# Patient Record
Sex: FEMALE | Race: WHITE | NOT HISPANIC OR LATINO | Employment: UNEMPLOYED | URBAN - METROPOLITAN AREA
[De-identification: names, ages, dates, MRNs, and addresses within clinical notes are randomized per-mention and may not be internally consistent; named-entity substitution may affect disease eponyms.]

---

## 2017-11-15 ENCOUNTER — TRANSCRIBE ORDERS (OUTPATIENT)
Dept: ADMINISTRATIVE | Facility: HOSPITAL | Age: 52
End: 2017-11-15

## 2017-11-15 ENCOUNTER — APPOINTMENT (OUTPATIENT)
Dept: LAB | Facility: HOSPITAL | Age: 52
End: 2017-11-15
Payer: MEDICARE

## 2017-11-15 DIAGNOSIS — N39.0 URINARY TRACT INFECTION WITHOUT HEMATURIA, SITE UNSPECIFIED: Primary | ICD-10-CM

## 2017-11-15 LAB
BACTERIA UR QL AUTO: ABNORMAL /HPF
BILIRUB UR QL STRIP: NEGATIVE
CLARITY UR: ABNORMAL
COLOR UR: YELLOW
GLUCOSE UR STRIP-MCNC: NEGATIVE MG/DL
HGB UR QL STRIP.AUTO: NEGATIVE
KETONES UR STRIP-MCNC: NEGATIVE MG/DL
LEUKOCYTE ESTERASE UR QL STRIP: ABNORMAL
NITRITE UR QL STRIP: NEGATIVE
NON-SQ EPI CELLS URNS QL MICRO: ABNORMAL /HPF
PH UR STRIP.AUTO: 5.5 [PH] (ref 4.5–8)
PROT UR STRIP-MCNC: NEGATIVE MG/DL
RBC #/AREA URNS AUTO: ABNORMAL /HPF
SP GR UR STRIP.AUTO: >=1.03 (ref 1–1.03)
UROBILINOGEN UR QL STRIP.AUTO: 0.2 E.U./DL
WBC #/AREA URNS AUTO: ABNORMAL /HPF

## 2017-11-15 PROCEDURE — 81001 URINALYSIS AUTO W/SCOPE: CPT | Performed by: FAMILY MEDICINE

## 2017-12-14 ENCOUNTER — OFFICE VISIT (OUTPATIENT)
Dept: URGENT CARE | Facility: CLINIC | Age: 52
End: 2017-12-14
Payer: MEDICARE

## 2017-12-14 PROCEDURE — G0463 HOSPITAL OUTPT CLINIC VISIT: HCPCS

## 2017-12-14 PROCEDURE — 99213 OFFICE O/P EST LOW 20 MIN: CPT

## 2017-12-16 NOTE — PROGRESS NOTES
Assessment  1  Dysuria (788 1) (R30 0)    Discussion/Summary  Discussion Summary:   Patient was encouraged to go the ER and refused patient ambulated out of the office by her daughter at time of discharge  The she did multiple attempts to obtain a urine here in the office and was unsuccessful  Patient was getting frustrated and wanted to be straight cathed however procedure is not possible at this setting  Patient states she will do when she gets home cuff was given to her but must obtain order from PCP  I once again encouraged patient to go to ER due to her symptoms multiple complaints and recent falls and she says she will follow up with her family doctor and left with her daughter hunched over in using cane  Medication Side Effects Reviewed: Possible side effects of new medications were reviewed with the patient/guardian today  Understands and agrees with treatment plan: The treatment plan was reviewed with the patient/guardian  The patient/guardian understands and agrees with the treatment plan   Counseling Documentation With Imm: The patient was counseled regarding instructions for management,-- patient and family education,-- importance of compliance with treatment  Follow Up Instructions: Follow Up with your Primary Care Provider in 1-2 days  If your symptoms worsen, go to the nearest [place] [address] Emergency Department  Chief Complaint  1  Urinary Frequency  Chief Complaint Free Text Note Form: Pt reports 3 weeks of urinary frequency  She saw her PCP 3 weeks ago for her urinary frequency  She takes nitrofuratoin BID for prevention of kidney infections per the patient  On Monday she developed dysuria, b/l flank pain and had 1 episode of vomiting yesterday  She is also c/o urinary incontinence  She would also like to be evaluated for a fall today after tripping  She has right knee and right ankle pain  She reports hitting the right side of her head on a plastic tote  Denies LOC        History of Present Illness  HPI: 48 yo female who is here today for possible UTI symptoms and unable to urinate at this time  She has had these symptoms for the past 1 5 weeks and has been doing OTC cranberry pills and juice to help and not changing  She has burning with urination 3-4 days ago, fever two days ago, blood noted in urine per pt at home and dark kerry in color per pt, but unable to urinate at this time  right knee pain, right ankle and foot pain  She has had the symptoms for the past few months with intermittent pain to the areas  Patient is here with her daughter and patient is hunched to the right  Per patient and family member this is her normal posture and her normal behavior  She arrives slightly disheveled sunglasses on nightgown on in Winter boots to the knees  Daughter at bedside well put together  Patient is jumping from 1 complaint to another and then goes on to say she has multiple falls in the past few days and has a long history of falling  Denies hitting her head but has a known aneurysm present denies headache  She has a long history of migraines but that has not bothered her  She has not urinated for last few hours to a goal cup of water has been here and still unable to urinate  She straight caths herself at home  Encouraged patient to go to ER due to the multiple complaints on ability to urinate and patient refused patient states her PCP usually gives her a sterile container and is able to bring her urine to lab once it is collected at home  Continue was given to her today with the understanding that patient must call her family doctor to obtain an order and follow up with them as well  Making to patient's right knee right ankle right foot in due to multiple injuries patient refused for me to evaluate and suggest forget I mention it because I do not Want to go over to the Er   I explained the importance of knowing all details to injuries and the seriousness and sehs topped talking about her falls ans headed to the restroom to try and obtain a urine sample  [facility] Based Practices Required Assessment:  Pain Assessment  the patient states they have pain  The pain is located in the right knee, right ankle  The patient describes the pain as aching  (on a scale of 0 to 10, the patient rates the pain at 7 )  Abuse And Domestic Violence Screen   Domestic violence screen not done today  Reason DV Screen not done: family present   Depression And Suicide Screen  Suicide screen not done today  -- Reason suicide screen not done: family present  Prefered Language is  [place]  Primary Language is  English  Urinary Frequency: Dung Joy presents with complaints of urinary frequency  Associated symptoms include urinary urgency  Review of Systems  Focused-Female:  Constitutional: as noted in HPI  Genitourinary: as noted in HPI  ROS Reviewed:   ROS reviewed  Active Problems  1  Acquired Deformity - Wrist Drop (736 05)   2  Closed Fracture Of The Proximal Phalanx Of The Right Fourth Toe (826 0)   3  Closed Fracture Of The Proximal Phalanx Of The Right Third Toe (826 0)   4  Pain in joint of right wrist (719 43) (M25 531)   5  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)   6  Ulnar Nerve Palsy Of The Right Arm (354 2)    Past Medical History  1  History of Arthritis (V13 4)   2  History of Asthma (493 90) (J45 909)   3  History of Depression (311) (F32 9)   4  History of Disorder of function of stomach (536 9) (K31 9)   5  History of Epilepsy (345 90)   6  History of thyroid disease (V12 29) (Z86 39)  Active Problems And Past Medical History Reviewed: The active problems and past medical history were reviewed and updated today  Family History  Family History    1  Family history of Acute Myocardial Infarction (V17 3)   2  Family history of Arthritis (V17 7)   3  Family history of Breast Cancer (V16 3)   4  Family history of Diabetes Mellitus (V18 0)   5   Family history of Essential Hypertension 6  Family history of Ischemic Stroke (V17 1)  Family History Reviewed: The family history was reviewed and updated today  Social History   · Current Every Day Smoker (305 1)   · Daily Coffee Consumption (4  Cups/Day)   · Daily Tea Consumption (2  Cups/Day)   · Marital History -  (V61 03)   · Physical Disability Affecting Ability To Work  Social History Reviewed: The social history was reviewed and updated today  The social history was reviewed and is unchanged  Surgical History  1  History of  Section   2  History of Gastric Surgery For Morbid Obesity Gastric Stapling   3  History of Intracranial Aneurysm Repair   4  History of Laminectomy Lumbar   5  History of Wrist Surgery  Surgical History Reviewed: The surgical history was reviewed and updated today  Current Meds   1  Amitiza CAPS; Therapy: (Marjorie Iniguez) to Recorded   2  Baclofen 10 MG Oral Tablet; TAKE 1 TABLET 3 TIMES DAILY; Therapy: (Marjorie Iniguez) to Recorded   3  BuPROPion HCl ER (XL) 300 MG Oral Tablet Extended Release 24 Hour; TAKE 1 TABLET DAILY; Therapy: (Marjorie Iniguez) to Recorded   4  Citalopram Hydrobromide 40 MG Oral Tablet; TAKE 1 TABLET DAILY; Therapy: (Marjorie Iniguez) to Recorded   5  Cyclobenzaprine HCl - 5 MG Oral Tablet; Therapy: (Recorded:[id number]) to Recorded   6  Dilaudid-5 1 MG/ML LIQD; Therapy: [id number] to Recorded   7  Gabapentin 600 MG Oral Tablet; TAKE 1 TABLET 4 TIMES DAILY; Therapy: (Marjorie Iniguez) to Recorded   8  Ipratropium-Albuterol 0 5-2 5 (3) MG/3ML Inhalation Solution; Therapy: (Marjorie Iniguez) to Recorded   9  LevETIRAcetam TABS; Therapy: (Marjorie Iniguez) to Recorded   10  Levothyroxine Sodium 125 MCG Oral Tablet; TAKE 1 TABLET DAILY; Therapy: (Marjorie Iniguez) to Recorded   11  LORazepam 1 MG Oral Tablet; TAKE 1 TABLET DAILY AS DIRECTED; Therapy: (Marjorie Iniguez) to Recorded   12  MetOLazone 5 MG Oral Tablet; TAKE 1 TABLET DAILY;   Therapy: (Shasta Lozoya) to Recorded   13  Multivital-M TABS; Therapy: (Shasta Lozoya) to Recorded   14  Nitrofurantoin Macrocrystal 100 MG Oral Capsule; Therapy: (Recorded:[id number]) to Recorded   15  Oxybutynin Chloride 5 MG Oral Tablet; TAKE 1 TABLET 3 TIMES DAILY; Therapy: (Shasta Lozoya) to Recorded   16  Potassium Chloride Rebeca ER 20 MEQ Oral Tablet Extended Release; TAKE 2 TABLETS  TWICE DAILY; Therapy: (Recorded:[id number]) to Recorded   17  SUMAtriptan Succinate 50 MG Oral Tablet; TAKE AS DIRECTED; Therapy: (Shasta Lozoya) to Recorded   18  Temazepam 30 MG Oral Capsule; TAKE 1 CAPSULE AT BEDTIME; Therapy: (Shasta Lozoya) to Recorded   19  TiZANidine HCl - 4 MG Oral Capsule; Therapy: (Shasta Lozoya) to Recorded   20  TraMADol HCl - 50 MG Oral Tablet; TAKE 1 TABLET EVERY 6 HOURS AS NEEDED; Therapy: (Recorded:[id number]) to Recorded    Allergies  1  Iodinated Contrast Media    Vitals  Signs   Recorded: [id number] 06:16PM   Temperature: 98 9 F  Heart Rate: 100  Respiration: 16  Systolic: 237  Diastolic: 54  O2 Saturation: 98    Physical Exam   Constitutional  General appearance: Abnormal  -- Unkept and disheveled, wearing sunglasses due to photosensitivity  Cardiovascular  Auscultation of heart: Normal rate and rhythm, normal S1 and S2, without murmurs  Musculoskeletal  Gait and station: Abnormal  -- patient has slow gait hunched over posture and uses cane to ambulate  Digits and nails: Abnormal  -- right foot and ankle were not fully examine due to patient not wanting me to continue assessment of lower extremity  Inspection/palpation of joints, bones, and muscles: Abnormal    Psychiatric  Orientation to person, place, and time: Normal    Mood and affect: Normal    Additional Exam:  patient was unable to give a urine sample today encouraged with the ER patient refused patient will follow up her PCP  Vital signs stable left with her daughter        Signatures   Electronically signed by : AFSHIN Lance; Dec 14 2017  8:46PM EST                       (Author)    Electronically signed by : Agustin Loomis DO; Dec 15 2017  2:37PM EST                       (Co-author)

## 2017-12-24 ENCOUNTER — APPOINTMENT (EMERGENCY)
Dept: CT IMAGING | Facility: HOSPITAL | Age: 52
End: 2017-12-24
Payer: MEDICARE

## 2017-12-24 ENCOUNTER — APPOINTMENT (EMERGENCY)
Dept: RADIOLOGY | Facility: HOSPITAL | Age: 52
End: 2017-12-24
Payer: MEDICARE

## 2017-12-24 ENCOUNTER — HOSPITAL ENCOUNTER (EMERGENCY)
Facility: HOSPITAL | Age: 52
Discharge: LEFT BEFORE DISCHARGE | End: 2017-12-24
Attending: EMERGENCY MEDICINE
Payer: MEDICARE

## 2017-12-24 VITALS
HEART RATE: 92 BPM | SYSTOLIC BLOOD PRESSURE: 124 MMHG | WEIGHT: 150 LBS | OXYGEN SATURATION: 95 % | BODY MASS INDEX: 24.11 KG/M2 | RESPIRATION RATE: 18 BRPM | TEMPERATURE: 96.9 F | HEIGHT: 66 IN | DIASTOLIC BLOOD PRESSURE: 72 MMHG

## 2017-12-24 DIAGNOSIS — N39.0 UTI (URINARY TRACT INFECTION): Primary | ICD-10-CM

## 2017-12-24 DIAGNOSIS — E83.42 HYPOMAGNESEMIA: ICD-10-CM

## 2017-12-24 LAB
ALBUMIN SERPL BCP-MCNC: 2.4 G/DL (ref 3.5–5)
ALP SERPL-CCNC: 133 U/L (ref 46–116)
ALT SERPL W P-5'-P-CCNC: 36 U/L (ref 12–78)
ANION GAP SERPL CALCULATED.3IONS-SCNC: 9 MMOL/L (ref 4–13)
APTT PPP: 33 SECONDS (ref 23–35)
AST SERPL W P-5'-P-CCNC: 29 U/L (ref 5–45)
BACTERIA UR QL AUTO: ABNORMAL /HPF
BASOPHILS # BLD AUTO: 0.03 THOUSANDS/ΜL (ref 0–0.1)
BASOPHILS NFR BLD AUTO: 0 % (ref 0–1)
BILIRUB SERPL-MCNC: 0.2 MG/DL (ref 0.2–1)
BILIRUB UR QL STRIP: NEGATIVE
BUN SERPL-MCNC: 32 MG/DL (ref 5–25)
CALCIUM SERPL-MCNC: 7.6 MG/DL (ref 8.3–10.1)
CHLORIDE SERPL-SCNC: 108 MMOL/L (ref 100–108)
CLARITY UR: ABNORMAL
CO2 SERPL-SCNC: 27 MMOL/L (ref 21–32)
COLOR UR: YELLOW
CREAT SERPL-MCNC: 0.86 MG/DL (ref 0.6–1.3)
EOSINOPHIL # BLD AUTO: 0.13 THOUSAND/ΜL (ref 0–0.61)
EOSINOPHIL NFR BLD AUTO: 1 % (ref 0–6)
ERYTHROCYTE [DISTWIDTH] IN BLOOD BY AUTOMATED COUNT: 21 % (ref 11.6–15.1)
GFR SERPL CREATININE-BSD FRML MDRD: 77 ML/MIN/1.73SQ M
GLUCOSE SERPL-MCNC: 146 MG/DL (ref 65–140)
GLUCOSE UR STRIP-MCNC: NEGATIVE MG/DL
HCT VFR BLD AUTO: 26.6 % (ref 34.8–46.1)
HGB BLD-MCNC: 8 G/DL (ref 11.5–15.4)
HGB UR QL STRIP.AUTO: NEGATIVE
INR PPP: 1.12 (ref 0.86–1.16)
KETONES UR STRIP-MCNC: NEGATIVE MG/DL
LACTATE SERPL-SCNC: 1 MMOL/L (ref 0.5–2)
LEUKOCYTE ESTERASE UR QL STRIP: ABNORMAL
LYMPHOCYTES # BLD AUTO: 1.38 THOUSANDS/ΜL (ref 0.6–4.47)
LYMPHOCYTES NFR BLD AUTO: 15 % (ref 14–44)
MAGNESIUM SERPL-MCNC: 1.4 MG/DL (ref 1.6–2.6)
MCH RBC QN AUTO: 26 PG (ref 26.8–34.3)
MCHC RBC AUTO-ENTMCNC: 30.1 G/DL (ref 31.4–37.4)
MCV RBC AUTO: 86 FL (ref 82–98)
MONOCYTES # BLD AUTO: 0.73 THOUSAND/ΜL (ref 0.17–1.22)
MONOCYTES NFR BLD AUTO: 8 % (ref 4–12)
NEUTROPHILS # BLD AUTO: 7.25 THOUSANDS/ΜL (ref 1.85–7.62)
NEUTS SEG NFR BLD AUTO: 76 % (ref 43–75)
NITRITE UR QL STRIP: POSITIVE
NON-SQ EPI CELLS URNS QL MICRO: ABNORMAL /HPF
PH UR STRIP.AUTO: 5.5 [PH] (ref 4.5–8)
PLATELET # BLD AUTO: 358 THOUSANDS/UL (ref 149–390)
PMV BLD AUTO: 10 FL (ref 8.9–12.7)
POTASSIUM SERPL-SCNC: 3.4 MMOL/L (ref 3.5–5.3)
PROT SERPL-MCNC: 5.5 G/DL (ref 6.4–8.2)
PROT UR STRIP-MCNC: NEGATIVE MG/DL
PROTHROMBIN TIME: 14.2 SECONDS (ref 12.1–14.4)
RBC # BLD AUTO: 3.08 MILLION/UL (ref 3.81–5.12)
RBC #/AREA URNS AUTO: ABNORMAL /HPF
SODIUM SERPL-SCNC: 144 MMOL/L (ref 136–145)
SP GR UR STRIP.AUTO: >=1.03 (ref 1–1.03)
UROBILINOGEN UR QL STRIP.AUTO: 0.2 E.U./DL
WBC # BLD AUTO: 9.52 THOUSAND/UL (ref 4.31–10.16)
WBC #/AREA URNS AUTO: ABNORMAL /HPF
WBC CLUMPS # UR AUTO: PRESENT /UL

## 2017-12-24 PROCEDURE — 83735 ASSAY OF MAGNESIUM: CPT | Performed by: EMERGENCY MEDICINE

## 2017-12-24 PROCEDURE — 87077 CULTURE AEROBIC IDENTIFY: CPT | Performed by: EMERGENCY MEDICINE

## 2017-12-24 PROCEDURE — 99284 EMERGENCY DEPT VISIT MOD MDM: CPT

## 2017-12-24 PROCEDURE — 81001 URINALYSIS AUTO W/SCOPE: CPT | Performed by: EMERGENCY MEDICINE

## 2017-12-24 PROCEDURE — 36415 COLL VENOUS BLD VENIPUNCTURE: CPT | Performed by: EMERGENCY MEDICINE

## 2017-12-24 PROCEDURE — 87086 URINE CULTURE/COLONY COUNT: CPT | Performed by: EMERGENCY MEDICINE

## 2017-12-24 PROCEDURE — 83605 ASSAY OF LACTIC ACID: CPT | Performed by: EMERGENCY MEDICINE

## 2017-12-24 PROCEDURE — 96365 THER/PROPH/DIAG IV INF INIT: CPT

## 2017-12-24 PROCEDURE — 87186 SC STD MICRODIL/AGAR DIL: CPT | Performed by: EMERGENCY MEDICINE

## 2017-12-24 PROCEDURE — 93005 ELECTROCARDIOGRAM TRACING: CPT

## 2017-12-24 PROCEDURE — 87040 BLOOD CULTURE FOR BACTERIA: CPT | Performed by: EMERGENCY MEDICINE

## 2017-12-24 PROCEDURE — 85730 THROMBOPLASTIN TIME PARTIAL: CPT | Performed by: EMERGENCY MEDICINE

## 2017-12-24 PROCEDURE — 70450 CT HEAD/BRAIN W/O DYE: CPT

## 2017-12-24 PROCEDURE — 80053 COMPREHEN METABOLIC PANEL: CPT | Performed by: EMERGENCY MEDICINE

## 2017-12-24 PROCEDURE — 96361 HYDRATE IV INFUSION ADD-ON: CPT

## 2017-12-24 PROCEDURE — 85025 COMPLETE CBC W/AUTO DIFF WBC: CPT | Performed by: EMERGENCY MEDICINE

## 2017-12-24 PROCEDURE — 71010 HB CHEST X-RAY 1 VIEW FRONTAL (PORTABLE): CPT

## 2017-12-24 PROCEDURE — 85610 PROTHROMBIN TIME: CPT | Performed by: EMERGENCY MEDICINE

## 2017-12-24 RX ORDER — CEPHALEXIN 250 MG/1
500 CAPSULE ORAL ONCE
Status: COMPLETED | OUTPATIENT
Start: 2017-12-24 | End: 2017-12-24

## 2017-12-24 RX ORDER — CEPHALEXIN 500 MG/1
500 CAPSULE ORAL EVERY 12 HOURS SCHEDULED
Qty: 20 CAPSULE | Refills: 0 | Status: SHIPPED | OUTPATIENT
Start: 2017-12-25 | End: 2018-10-07

## 2017-12-24 RX ORDER — 0.9 % SODIUM CHLORIDE 0.9 %
3 VIAL (ML) INJECTION AS NEEDED
Status: DISCONTINUED | OUTPATIENT
Start: 2017-12-24 | End: 2017-12-24 | Stop reason: HOSPADM

## 2017-12-24 RX ORDER — MAGNESIUM SULFATE HEPTAHYDRATE 40 MG/ML
2 INJECTION, SOLUTION INTRAVENOUS ONCE
Status: COMPLETED | OUTPATIENT
Start: 2017-12-24 | End: 2017-12-24

## 2017-12-24 RX ADMIN — MAGNESIUM SULFATE HEPTAHYDRATE 2 G: 40 INJECTION, SOLUTION INTRAVENOUS at 17:01

## 2017-12-24 RX ADMIN — CEPHALEXIN 500 MG: 250 CAPSULE ORAL at 17:40

## 2017-12-24 RX ADMIN — SODIUM CHLORIDE 1000 ML: 0.9 INJECTION, SOLUTION INTRAVENOUS at 15:13

## 2017-12-24 NOTE — DISCHARGE INSTRUCTIONS
Hypomagnesemia   AMBULATORY CARE:   Hypomagnesemia  is a condition that develops when the amount of magnesium in your body is too low  Magnesium is a mineral that helps your heart, muscles, and nerves work normally  It also helps strengthen your bones  Signs and symptoms: You may have no signs or symptoms when your levels are only slightly below normal  As your blood levels continue to decrease, you may develop any of the following:  · Muscle weakness     · Muscle tightness, tremors, or twitches     · Irritability or insomnia     · Numbness and tingling    · Severe drowsiness, fatigue, and confusion    · Fast or irregular heart rate    · Seizures  Seek care immediately if:   · You have numbness and tingling in your arms or legs  · You have painful muscle spasms and tremors in your arms or legs  · You are not able to move your muscles, and you have trouble thinking clearly  · Your heartbeat is faster than usual, or is irregular  · You have a seizure  Contact your healthcare provider if:   · You have fatigue and muscle tremors or twitching  · You become irritable and have trouble sleeping  · You have questions or concerns about your condition or care  Treatment:  Magnesium is given to you in the form of a pill if you have no symptoms and tests show you have mild hypomagnesemia  Magnesium will be given to you through an IV if you have moderate to life-threatening hypomagnesemia  Prevent hypomagnesemia:   · Manage health conditions  by following your treatment plan  Health conditions such as congestive heart failure, diabetes, and chronic diarrhea can put you at risk for hypomagnesemia  · Eat foods that contain magnesium every day  Ask your dietitian or healthcare provider how much magnesium you need each day  · Limit or do not drink alcohol  Alcohol can prevent your body from absorbing magnesium   Alcohol also makes your body release large amounts of magnesium through your urine  · You may need to take a magnesium supplement  Ask your healthcare provider which supplement to take and how often to take it  Foods that contain magnesium:   · Almonds, cashews, peanuts, and peanut butter    · Dark green leafy vegetables such as spinach    · Raisins, bananas, apples, broccoli, and carrots    · Soy milk and soy beans    · Black beans and kidney beans    · Whole-wheat bread and brown rice    · Shredded-wheat cereal, oatmeal, and other breakfast cereals fortified with magnesium    · Plain low-fat yogurt and milk    · Cooked halibut  Follow up with your healthcare provider as directed: You may need more tests to monitor your condition  Write down your questions so you remember to ask them during your visits  © 2017 [employer] Information is for End User's use only and may not be sold, redistributed or otherwise used for commercial purposes  All illustrations and images included in CareNotes® are the copyrighted property of A D A M , Inc  or Zach Lewis  The above information is an  only  It is not intended as medical advice for individual conditions or treatments  Talk to your doctor, nurse or pharmacist before following any medical regimen to see if it is safe and effective for you  Urinary Tract Infection in Women   AMBULATORY CARE:   A urinary tract infection (UTI)  is caused by bacteria that get inside your urinary tract  Most bacteria that enter your urinary tract come out when you urinate  If the bacteria stay in your urinary tract, you may get an infection  Your urinary tract includes your kidneys, ureters, bladder, and urethra  Urine is made in your kidneys, and it flows from the ureters to the bladder  Urine leaves the bladder through the urethra  A UTI is more common in your lower urinary tract, which includes your bladder and urethra          Common symptoms include the following:   · Urinating more often or waking from sleep to urinate    · Pain or burning when you urinate    · Pain or pressure in your lower abdomen     · Urine that smells bad    · Blood in your urine    · Leaking urine  Seek care immediately if:   · You are urinating very little or not at all  · You have a high fever with shaking chills  · You have side or back pain that gets worse  Contact your healthcare provider if:   · You have a fever  · You do not feel better after 2 days of taking antibiotics  · You are vomiting  · You have questions or concerns about your condition or care  Treatment for a UTI  may include medicines to treat a bacterial infection  You may also need medicines to decrease pain and burning, or decrease the urge to urinate often  Prevent a UTI:   · Empty your bladder often  Urinate and empty your bladder as soon as you feel the need  Do not hold your urine for long periods of time  · Wipe from front to back after you urinate or have a bowel movement  This will help prevent germs from getting into your urinary tract through your urethra  · Drink liquids as directed  Ask how much liquid to drink each day and which liquids are best for you  You may need to drink more liquids than usual to help flush out the bacteria  Do not drink alcohol, caffeine, or citrus juices  These can irritate your bladder and increase your symptoms  Your healthcare provider may recommend cranberry juice to help prevent a UTI  · Urinate after you have sex  This can help flush out bacteria passed during sex  · Do not douche or use feminine deodorants  These can change the chemical balance in your vagina  · Change sanitary pads or tampons often  This will help prevent germs from getting into your urinary tract  · Do pelvic muscle exercises often  Pelvic muscle exercises may help you start and stop urinating  Strong pelvic muscles may help you empty your bladder easier   Squeeze these muscles tightly for 5 seconds like you are trying to hold back urine  Then relax for 5 seconds  Gradually work up to squeezing for 10 seconds  Do 3 sets of 15 repetitions a day, or as directed  Follow up with your healthcare provider as directed:  Write down your questions so you remember to ask them during your visits  © 2017 [employer] Information is for End User's use only and may not be sold, redistributed or otherwise used for commercial purposes  All illustrations and images included in CareNotes® are the copyrighted property of A D A M , Inc  or [employer]  The above information is an  only  It is not intended as medical advice for individual conditions or treatments  Talk to your doctor, nurse or pharmacist before following any medical regimen to see if it is safe and effective for you  Against Medical Advice   WHAT YOU NEED TO KNOW:   Discharge against medical advice means that you choose to leave the hospital before your healthcare provider recommends that you do  Your healthcare provider may still need to diagnose or treat your condition or your treatment may not be complete  DISCHARGE INSTRUCTIONS:   Risks:  Risks of leaving the hospital before your healthcare providers recommend include the following:  · Your condition may cause other health problems if not treated properly  · You may need to be admitted to the hospital again for the same condition  · Your condition could become life-threatening  Follow up with your healthcare provider as directed:  Write down your questions so you remember to ask them during your visits  © 2017 [address] [employer] Information is for End User's use only and may not be sold, redistributed or otherwise used for commercial purposes  All illustrations and images included in CareNotes® are the copyrighted property of A D A M , Inc  or [employer] Debbie  The above information is an  only   It is not intended as medical advice for individual conditions or treatments  Talk to your doctor, nurse or pharmacist before following any medical regimen to see if it is safe and effective for you

## 2017-12-24 NOTE — ED NOTES
Patient has rambling conversation    Patient is able to answer questions     Galen Sahu, RN  12/24/17 2083

## 2017-12-24 NOTE — ED NOTES
Patient will ambulate to the bathroom but stated she was unable to void    Pt cathed for approx 400cc of dark urine     Tiffany Azar RN  12/24/17 4436

## 2017-12-24 NOTE — ED NOTES
Patient states she is not usually incontinent of urine but was today,     Fiorella Irby RN  12/24/17 7751

## 2017-12-24 NOTE — ED NOTES
Patient straight cathed for a urine specimen    Patient tolerated procedure well Pt states she thinks she may have a yeast infection but no redness or discharge noted     Landy Avitia RN  12/24/17 4396

## 2017-12-26 LAB
ATRIAL RATE: 93 BPM
BACTERIA UR CULT: ABNORMAL
P AXIS: 54 DEGREES
PR INTERVAL: 144 MS
QRS AXIS: 67 DEGREES
QRSD INTERVAL: 90 MS
QT INTERVAL: 418 MS
QTC INTERVAL: 519 MS
T WAVE AXIS: 55 DEGREES
VENTRICULAR RATE: 93 BPM

## 2017-12-27 NOTE — PROGRESS NOTES
Patient returned call  I advised her to stop taking keflex since she isn't feeling much better and I called in a script of Bactrim DS I po BID x 7 days to [facility] in [facility]  I advised patient to f/u with PCP for recheck in 2-3 days

## 2017-12-29 LAB
BACTERIA BLD CULT: NORMAL
BACTERIA BLD CULT: NORMAL

## 2018-01-23 VITALS
HEART RATE: 100 BPM | SYSTOLIC BLOOD PRESSURE: 112 MMHG | RESPIRATION RATE: 16 BRPM | DIASTOLIC BLOOD PRESSURE: 54 MMHG | OXYGEN SATURATION: 98 % | TEMPERATURE: 98.9 F

## 2018-04-03 LAB
ABSOL LYMPHOCYTES (HISTORICAL): 1.6 K/UL (ref 0.5–4)
ALBUMIN SERPL BCP-MCNC: 3.1 G/DL (ref 3–5.2)
ALP SERPL-CCNC: 125 U/L (ref 43–122)
ALT SERPL W P-5'-P-CCNC: 26 U/L (ref 9–52)
ANION GAP SERPL CALCULATED.3IONS-SCNC: 9 MMOL/L (ref 5–14)
AST SERPL W P-5'-P-CCNC: 18 U/L (ref 14–36)
BASOPHILS # BLD AUTO: 0.1 K/UL (ref 0–0.1)
BASOPHILS # BLD AUTO: 1 % (ref 0–1)
BILIRUB SERPL-MCNC: <0.1 MG/DL
BUN SERPL-MCNC: 16 MG/DL (ref 5–25)
CALCIUM SERPL-MCNC: 8.6 MG/DL (ref 8.4–10.2)
CHLORIDE SERPL-SCNC: 102 MEQ/L (ref 97–108)
CO2 SERPL-SCNC: 28 MMOL/L (ref 22–30)
COMMENT (HISTORICAL): ABNORMAL
CREATINE, SERUM (HISTORICAL): 0.55 MG/DL (ref 0.6–1.2)
DEPRECATED RDW RBC AUTO: 19.5 %
EGFR (HISTORICAL): >60 ML/MIN/1.73 M2
EOSINOPHIL # BLD AUTO: 0.1 K/UL (ref 0–0.4)
EOSINOPHIL NFR BLD AUTO: 1 % (ref 0–6)
ERYTHROCYTE SEDIMENTATION RATE (HISTORICAL): 78 MM (ref 1–20)
GLUCOSE SERPL-MCNC: 194 MG/DL (ref 70–99)
GLUCOSE SERPL-MCNC: 52 MG/DL (ref 70–99)
HCT VFR BLD AUTO: 25 % (ref 36–46)
HGB BLD-MCNC: 7.7 G/DL (ref 12–16)
LYMPHOCYTES NFR BLD AUTO: 23 % (ref 25–45)
MCH RBC QN AUTO: 23.8 PG (ref 26–34)
MCHC RBC AUTO-ENTMCNC: 30.6 % (ref 31–36)
MCV RBC AUTO: 78 FL (ref 80–100)
MONOCYTES # BLD AUTO: 0.6 K/UL (ref 0.2–0.9)
MONOCYTES NFR BLD AUTO: 8 % (ref 1–10)
NEUTROPHILS ABS COUNT (HISTORICAL): 4.5 K/UL (ref 1.8–7.8)
NEUTS SEG NFR BLD AUTO: 67 % (ref 45–65)
PLATELET # BLD AUTO: 395 K/MCL (ref 150–450)
POTASSIUM SERPL-SCNC: 4.6 MEQ/L (ref 3.6–5)
PT - I.N. RATIO (HISTORICAL): 1 RATIO(INR)
PT, PATIENT (HISTORICAL): 9.7 SEC (ref 9.2–11.1)
RBC # BLD AUTO: 3.21 M/MCL (ref 4–5.2)
RBC MORPHOLOGY (HISTORICAL): ABNORMAL
SODIUM SERPL-SCNC: 138 MEQ/L (ref 137–147)
TOTAL PROTEIN (HISTORICAL): 5.6 G/DL (ref 5.9–8.4)
WBC # BLD AUTO: 6.9 K/MCL (ref 4.5–11)

## 2018-04-04 LAB
ABSOL LYMPHOCYTES (HISTORICAL): 1.8 K/UL (ref 0.5–4)
ALBUMIN SERPL BCP-MCNC: 2.9 G/DL (ref 3–5.2)
ALP SERPL-CCNC: 202 U/L (ref 43–122)
ALT SERPL W P-5'-P-CCNC: 74 U/L (ref 9–52)
ANION GAP SERPL CALCULATED.3IONS-SCNC: 7 MMOL/L (ref 5–14)
AST SERPL W P-5'-P-CCNC: 92 U/L (ref 14–36)
BASOPHILS # BLD AUTO: 0 % (ref 0–1)
BASOPHILS # BLD AUTO: 0 K/UL (ref 0–0.1)
BILIRUB SERPL-MCNC: 0.1 MG/DL
BUN SERPL-MCNC: 12 MG/DL (ref 5–25)
CALCIUM SERPL-MCNC: 8.7 MG/DL (ref 8.4–10.2)
CHLORIDE SERPL-SCNC: 101 MEQ/L (ref 97–108)
CO2 SERPL-SCNC: 29 MMOL/L (ref 22–30)
COMMENT (HISTORICAL): ABNORMAL
CREATINE, SERUM (HISTORICAL): 0.53 MG/DL (ref 0.6–1.2)
DEPRECATED RDW RBC AUTO: 19.7 %
EGFR (HISTORICAL): >60 ML/MIN/1.73 M2
EOSINOPHIL # BLD AUTO: 0.1 K/UL (ref 0–0.4)
EOSINOPHIL NFR BLD AUTO: 2 % (ref 0–6)
FERRITIN SERPL-MCNC: 8 NG/ML (ref 11–264)
FOLATE SERPL-MCNC: 15.4 NG/ML
GLUCOSE SERPL-MCNC: 70 MG/DL (ref 70–99)
HCT VFR BLD AUTO: 25 % (ref 36–46)
HGB BLD-MCNC: 7.5 G/DL (ref 12–16)
IRON SERPL-MCNC: 26 UG/DL (ref 37–170)
LYMPHOCYTES NFR BLD AUTO: 26 % (ref 25–45)
MCH RBC QN AUTO: 23.4 PG (ref 26–34)
MCHC RBC AUTO-ENTMCNC: 29.9 % (ref 31–36)
MCV RBC AUTO: 78 FL (ref 80–100)
MONOCYTES # BLD AUTO: 0.7 K/UL (ref 0.2–0.9)
MONOCYTES NFR BLD AUTO: 10 % (ref 1–10)
NEUTROPHILS ABS COUNT (HISTORICAL): 4.3 K/UL (ref 1.8–7.8)
NEUTS SEG NFR BLD AUTO: 62 % (ref 45–65)
PERCENT SATURATION (HISTORICAL): 7 % (ref 11–46)
PHOSPHATE SERPL-MCNC: 3.8 MG/DL (ref 2.5–4.8)
PLATELET # BLD AUTO: 394 K/MCL (ref 150–450)
POTASSIUM SERPL-SCNC: 4 MEQ/L (ref 3.6–5)
RBC # BLD AUTO: 3.19 M/MCL (ref 4–5.2)
RBC MORPHOLOGY (HISTORICAL): ABNORMAL
SODIUM SERPL-SCNC: 136 MEQ/L (ref 137–147)
TIBC SERPL-MCNC: 365 UG/DL (ref 265–497)
TOTAL PROTEIN (HISTORICAL): 5.3 G/DL (ref 5.9–8.4)
TSH SERPL DL<=0.05 MIU/L-ACNC: 4.52 UIU/ML (ref 0.47–4.68)
VIT B12 SERPL-MCNC: 776 PG/ML (ref 239–931)
VITAMIN D25-HYDROXY (HISTORICAL): 24.1 NG/ML (ref 30–100)
WBC # BLD AUTO: 6.9 K/MCL (ref 4.5–11)

## 2018-04-05 LAB
ABSOL LYMPHOCYTES (HISTORICAL): 2 K/UL (ref 0.5–4)
ALBUMIN SERPL BCP-MCNC: 3 G/DL (ref 3–5.2)
ALP SERPL-CCNC: 172 U/L (ref 43–122)
ALT SERPL W P-5'-P-CCNC: 56 U/L (ref 9–52)
ANION GAP SERPL CALCULATED.3IONS-SCNC: 7 MMOL/L (ref 5–14)
AST SERPL W P-5'-P-CCNC: 32 U/L (ref 14–36)
BASOPHILS # BLD AUTO: 0.1 K/UL (ref 0–0.1)
BASOPHILS # BLD AUTO: 1 % (ref 0–1)
BILIRUB SERPL-MCNC: <0.1 MG/DL
BUN SERPL-MCNC: 12 MG/DL (ref 5–25)
CALCIUM SERPL-MCNC: 8.7 MG/DL (ref 8.4–10.2)
CHLORIDE SERPL-SCNC: 100 MEQ/L (ref 97–108)
CO2 SERPL-SCNC: 31 MMOL/L (ref 22–30)
COMMENT (HISTORICAL): ABNORMAL
CREATINE, SERUM (HISTORICAL): 0.51 MG/DL (ref 0.6–1.2)
DEPRECATED RDW RBC AUTO: 19.4 %
EGFR (HISTORICAL): >60 ML/MIN/1.73 M2
EOSINOPHIL # BLD AUTO: 0.1 K/UL (ref 0–0.4)
EOSINOPHIL NFR BLD AUTO: 2 % (ref 0–6)
GLUCOSE SERPL-MCNC: 79 MG/DL (ref 70–99)
HCT VFR BLD AUTO: 25.3 % (ref 36–46)
HGB BLD-MCNC: 7.6 G/DL (ref 12–16)
LEVETIRACETA (KEPPRA) (HISTORICAL): 10
LYMPHOCYTES NFR BLD AUTO: 31 % (ref 25–45)
MCH RBC QN AUTO: 23.6 PG (ref 26–34)
MCHC RBC AUTO-ENTMCNC: 30.2 % (ref 31–36)
MCV RBC AUTO: 78 FL (ref 80–100)
MONOCYTES # BLD AUTO: 0.7 K/UL (ref 0.2–0.9)
MONOCYTES NFR BLD AUTO: 11 % (ref 1–10)
NEUTROPHILS ABS COUNT (HISTORICAL): 3.6 K/UL (ref 1.8–7.8)
NEUTS SEG NFR BLD AUTO: 55 % (ref 45–65)
PLATELET # BLD AUTO: 350 K/MCL (ref 150–450)
POTASSIUM SERPL-SCNC: 4 MEQ/L (ref 3.6–5)
RBC # BLD AUTO: 3.23 M/MCL (ref 4–5.2)
RBC MORPHOLOGY (HISTORICAL): ABNORMAL
SODIUM SERPL-SCNC: 138 MEQ/L (ref 137–147)
TOTAL PROTEIN (HISTORICAL): 5.3 G/DL (ref 5.9–8.4)
VANCOMYCIN TROUGH (HISTORICAL): 9.31 UG/ML (ref 10–20)
WBC # BLD AUTO: 6.5 K/MCL (ref 4.5–11)

## 2018-04-06 LAB
ALBUMIN SERPL BCP-MCNC: 3 G/DL (ref 3–5.2)
ALP SERPL-CCNC: 142 U/L (ref 43–122)
ALT SERPL W P-5'-P-CCNC: 51 U/L (ref 9–52)
ANION GAP SERPL CALCULATED.3IONS-SCNC: 11 MMOL/L (ref 5–14)
AST SERPL W P-5'-P-CCNC: 32 U/L (ref 14–36)
BILIRUB SERPL-MCNC: <0.1 MG/DL
BUN SERPL-MCNC: 13 MG/DL (ref 5–25)
CALCIUM SERPL-MCNC: 8.7 MG/DL (ref 8.4–10.2)
CHLORIDE SERPL-SCNC: 99 MEQ/L (ref 97–108)
CO2 SERPL-SCNC: 27 MMOL/L (ref 22–30)
CREATINE, SERUM (HISTORICAL): 0.69 MG/DL (ref 0.6–1.2)
DEPRECATED RDW RBC AUTO: 19.7 %
EGFR (HISTORICAL): >60 ML/MIN/1.73 M2
GLUCOSE SERPL-MCNC: 120 MG/DL (ref 70–99)
HCT VFR BLD AUTO: 24.9 % (ref 36–46)
HGB BLD-MCNC: 7.5 G/DL (ref 12–16)
MAGNESIUM SERPL-MCNC: 1.4 MG/DL (ref 1.6–2.3)
MCH RBC QN AUTO: 23.7 PG (ref 26–34)
MCHC RBC AUTO-ENTMCNC: 30.3 % (ref 31–36)
MCV RBC AUTO: 78 FL (ref 80–100)
PLATELET # BLD AUTO: 353 K/MCL (ref 150–450)
POTASSIUM SERPL-SCNC: 4 MEQ/L (ref 3.6–5)
RBC # BLD AUTO: 3.17 M/MCL (ref 4–5.2)
SODIUM SERPL-SCNC: 137 MEQ/L (ref 137–147)
TOTAL PROTEIN (HISTORICAL): 5.4 G/DL (ref 5.9–8.4)
WBC # BLD AUTO: 6.9 K/MCL (ref 4.5–11)

## 2018-04-07 LAB
ANION GAP SERPL CALCULATED.3IONS-SCNC: 9 MMOL/L (ref 5–14)
BUN SERPL-MCNC: 19 MG/DL (ref 5–25)
CALCIUM SERPL-MCNC: 8.3 MG/DL (ref 8.4–10.2)
CHLORIDE SERPL-SCNC: 97 MEQ/L (ref 97–108)
CO2 SERPL-SCNC: 26 MMOL/L (ref 22–30)
CREATINE, SERUM (HISTORICAL): 0.57 MG/DL (ref 0.6–1.2)
EGFR (HISTORICAL): >60 ML/MIN/1.73 M2
GLUCOSE SERPL-MCNC: 140 MG/DL (ref 70–99)
MAGNESIUM SERPL-MCNC: 1.8 MG/DL (ref 1.6–2.3)
POTASSIUM SERPL-SCNC: 4.2 MEQ/L (ref 3.6–5)
SODIUM SERPL-SCNC: 133 MEQ/L (ref 137–147)
VANCOMYCIN TROUGH (HISTORICAL): 12.37 UG/ML (ref 10–20)

## 2018-04-10 LAB
ABSOL LYMPHOCYTES (HISTORICAL): 2.8 K/UL (ref 0.5–4)
BASOPHILS # BLD AUTO: 0.1 K/UL (ref 0–0.1)
BASOPHILS # BLD AUTO: 1 % (ref 0–1)
DEPRECATED RDW RBC AUTO: 20.4 %
EOSINOPHIL # BLD AUTO: 0.3 K/UL (ref 0–0.4)
EOSINOPHIL NFR BLD AUTO: 4 % (ref 0–6)
HCT VFR BLD AUTO: 26.2 % (ref 36–46)
HGB BLD-MCNC: 7.9 G/DL (ref 12–16)
LYMPHOCYTES NFR BLD AUTO: 34 % (ref 25–45)
MCH RBC QN AUTO: 24.5 PG (ref 26–34)
MCHC RBC AUTO-ENTMCNC: 30.2 % (ref 31–36)
MCV RBC AUTO: 81 FL (ref 80–100)
MONOCYTES # BLD AUTO: 0.7 K/UL (ref 0.2–0.9)
MONOCYTES NFR BLD AUTO: 8 % (ref 1–10)
NEUTROPHILS ABS COUNT (HISTORICAL): 4.4 K/UL (ref 1.8–7.8)
NEUTS SEG NFR BLD AUTO: 53 % (ref 45–65)
PLATELET # BLD AUTO: 348 K/MCL (ref 150–450)
RBC # BLD AUTO: 3.23 M/MCL (ref 4–5.2)
WBC # BLD AUTO: 8.4 K/MCL (ref 4.5–11)

## 2018-07-02 ENCOUNTER — APPOINTMENT (OUTPATIENT)
Dept: WOUND CARE | Facility: CLINIC | Age: 53
End: 2018-07-02

## 2018-07-31 ENCOUNTER — APPOINTMENT (OUTPATIENT)
Dept: WOUND CARE | Facility: HOSPITAL | Age: 53
End: 2018-07-31
Attending: PREVENTIVE MEDICINE
Payer: MEDICARE

## 2018-07-31 PROCEDURE — 99212 OFFICE O/P EST SF 10 MIN: CPT | Performed by: PREVENTIVE MEDICINE

## 2018-10-07 ENCOUNTER — HOSPITAL ENCOUNTER (EMERGENCY)
Facility: HOSPITAL | Age: 53
Discharge: HOME | End: 2018-10-07
Attending: EMERGENCY MEDICINE | Admitting: EMERGENCY MEDICINE
Payer: MEDICARE

## 2018-10-07 ENCOUNTER — APPOINTMENT (EMERGENCY)
Dept: CT IMAGING | Facility: HOSPITAL | Age: 53
End: 2018-10-07
Payer: MEDICARE

## 2018-10-07 ENCOUNTER — OFFICE VISIT (OUTPATIENT)
Dept: URGENT CARE | Facility: CLINIC | Age: 53
End: 2018-10-07
Payer: MEDICARE

## 2018-10-07 VITALS
TEMPERATURE: 97.6 F | HEART RATE: 68 BPM | HEIGHT: 66 IN | BODY MASS INDEX: 28.12 KG/M2 | WEIGHT: 175 LBS | RESPIRATION RATE: 18 BRPM | OXYGEN SATURATION: 97 % | DIASTOLIC BLOOD PRESSURE: 76 MMHG | SYSTOLIC BLOOD PRESSURE: 132 MMHG

## 2018-10-07 DIAGNOSIS — S01.112A LEFT EYELID LACERATION, INITIAL ENCOUNTER: Primary | ICD-10-CM

## 2018-10-07 DIAGNOSIS — S01.119A EYELID LACERATION: ICD-10-CM

## 2018-10-07 DIAGNOSIS — S09.93XA FACIAL INJURY, INITIAL ENCOUNTER: Primary | ICD-10-CM

## 2018-10-07 PROCEDURE — 90715 TDAP VACCINE 7 YRS/> IM: CPT | Performed by: EMERGENCY MEDICINE

## 2018-10-07 PROCEDURE — 70450 CT HEAD/BRAIN W/O DYE: CPT

## 2018-10-07 PROCEDURE — 70486 CT MAXILLOFACIAL W/O DYE: CPT

## 2018-10-07 PROCEDURE — 99213 OFFICE O/P EST LOW 20 MIN: CPT | Performed by: PHYSICIAN ASSISTANT

## 2018-10-07 PROCEDURE — 99285 EMERGENCY DEPT VISIT HI MDM: CPT

## 2018-10-07 PROCEDURE — 90471 IMMUNIZATION ADMIN: CPT

## 2018-10-07 PROCEDURE — G0463 HOSPITAL OUTPT CLINIC VISIT: HCPCS | Performed by: PHYSICIAN ASSISTANT

## 2018-10-07 RX ORDER — CARISOPRODOL 350 MG/1
0.5 TABLET ORAL 3 TIMES DAILY
COMMUNITY
End: 2020-10-02 | Stop reason: HOSPADM

## 2018-10-07 RX ORDER — GABAPENTIN 300 MG/1
300 CAPSULE ORAL 4 TIMES DAILY
COMMUNITY
Start: 2016-02-11

## 2018-10-07 RX ORDER — HYDROMORPHONE HYDROCHLORIDE 1 MG/ML
2.7 SOLUTION ORAL DAILY
COMMUNITY
Start: 2013-10-17 | End: 2020-10-02 | Stop reason: HOSPADM

## 2018-10-07 RX ORDER — GINSENG 100 MG
1 CAPSULE ORAL ONCE
Status: COMPLETED | OUTPATIENT
Start: 2018-10-07 | End: 2018-10-07

## 2018-10-07 RX ADMIN — BACITRACIN ZINC 1 SMALL APPLICATION: 500 OINTMENT TOPICAL at 17:51

## 2018-10-07 RX ADMIN — TETANUS TOXOID, REDUCED DIPHTHERIA TOXOID AND ACELLULAR PERTUSSIS VACCINE, ADSORBED 0.5 ML: 5; 2.5; 8; 8; 2.5 SUSPENSION INTRAMUSCULAR at 16:45

## 2018-10-07 NOTE — PATIENT INSTRUCTIONS
Patient referred to [address] [place] [address] ER for treatment  Travel by private vehicle with daughter

## 2018-10-07 NOTE — PROGRESS NOTES
NAME: Wilian Magallanes is a 47 y o  female  : 1965    MRN: [id number]      Assessment and Plan   Left eyelid laceration, initial encounter [S42 313I]  1  Left eyelid laceration, initial encounter  Transfer to other facility       Due to location of laceration will be referring patient to the ER for suturing     Patient Instructions   Patient Instructions   Patient referred to [facility] ER for treatment  Travel by private vehicle with daughter    Proceed to ER if symptoms worsen  History of Present Illness     Patient presents complaining of a laceration to her left eyelid which she sustained a little bit ago after falling and hitting her face on the railing  She reports that she caught a board that was sticking up which caused her to trip and fall  Patient denies loss of consciousness but reports feeling dizzy and seeing stars afterwards  Review of Systems   Review of Systems   Skin: Positive for wound  Current Medications       Current Outpatient Prescriptions:     baclofen 10 mg tablet, Take 10 mg by mouth 3 (three) times a day , Disp: , Rfl:     bisacodyl (DULCOLAX) 5 mg EC tablet, Take 5 mg by mouth 2 (two) times a day  2 in the AM; 2 in the PM, Disp: , Rfl:     buPROPion (WELLBUTRIN SR) 150 mg 12 hr tablet, Take 150 mg by mouth 2 (two) times a day  2 tabs in AM; one tab in afternoon, Disp: , Rfl:     cephalexin (KEFLEX) 500 mg capsule, Take 1 capsule by mouth every 12 (twelve) hours, Disp: 20 capsule, Rfl: 0    citalopram (CeleXA) 40 mg tablet, Take 40 mg by mouth daily  , Disp: , Rfl:     CRANBERRY PO, Take 4,200 mg by mouth daily  , Disp: , Rfl:     DiphenhydrAMINE HCl (ALLERGY MED PO), Take 25 mg by mouth as needed  [facility], Disp: , Rfl:     fluconazole (DIFLUCAN) 100 mg tablet, Take 100 mg by mouth 2 (two) times a day , Disp: , Rfl:     levETIRAcetam (KEPPRA) 500 mg tablet, Take 500 mg by mouth 2 (two) times a day   3 tabs two times a day, Disp: , Rfl:    levothyroxine 100 mcg tablet, Take 100 mcg by mouth daily  , Disp: , Rfl:     LORazepam (ATIVAN) 1 mg tablet, Take 1 mg by mouth 4 (four) times a day , Disp: , Rfl:     lubiprostone (AMITIZA) 24 mcg capsule, Take 24 mcg by mouth 2 (two) times a day with meals  , Disp: , Rfl:     Magnesium Cl-Calcium Carbonate (SLOW-MAG PO), Take 64 mg by mouth 3 (three) times a day , Disp: , Rfl:     metolazone (ZAROXOLYN) 2 5 mg tablet, Take 2 5 mg by mouth daily  , Disp: , Rfl:     nitrofurantoin (MACROBID) 100 mg capsule, Take 100 mg by mouth 2 (two) times a day , Disp: , Rfl:     omeprazole (PriLOSEC) 40 MG capsule, Take 40 mg by mouth daily  , Disp: , Rfl:     oxybutynin (DITROPAN) 5 mg tablet, Take 5 mg by mouth 3 (three) times a day , Disp: , Rfl:     potassium chloride (K-DUR,KLOR-CON) 20 mEq tablet, Take 40 mEq by mouth 2 (two) times a day , Disp: , Rfl:     SUMAtriptan (IMITREX) 100 mg tablet, Take 100 mg by mouth once as needed for migraine  , Disp: , Rfl:     temazepam (RESTORIL) 22 5 MG capsule, Take 22 5 mg by mouth daily at bedtime  , Disp: , Rfl:     TiZANidine (ZANAFLEX) 4 MG capsule, Take 4 mg by mouth 3 (three) times a day , Disp: , Rfl:     traMADol (ULTRAM) 50 mg tablet, Take 50 mg by mouth 4 (four) times a day , Disp: , Rfl:     Current Allergies     Allergies as of 10/07/2018 - Reviewed 10/07/2018   Allergen Reaction Noted    Bee venom  03/22/2016    Iodinated diagnostic agents  03/22/2016              Past Medical History:   Diagnosis Date    Abnormal gait     Abscess of abdominal cavity (HCC)     Aneurysm ([address] )     Brain with clips x 3 per pt    Arm injury     Right arm- pt present with short FA/wrist  immoblizer present on ED arrival    Cataract     Cellulitis     B/L LE    Chronic back pain     Chronic pain     Disease of thyroid gland     hypothyroid    Edema     B/L LE    Falls frequently     last fall 1 hour ago per pt    Foot drop, left foot     Fracture     Facial bones per pt- left orbit/cheek? pt unsure    Fracture closed, nasal bone     Gastric bypass status for obesity     Hypoglycemia     Memory loss, short term     Migraine     MRSA (methicillin resistant Staphylococcus aureus)     Stomach ulcer        Past Surgical History:   Procedure Laterality Date    BACK SURGERY      lumbar/sacral fusion per pt    CEREBRAL ANEURYSM REPAIR      with clips x3 per pt     SECTION      x 4 per pt    GASTRIC BYPASS      MRSA CULTURE (HISTORICAL)      stomach abscess per pt       No family history on file  Medications have been verified  The following portions of the patient's history were reviewed and updated as appropriate: allergies, current medications, past family history, past medical history, past social history, past surgical history and problem list     Objective   LMP  (LMP Unknown)      Physical Exam     Physical Exam   Constitutional: She appears well-developed and well-nourished  No distress  Skin:   1/2 centimeter transverse laceration extending into the adipose tissue of the left eyelid at the crease  Actively oozing blood  Surrounding area up through eyebrow and then to frontal aspect of forehead is ecchymotic and edematous

## 2018-10-07 NOTE — ED PROVIDER NOTES
H&P Exam - Trauma   Rosanna Almonte 47 y o  female MRN: [id number]  Unit/Bed#: ED 05/ED 05 Encounter: [id number]    Assessment/Plan   Trauma Alert: Trauma Acuity: Trauma Evaluation  Model of Arrival: Trauma Mode of Arrival: Other (Comment) via    Trauma Team: Current Providers  Attending Provider: Toribio Sands MD  Registered Nurse: Yousuf Rudd RN  Consultants:     Trauma Active Problems:  Head trauma, facial contusion, laceration to left eyelid upper    Trauma Plan: CT head, CT face, discussed with plastics for laceration repair    Chief Complaint:   Chief Complaint   Patient presents with    Fall - Major     Patient states she tripped and fell into the raling  Patient has an ecchymotic area over L eye and L cheek  Patient denies any LOC  Patient playing on phone and ddaughter answering all the questions       History of Present Illness   HPI:  Rosanna Almonte is a 47 y o  female who presents with left upper eyelid laceration  Patient had mechanical fall, tripped  Hit railing  No loss of consciousness  Not on blood thinners  Complaining of pain around the eye  Eye itself is not painful  Eyes not red  Was seen in urgent care refer to ER  Minimal headache  No trouble swallowing  No trismus  No neck or back pain  No chest pain  No shortness of breath  No nausea vomiting  Tetanus up-to-date  Mechanism:Details of Incident:  (Patient states she fell into the railing at home, patient denies any LOC   ) Injury Date: 10/07/18 Injury Time: 1535        History provided by:  Patient   used: No      Review of Systems   Constitutional: Negative for chills, diaphoresis and fever  HENT: Negative for congestion and sore throat  Eyes: Negative for photophobia, redness and visual disturbance  Respiratory: Negative for cough, shortness of breath, wheezing and stridor  Cardiovascular: Negative for chest pain, palpitations and leg swelling     Gastrointestinal: Negative for abdominal pain, blood in stool, diarrhea, nausea and vomiting  Genitourinary: Negative for dysuria, frequency and urgency  Musculoskeletal: Negative for neck pain and neck stiffness  Skin: Negative for pallor and rash  Neurological: Positive for headaches  Negative for dizziness, syncope, weakness and light-headedness  All other systems reviewed and are negative  Historical Information     Immunizations:   Immunization History   Administered Date(s) Administered    Tdap 10/07/2018       Past Medical History:   Diagnosis Date    Abnormal gait     Abscess of abdominal cavity (HCC)     Aneurysm ([address] )     Brain with clips x 3 per pt    Arm injury     Right arm- pt present with short FA/wrist  immoblizer present on ED arrival    Cataract     Cellulitis     B/L LE    Chronic back pain     Chronic pain     Disease of thyroid gland     hypothyroid    Edema     B/L LE    Falls frequently     last fall 1 hour ago per pt    Foot drop, left foot     Fracture     Facial bones per pt- left orbit/cheek? pt unsure    Fracture closed, nasal bone     Gastric bypass status for obesity     Hypoglycemia     Memory loss, short term     Migraine     MRSA (methicillin resistant Staphylococcus aureus)     Stomach ulcer      History reviewed  No pertinent family history    Past Surgical History:   Procedure Laterality Date    BACK SURGERY      lumbar/sacral fusion per pt    CEREBRAL ANEURYSM REPAIR      with clips x3 per pt     SECTION      x 4 per pt    GASTRIC BYPASS      MRSA CULTURE (HISTORICAL)      stomach abscess per pt       Social History     Social History    Marital status:      Spouse name: N/A    Number of children: N/A    Years of education: N/A     Social History Main Topics    Smoking status: Former Smoker    Smokeless tobacco: Never Used    Alcohol use 1 2 oz/week     2 Glasses of wine per week    Drug use: No    Sexual activity: Not Asked     Other Topics Concern    None     Social History Narrative    None       Family History: non-contributory    Meds/Allergies   Prior to Admission Medications   Prescriptions Last Dose Informant Patient Reported? Taking? CRANBERRY PO   Yes No   Sig: Take 4,200 mg by mouth daily  DiphenhydrAMINE HCl (ALLERGY MED PO)   Yes No   Sig: Take 25 mg by mouth as needed  [place] [facility]   Hydromorphone HCl (DILAUDID) 1 MG/ML LIQD   Yes Yes   Sig: Take 2 7 mg by mouth daily   LORazepam (ATIVAN) 1 mg tablet   Yes No   Sig: Take 1 mg by mouth 4 (four) times a day  SUMAtriptan (IMITREX) 100 mg tablet   Yes No   Sig: Take 100 mg by mouth once as needed for migraine  Tapentadol HCl (NUCYNTA PO)   Yes Yes   Sig: Take by mouth   TiZANidine (ZANAFLEX) 4 MG capsule   Yes No   Sig: Take 4 mg by mouth 3 (three) times a day  bisacodyl (DULCOLAX) 5 mg EC tablet   Yes No   Sig: Take 5 mg by mouth 2 (two) times a day  2 in the AM; 2 in the PM   buPROPion [facility]) 150 mg 12 hr tablet   Yes No   Sig: Take 150 mg by mouth 2 (two) times a day  2 tabs in AM; one tab in afternoon   carisoprodol (SOMA) 350 mg tablet   Yes No   Sig: Take 0 5 tablets by mouth 3 (three) times a day   citalopram (CeleXA) 40 mg tablet   Yes No   Sig: Take 40 mg by mouth daily  fluconazole (DIFLUCAN) 100 mg tablet   Yes No   Sig: Take 100 mg by mouth as needed     gabapentin (NEURONTIN) 300 mg capsule   Yes Yes   Sig: Take 300 mg by mouth 4 (four) times a day   levETIRAcetam (KEPPRA) 500 mg tablet   Yes No   Sig: Take 500 mg by mouth 2 (two) times a day  3 tabs two times a day   levothyroxine 100 mcg tablet   Yes No   Sig: Take 100 mcg by mouth daily  lubiprostone (AMITIZA) 24 mcg capsule   Yes No   Sig: Take 24 mcg by mouth 2 (two) times a day with meals  metolazone (ZAROXOLYN) 2 5 mg tablet   Yes No   Sig: Take 2 5 mg by mouth     nitrofurantoin (MACROBID) 100 mg capsule   Yes No   Sig: Take 100 mg by mouth 2 (two) times a day     omeprazole (PriLOSEC) 40 MG capsule   Yes No Sig: Take 40 mg by mouth daily  oxybutynin (DITROPAN) 5 mg tablet   Yes No   Sig: Take 5 mg by mouth 3 (three) times a day     potassium chloride (K-DUR,KLOR-CON) 20 mEq tablet   Yes No   Sig: Take 40 mEq by mouth 2 (two) times a day     temazepam (RESTORIL) 22 5 MG capsule   Yes No   Sig: Take 22 5 mg by mouth daily at bedtime  Facility-Administered Medications: None       Allergies   Allergen Reactions    Bee Venom     Iodinated Diagnostic Agents        PHYSICAL EXAM        Objective   Vitals:   First set: Temperature: (!) 96 1 °F (35 6 °C) (10/07/18 1631)  Pulse: 72 (10/07/18 1630)  Respirations: 20 (10/07/18 1631)  Blood Pressure: 134/75 (10/07/18 1631)  SpO2: 96 % (10/07/18 1630)    Primary Survey:   (A) Airway:  Intact  (B) Breathing:  Equal bilaterally  (C) Circulation: Pulses:   normal  (D) Disabliity:  GCS Total:  15  (E) Expose:  Completed    Secondary Survey: (Click on Physical Exam tab above)  Physical Exam   Constitutional: She is oriented to person, place, and time  She appears well-developed and well-nourished  No distress  HENT:   Head: Normocephalic  Abrasion over the left cheekbone, left side of forehead  Laceration to upper eyelid on the left, does not go through all the way  Eyes: Pupils are equal, round, and reactive to light  Conjunctivae and EOM are normal    Neck: Neck supple  No C-spine tenderness  Pulmonary/Chest: Effort normal  No respiratory distress  Abdominal: Soft  Musculoskeletal: Normal range of motion  She exhibits no edema, tenderness or deformity  Neurological: She is alert and oriented to person, place, and time  No cranial nerve deficit  She exhibits normal muscle tone  Coordination normal    Skin: Skin is warm  She is not diaphoretic         Invasive Devices     Peripheral Intravenous Line            Peripheral IV 12/24/17 Right Forearm 289 days                Lab Results:   Results Reviewed     None                 Imaging Studies:   CT facial bones wo contrast   Final Result by Rowdy Gage MD (10/07 1716)   1  Negative for facial bone fracture  2   Soft tissue swelling and hematoma as above  Workstation performed: [id number]         CT head without contrast   Final Result by Rowdy Gage MD (10/07 1707)      No acute intracranial abnormality  Workstation performed: [id number]                 Code Status: No Order  Advance Directive and Living Will:      Power of :    POLST:      Procedures  Procedures       Phone Contacts  ED Phone Contact     ED Course       Discussed case with Angie Royal from plastics  Explained that I do not feel comfortable repairing this in the ER  Dr Angie Royal recommends for patient to follow up in the morning in the office  States that laceration can wait until tomorrow to be fixed  Patient and daughter agreeable with plan  MDM  CritCare Time        Disposition  Final diagnoses:   Facial injury, initial encounter   Eyelid laceration     Time reflects when diagnosis was documented in both MDM as applicable and the Disposition within this note     Time User Action Codes Description Comment    10/7/2018  5:46 PM [address] [facility], [place] Add [[id number]] Facial injury, initial encounter     10/7/2018  5:46 PM [address] [facility], [place] [[id number]] Eyelid laceration       ED Disposition     ED Disposition Condition Comment    Discharge  [place] Shane discharge to home/self care      Condition at discharge: Good        Follow-up Information     Follow up With Specialties Details Why Contact Info Additional Information    [address] [facility] Emergency Medicine  As needed, If symptoms worsen [facility]  [address] [place] [address] [address] [place] [address] [facility], [facility], [address] [place], [place], 2 Maggy Quinn MD Plastic Surgery, Hand Surgery Call please call in morning and get see, let them know ER doctor spoke with Dr Angie Royal and told to be seen same day [facility] [address]           Discharge Medication List as of 10/7/2018  5:47 PM      CONTINUE these medications which have NOT CHANGED    Details   bisacodyl (DULCOLAX) 5 mg EC tablet Take 5 mg by mouth 2 (two) times a day  2 in the AM; 2 in the PM, Until Discontinued, Historical Med      buPROPion (WELLBUTRIN SR) 150 mg 12 hr tablet Take 150 mg by mouth 2 (two) times a day  2 tabs in AM; one tab in afternoon, Until Discontinued, Historical Med      carisoprodol (SOMA) 350 mg tablet Take 0 5 tablets by mouth 3 (three) times a day, Historical Med      citalopram (CeleXA) 40 mg tablet Take 40 mg by mouth daily  , Until Discontinued, Historical Med      CRANBERRY PO Take 4,200 mg by mouth daily  , Until Discontinued, Historical Med      DiphenhydrAMINE HCl (ALLERGY MED PO) Take 25 mg by mouth as needed  [place] [facility], Until Discontinued, Historical Med      fluconazole (DIFLUCAN) 100 mg tablet Take 100 mg by mouth as needed  , Historical Med      gabapentin (NEURONTIN) 300 mg capsule Take 300 mg by mouth 4 (four) times a day, Starting Thu 2/11/2016, Historical Med      Hydromorphone HCl (DILAUDID) 1 MG/ML LIQD Take 2 7 mg by mouth daily, Starting Thu 10/17/2013, Historical Med      levETIRAcetam (KEPPRA) 500 mg tablet Take 500 mg by mouth 2 (two) times a day  3 tabs two times a day, Until Discontinued, Historical Med      levothyroxine 100 mcg tablet Take 100 mcg by mouth daily  , Until Discontinued, Historical Med      LORazepam (ATIVAN) 1 mg tablet Take 1 mg by mouth 4 (four) times a day , Until Discontinued, Historical Med      lubiprostone (AMITIZA) 24 mcg capsule Take 24 mcg by mouth 2 (two) times a day with meals  , Until Discontinued, Historical Med      metolazone (ZAROXOLYN) 2 5 mg tablet Take 2 5 mg by mouth  , Historical Med      nitrofurantoin (MACROBID) 100 mg capsule Take 100 mg by mouth 2 (two) times a day , Until Discontinued, Historical Med      omeprazole (PriLOSEC) 40 MG capsule Take 40 mg by mouth daily  , Until Discontinued, Historical Med      oxybutynin (DITROPAN) 5 mg tablet Take 5 mg by mouth 3 (three) times a day  , Historical Med      potassium chloride (K-DUR,KLOR-CON) 20 mEq tablet Take 40 mEq by mouth 2 (two) times a day  , Historical Med      SUMAtriptan (IMITREX) 100 mg tablet Take 100 mg by mouth once as needed for migraine , Until Discontinued, Historical Med      Tapentadol HCl (NUCYNTA PO) Take by mouth, Historical Med      temazepam (RESTORIL) 22 5 MG capsule Take 22 5 mg by mouth daily at bedtime  , Until Discontinued, Historical Med      TiZANidine (ZANAFLEX) 4 MG capsule Take 4 mg by mouth 3 (three) times a day , Until Discontinued, Historical Med           No discharge procedures on file        ED Provider  Electronically Signed by         Jake Gomez MD  10/10/18 [id number]

## 2018-10-07 NOTE — DISCHARGE INSTRUCTIONS
Facial Laceration   WHAT YOU NEED TO KNOW:   A facial laceration is a tear or cut in the skin caused by blunt or shearing forces, or sharp objects  Facial lacerations may be closed within 24 hours of injury  DISCHARGE INSTRUCTIONS:   Return to the emergency department if:   · You have a fever and the wound is painful, warm, or swollen  The wound area may be red, or fluid may come out of it  · You have heavy bleeding or bleeding that does not stop after 10 minutes of holding firm, direct pressure over the wound  Contact your healthcare provider if:   · Your wound reopens or your tape comes off  · Your wound is very painful  · Your wound is not healing, or you think there is an object in the wound  · The skin around your wound stays numb  · You have questions or concerns about your condition or care  Medicines:   · Antibiotics  may be given to prevent an infection if your wound was deep and had to be cleaned out  · Take your medicine as directed  Contact your healthcare provider if you think your medicine is not helping or if you have side effects  Tell him of her if you are allergic to any medicine  Keep a list of the medicines, vitamins, and herbs you take  Include the amounts, and when and why you take them  Bring the list or the pill bottles to follow-up visits  Carry your medicine list with you in case of an emergency  Care for your wound:  Care for your wound as directed to prevent infection and help it heal  Wash your hands with soap and warm water before and after you care for your wound  You may need to keep the wound dry for the first 24 to 48 hours  When your healthcare provider says it is okay, wash around your wound with soap and water, or as directed  Gently pat the area dry  Do not use alcohol or hydrogen peroxide to clean your wound unless you are directed to  · Do not take aspirin or NSAIDs for 24 hours after being injured  Aspirin and NSAIDs can increase blood flow   Your laceration may continue to bleed  · Do not take hot showers, eat or drink hot foods and liquids for 48 hours after being injured  Also, do not use a heating pad near your laceration  The heat can cause swelling in and around your laceration  · If your wound was covered with a bandage,  leave your bandage on as long as directed  Bandages keep your wound clean and protected  They can also prevent swelling  Ask when and how to change your bandage  Be careful not to apply the bandage or tape too tightly  This could cut off blood flow and cause more injury  · If your wound was closed with stitches,  keep your wound clean  Your healthcare provider may recommend that you apply antibiotic ointment after you clean your wound  · If your wound was closed with wound tape or medical strips,  keep the area clean and dry  The strips will usually fall off on their own after several days  · If your wound was closed with tissue glue,  do not use any ointments or lotions on the area  You may shower, but do not swim or soak in a bathtub  Gently pat the area dry after you take a shower  Do not pick at or scrub the glue area  Decrease scarring: The skin in the area of your wound may turn a different color if it is exposed to direct sunlight  After your wound is healed, use sunscreen over the area when you are out in the sun  You should do this for at least 6 months to 1 year after your injury  Some wounds scar less if they are covered while they heal   Follow up with your healthcare provider as directed: You may need to follow up with your healthcare provider in 24 to 48 hours to have your wound checked for infection  You may need to return in 3 to 5 days if you have stitches that need to be removed  Write down your questions so you remember to ask them during your visits    © 2017 [employer] Information is for End User's use only and may not be sold, redistributed or otherwise used for commercial purposes  All illustrations and images included in CareNotes® are the copyrighted property of A [employer] M [employer]  or Zach Lewis  The above information is an  only  It is not intended as medical advice for individual conditions or treatments  Talk to your doctor, nurse or pharmacist before following any medical regimen to see if it is safe and effective for you

## 2018-10-08 NOTE — TRAUMA DOCUMENTATION
Patient has ecchymosis and swelling of left orbital region  Patient able to open eye but states it is very painful

## 2019-04-01 ENCOUNTER — APPOINTMENT (OUTPATIENT)
Dept: RADIOLOGY | Facility: CLINIC | Age: 54
End: 2019-04-01
Payer: MEDICARE

## 2019-04-01 ENCOUNTER — OFFICE VISIT (OUTPATIENT)
Dept: URGENT CARE | Facility: CLINIC | Age: 54
End: 2019-04-01
Payer: MEDICARE

## 2019-04-01 VITALS
TEMPERATURE: 99 F | OXYGEN SATURATION: 98 % | HEART RATE: 90 BPM | WEIGHT: 178 LBS | BODY MASS INDEX: 28.61 KG/M2 | SYSTOLIC BLOOD PRESSURE: 167 MMHG | HEIGHT: 66 IN | RESPIRATION RATE: 16 BRPM | DIASTOLIC BLOOD PRESSURE: 95 MMHG

## 2019-04-01 DIAGNOSIS — M25.551 RIGHT HIP PAIN: Primary | ICD-10-CM

## 2019-04-01 DIAGNOSIS — M25.551 RIGHT HIP PAIN: ICD-10-CM

## 2019-04-01 DIAGNOSIS — S70.01XA CONTUSION OF RIGHT HIP, INITIAL ENCOUNTER: ICD-10-CM

## 2019-04-01 PROCEDURE — 73502 X-RAY EXAM HIP UNI 2-3 VIEWS: CPT

## 2019-04-01 PROCEDURE — 99213 OFFICE O/P EST LOW 20 MIN: CPT | Performed by: NURSE PRACTITIONER

## 2019-04-01 PROCEDURE — G0463 HOSPITAL OUTPT CLINIC VISIT: HCPCS | Performed by: NURSE PRACTITIONER

## 2019-04-01 RX ORDER — KETOROLAC TROMETHAMINE 30 MG/ML
30 INJECTION, SOLUTION INTRAMUSCULAR; INTRAVENOUS ONCE
Status: COMPLETED | OUTPATIENT
Start: 2019-04-01 | End: 2019-04-01

## 2019-04-01 RX ADMIN — KETOROLAC TROMETHAMINE 30 MG: 30 INJECTION, SOLUTION INTRAMUSCULAR; INTRAVENOUS at 16:02

## 2019-04-19 ENCOUNTER — HOSPITAL ENCOUNTER (EMERGENCY)
Facility: HOSPITAL | Age: 54
Discharge: HOME | End: 2019-04-19
Admitting: EMERGENCY MEDICINE
Payer: MEDICARE

## 2019-04-19 ENCOUNTER — APPOINTMENT (EMERGENCY)
Dept: RADIOLOGY | Facility: HOSPITAL | Age: 54
End: 2019-04-19
Payer: MEDICARE

## 2019-04-19 VITALS
HEIGHT: 66 IN | RESPIRATION RATE: 20 BRPM | TEMPERATURE: 98.2 F | BODY MASS INDEX: 28.59 KG/M2 | WEIGHT: 177.91 LBS | SYSTOLIC BLOOD PRESSURE: 140 MMHG | OXYGEN SATURATION: 94 % | HEART RATE: 86 BPM | DIASTOLIC BLOOD PRESSURE: 63 MMHG
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
## 2019-04-19 DIAGNOSIS — S76.012A HIP STRAIN, LEFT, INITIAL ENCOUNTER: ICD-10-CM

## 2019-04-19 PROCEDURE — 99283 EMERGENCY DEPT VISIT LOW MDM: CPT | Performed by: PHYSICIAN ASSISTANT

## 2019-04-19 PROCEDURE — 73502 X-RAY EXAM HIP UNI 2-3 VIEWS: CPT

## 2019-04-19 PROCEDURE — 99283 EMERGENCY DEPT VISIT LOW MDM: CPT

## 2019-04-19 PROCEDURE — 96372 THER/PROPH/DIAG INJ SC/IM: CPT

## 2019-04-19 RX ORDER — KETOROLAC TROMETHAMINE 30 MG/ML
30 INJECTION, SOLUTION INTRAMUSCULAR; INTRAVENOUS ONCE
Status: COMPLETED | OUTPATIENT
Start: 2019-04-19 | End: 2019-04-19

## 2019-04-19 RX ADMIN — KETOROLAC TROMETHAMINE 30 MG: 30 INJECTION, SOLUTION INTRAMUSCULAR; INTRAVENOUS at 21:17

## 2019-05-07 ENCOUNTER — OFFICE VISIT (OUTPATIENT)
Dept: OBGYN CLINIC | Facility: CLINIC | Age: 54
End: 2019-05-07
Payer: MEDICARE

## 2019-05-07 ENCOUNTER — TELEPHONE (OUTPATIENT)
Dept: OBGYN CLINIC | Facility: HOSPITAL | Age: 54
End: 2019-05-07

## 2019-05-07 VITALS
WEIGHT: 177 LBS | HEART RATE: 80 BPM | DIASTOLIC BLOOD PRESSURE: 70 MMHG | SYSTOLIC BLOOD PRESSURE: 122 MMHG | BODY MASS INDEX: 28.45 KG/M2 | HEIGHT: 66 IN

## 2019-05-07 DIAGNOSIS — M25.552 PAIN IN LEFT HIP: Primary | ICD-10-CM

## 2019-05-07 PROCEDURE — 99203 OFFICE O/P NEW LOW 30 MIN: CPT | Performed by: ORTHOPAEDIC SURGERY

## 2019-08-14 ENCOUNTER — APPOINTMENT (OUTPATIENT)
Dept: RADIOLOGY | Facility: CLINIC | Age: 54
End: 2019-08-14
Payer: MEDICARE

## 2019-08-14 ENCOUNTER — OFFICE VISIT (OUTPATIENT)
Dept: URGENT CARE | Facility: CLINIC | Age: 54
End: 2019-08-14
Payer: MEDICARE

## 2019-08-14 VITALS
SYSTOLIC BLOOD PRESSURE: 122 MMHG | HEART RATE: 92 BPM | RESPIRATION RATE: 18 BRPM | BODY MASS INDEX: 32.65 KG/M2 | WEIGHT: 196 LBS | HEIGHT: 65 IN | OXYGEN SATURATION: 95 % | DIASTOLIC BLOOD PRESSURE: 76 MMHG | TEMPERATURE: 98.4 F

## 2019-08-14 DIAGNOSIS — M25.561 ACUTE PAIN OF RIGHT KNEE: ICD-10-CM

## 2019-08-14 DIAGNOSIS — M54.50 LUMBAR PAIN: ICD-10-CM

## 2019-08-14 DIAGNOSIS — M79.671 RIGHT FOOT PAIN: ICD-10-CM

## 2019-08-14 DIAGNOSIS — M79.671 RIGHT FOOT PAIN: Primary | ICD-10-CM

## 2019-08-14 PROCEDURE — G0463 HOSPITAL OUTPT CLINIC VISIT: HCPCS

## 2019-08-14 PROCEDURE — 99213 OFFICE O/P EST LOW 20 MIN: CPT

## 2019-08-14 PROCEDURE — 73564 X-RAY EXAM KNEE 4 OR MORE: CPT

## 2019-08-14 PROCEDURE — 72100 X-RAY EXAM L-S SPINE 2/3 VWS: CPT

## 2019-08-14 PROCEDURE — 73630 X-RAY EXAM OF FOOT: CPT

## 2019-08-14 RX ORDER — IPRATROPIUM/ALBUTEROL SULFATE 20-100 MCG
2 MIST INHALER (GRAM) INHALATION EVERY 4 HOURS PRN
Refills: 5 | COMMUNITY
Start: 2019-08-10

## 2019-08-14 RX ORDER — LEVETIRACETAM 500 MG/1
1500 TABLET ORAL 2 TIMES DAILY
Refills: 5 | COMMUNITY
Start: 2019-07-29

## 2019-08-14 RX ORDER — CYANOCOBALAMIN 1000 UG/ML
INJECTION INTRAMUSCULAR; SUBCUTANEOUS
COMMUNITY

## 2019-08-14 NOTE — PROGRESS NOTES
NAME: Rayshawn Chatterjee is a 46 y o  female  : 1965    MRN: [id number]      Assessment and Plan   Right foot pain [M79 318]  1  Right foot pain  XR foot 3+ vw right    CANCELED: XR foot 2 vw right   2  Acute pain of right knee  XR knee 4+ vw right injury   3  Lumbar pain  XR spine lumbar 2 or 3 views injury       Patient has hx of chronic pain  She was able to ambulate into clinic with assistance of cane  She has no neuro complaints and no blood thinners  Will x-ray lumbar, knee and foot as these are the areas where patient had point bony tenderness and had the most complaints  Discussed with patient and daughter how x-rays were difficult to read due to all the degenerative changes  Call tomorrow for final read  Take pain medication as prescribed  Patient is asking for a toradol injection- told patient I will not be giving her one as it has high risk of interaction with the citalopram- daughter agrees  Discussed if she is in this much pain she should really be going to the ER  She is declining  X-ray lumbar spine: no acute fracture noted- severe degenerative changes visualized  Right knee x-ray: no acute fracture noted- degenerative changes  Right foot x-ray: no acute fracture noted - degenerative changes  Patient Instructions   Patient Instructions   Continue pain medication as directed  Ice and heat to the areas  F/u with PCP in 1-2 days   If anything changes or worsens before then, f/u sooner or go to the ER    Proceed to ER if symptoms worsen      Chief Complaint     Chief Complaint   Patient presents with    Fall     2 nights ago; pt fell backwards injurying her R hip, R shoulder, R foot, R knee, and L face         History of Present Illness   Patient with extensive past medical history including seizure disorder, behavioral health history chronic back pain secondary to osteomyelitis in her spine and brain aneurysms which have been surgically removed presents complaining of a fall 2 days ago   Reports she slipped on water and started to fall forward  She states she caught herself on the table but then fell backwards  She reports she thinks she hit her head but did not have any loss of consciousness  She is here with her daughter who did not witness the fall but states she has been complaining of right-sided body pain since  Patient walks with a cane on the right side chronically  States that her right shoulder, right elbow, right hip, right knee and right foot hurt  She denies any headache, change in vision, dizziness, lightheadedness, nausea, vomiting or seizures  She states she has chronic pain but this is worse  Reports several injuries to her foot in the past   Has not taken anything for the pain  Review of Systems   Review of Systems   Musculoskeletal: Positive for arthralgias and back pain  Negative for neck pain and neck stiffness  Neurological: Negative for dizziness, syncope, speech difficulty, light-headedness and headaches  Current Medications       Current Outpatient Medications:     bisacodyl (DULCOLAX) 5 mg EC tablet, Take 5 mg by mouth 2 (two) times a day  2 in the AM; 2 in the PM, Disp: , Rfl:     buPROPion (WELLBUTRIN SR) 150 mg 12 hr tablet, Take 150 mg by mouth 2 (two) times a day  2 tabs in AM; one tab in afternoon, Disp: , Rfl:     carisoprodol (SOMA) 350 mg tablet, Take 0 5 tablets by mouth 3 (three) times a day, Disp: , Rfl:     citalopram (CeleXA) 40 mg tablet, Take 40 mg by mouth daily  , Disp: , Rfl:     CRANBERRY PO, Take 4,200 mg by mouth daily  , Disp: , Rfl:     DiphenhydrAMINE HCl (ALLERGY MED PO), Take 25 mg by mouth as needed  [facility], Disp: , Rfl:     gabapentin (NEURONTIN) 300 mg capsule, Take 300 mg by mouth 4 (four) times a day, Disp: , Rfl:     Hydromorphone HCl (DILAUDID) 1 MG/ML LIQD, Take 2 7 mg by mouth daily, Disp: , Rfl:     levothyroxine 100 mcg tablet, Take 100 mcg by mouth daily  , Disp: , Rfl:     LORazepam (ATIVAN) 1 mg tablet, Take 1 mg by mouth 4 (four) times a day , Disp: , Rfl:     lubiprostone (AMITIZA) 24 mcg capsule, Take 24 mcg by mouth 2 (two) times a day with meals  , Disp: , Rfl:     metolazone (ZAROXOLYN) 2 5 mg tablet, Take 2 5 mg by mouth  , Disp: , Rfl:     nitrofurantoin (MACROBID) 100 mg capsule, Take 100 mg by mouth 2 (two) times a day , Disp: , Rfl:     omeprazole (PriLOSEC) 40 MG capsule, Take 40 mg by mouth daily  , Disp: , Rfl:     oxybutynin (DITROPAN) 5 mg tablet, Take 5 mg by mouth 3 (three) times a day  , Disp: , Rfl:     potassium chloride (K-DUR,KLOR-CON) 20 mEq tablet, Take 40 mEq by mouth 2 (two) times a day  , Disp: , Rfl:     SUMAtriptan (IMITREX) 100 mg tablet, Take 100 mg by mouth once as needed for migraine  , Disp: , Rfl:     Tapentadol HCl (NUCYNTA PO), Take by mouth , Disp: , Rfl:     temazepam (RESTORIL) 22 5 MG capsule, Take 22 5 mg by mouth daily at bedtime  , Disp: , Rfl:     COMBIVENT RESPIMAT inhaler, Inhale 2 puffs every 4 (four) hours as needed, Disp: , Rfl: 5    cyanocobalamin 1,000 mcg/mL, 1 INJECTION MONTHLY, Disp: , Rfl:     levETIRAcetam (KEPPRA) 500 mg tablet, Take 1,500 mg by mouth 2 (two) times a day, Disp: , Rfl: 5    Current Allergies     Allergies as of 08/14/2019 - Reviewed 08/14/2019   Allergen Reaction Noted    Iodinated diagnostic agents Swelling 03/22/2016    Bee venom  03/22/2016    Iodine  08/14/2019              Past Medical History:   Diagnosis Date    Abnormal gait     Abscess of abdominal cavity (HCC)     Aneurysm ([address] )     Brain with clips x 3 per pt    Arm injury     Right arm- pt present with short FA/wrist  immoblizer present on ED arrival    Cataract     Cellulitis     B/L LE    Chronic back pain     Chronic pain     Disease of thyroid gland     hypothyroid    Edema     B/L LE    Falls frequently     last fall 1 hour ago per pt    Foot drop, left foot     Fracture     Facial bones per pt- left orbit/cheek? pt unsure    Fracture closed, nasal bone     Gastric bypass status for obesity     Hypoglycemia     Memory loss, short term     Migraine     MRSA (methicillin resistant Staphylococcus aureus)     Stomach ulcer        Past Surgical History:   Procedure Laterality Date    BACK SURGERY      lumbar/sacral fusion per pt    CEREBRAL ANEURYSM REPAIR      with clips x3 per pt     SECTION      x 4 per pt    GASTRIC BYPASS      MRSA CULTURE (HISTORICAL)      stomach abscess per pt       Family History   Problem Relation Age of Onset    Diabetes Mother     Heart failure Mother     Breast cancer Mother     Heart disease Father          Medications have been verified  The following portions of the patient's history were reviewed and updated as appropriate: allergies, current medications, past family history, past medical history, past social history, past surgical history and problem list     Objective   /76   Pulse 92   Temp 98 4 °F (36 9 °C) (Tympanic)   Resp 18   Ht 5' 5" (1 651 m)   Wt 88 9 kg (196 lb)   LMP  (LMP Unknown)   SpO2 95%   BMI 32 62 kg/m²      Physical Exam     Physical Exam   Constitutional: She is oriented to person, place, and time  She appears well-developed and well-nourished  No distress  HENT:   Head: Normocephalic and atraumatic  Musculoskeletal:   Right shoulder:  Not tender to palpation anywhere in the joint, clavicle or upper arm  Limited range of motion (chronically)  No erythema, edema, ecchymosis or abrasion  Right elbow:  Mild generalized tenderness  No point tenderness  Full flexion and extension with discomfort      Right hip:  Not tender to palpation over the ASIS or pelvis  Tender to palpation over the lumbar spine midline  Tender to palpation over the right PVMs  Patient unable to perform range of motion    Right knee: Without erythema, ecchymosis or abrasion  Chronic edema  Tender to palpation over the patella and lateral aspect    Full flexion and extension  Right ankle and foot:  No ecchymosis or abrasion  Chronic edema and slight erythema per patient and daughter  Reports tenderness over the great toe  Full flexion of great toe  Chronic decreased range of motion of toes and ankle  No tenderness anywhere else in the foot or ankle reported  Pedal pulse 2+  Sensation intact  Capillary refill < 2 seconds  Neurological: She is alert and oriented to person, place, and time  No cranial nerve deficit  Skin: She is not diaphoretic  Vitals reviewed

## 2019-08-15 NOTE — PATIENT INSTRUCTIONS
Continue pain medication as directed  Ice and heat to the areas  F/u with PCP in 1-2 days   If anything changes or worsens before then, f/u sooner or go to the ER

## 2019-12-18 ENCOUNTER — APPOINTMENT (EMERGENCY)
Dept: CT IMAGING | Facility: HOSPITAL | Age: 54
End: 2019-12-18
Payer: MEDICARE

## 2019-12-18 ENCOUNTER — HOSPITAL ENCOUNTER (EMERGENCY)
Facility: HOSPITAL | Age: 54
Discharge: HOME | End: 2019-12-19
Attending: EMERGENCY MEDICINE | Admitting: EMERGENCY MEDICINE
Payer: MEDICARE

## 2019-12-18 VITALS
WEIGHT: 195.99 LBS | RESPIRATION RATE: 16 BRPM | TEMPERATURE: 98.4 F | SYSTOLIC BLOOD PRESSURE: 122 MMHG | OXYGEN SATURATION: 99 % | BODY MASS INDEX: 32.61 KG/M2 | DIASTOLIC BLOOD PRESSURE: 69 MMHG | HEART RATE: 80 BPM

## 2019-12-18 DIAGNOSIS — S20.211A RIB CONTUSION, RIGHT, INITIAL ENCOUNTER: Primary | ICD-10-CM

## 2019-12-18 DIAGNOSIS — N63.41 SUBAREOLAR LUMP OF RIGHT BREAST: ICD-10-CM

## 2019-12-18 DIAGNOSIS — J84.9 LUNG DISEASE, INTERSTITIAL (HCC): ICD-10-CM

## 2019-12-18 PROCEDURE — 96375 TX/PRO/DX INJ NEW DRUG ADDON: CPT

## 2019-12-18 PROCEDURE — 99285 EMERGENCY DEPT VISIT HI MDM: CPT | Performed by: EMERGENCY MEDICINE

## 2019-12-18 PROCEDURE — 99284 EMERGENCY DEPT VISIT MOD MDM: CPT

## 2019-12-18 PROCEDURE — 96374 THER/PROPH/DIAG INJ IV PUSH: CPT

## 2019-12-18 PROCEDURE — 71250 CT THORAX DX C-: CPT

## 2019-12-18 RX ORDER — METOCLOPRAMIDE HYDROCHLORIDE 5 MG/ML
10 INJECTION INTRAMUSCULAR; INTRAVENOUS ONCE
Status: COMPLETED | OUTPATIENT
Start: 2019-12-18 | End: 2019-12-18

## 2019-12-18 RX ORDER — LIDOCAINE 50 MG/G
1 PATCH TOPICAL ONCE
Status: DISCONTINUED | OUTPATIENT
Start: 2019-12-18 | End: 2019-12-19 | Stop reason: HOSPADM

## 2019-12-18 RX ADMIN — METOCLOPRAMIDE 10 MG: 5 INJECTION, SOLUTION INTRAMUSCULAR; INTRAVENOUS at 22:08

## 2019-12-18 RX ADMIN — MORPHINE SULFATE 2 MG: 2 INJECTION, SOLUTION INTRAMUSCULAR; INTRAVENOUS at 22:08

## 2019-12-18 RX ADMIN — LIDOCAINE 1 PATCH: 50 PATCH TOPICAL at 22:07

## 2019-12-19 NOTE — ED PROVIDER NOTES
History  Chief Complaint   Patient presents with    Fall     pt fell and hit right rib on coffee table  Pt states she hit table on side of ribs     19-year-old female presents for evaluation of right lateral chest wall pain beginning after striking the right side of her chest on a coffee table at home around 4:30 p m  Today  Patient reports severe pain in the right side chest which worsens with deep inspiration and with movement  She denies any shortness of breath  No recent cough or congestion  Patient states that her feet were wrapped around a blanket with stricture causing her to fall  She denies loss of consciousness or head strike  Patient history of chronic migraines and states that she has had a migraine that is typical of her usual headaches throughout the day today  Migraine is associated with photophobia  Patient took Tylenol for pain prior to arrival without any significant improvement  History provided by:  Patient  Injury   Location:  Right chest wall  Quality:  Sharp  Severity:  Moderate  Onset quality:  Sudden  Duration:  4 hours  Timing:  Constant  Progression:  Worsening  Chronicity:  New  Context:  Fall, stuck on coffee table  Relieved by:  Nothing  Worsened by: Movement, deep inspiration  Ineffective treatments:  Tylenol  Associated symptoms: chest pain (right chest wall) and headaches    Associated symptoms: no abdominal pain, no congestion, no cough, no diarrhea, no fever, no myalgias, no nausea, no rhinorrhea, no shortness of breath, no sore throat and no vomiting    Headaches:     Severity:  Severe    Onset quality:  Gradual    Duration:  1 day    Timing:  Constant    Progression:  Unchanged    Chronicity:  New      Prior to Admission Medications   Prescriptions Last Dose Informant Patient Reported? Taking? COMBIVENT RESPIMAT inhaler   Yes No   Sig: Inhale 2 puffs every 4 (four) hours as needed   CRANBERRY PO  Child Yes No   Sig: Take 4,200 mg by mouth daily     DiphenhydrAMINE HCl (ALLERGY MED PO)  Child Yes No   Sig: Take 25 mg by mouth as needed  [place] [facility]   Hydromorphone HCl (DILAUDID) 1 MG/ML LIQD  Child Yes No   Sig: Take 2 7 mg by mouth daily   LORazepam (ATIVAN) 1 mg tablet  Child Yes No   Sig: Take 1 mg by mouth 4 (four) times a day  SUMAtriptan (IMITREX) 100 mg tablet  Child Yes No   Sig: Take 100 mg by mouth once as needed for migraine  Tapentadol HCl (NUCYNTA PO)  Child Yes No   Sig: Take by mouth    bisacodyl (DULCOLAX) 5 mg EC tablet  Child Yes No   Sig: Take 5 mg by mouth 2 (two) times a day  2 in the AM; 2 in the PM   buPROPion [facility]) 150 mg 12 hr tablet  Child Yes No   Sig: Take 150 mg by mouth 2 (two) times a day  2 tabs in AM; one tab in afternoon   carisoprodol (SOMA) 350 mg tablet  Child Yes No   Sig: Take 0 5 tablets by mouth 3 (three) times a day   citalopram (CeleXA) 40 mg tablet  Child Yes No   Sig: Take 40 mg by mouth daily  cyanocobalamin 1,000 mcg/mL   Yes No   Si INJECTION MONTHLY   gabapentin (NEURONTIN) 300 mg capsule  Child Yes No   Sig: Take 300 mg by mouth 4 (four) times a day   levETIRAcetam (KEPPRA) 500 mg tablet   Yes No   Sig: Take 1,500 mg by mouth 2 (two) times a day   levothyroxine 100 mcg tablet  Child Yes No   Sig: Take 100 mcg by mouth daily  lubiprostone (AMITIZA) 24 mcg capsule  Child Yes No   Sig: Take 24 mcg by mouth 2 (two) times a day with meals  metolazone (ZAROXOLYN) 2 5 mg tablet  Child Yes No   Sig: Take 2 5 mg by mouth     nitrofurantoin (MACROBID) 100 mg capsule  Child Yes No   Sig: Take 100 mg by mouth 2 (two) times a day  omeprazole (PriLOSEC) 40 MG capsule  Child Yes No   Sig: Take 40 mg by mouth daily     oxybutynin (DITROPAN) 5 mg tablet  Child Yes No   Sig: Take 5 mg by mouth 3 (three) times a day     potassium chloride (K-DUR,KLOR-CON) 20 mEq tablet  Child Yes No   Sig: Take 40 mEq by mouth 2 (two) times a day     temazepam (RESTORIL) 22 5 MG capsule  Child Yes No   Sig: Take 22 5 mg by mouth daily at bedtime  Facility-Administered Medications: None       Past Medical History:   Diagnosis Date    Abnormal gait     Abscess of abdominal cavity (HCC)     Aneurysm (HCC)     Brain with clips x 3 per pt    Arm injury     Right arm- pt present with short FA/wrist  immoblizer present on ED arrival    Cataract     Cellulitis     B/L LE    Chronic back pain     Chronic pain     Disease of thyroid gland     hypothyroid    Edema     B/L LE    Falls frequently     last fall 1 hour ago per pt    Foot drop, left foot     Fracture     Facial bones per pt- left orbit/cheek? pt unsure    Fracture closed, nasal bone     Gastric bypass status for obesity     Hypoglycemia     Memory loss, short term     Migraine     MRSA (methicillin resistant Staphylococcus aureus)     Stomach ulcer        Past Surgical History:   Procedure Laterality Date    BACK SURGERY      lumbar/sacral fusion per pt    CEREBRAL ANEURYSM REPAIR      with clips x3 per pt     SECTION      x 4 per pt    GASTRIC BYPASS      MRSA CULTURE (HISTORICAL)      stomach abscess per pt       Family History   Problem Relation Age of Onset    Diabetes Mother     Heart failure Mother     Breast cancer Mother     Heart disease Father      I have reviewed and agree with the history as documented  Social History     Tobacco Use    Smoking status: Current Every Day Smoker     Packs/day: 0 50     Types: Cigarettes, Cigars    Smokeless tobacco: Never Used   Substance Use Topics    Alcohol use: Not Currently     Comment: social    Drug use: No        Review of Systems   Constitutional: Negative for appetite change, chills and fever  HENT: Negative for congestion, rhinorrhea and sore throat  Respiratory: Negative for cough, chest tightness and shortness of breath  Cardiovascular: Positive for chest pain (right chest wall)  Negative for palpitations and leg swelling     Gastrointestinal: Negative for abdominal pain, constipation, diarrhea, nausea and vomiting  Genitourinary: Negative for dysuria, frequency and hematuria  Musculoskeletal: Negative for myalgias, neck pain and neck stiffness  Skin: Negative for pallor and wound  Neurological: Positive for headaches  Negative for syncope and weakness  All other systems reviewed and are negative  Physical Exam  Physical Exam   Constitutional: She is oriented to person, place, and time  She appears well-developed and well-nourished  Non-toxic appearance  No distress  HENT:   Head: Normocephalic and atraumatic  Eyes: Pupils are equal, round, and reactive to light  Conjunctivae and EOM are normal    Neck: Normal range of motion  Neck supple  No tracheal deviation present  No thyromegaly present  Cardiovascular: Normal rate, regular rhythm, normal heart sounds and intact distal pulses  Pulmonary/Chest: Effort normal and breath sounds normal  She exhibits tenderness  She exhibits no crepitus  Abdominal: Soft  Bowel sounds are normal  She exhibits no distension  There is no tenderness  Musculoskeletal: She exhibits no edema  Lymphadenopathy:     She has no cervical adenopathy  Neurological: She is alert and oriented to person, place, and time  Skin: Skin is warm and dry  She is not diaphoretic  Nursing note and vitals reviewed        Vital Signs  ED Triage Vitals   Temperature Pulse Respirations Blood Pressure SpO2   12/18/19 2116 12/18/19 2113 12/18/19 2113 12/18/19 2113 12/18/19 2113   98 4 °F (36 9 °C) 79 18 (!) 181/85 98 %      Temp Source Heart Rate Source Patient Position - Orthostatic VS BP Location FiO2 (%)   12/18/19 2113 12/18/19 2113 12/18/19 2113 12/18/19 2113 --   Tympanic Monitor Lying Right arm       Pain Score       12/18/19 2113       6           Vitals:    12/18/19 2130 12/18/19 2200 12/18/19 2300 12/18/19 2330   BP: (!) 134/101 122/78 119/67 122/69   Pulse: 72 72 77 80   Patient Position - Orthostatic VS: Lying Lying Lying Lying Visual Acuity      ED Medications  Medications   lidocaine (LIDODERM) 5 % patch 1 patch (1 patch Topical Medication Applied 12/18/19 2207)   metoclopramide (REGLAN) injection 10 mg (10 mg Intravenous Given 12/18/19 2208)   morphine injection 2 mg (2 mg Intravenous Given 12/18/19 2208)       Diagnostic Studies  Results Reviewed     Procedure Component Value Units Date/Time    POCT pregnancy, urine [[id number]]     Lab Status:  No result                  CT chest without contrast   Final Result by Wild Harmon MD (12/18 2327)      Ill-defined scarring and interstitial irregularity noted in the right lobe; this is visible on the prior chest x-ray from 5/2/2018; this may represent recurrent atypical pneumonia or chronic interstitial changes  Subtle centrilobular groundglass nodules are seen in the right lower lobe suggesting an infectious or inflammatory process  Recommend clinical correlation  Subareolar changes are noted in the right breast; recommend mammography if this has not been done recently  Workstation performed: [id number]                    Procedures  Procedures         ED Course                               MDM  Number of Diagnoses or Management Options  Lung disease, interstitial ([facility] [address] ): new and requires workup  Rib contusion, right, initial encounter: new and requires workup  Subareolar lump of right breast: new and requires workup  Diagnosis management comments: 53-year-old female presents for evaluation of right lateral chest wall pain after falling and striking the right side of her chest on a coffee table have 4:30 p m  Matt Mouna Chest wall tenderness on exam   No crepitus  Patient also complaining of migraine  Typical of her usual headaches  No neuro deficits  Reglan given for migraine  Morphine for chest wall pain as well as Lidoderm patch  No fracture seen on CT scan  Likely rib contusion  Incentive spirometry    Patient noticed to have interstitial changes of the right lung infectious versus inflammatory  Patient denies any cough or congestion  She denies chest pain prior to the fall  Consult dental right breast changes also noticed on CT scan  Patient informed of incidental finding  PCP follow-up  Discussed strict return precautions with patient and her daughter who is her caregiver  Amount and/or Complexity of Data Reviewed  Tests in the radiology section of CPT®: ordered and reviewed    Patient Progress  Patient progress: stable        Disposition  Final diagnoses:   Lung disease, interstitial ([address] ) - right lung   Subareolar lump of right breast   Rib contusion, right, initial encounter     Time reflects when diagnosis was documented in both MDM as applicable and the Disposition within this note     Time User Action Codes Description Comment    12/19/2019 12:04 AM Tesha POLLOCK Add [J84 9] Lung disease, interstitial ([address] )     12/19/2019 12:04 AM Kendra Amador Modify [J84 9] Lung disease, interstitial ([address] ) right lung    12/19/2019 12:05 AM Shasta Lozano Add [N63 41] Subareolar lump of right breast     12/19/2019 12:05 AM Kendra Amaodr Add [S20 211A] Rib contusion, right, initial encounter     12/19/2019 12:06 AM Kendra Amador Modify [J84 9] Lung disease, interstitial ([address] ) right lung    12/19/2019 12:06 AM Kendra Amador Modify [S20 211A] Rib contusion, right, initial encounter       ED Disposition     ED Disposition Condition Date/Time Comment    Discharge Stable u Dec 19, 2019 12:06 AM Kenneth Acuna discharge to home/self care              Follow-up Information     Follow up With Specialties Details Why Contact Info Additional Information    [facility] Schedule an appointment as soon as possible for a visit in 2 days for re-evaluation and to schedule mammogram [address] [facility] [address]  [place] [address] Emergency Department Emergency Medicine Go to If symptoms worsen, shortness of breath, severe cough, fever greater than [address]  [address] [facility] [address] [phone]  ED, [address] [place] [address], [facility]and[facility] [address]          Patient's Medications   Discharge Prescriptions    No medications on file     No discharge procedures on file      ED Provider  Electronically Signed by           Jessica Roberson MD  12/19/19 1655

## 2019-12-19 NOTE — ED NOTES
Pt refusing to give urine at this time, states "I have my tubes tied and I haven't had a period for ten years"     Wong Lovett RN  12/18/19 9784

## 2019-12-19 NOTE — DISCHARGE INSTRUCTIONS
Rib Contusion   WHAT YOU NEED TO KNOW:   A rib contusion is a bruise on one or more of your ribs  DISCHARGE INSTRUCTIONS:   Return to the emergency department if:   · You have increased chest pain  · You have shortness of breath  · You start to cough up blood  · Your pain does not improve with pain medicine  Contact your healthcare provider if:   · You have a cough  · You have a fever  · You have questions or concerns about your condition or care  Medicines: You may need any of the following:  · NSAIDs , such as ibuprofen, help decrease swelling, pain, and fever  This medicine is available with or without a doctor's order  NSAIDs can cause stomach bleeding or kidney problems in certain people  If you take blood thinner medicine, always ask if NSAIDs are safe for you  Always read the medicine label and follow directions  Do not give these medicines to children under 10months of age without direction from your child's healthcare provider  · Prescription pain medicine  may be given  Ask how to take this medicine safely  · Take your medicine as directed  Contact your healthcare provider if you think your medicine is not helping or if you have side effects  Tell him of her if you are allergic to any medicine  Keep a list of the medicines, vitamins, and herbs you take  Include the amounts, and when and why you take them  Bring the list or the pill bottles to follow-up visits  Carry your medicine list with you in case of an emergency  Deep breathing:   · To help prevent pneumonia, take 10 deep breaths every hour, even when you wake up during the night  Brace your ribs with your hands or a pillow while you take deep breaths or cough  This will help decrease your pain  · You may need to use an incentive spirometer to help you take deeper breaths  Put the plastic piece into your mouth and take a very deep breath  Hold your breath as long as you can  Then let out your breath   Do this 10 times in a row every hour while you are awake  Rest:  Rest your ribs to decrease swelling and allow the injury to heal faster  Avoid activities that may cause more pain or damage to your ribs  As your pain decreases, begin movements slowly  Ice:  Ice helps decrease swelling and pain  Ice may also help prevent tissue damage  Use an ice pack or put crushed ice in a plastic bag  Cover it with a towel and place it on your bruised area for 15 to 20 minutes every hour as directed  Follow up with your healthcare provider as directed:  Write down your questions so you remember to ask them during your visits  © 2017 [facility] Information is for End User's use only and may not be sold, redistributed or otherwise used for commercial purposes  All illustrations and images included in CareNotes® are the copyrighted property of [employer]  or Zach Lewis  The above information is an  only  It is not intended as medical advice for individual conditions or treatments  Talk to your doctor, nurse or pharmacist before following any medical regimen to see if it is safe and effective for you

## 2020-08-24 ENCOUNTER — APPOINTMENT (EMERGENCY)
Dept: RADIOLOGY | Facility: HOSPITAL | Age: 55
End: 2020-08-24
Payer: MEDICARE

## 2020-08-24 ENCOUNTER — HOSPITAL ENCOUNTER (EMERGENCY)
Facility: HOSPITAL | Age: 55
Discharge: HOME | End: 2020-08-24
Attending: EMERGENCY MEDICINE | Admitting: EMERGENCY MEDICINE
Payer: MEDICARE

## 2020-08-24 VITALS
DIASTOLIC BLOOD PRESSURE: 70 MMHG | BODY MASS INDEX: 33.28 KG/M2 | RESPIRATION RATE: 18 BRPM | HEART RATE: 73 BPM | SYSTOLIC BLOOD PRESSURE: 159 MMHG | WEIGHT: 200 LBS | TEMPERATURE: 98 F | OXYGEN SATURATION: 95 %

## 2020-08-24 DIAGNOSIS — S93.601A RIGHT FOOT SPRAIN: Primary | ICD-10-CM

## 2020-08-24 PROCEDURE — 73630 X-RAY EXAM OF FOOT: CPT

## 2020-08-24 PROCEDURE — 96372 THER/PROPH/DIAG INJ SC/IM: CPT

## 2020-08-24 PROCEDURE — 73610 X-RAY EXAM OF ANKLE: CPT

## 2020-08-24 PROCEDURE — 99283 EMERGENCY DEPT VISIT LOW MDM: CPT

## 2020-08-24 PROCEDURE — 99284 EMERGENCY DEPT VISIT MOD MDM: CPT | Performed by: EMERGENCY MEDICINE

## 2020-08-24 RX ORDER — KETOROLAC TROMETHAMINE 30 MG/ML
30 INJECTION, SOLUTION INTRAMUSCULAR; INTRAVENOUS ONCE
Status: COMPLETED | OUTPATIENT
Start: 2020-08-24 | End: 2020-08-24

## 2020-08-24 RX ORDER — OLANZAPINE 10 MG/1
5 INJECTION, POWDER, LYOPHILIZED, FOR SOLUTION INTRAMUSCULAR ONCE
Status: DISCONTINUED | OUTPATIENT
Start: 2020-08-24 | End: 2020-08-24

## 2020-08-24 RX ADMIN — KETOROLAC TROMETHAMINE 30 MG: 30 INJECTION, SOLUTION INTRAMUSCULAR at 21:07

## 2020-08-24 NOTE — ED PROVIDER NOTES
History  Chief Complaint   Patient presents with    Fall     fall from bed 2 days ago, denies headstrike, denies LOC   c/o right foot pain "I broke all the bones in my right foot"     This is a 79-year-old female who states that she was getting out of bed 2 days ago stumbled and hyper flexed her right foot she heard several snaps now has pain across the dorsal aspect of the right foot denies any head injury no loss of consciousness  She is ambulatory with crutches secondary to chronic back pain      History provided by:  Patient  Medical Problem   Location:  Right foot  Quality:  Sharp pain  Severity:  Severe  Onset quality:  Sudden  Duration:  2 days  Timing:  Constant  Progression:  Unchanged  Chronicity:  New  Context:  Hyperflexion injury to the right foot  Relieved by:  Nothing  Worsened by:  Ambulation      Prior to Admission Medications   Prescriptions Last Dose Informant Patient Reported? Taking? COMBIVENT RESPIMAT inhaler   Yes No   Sig: Inhale 2 puffs every 4 (four) hours as needed   CRANBERRY PO  Child Yes No   Sig: Take 4,200 mg by mouth daily  DiphenhydrAMINE HCl (ALLERGY MED PO)  Child Yes No   Sig: Take 25 mg by mouth as needed  [place] [facility]   Hydromorphone HCl (DILAUDID) 1 MG/ML LIQD  Child Yes No   Sig: Take 2 7 mg by mouth daily   LORazepam (ATIVAN) 1 mg tablet  Child Yes No   Sig: Take 1 mg by mouth 4 (four) times a day  SUMAtriptan (IMITREX) 100 mg tablet  Child Yes No   Sig: Take 100 mg by mouth once as needed for migraine  Tapentadol HCl (NUCYNTA PO)  Child Yes No   Sig: Take by mouth    bisacodyl (DULCOLAX) 5 mg EC tablet  Child Yes No   Sig: Take 5 mg by mouth 2 (two) times a day  2 in the AM; 2 in the PM   buPROPion [facility]) 150 mg 12 hr tablet  Child Yes No   Sig: Take 150 mg by mouth 2 (two) times a day   2 tabs in AM; one tab in afternoon   carisoprodol (SOMA) 350 mg tablet  Child Yes No   Sig: Take 0 5 tablets by mouth 3 (three) times a day   citalopram (CeleXA) 40 mg tablet  Child Yes No   Sig: Take 40 mg by mouth daily  cyanocobalamin 1,000 mcg/mL   Yes No   Si INJECTION MONTHLY   gabapentin (NEURONTIN) 300 mg capsule  Child Yes No   Sig: Take 300 mg by mouth 4 (four) times a day   levETIRAcetam (KEPPRA) 500 mg tablet   Yes No   Sig: Take 1,500 mg by mouth 2 (two) times a day   levothyroxine 100 mcg tablet  Child Yes No   Sig: Take 100 mcg by mouth daily  lubiprostone (AMITIZA) 24 mcg capsule  Child Yes No   Sig: Take 24 mcg by mouth 2 (two) times a day with meals  metolazone (ZAROXOLYN) 2 5 mg tablet  Child Yes No   Sig: Take 2 5 mg by mouth     nitrofurantoin (MACROBID) 100 mg capsule  Child Yes No   Sig: Take 100 mg by mouth 2 (two) times a day  omeprazole (PriLOSEC) 40 MG capsule  Child Yes No   Sig: Take 40 mg by mouth daily  oxybutynin (DITROPAN) 5 mg tablet  Child Yes No   Sig: Take 5 mg by mouth 3 (three) times a day     potassium chloride (K-DUR,KLOR-CON) 20 mEq tablet  Child Yes No   Sig: Take 40 mEq by mouth 2 (two) times a day     temazepam (RESTORIL) 22 5 MG capsule  Child Yes No   Sig: Take 22 5 mg by mouth daily at bedtime        Facility-Administered Medications: None       Past Medical History:   Diagnosis Date    Abnormal gait     Abscess of abdominal cavity (HCC)     Aneurysm (HCC)     Brain with clips x 3 per pt    Arm injury     Right arm- pt present with short FA/wrist  immoblizer present on ED arrival    Cataract     Cellulitis     B/L LE    Chronic back pain     Chronic pain     Disease of thyroid gland     hypothyroid    Edema     B/L LE    Falls frequently     last fall 1 hour ago per pt    Foot drop, left foot     Fracture     Facial bones per pt- left orbit/cheek? pt unsure    Fracture closed, nasal bone     Gastric bypass status for obesity     Hypoglycemia     Memory loss, short term     Migraine     MRSA (methicillin resistant Staphylococcus aureus)     Stomach ulcer        Past Surgical History:   Procedure Laterality Date    BACK SURGERY      lumbar/sacral fusion per pt    CEREBRAL ANEURYSM REPAIR      with clips x3 per pt     SECTION      x 4 per pt    GASTRIC BYPASS      MRSA CULTURE (HISTORICAL)      stomach abscess per pt       Family History   Problem Relation Age of Onset    Diabetes Mother     Heart failure Mother     Breast cancer Mother     Heart disease Father      I have reviewed and agree with the history as documented  E-Cigarette/Vaping    E-Cigarette Use Never User      E-Cigarette/Vaping Substances    Nicotine No     THC No     CBD No     Flavoring No     Other No     Unknown No      Social History     Tobacco Use    Smoking status: Current Every Day Smoker     Packs/day: 0 50     Types: Cigarettes, Cigars    Smokeless tobacco: Never Used   Substance Use Topics    Alcohol use: Not Currently     Comment: social    Drug use: No       Review of Systems   Musculoskeletal: Positive for back pain (Chronic)  Right foot pain   All other systems reviewed and are negative  Physical Exam  Physical Exam  Vitals signs and nursing note reviewed  Constitutional:       General: She is not in acute distress  Appearance: She is not ill-appearing, toxic-appearing or diaphoretic  HENT:      Head: Normocephalic and atraumatic  Right Ear: Tympanic membrane normal       Left Ear: Tympanic membrane normal    Eyes:      General: No scleral icterus  Right eye: No discharge  Left eye: No discharge  Extraocular Movements: Extraocular movements intact  Pupils: Pupils are equal, round, and reactive to light  Neck:      Musculoskeletal: Neck supple  No neck rigidity  Cardiovascular:      Rate and Rhythm: Normal rate and regular rhythm  Pulses: Normal pulses  Heart sounds: No murmur  No friction rub  No gallop  Pulmonary:      Effort: Pulmonary effort is normal  No respiratory distress  Breath sounds: No stridor   No wheezing, rhonchi or rales  Abdominal:      General: There is no distension  Tenderness: There is no abdominal tenderness  There is no guarding  Musculoskeletal:         General: Swelling, tenderness and signs of injury present  Comments: Moderate swelling and tenderness across the dorsal aspect of the right foot pulses in gross sensation intact decreased range of motion secondary to pain   Skin:     General: Skin is warm and dry  Neurological:      Mental Status: She is alert and oriented to person, place, and time  Gait: Gait abnormal    Psychiatric:         Mood and Affect: Mood normal          Behavior: Behavior normal          Vital Signs  ED Triage Vitals [08/24/20 1959]   Temperature Pulse Respirations Blood Pressure SpO2   98 °F (36 7 °C) 84 18 (!) 192/84 96 %      Temp Source Heart Rate Source Patient Position - Orthostatic VS BP Location FiO2 (%)   Temporal Monitor Lying Right arm --      Pain Score       7           Vitals:    08/24/20 1959 08/24/20 2045   BP: (!) 192/84 159/70   Pulse: 84 73   Patient Position - Orthostatic VS: Lying Sitting         Visual Acuity      ED Medications  Medications   ketorolac (TORADOL) injection 30 mg (has no administration in time range)       Diagnostic Studies  Results Reviewed     None                 XR ankle 3+ views RIGHT   Final Result by Haily Vergara MD (08/24 2028)      No acute osseous abnormality  Workstation performed: [id number]         XR foot 3+ views RIGHT   Final Result by Haily Vergara MD (08/24 2028)      No acute osseous abnormality  Workstation performed: [id number]                    Procedures  Procedures         ED Course  ED Course as of Aug 24 2104   Mon Aug 24, 2020   2104 Patient was instructed to call her pain management physician tomorrow for further pain control medication          US AUDIT      Most Recent Value   Initial Alcohol Screen: US AUDIT-C    1   How often do you have a drink containing alcohol?  0 Filed at: 08/24/2020 1958   2  How many drinks containing alcohol do you have on a typical day you are drinking? 0 Filed at: 08/24/2020 1958   3a  Male UNDER 65: How often do you have five or more drinks on one occasion? 0 Filed at: 08/24/2020 1958   3b  FEMALE Any Age, or MALE 65+: How often do you have 4 or more drinks on one occassion? 0 Filed at: 08/24/2020 1958   Audit-C Score  0 Filed at: 08/24/2020 1958                  KRISTEL/DAST-10      Most Recent Value   How many times in the past year have you    Used an illegal drug or used a prescription medication for non-medical reasons? Never Filed at: 08/24/2020 1958                                MDM      Disposition  Final diagnoses:   Right foot sprain     Time reflects when diagnosis was documented in both MDM as applicable and the Disposition within this note     Time User Action Codes Description Comment    8/24/2020  9:03 PM Kaveh Beasley [[id number]] Right foot sprain       ED Disposition     ED Disposition Condition Date/Time Comment    Discharge Stable Mon Aug 24, 2020  9:02 PM Haseeb Sandra discharge to home/self care  Follow-up Information     Follow up With Specialties Details Why Contact Info Additional [address] In 1 week As needed follow-up if pain continues [address] [facility] [id number]  [address] [facility] [address] [facility] [address] [id number] [address], [address] [place] [address], [phone]          Patient's Medications   Discharge Prescriptions    No medications on file     No discharge procedures on file      PDMP Review     None          ED Provider  Electronically Signed by           Megan Castaneda DO  08/24/20 7609

## 2020-08-25 NOTE — ED NOTES
Called [facility] to attempt and get transport for patient  Was told there was no transportation available until the morning  Patient states she will call daughter and attempt to set up ride with her        [facility]  08/24/20 2118

## 2020-09-12 ENCOUNTER — HOSPITAL ENCOUNTER (EMERGENCY)
Facility: HOSPITAL | Age: 55
Discharge: HOME | End: 2020-09-12
Attending: EMERGENCY MEDICINE
Payer: MEDICARE

## 2020-09-12 VITALS
RESPIRATION RATE: 14 BRPM | BODY MASS INDEX: 35.45 KG/M2 | HEART RATE: 79 BPM | TEMPERATURE: 96.7 F | OXYGEN SATURATION: 99 % | DIASTOLIC BLOOD PRESSURE: 123 MMHG | WEIGHT: 213 LBS | SYSTOLIC BLOOD PRESSURE: 215 MMHG

## 2020-09-12 DIAGNOSIS — I10 HYPERTENSION: ICD-10-CM

## 2020-09-12 DIAGNOSIS — G89.29 CHRONIC BACK PAIN: ICD-10-CM

## 2020-09-12 DIAGNOSIS — M54.9 CHRONIC BACK PAIN: ICD-10-CM

## 2020-09-12 DIAGNOSIS — Z76.0 ENCOUNTER FOR MEDICATION REFILL: Primary | ICD-10-CM

## 2020-09-12 PROCEDURE — 96372 THER/PROPH/DIAG INJ SC/IM: CPT

## 2020-09-12 PROCEDURE — 99284 EMERGENCY DEPT VISIT MOD MDM: CPT | Performed by: PHYSICIAN ASSISTANT

## 2020-09-12 PROCEDURE — 99283 EMERGENCY DEPT VISIT LOW MDM: CPT

## 2020-09-12 RX ORDER — ACETAMINOPHEN 500 MG
1000 TABLET ORAL EVERY 6 HOURS PRN
Qty: 30 TABLET | Refills: 0 | Status: SHIPPED | OUTPATIENT
Start: 2020-09-12

## 2020-09-12 RX ORDER — KETOROLAC TROMETHAMINE 30 MG/ML
15 INJECTION, SOLUTION INTRAMUSCULAR; INTRAVENOUS ONCE
Status: COMPLETED | OUTPATIENT
Start: 2020-09-12 | End: 2020-09-12

## 2020-09-12 RX ADMIN — KETOROLAC TROMETHAMINE 15 MG: 30 INJECTION, SOLUTION INTRAMUSCULAR; INTRAVENOUS at 18:28

## 2020-09-12 NOTE — ED PROVIDER NOTES
History  Chief Complaint   Patient presents with    Back Pain     chronic  pt states she has been out of pain meds x2 weeks and hasnt been able to get in contact with  for refill  pt states she has a dilaudid infusion pump in spine     54 yo female presents to the ED for request of pain medication for chronic back pain  Pt reports that she last saw her pain management provider over one month ago and is currently on Nucenta, but has ran out three weeks ago and has been unsuccessful in contacting them to refill her prescription  She has a scheduled appt with pain management in two weeks  She also states that she has a dilaudid pump that was placed years ago and had lasted received refill for the pump over 5 months ago  Pt reports that her pain today is consistent in nature to her pain in the past  She reports chronic bowel irregularity, but denies any acute changes  Denies any fever, chills, diaphoresis, chest pain, and dyspnea  History provided by:  Patient   used: No    Back Pain   Associated symptoms: no abdominal pain, no chest pain, no dysuria, no fever, no headaches, no numbness and no weakness        Prior to Admission Medications   Prescriptions Last Dose Informant Patient Reported? Taking? COMBIVENT RESPIMAT inhaler   Yes No   Sig: Inhale 2 puffs every 4 (four) hours as needed   CRANBERRY PO  Child Yes No   Sig: Take 4,200 mg by mouth daily  DiphenhydrAMINE HCl (ALLERGY MED PO)  Child Yes No   Sig: Take 25 mg by mouth as needed  [place] [facility]   Hydromorphone HCl (DILAUDID) 1 MG/ML LIQD  Child Yes No   Sig: Take 2 7 mg by mouth daily   LORazepam (ATIVAN) 1 mg tablet  Child Yes No   Sig: Take 1 mg by mouth 4 (four) times a day  SUMAtriptan (IMITREX) 100 mg tablet  Child Yes No   Sig: Take 100 mg by mouth once as needed for migraine     Tapentadol HCl (NUCYNTA PO)  Child Yes No   Sig: Take by mouth    bisacodyl (DULCOLAX) 5 mg EC tablet  Child Yes No   Sig: Take 5 mg by mouth 2 (two) times a day  2 in the AM; 2 in the PM   buPROPion [facility] SR) 150 mg 12 hr tablet  Child Yes No   Sig: Take 150 mg by mouth 2 (two) times a day  2 tabs in AM; one tab in afternoon   carisoprodol (SOMA) 350 mg tablet  Child Yes No   Sig: Take 0 5 tablets by mouth 3 (three) times a day   citalopram (CeleXA) 40 mg tablet  Child Yes No   Sig: Take 40 mg by mouth daily  cyanocobalamin 1,000 mcg/mL   Yes No   Si INJECTION MONTHLY   gabapentin (NEURONTIN) 300 mg capsule  Child Yes No   Sig: Take 300 mg by mouth 4 (four) times a day   levETIRAcetam (KEPPRA) 500 mg tablet   Yes No   Sig: Take 1,500 mg by mouth 2 (two) times a day   levothyroxine 100 mcg tablet  Child Yes No   Sig: Take 100 mcg by mouth daily  lubiprostone (AMITIZA) 24 mcg capsule  Child Yes No   Sig: Take 24 mcg by mouth 2 (two) times a day with meals  metolazone (ZAROXOLYN) 2 5 mg tablet  Child Yes No   Sig: Take 2 5 mg by mouth     nitrofurantoin (MACROBID) 100 mg capsule  Child Yes No   Sig: Take 100 mg by mouth 2 (two) times a day  omeprazole (PriLOSEC) 40 MG capsule  Child Yes No   Sig: Take 40 mg by mouth daily  oxybutynin (DITROPAN) 5 mg tablet  Child Yes No   Sig: Take 5 mg by mouth 3 (three) times a day     potassium chloride (K-DUR,KLOR-CON) 20 mEq tablet  Child Yes No   Sig: Take 40 mEq by mouth 2 (two) times a day     temazepam (RESTORIL) 22 5 MG capsule  Child Yes No   Sig: Take 22 5 mg by mouth daily at bedtime        Facility-Administered Medications: None       Past Medical History:   Diagnosis Date    Abnormal gait     Abscess of abdominal cavity (HCC)     Aneurysm (HCC)     Brain with clips x 3 per pt    Arm injury     Right arm- pt present with short FA/wrist  immoblizer present on ED arrival    Cataract     Cellulitis     B/L LE    Chronic back pain     Chronic pain     Disease of thyroid gland     hypothyroid    Edema     B/L LE    Falls frequently     last fall 1 hour ago per pt    Foot drop, left foot  Fracture     Facial bones per pt- left orbit/cheek? pt unsure    Fracture closed, nasal bone     Gastric bypass status for obesity     Hypoglycemia     Memory loss, short term     Migraine     MRSA (methicillin resistant Staphylococcus aureus)     Stomach ulcer        Past Surgical History:   Procedure Laterality Date    BACK SURGERY      lumbar/sacral fusion per pt    CEREBRAL ANEURYSM REPAIR      with clips x3 per pt     SECTION      x 4 per pt    GASTRIC BYPASS      MRSA CULTURE (HISTORICAL)      stomach abscess per pt       Family History   Problem Relation Age of Onset    Diabetes Mother     Heart failure Mother     Breast cancer Mother     Heart disease Father      I have reviewed and agree with the history as documented  E-Cigarette/Vaping    E-Cigarette Use Never User      E-Cigarette/Vaping Substances    Nicotine No     THC No     CBD No     Flavoring No     Other No     Unknown No      Social History     Tobacco Use    Smoking status: Current Every Day Smoker     Packs/day: 0 50     Types: Cigarettes, Cigars    Smokeless tobacco: Never Used   Substance Use Topics    Alcohol use: Not Currently     Comment: social    Drug use: No       Review of Systems   Constitutional: Negative for chills, diaphoresis, fatigue and fever  HENT: Negative for congestion, rhinorrhea and sore throat  Respiratory: Negative for cough and shortness of breath  Cardiovascular: Negative for chest pain and palpitations  Gastrointestinal: Positive for constipation (chronic) and diarrhea (chronic )  Negative for abdominal pain, nausea and vomiting  Genitourinary: Negative for difficulty urinating, dysuria and hematuria  Musculoskeletal: Positive for back pain (chronic) and gait problem (chronic due to back pain)  Negative for joint swelling, myalgias and neck pain  Skin: Negative for color change, rash and wound     Neurological: Negative for dizziness, weakness, light-headedness, numbness and headaches  Hematological: Negative for adenopathy  Does not bruise/bleed easily  Physical Exam  Physical Exam  Vitals signs and nursing note reviewed  Constitutional:       General: She is not in acute distress  Appearance: She is well-developed  She is obese  She is not ill-appearing, toxic-appearing or diaphoretic  HENT:      Head: Normocephalic and atraumatic  Right Ear: External ear normal       Left Ear: External ear normal       Nose: Nose normal  No congestion or rhinorrhea  Mouth/Throat:      Mouth: Mucous membranes are moist       Pharynx: No oropharyngeal exudate  Eyes:      General: No scleral icterus  Right eye: No discharge  Left eye: No discharge  Extraocular Movements: Extraocular movements intact  Conjunctiva/sclera: Conjunctivae normal       Pupils: Pupils are equal, round, and reactive to light  Neck:      Musculoskeletal: No neck rigidity or muscular tenderness  Cardiovascular:      Rate and Rhythm: Normal rate and regular rhythm  Pulses: Normal pulses  Heart sounds: Normal heart sounds  No murmur  Pulmonary:      Effort: Pulmonary effort is normal  No respiratory distress  Breath sounds: Normal breath sounds  No wheezing  Abdominal:      Palpations: Abdomen is soft  Tenderness: There is no abdominal tenderness  Musculoskeletal:         General: No swelling or deformity  Right shoulder: She exhibits decreased range of motion, tenderness and bony tenderness  She exhibits no swelling, no crepitus, no deformity and no laceration  Right lower leg: No edema  Left lower leg: No edema  Comments: Poor mobility and range of motion at back  Pt accompanied by daughter who states that this is "normal" for pt since her injury over ten years ago  Tenderness diffusely throughout bony aspect of thoracic and lumbar spine  Distal sensation to light touch intact and equal b/l  Skin:     General: Skin is warm and dry  Capillary Refill: Capillary refill takes less than 2 seconds  Coloration: Skin is not jaundiced  Findings: No bruising, erythema or rash  Neurological:      Mental Status: She is alert and oriented to person, place, and time  Sensory: No sensory deficit  Coordination: Coordination normal       Gait: Gait abnormal (chronic due to back pain)  Psychiatric:         Mood and Affect: Mood normal          Behavior: Behavior normal          Vital Signs  ED Triage Vitals   Temperature Pulse Respirations Blood Pressure SpO2   09/12/20 1748 09/12/20 1748 09/12/20 1748 09/12/20 1748 09/12/20 1748   (!) 96 7 °F (35 9 °C) 79 14 (!) 215/123 99 %      Temp Source Heart Rate Source Patient Position - Orthostatic VS BP Location FiO2 (%)   09/12/20 1748 09/12/20 1748 09/12/20 1748 09/12/20 1748 --   Tympanic Monitor Lying Left arm       Pain Score       09/12/20 1823       9           Vitals:    09/12/20 1748   BP: (!) 215/123   Pulse: 79   Patient Position - Orthostatic VS: Lying         Visual Acuity      ED Medications  Medications   ketorolac (TORADOL) injection 15 mg (15 mg Intramuscular Given 9/12/20 1828)       Diagnostic Studies  Results Reviewed     None                 No orders to display              Procedures  Procedures         ED Course                           SBIRT 22yo+      Most Recent Value   SBIRT (25 yo +)   In order to provide better care to our patients, we are screening all of our patients for alcohol and drug use  Would it be okay to ask you these screening questions? Yes Filed at: 09/12/2020 1824   Initial Alcohol Screen: US AUDIT-C    1  How often do you have a drink containing alcohol?  0 Filed at: 09/12/2020 1824   2  How many drinks containing alcohol do you have on a typical day you are drinking? 1 Filed at: 09/12/2020 1824   3b  FEMALE Any Age, or MALE 65+: How often do you have 4 or more drinks on one occassion?   0 Filed at: 09/12/2020 1824   Audit-C Score  1 Filed at: 09/12/2020 1824   KRISTEL: How many times in the past year have you    Used an illegal drug or used a prescription medication for non-medical reasons? Never Filed at: 09/12/2020 1824                  MDM  Number of Diagnoses or Management Options  Chronic back pain: new and requires workup  Encounter for medication refill: new and requires workup  Hypertension: new and requires workup  Diagnosis management comments: 54 yo female presents with request to for "pain medicine" until she can see her pain management provider in two weeks  Explained to patient the network policy that ED providers are restricted from refilling chronic pain medicine in the ED  History and physical exam revealed no acute changes in patients condition  Provided pt with toradol and tylenol  Advised to continue follow up with PM as scheduled  F/u with PCP for HTN  Provided patient with strict return precautions  Patient stable at discharge  Vital signs stable at discharge  Discussed in detail any red flag signs and symptoms that would require immediate follow-up care in the emergency room  Instructed to follow-up with primary care provider for reevaluation  Discussed all prescribed medications to include doses, use, and route  Patient was satisfied with discharge plan and was provided the opportunity to inquire about any questions or concerns regarding ED visit          Amount and/or Complexity of Data Reviewed  Review and summarize past medical records: yes  Discuss the patient with other providers: yes  Independent visualization of images, tracings, or specimens: yes    Risk of Complications, Morbidity, and/or Mortality  Presenting problems: moderate  Diagnostic procedures: moderate  Management options: moderate        Disposition  Final diagnoses:   Encounter for medication refill   Chronic back pain   Hypertension     Time reflects when diagnosis was documented in both MDM as applicable and the Disposition within this note     Time User Action Codes Description Comment    9/12/2020  6:29 PM Tramaine Miguelito Beasley [Z76 [id number]] Encounter for medication refill     9/12/2020  6:31 PM Tramaine Beasley [M54 [id number],  G89 29] Chronic back pain     9/17/2020  8:44 PM Kavitha Guzman [facility] Hypertension       ED Disposition     ED Disposition Condition Date/Time Comment    Discharge Stable Sat Sep 12, 2020  6:28 PM Francisco Cordero discharge to home/self care  Follow-up Information     Follow up With Specialties Details Why Contact Info Additional Information    [facility] Physical Therapy Call   [phone]          Discharge Medication List as of 9/12/2020  6:33 PM      START taking these medications    Details   acetaminophen (TYLENOL) 500 mg tablet Take 2 tablets (1,000 mg total) by mouth every 6 (six) hours as needed for mild pain or moderate pain, Starting Sat 9/12/2020, Normal         CONTINUE these medications which have NOT CHANGED    Details   bisacodyl (DULCOLAX) 5 mg EC tablet Take 5 mg by mouth 2 (two) times a day  2 in the AM; 2 in the PM, Historical Med      buPROPion (WELLBUTRIN SR) 150 mg 12 hr tablet Take 150 mg by mouth 2 (two) times a day  2 tabs in AM; one tab in afternoon, Historical Med      carisoprodol (SOMA) 350 mg tablet Take 0 5 tablets by mouth 3 (three) times a day, Historical Med      citalopram (CeleXA) 40 mg tablet Take 40 mg by mouth daily  , Historical Med      COMBIVENT RESPIMAT inhaler Inhale 2 puffs every 4 (four) hours as needed, Starting Sat 8/10/2019, Historical Med      CRANBERRY PO Take 4,200 mg by mouth daily  , Historical Med      cyanocobalamin 1,000 mcg/mL 1 INJECTION MONTHLY, Historical Med      DiphenhydrAMINE HCl (ALLERGY MED PO) Take 25 mg by mouth as needed   [place] [facility], Historical Med      gabapentin (NEURONTIN) 300 mg capsule Take 300 mg by mouth 4 (four) times a day, Starting Thu 2/11/2016, Historical Med Hydromorphone HCl (DILAUDID) 1 MG/ML LIQD Take 2 7 mg by mouth daily, Starting Thu 10/17/2013, Historical Med      levETIRAcetam (KEPPRA) 500 mg tablet Take 1,500 mg by mouth 2 (two) times a day, Starting Mon 7/29/2019, Historical Med      levothyroxine 100 mcg tablet Take 100 mcg by mouth daily  , Historical Med      LORazepam (ATIVAN) 1 mg tablet Take 1 mg by mouth 4 (four) times a day , Historical Med      lubiprostone (AMITIZA) 24 mcg capsule Take 24 mcg by mouth 2 (two) times a day with meals  , Historical Med      metolazone (ZAROXOLYN) 2 5 mg tablet Take 2 5 mg by mouth  , Historical Med      nitrofurantoin (MACROBID) 100 mg capsule Take 100 mg by mouth 2 (two) times a day , Historical Med      omeprazole (PriLOSEC) 40 MG capsule Take 40 mg by mouth daily  , Historical Med      oxybutynin (DITROPAN) 5 mg tablet Take 5 mg by mouth 3 (three) times a day  , Historical Med      potassium chloride (K-DUR,KLOR-CON) 20 mEq tablet Take 40 mEq by mouth 2 (two) times a day  , Historical Med      SUMAtriptan (IMITREX) 100 mg tablet Take 100 mg by mouth once as needed for migraine , Historical Med      Tapentadol HCl (NUCYNTA PO) Take by mouth , Historical Med      temazepam (RESTORIL) 22 5 MG capsule Take 22 5 mg by mouth daily at bedtime  , Historical Med           No discharge procedures on file      PDMP Review       Value Time User    PDMP Reviewed  Yes 9/17/2020 12:42 PM Omi Bean MD          ED Provider  Electronically Signed by           Margaux Anne PA-C  09/17/20 2045

## 2020-09-14 ENCOUNTER — HOSPITAL ENCOUNTER (EMERGENCY)
Facility: HOSPITAL | Age: 55
Discharge: HOME | End: 2020-09-14
Attending: EMERGENCY MEDICINE | Admitting: EMERGENCY MEDICINE
Payer: MEDICARE

## 2020-09-14 ENCOUNTER — APPOINTMENT (EMERGENCY)
Dept: CT IMAGING | Facility: HOSPITAL | Age: 55
End: 2020-09-14
Payer: MEDICARE

## 2020-09-14 VITALS
DIASTOLIC BLOOD PRESSURE: 111 MMHG | SYSTOLIC BLOOD PRESSURE: 233 MMHG | RESPIRATION RATE: 21 BRPM | TEMPERATURE: 98.2 F | HEART RATE: 75 BPM | OXYGEN SATURATION: 99 % | WEIGHT: 209.44 LBS | BODY MASS INDEX: 34.85 KG/M2

## 2020-09-14 DIAGNOSIS — R19.7 DIARRHEA: Primary | ICD-10-CM

## 2020-09-14 LAB
ABO GROUP BLD: NORMAL
ALBUMIN SERPL BCP-MCNC: 4.2 G/DL (ref 3.5–5)
ALP SERPL-CCNC: 121 U/L (ref 46–116)
ALT SERPL W P-5'-P-CCNC: 34 U/L (ref 12–78)
ANION GAP SERPL CALCULATED.3IONS-SCNC: 8 MMOL/L (ref 4–13)
AST SERPL W P-5'-P-CCNC: 22 U/L (ref 5–45)
BASOPHILS # BLD AUTO: 0.05 THOUSANDS/ΜL (ref 0–0.1)
BASOPHILS NFR BLD AUTO: 1 % (ref 0–1)
BILIRUB SERPL-MCNC: 0.2 MG/DL (ref 0.2–1)
BLD GP AB SCN SERPL QL: NEGATIVE
BUN SERPL-MCNC: 9 MG/DL (ref 5–25)
CALCIUM SERPL-MCNC: 8.4 MG/DL (ref 8.3–10.1)
CHLORIDE SERPL-SCNC: 96 MMOL/L (ref 100–108)
CO2 SERPL-SCNC: 26 MMOL/L (ref 21–32)
CREAT SERPL-MCNC: 0.91 MG/DL (ref 0.6–1.3)
EOSINOPHIL # BLD AUTO: 0.15 THOUSAND/ΜL (ref 0–0.61)
EOSINOPHIL NFR BLD AUTO: 2 % (ref 0–6)
ERYTHROCYTE [DISTWIDTH] IN BLOOD BY AUTOMATED COUNT: 17.1 % (ref 11.6–15.1)
GFR SERPL CREATININE-BSD FRML MDRD: 71 ML/MIN/1.73SQ M
GLUCOSE SERPL-MCNC: 121 MG/DL (ref 65–140)
HCT VFR BLD AUTO: 38.3 % (ref 34.8–46.1)
HGB BLD-MCNC: 11.9 G/DL (ref 11.5–15.4)
IMM GRANULOCYTES # BLD AUTO: 0.07 THOUSAND/UL (ref 0–0.2)
IMM GRANULOCYTES NFR BLD AUTO: 1 % (ref 0–2)
LIPASE SERPL-CCNC: 83 U/L (ref 73–393)
LYMPHOCYTES # BLD AUTO: 1.89 THOUSANDS/ΜL (ref 0.6–4.47)
LYMPHOCYTES NFR BLD AUTO: 26 % (ref 14–44)
MCH RBC QN AUTO: 24.6 PG (ref 26.8–34.3)
MCHC RBC AUTO-ENTMCNC: 31.1 G/DL (ref 31.4–37.4)
MCV RBC AUTO: 79 FL (ref 82–98)
MONOCYTES # BLD AUTO: 0.53 THOUSAND/ΜL (ref 0.17–1.22)
MONOCYTES NFR BLD AUTO: 7 % (ref 4–12)
NEUTROPHILS # BLD AUTO: 4.55 THOUSANDS/ΜL (ref 1.85–7.62)
NEUTS SEG NFR BLD AUTO: 63 % (ref 43–75)
NRBC BLD AUTO-RTO: 0 /100 WBCS
PLATELET # BLD AUTO: 347 THOUSANDS/UL (ref 149–390)
PMV BLD AUTO: 10.1 FL (ref 8.9–12.7)
POTASSIUM SERPL-SCNC: 3.7 MMOL/L (ref 3.5–5.3)
PROT SERPL-MCNC: 7.3 G/DL (ref 6.4–8.2)
RBC # BLD AUTO: 4.84 MILLION/UL (ref 3.81–5.12)
RH BLD: POSITIVE
SODIUM SERPL-SCNC: 130 MMOL/L (ref 136–145)
SPECIMEN EXPIRATION DATE: NORMAL
TROPONIN I SERPL-MCNC: <0.02 NG/ML
WBC # BLD AUTO: 7.24 THOUSAND/UL (ref 4.31–10.16)

## 2020-09-14 PROCEDURE — 86901 BLOOD TYPING SEROLOGIC RH(D): CPT | Performed by: EMERGENCY MEDICINE

## 2020-09-14 PROCEDURE — 99284 EMERGENCY DEPT VISIT MOD MDM: CPT | Performed by: EMERGENCY MEDICINE

## 2020-09-14 PROCEDURE — 96374 THER/PROPH/DIAG INJ IV PUSH: CPT

## 2020-09-14 PROCEDURE — G1004 CDSM NDSC: HCPCS

## 2020-09-14 PROCEDURE — 96375 TX/PRO/DX INJ NEW DRUG ADDON: CPT

## 2020-09-14 PROCEDURE — 74176 CT ABD & PELVIS W/O CONTRAST: CPT

## 2020-09-14 PROCEDURE — 86900 BLOOD TYPING SEROLOGIC ABO: CPT | Performed by: EMERGENCY MEDICINE

## 2020-09-14 PROCEDURE — 86850 RBC ANTIBODY SCREEN: CPT | Performed by: EMERGENCY MEDICINE

## 2020-09-14 PROCEDURE — 83690 ASSAY OF LIPASE: CPT | Performed by: EMERGENCY MEDICINE

## 2020-09-14 PROCEDURE — 99285 EMERGENCY DEPT VISIT HI MDM: CPT

## 2020-09-14 PROCEDURE — 80053 COMPREHEN METABOLIC PANEL: CPT | Performed by: EMERGENCY MEDICINE

## 2020-09-14 PROCEDURE — 84484 ASSAY OF TROPONIN QUANT: CPT | Performed by: EMERGENCY MEDICINE

## 2020-09-14 PROCEDURE — 93005 ELECTROCARDIOGRAM TRACING: CPT

## 2020-09-14 PROCEDURE — 36415 COLL VENOUS BLD VENIPUNCTURE: CPT | Performed by: EMERGENCY MEDICINE

## 2020-09-14 PROCEDURE — 85025 COMPLETE CBC W/AUTO DIFF WBC: CPT | Performed by: EMERGENCY MEDICINE

## 2020-09-14 RX ORDER — ONDANSETRON 2 MG/ML
4 INJECTION INTRAMUSCULAR; INTRAVENOUS ONCE
Status: COMPLETED | OUTPATIENT
Start: 2020-09-14 | End: 2020-09-14

## 2020-09-14 RX ORDER — ONDANSETRON 2 MG/ML
1 INJECTION INTRAMUSCULAR; INTRAVENOUS ONCE
Status: COMPLETED | OUTPATIENT
Start: 2020-09-14 | End: 2020-09-14

## 2020-09-14 RX ORDER — KETOROLAC TROMETHAMINE 30 MG/ML
15 INJECTION, SOLUTION INTRAMUSCULAR; INTRAVENOUS ONCE
Status: COMPLETED | OUTPATIENT
Start: 2020-09-14 | End: 2020-09-14

## 2020-09-14 RX ADMIN — KETOROLAC TROMETHAMINE 15 MG: 30 INJECTION, SOLUTION INTRAMUSCULAR at 23:08

## 2020-09-14 RX ADMIN — ONDANSETRON 4 MG: 2 INJECTION INTRAMUSCULAR; INTRAVENOUS at 21:29

## 2020-09-15 ENCOUNTER — HOSPITAL ENCOUNTER (INPATIENT)
Facility: HOSPITAL | Age: 55
LOS: 1 days | DRG: 305 | End: 2020-09-17
Attending: EMERGENCY MEDICINE | Admitting: INTERNAL MEDICINE
Payer: MEDICARE

## 2020-09-15 DIAGNOSIS — I16.0 HYPERTENSIVE URGENCY: ICD-10-CM

## 2020-09-15 DIAGNOSIS — R19.7 NAUSEA VOMITING AND DIARRHEA: ICD-10-CM

## 2020-09-15 DIAGNOSIS — E87.1 HYPONATREMIA: ICD-10-CM

## 2020-09-15 DIAGNOSIS — R11.2 NAUSEA VOMITING AND DIARRHEA: ICD-10-CM

## 2020-09-15 DIAGNOSIS — E87.6 HYPOKALEMIA: Primary | ICD-10-CM

## 2020-09-15 PROBLEM — R26.2 AMBULATORY DYSFUNCTION: Status: ACTIVE | Noted: 2020-09-15

## 2020-09-15 PROBLEM — G89.29 CHRONIC MIDLINE LOW BACK PAIN WITH SCIATICA: Status: ACTIVE | Noted: 2020-09-15

## 2020-09-15 PROBLEM — M54.59 INTRACTABLE LOW BACK PAIN: Status: ACTIVE | Noted: 2020-09-15

## 2020-09-15 PROBLEM — M54.40 CHRONIC MIDLINE LOW BACK PAIN WITH SCIATICA: Status: ACTIVE | Noted: 2020-09-15

## 2020-09-15 PROBLEM — D72.829 LEUKOCYTOSIS: Status: ACTIVE | Noted: 2020-09-15

## 2020-09-15 LAB
ALBUMIN SERPL BCP-MCNC: 3 G/DL (ref 3–5.2)
ALP SERPL-CCNC: 74 U/L (ref 43–122)
ALT SERPL W P-5'-P-CCNC: 30 U/L (ref 9–52)
ANION GAP SERPL CALCULATED.3IONS-SCNC: 10 MMOL/L (ref 5–14)
AST SERPL W P-5'-P-CCNC: 22 U/L (ref 14–36)
ATRIAL RATE: 84 BPM
BILIRUB SERPL-MCNC: 0.2 MG/DL
BUN SERPL-MCNC: 8 MG/DL (ref 5–25)
CALCIUM SERPL-MCNC: 6.4 MG/DL (ref 8.4–10.2)
CHLORIDE SERPL-SCNC: 103 MMOL/L (ref 97–108)
CO2 SERPL-SCNC: 15 MMOL/L (ref 22–30)
CREAT SERPL-MCNC: 0.33 MG/DL (ref 0.6–1.2)
ERYTHROCYTE [DISTWIDTH] IN BLOOD BY AUTOMATED COUNT: 18.6 %
GFR SERPL CREATININE-BSD FRML MDRD: 125 ML/MIN/1.73SQ M
GLUCOSE SERPL-MCNC: 111 MG/DL (ref 70–99)
HCT VFR BLD AUTO: 43.8 % (ref 36–46)
HGB BLD-MCNC: 14.2 G/DL (ref 12–16)
LIPASE SERPL-CCNC: 27 U/L (ref 23–300)
LYMPHOCYTES # BLD AUTO: 24 % (ref 25–45)
LYMPHOCYTES # BLD AUTO: 5.06 THOUSAND/UL (ref 0.5–4)
MCH RBC QN AUTO: 24.8 PG (ref 26–34)
MCHC RBC AUTO-ENTMCNC: 32.3 G/DL (ref 31–36)
MCV RBC AUTO: 77 FL (ref 80–100)
MICROCYTES BLD QL AUTO: PRESENT
MONOCYTES # BLD AUTO: 1.9 THOUSAND/UL (ref 0.2–0.9)
MONOCYTES NFR BLD AUTO: 9 % (ref 1–10)
NEUTS BAND NFR BLD MANUAL: 3 % (ref 0–8)
NEUTS SEG # BLD: 13.72 THOUSAND/UL (ref 1.8–7.8)
NEUTS SEG NFR BLD AUTO: 62 %
P AXIS: 60 DEGREES
PLATELET # BLD AUTO: 444 THOUSANDS/UL (ref 150–450)
PLATELET BLD QL SMEAR: ABNORMAL
PMV BLD AUTO: 8.1 FL (ref 8.9–12.7)
POTASSIUM SERPL-SCNC: 2.1 MMOL/L (ref 3.6–5)
PR INTERVAL: 168 MS
PROT SERPL-MCNC: 5.2 G/DL (ref 5.9–8.4)
QRS AXIS: 69 DEGREES
QRSD INTERVAL: 94 MS
QT INTERVAL: 390 MS
QTC INTERVAL: 460 MS
RBC # BLD AUTO: 5.72 MILLION/UL (ref 4–5.2)
RBC MORPH BLD: ABNORMAL
SODIUM SERPL-SCNC: 128 MMOL/L (ref 137–147)
T WAVE AXIS: 48 DEGREES
TOTAL CELLS COUNTED SPEC: 100
TROPONIN I SERPL-MCNC: 0.03 NG/ML (ref 0–0.03)
VARIANT LYMPHS # BLD AUTO: 2 % (ref 0–0)
VENTRICULAR RATE: 84 BPM
WBC # BLD AUTO: 21.1 THOUSAND/UL (ref 4.5–11)

## 2020-09-15 PROCEDURE — 93010 ELECTROCARDIOGRAM REPORT: CPT | Performed by: INTERNAL MEDICINE

## 2020-09-15 PROCEDURE — 99285 EMERGENCY DEPT VISIT HI MDM: CPT | Performed by: EMERGENCY MEDICINE

## 2020-09-15 PROCEDURE — 84484 ASSAY OF TROPONIN QUANT: CPT | Performed by: EMERGENCY MEDICINE

## 2020-09-15 PROCEDURE — 83690 ASSAY OF LIPASE: CPT | Performed by: EMERGENCY MEDICINE

## 2020-09-15 PROCEDURE — 99285 EMERGENCY DEPT VISIT HI MDM: CPT

## 2020-09-15 PROCEDURE — 36415 COLL VENOUS BLD VENIPUNCTURE: CPT | Performed by: EMERGENCY MEDICINE

## 2020-09-15 PROCEDURE — 93005 ELECTROCARDIOGRAM TRACING: CPT

## 2020-09-15 PROCEDURE — 80053 COMPREHEN METABOLIC PANEL: CPT | Performed by: EMERGENCY MEDICINE

## 2020-09-15 PROCEDURE — 96375 TX/PRO/DX INJ NEW DRUG ADDON: CPT

## 2020-09-15 PROCEDURE — 96374 THER/PROPH/DIAG INJ IV PUSH: CPT

## 2020-09-15 PROCEDURE — 85027 COMPLETE CBC AUTOMATED: CPT | Performed by: EMERGENCY MEDICINE

## 2020-09-15 PROCEDURE — 85007 BL SMEAR W/DIFF WBC COUNT: CPT | Performed by: EMERGENCY MEDICINE

## 2020-09-15 PROCEDURE — 99220 PR INITIAL OBSERVATION CARE/DAY 70 MINUTES: CPT | Performed by: INTERNAL MEDICINE

## 2020-09-15 RX ORDER — LIDOCAINE 50 MG/G
1 PATCH TOPICAL DAILY
Status: COMPLETED | OUTPATIENT
Start: 2020-09-16 | End: 2020-09-16

## 2020-09-15 RX ORDER — LEVOTHYROXINE SODIUM 0.1 MG/1
100 TABLET ORAL
Status: DISCONTINUED | OUTPATIENT
Start: 2020-09-16 | End: 2020-09-17 | Stop reason: HOSPADM

## 2020-09-15 RX ORDER — IPRATROPIUM BROMIDE AND ALBUTEROL SULFATE 2.5; .5 MG/3ML; MG/3ML
3 SOLUTION RESPIRATORY (INHALATION) EVERY 6 HOURS PRN
Status: DISCONTINUED | OUTPATIENT
Start: 2020-09-15 | End: 2020-09-17 | Stop reason: HOSPADM

## 2020-09-15 RX ORDER — POTASSIUM CHLORIDE AND SODIUM CHLORIDE 900; 300 MG/100ML; MG/100ML
125 INJECTION, SOLUTION INTRAVENOUS CONTINUOUS
Status: DISPENSED | OUTPATIENT
Start: 2020-09-15 | End: 2020-09-17

## 2020-09-15 RX ORDER — GABAPENTIN 300 MG/1
300 CAPSULE ORAL 4 TIMES DAILY
Status: DISCONTINUED | OUTPATIENT
Start: 2020-09-15 | End: 2020-09-17 | Stop reason: HOSPADM

## 2020-09-15 RX ORDER — HYDROMORPHONE HCL/PF 1 MG/ML
0.5 SYRINGE (ML) INJECTION
Status: DISCONTINUED | OUTPATIENT
Start: 2020-09-15 | End: 2020-09-16

## 2020-09-15 RX ORDER — DIPHENHYDRAMINE HYDROCHLORIDE 50 MG/ML
25 INJECTION INTRAMUSCULAR; INTRAVENOUS EVERY 6 HOURS
Status: DISPENSED | OUTPATIENT
Start: 2020-09-15 | End: 2020-09-16

## 2020-09-15 RX ORDER — ONDANSETRON 2 MG/ML
4 INJECTION INTRAMUSCULAR; INTRAVENOUS ONCE
Status: COMPLETED | OUTPATIENT
Start: 2020-09-15 | End: 2020-09-15

## 2020-09-15 RX ORDER — OXYBUTYNIN CHLORIDE 5 MG/1
5 TABLET ORAL 3 TIMES DAILY
Status: DISCONTINUED | OUTPATIENT
Start: 2020-09-15 | End: 2020-09-17 | Stop reason: HOSPADM

## 2020-09-15 RX ORDER — CITALOPRAM 20 MG/1
40 TABLET ORAL DAILY
Status: DISCONTINUED | OUTPATIENT
Start: 2020-09-16 | End: 2020-09-17 | Stop reason: HOSPADM

## 2020-09-15 RX ORDER — HYDROMORPHONE HCL/PF 1 MG/ML
0.2 SYRINGE (ML) INJECTION EVERY 6 HOURS PRN
Status: DISCONTINUED | OUTPATIENT
Start: 2020-09-15 | End: 2020-09-17 | Stop reason: HOSPADM

## 2020-09-15 RX ORDER — HYDROMORPHONE HCL/PF 1 MG/ML
1 SYRINGE (ML) INJECTION ONCE
Status: COMPLETED | OUTPATIENT
Start: 2020-09-15 | End: 2020-09-15

## 2020-09-15 RX ORDER — ONDANSETRON 2 MG/ML
4 INJECTION INTRAMUSCULAR; INTRAVENOUS EVERY 4 HOURS PRN
Status: DISCONTINUED | OUTPATIENT
Start: 2020-09-15 | End: 2020-09-17 | Stop reason: HOSPADM

## 2020-09-15 RX ORDER — SUMATRIPTAN 6 MG/.5ML
6 INJECTION, SOLUTION SUBCUTANEOUS ONCE
Status: COMPLETED | OUTPATIENT
Start: 2020-09-15 | End: 2020-09-15

## 2020-09-15 RX ORDER — ACETAMINOPHEN 325 MG/1
975 TABLET ORAL EVERY 8 HOURS PRN
Status: DISCONTINUED | OUTPATIENT
Start: 2020-09-15 | End: 2020-09-17 | Stop reason: HOSPADM

## 2020-09-15 RX ORDER — IPRATROPIUM BROMIDE AND ALBUTEROL SULFATE 2.5; .5 MG/3ML; MG/3ML
3 SOLUTION RESPIRATORY (INHALATION)
Status: DISCONTINUED | OUTPATIENT
Start: 2020-09-16 | End: 2020-09-15

## 2020-09-15 RX ORDER — HYDROMORPHONE HCL/PF 1 MG/ML
1 SYRINGE (ML) INJECTION EVERY 4 HOURS PRN
Status: DISCONTINUED | OUTPATIENT
Start: 2020-09-15 | End: 2020-09-17 | Stop reason: HOSPADM

## 2020-09-15 RX ORDER — POTASSIUM CHLORIDE 14.9 MG/ML
20 INJECTION INTRAVENOUS
Status: COMPLETED | OUTPATIENT
Start: 2020-09-15 | End: 2020-09-16

## 2020-09-15 RX ORDER — BUPROPION HYDROCHLORIDE 150 MG/1
150 TABLET ORAL 2 TIMES DAILY
Status: DISCONTINUED | OUTPATIENT
Start: 2020-09-15 | End: 2020-09-17 | Stop reason: HOSPADM

## 2020-09-15 RX ORDER — LEVETIRACETAM 500 MG/1
1500 TABLET ORAL 2 TIMES DAILY
Status: DISCONTINUED | OUTPATIENT
Start: 2020-09-16 | End: 2020-09-16

## 2020-09-15 RX ORDER — METOCLOPRAMIDE HYDROCHLORIDE 5 MG/ML
10 INJECTION INTRAMUSCULAR; INTRAVENOUS EVERY 6 HOURS PRN
Status: DISCONTINUED | OUTPATIENT
Start: 2020-09-15 | End: 2020-09-16

## 2020-09-15 RX ORDER — PANTOPRAZOLE SODIUM 40 MG/1
40 INJECTION, POWDER, FOR SOLUTION INTRAVENOUS
Status: DISCONTINUED | OUTPATIENT
Start: 2020-09-16 | End: 2020-09-17 | Stop reason: HOSPADM

## 2020-09-15 RX ORDER — POTASSIUM CHLORIDE 14.9 MG/ML
20 INJECTION INTRAVENOUS 2 TIMES DAILY
Status: DISPENSED | OUTPATIENT
Start: 2020-09-15 | End: 2020-09-16

## 2020-09-15 RX ORDER — TIZANIDINE 4 MG/1
2 TABLET ORAL 3 TIMES DAILY
Status: DISCONTINUED | OUTPATIENT
Start: 2020-09-15 | End: 2020-09-17 | Stop reason: HOSPADM

## 2020-09-15 RX ORDER — LORAZEPAM 2 MG/ML
0.5 INJECTION INTRAMUSCULAR 4 TIMES DAILY
Status: DISCONTINUED | OUTPATIENT
Start: 2020-09-15 | End: 2020-09-17 | Stop reason: HOSPADM

## 2020-09-15 RX ADMIN — DIPHENHYDRAMINE HYDROCHLORIDE 25 MG: 50 INJECTION INTRAMUSCULAR; INTRAVENOUS at 23:16

## 2020-09-15 RX ADMIN — HYDROMORPHONE HYDROCHLORIDE 1 MG: 1 INJECTION, SOLUTION INTRAMUSCULAR; INTRAVENOUS; SUBCUTANEOUS at 20:46

## 2020-09-15 RX ADMIN — LORAZEPAM 0.5 MG: 2 INJECTION INTRAMUSCULAR; INTRAVENOUS at 23:07

## 2020-09-15 RX ADMIN — ONDANSETRON 4 MG: 2 INJECTION INTRAMUSCULAR; INTRAVENOUS at 20:46

## 2020-09-15 RX ADMIN — POTASSIUM CHLORIDE AND SODIUM CHLORIDE 125 ML/HR: 900; 300 INJECTION, SOLUTION INTRAVENOUS at 23:52

## 2020-09-15 RX ADMIN — ONDANSETRON 4 MG: 2 INJECTION INTRAMUSCULAR; INTRAVENOUS at 23:16

## 2020-09-15 RX ADMIN — POTASSIUM CHLORIDE 20 MEQ: 14.9 INJECTION, SOLUTION INTRAVENOUS at 21:51

## 2020-09-15 RX ADMIN — SUMATRIPTAN SUCCINATE 6 MG: 6 INJECTION SUBCUTANEOUS at 22:08

## 2020-09-16 ENCOUNTER — APPOINTMENT (OUTPATIENT)
Dept: CT IMAGING | Facility: HOSPITAL | Age: 55
DRG: 305 | End: 2020-09-16
Payer: MEDICARE

## 2020-09-16 ENCOUNTER — APPOINTMENT (OUTPATIENT)
Dept: NON INVASIVE DIAGNOSTICS | Facility: HOSPITAL | Age: 55
DRG: 305 | End: 2020-09-16
Payer: MEDICARE

## 2020-09-16 LAB
ALBUMIN SERPL BCP-MCNC: 4 G/DL (ref 3–5.2)
ALP SERPL-CCNC: 100 U/L (ref 43–122)
ALT SERPL W P-5'-P-CCNC: 25 U/L (ref 9–52)
AMPHETAMINES SERPL QL SCN: NEGATIVE
ANION GAP SERPL CALCULATED.3IONS-SCNC: 11 MMOL/L (ref 5–14)
ANION GAP SERPL CALCULATED.3IONS-SCNC: 14 MMOL/L (ref 5–14)
AST SERPL W P-5'-P-CCNC: 34 U/L (ref 14–36)
ATRIAL RATE: 0 BPM
ATRIAL RATE: 100 BPM
BACTERIA UR QL AUTO: ABNORMAL /HPF
BARBITURATES UR QL: NEGATIVE
BENZODIAZ UR QL: POSITIVE
BILIRUB SERPL-MCNC: 0.4 MG/DL
BILIRUB UR QL STRIP: NEGATIVE
BUN SERPL-MCNC: 12 MG/DL (ref 5–25)
BUN SERPL-MCNC: 13 MG/DL (ref 5–25)
CALCIUM SERPL-MCNC: 9.1 MG/DL (ref 8.4–10.2)
CALCIUM SERPL-MCNC: 9.4 MG/DL (ref 8.4–10.2)
CHLORIDE SERPL-SCNC: 94 MMOL/L (ref 97–108)
CHLORIDE SERPL-SCNC: 95 MMOL/L (ref 97–108)
CLARITY UR: CLEAR
CO2 SERPL-SCNC: 15 MMOL/L (ref 22–30)
CO2 SERPL-SCNC: 20 MMOL/L (ref 22–30)
COCAINE UR QL: NEGATIVE
COLOR UR: YELLOW
CREAT SERPL-MCNC: 0.48 MG/DL (ref 0.6–1.2)
CREAT SERPL-MCNC: 0.54 MG/DL (ref 0.6–1.2)
ERYTHROCYTE [DISTWIDTH] IN BLOOD BY AUTOMATED COUNT: 18.6 %
GFR SERPL CREATININE-BSD FRML MDRD: 107 ML/MIN/1.73SQ M
GFR SERPL CREATININE-BSD FRML MDRD: 111 ML/MIN/1.73SQ M
GLUCOSE SERPL-MCNC: 122 MG/DL (ref 70–99)
GLUCOSE SERPL-MCNC: 144 MG/DL (ref 70–99)
GLUCOSE UR STRIP-MCNC: NEGATIVE MG/DL
HCT VFR BLD AUTO: 40.8 % (ref 36–46)
HGB BLD-MCNC: 13.1 G/DL (ref 12–16)
HGB UR QL STRIP.AUTO: 25
HYALINE CASTS #/AREA URNS LPF: ABNORMAL /LPF
KETONES UR STRIP-MCNC: ABNORMAL MG/DL
LACTATE SERPL-SCNC: 1 MMOL/L (ref 0.7–2)
LEUKOCYTE ESTERASE UR QL STRIP: NEGATIVE
MAGNESIUM SERPL-MCNC: 1.2 MG/DL (ref 1.6–2.3)
MAGNESIUM SERPL-MCNC: 2.2 MG/DL (ref 1.6–2.3)
MCH RBC QN AUTO: 25 PG (ref 26–34)
MCHC RBC AUTO-ENTMCNC: 32.1 G/DL (ref 31–36)
MCV RBC AUTO: 78 FL (ref 80–100)
METHADONE UR QL: NEGATIVE
NITRITE UR QL STRIP: NEGATIVE
NON-SQ EPI CELLS URNS QL MICRO: ABNORMAL /HPF
OPIATES UR QL SCN: POSITIVE
OSMOLALITY UR/SERPL-RTO: 272 MMOL/KG (ref 282–298)
OSMOLALITY UR: 610 MMOL/KG
OXYCODONE+OXYMORPHONE UR QL SCN: NEGATIVE
P AXIS: 51 DEGREES
PCP UR QL: NEGATIVE
PH UR STRIP.AUTO: 6 [PH]
PLATELET # BLD AUTO: 405 THOUSANDS/UL (ref 150–450)
PLATELET # BLD AUTO: 417 THOUSANDS/UL (ref 150–450)
PMV BLD AUTO: 8.3 FL (ref 8.9–12.7)
POTASSIUM SERPL-SCNC: 4.1 MMOL/L (ref 3.6–5)
POTASSIUM SERPL-SCNC: 4.2 MMOL/L (ref 3.6–5)
PR INTERVAL: 148 MS
PROT SERPL-MCNC: 7.2 G/DL (ref 5.9–8.4)
PROT UR STRIP-MCNC: >=500 MG/DL
QRS AXIS: 0 DEGREES
QRS AXIS: 53 DEGREES
QRSD INTERVAL: 0 MS
QRSD INTERVAL: 92 MS
QT INTERVAL: 0 MS
QT INTERVAL: 360 MS
QTC INTERVAL: 0 MS
QTC INTERVAL: 464 MS
RBC # BLD AUTO: 5.24 MILLION/UL (ref 4–5.2)
RBC #/AREA URNS AUTO: ABNORMAL /HPF
SODIUM SERPL-SCNC: 123 MMOL/L (ref 137–147)
SODIUM SERPL-SCNC: 126 MMOL/L (ref 137–147)
SP GR UR STRIP.AUTO: 1.02 (ref 1–1.04)
T WAVE AXIS: 0 DEGREES
T WAVE AXIS: 43 DEGREES
THC UR QL: NEGATIVE
TSH SERPL DL<=0.05 MIU/L-ACNC: 1.12 UIU/ML (ref 0.47–4.68)
UROBILINOGEN UA: NEGATIVE MG/DL
VENTRICULAR RATE: 0 BPM
VENTRICULAR RATE: 100 BPM
WBC # BLD AUTO: 19.3 THOUSAND/UL (ref 4.5–11)
WBC #/AREA URNS AUTO: ABNORMAL /HPF

## 2020-09-16 PROCEDURE — 92610 EVALUATE SWALLOWING FUNCTION: CPT

## 2020-09-16 PROCEDURE — 84443 ASSAY THYROID STIM HORMONE: CPT | Performed by: INTERNAL MEDICINE

## 2020-09-16 PROCEDURE — 83605 ASSAY OF LACTIC ACID: CPT | Performed by: INTERNAL MEDICINE

## 2020-09-16 PROCEDURE — 93010 ELECTROCARDIOGRAM REPORT: CPT | Performed by: INTERNAL MEDICINE

## 2020-09-16 PROCEDURE — 97167 OT EVAL HIGH COMPLEX 60 MIN: CPT

## 2020-09-16 PROCEDURE — 80053 COMPREHEN METABOLIC PANEL: CPT | Performed by: INTERNAL MEDICINE

## 2020-09-16 PROCEDURE — G1004 CDSM NDSC: HCPCS

## 2020-09-16 PROCEDURE — C9113 INJ PANTOPRAZOLE SODIUM, VIA: HCPCS | Performed by: INTERNAL MEDICINE

## 2020-09-16 PROCEDURE — 81003 URINALYSIS AUTO W/O SCOPE: CPT | Performed by: INTERNAL MEDICINE

## 2020-09-16 PROCEDURE — 99223 1ST HOSP IP/OBS HIGH 75: CPT | Performed by: INTERNAL MEDICINE

## 2020-09-16 PROCEDURE — 97163 PT EVAL HIGH COMPLEX 45 MIN: CPT

## 2020-09-16 PROCEDURE — 93975 VASCULAR STUDY: CPT | Performed by: SURGERY

## 2020-09-16 PROCEDURE — 97530 THERAPEUTIC ACTIVITIES: CPT

## 2020-09-16 PROCEDURE — 82088 ASSAY OF ALDOSTERONE: CPT | Performed by: INTERNAL MEDICINE

## 2020-09-16 PROCEDURE — 93975 VASCULAR STUDY: CPT

## 2020-09-16 PROCEDURE — 80048 BASIC METABOLIC PNL TOTAL CA: CPT | Performed by: INTERNAL MEDICINE

## 2020-09-16 PROCEDURE — 99232 SBSQ HOSP IP/OBS MODERATE 35: CPT | Performed by: INTERNAL MEDICINE

## 2020-09-16 PROCEDURE — 83735 ASSAY OF MAGNESIUM: CPT | Performed by: INTERNAL MEDICINE

## 2020-09-16 PROCEDURE — 97535 SELF CARE MNGMENT TRAINING: CPT

## 2020-09-16 PROCEDURE — 80307 DRUG TEST PRSMV CHEM ANLYZR: CPT | Performed by: INTERNAL MEDICINE

## 2020-09-16 PROCEDURE — 83930 ASSAY OF BLOOD OSMOLALITY: CPT | Performed by: INTERNAL MEDICINE

## 2020-09-16 PROCEDURE — 70450 CT HEAD/BRAIN W/O DYE: CPT

## 2020-09-16 PROCEDURE — 83935 ASSAY OF URINE OSMOLALITY: CPT | Performed by: INTERNAL MEDICINE

## 2020-09-16 PROCEDURE — 85049 AUTOMATED PLATELET COUNT: CPT | Performed by: INTERNAL MEDICINE

## 2020-09-16 PROCEDURE — 84244 ASSAY OF RENIN: CPT | Performed by: INTERNAL MEDICINE

## 2020-09-16 PROCEDURE — 83835 ASSAY OF METANEPHRINES: CPT | Performed by: INTERNAL MEDICINE

## 2020-09-16 PROCEDURE — 81001 URINALYSIS AUTO W/SCOPE: CPT | Performed by: INTERNAL MEDICINE

## 2020-09-16 RX ORDER — AMLODIPINE BESYLATE 10 MG/1
10 TABLET ORAL DAILY
Status: DISCONTINUED | OUTPATIENT
Start: 2020-09-16 | End: 2020-09-17 | Stop reason: HOSPADM

## 2020-09-16 RX ORDER — CLONIDINE HYDROCHLORIDE 0.1 MG/1
0.1 TABLET ORAL EVERY 12 HOURS SCHEDULED
Status: DISCONTINUED | OUTPATIENT
Start: 2020-09-16 | End: 2020-09-16

## 2020-09-16 RX ORDER — HYDRALAZINE HYDROCHLORIDE 20 MG/ML
10 INJECTION INTRAMUSCULAR; INTRAVENOUS EVERY 6 HOURS PRN
Status: DISCONTINUED | OUTPATIENT
Start: 2020-09-16 | End: 2020-09-17 | Stop reason: HOSPADM

## 2020-09-16 RX ORDER — HYDROMORPHONE HCL/PF 1 MG/ML
0.5 SYRINGE (ML) INJECTION
Status: DISCONTINUED | OUTPATIENT
Start: 2020-09-16 | End: 2020-09-17 | Stop reason: HOSPADM

## 2020-09-16 RX ORDER — LABETALOL 20 MG/4 ML (5 MG/ML) INTRAVENOUS SYRINGE
10 EVERY 4 HOURS PRN
Status: DISCONTINUED | OUTPATIENT
Start: 2020-09-16 | End: 2020-09-17 | Stop reason: HOSPADM

## 2020-09-16 RX ORDER — HYDRALAZINE HYDROCHLORIDE 20 MG/ML
5 INJECTION INTRAMUSCULAR; INTRAVENOUS EVERY 6 HOURS PRN
Status: DISCONTINUED | OUTPATIENT
Start: 2020-09-16 | End: 2020-09-16

## 2020-09-16 RX ORDER — LABETALOL 20 MG/4 ML (5 MG/ML) INTRAVENOUS SYRINGE
10 EVERY 4 HOURS PRN
Status: DISCONTINUED | OUTPATIENT
Start: 2020-09-16 | End: 2020-09-16

## 2020-09-16 RX ORDER — METOCLOPRAMIDE HYDROCHLORIDE 5 MG/ML
10 INJECTION INTRAMUSCULAR; INTRAVENOUS EVERY 6 HOURS PRN
Status: DISCONTINUED | OUTPATIENT
Start: 2020-09-16 | End: 2020-09-17 | Stop reason: HOSPADM

## 2020-09-16 RX ORDER — MAGNESIUM SULFATE HEPTAHYDRATE 40 MG/ML
2 INJECTION, SOLUTION INTRAVENOUS ONCE
Status: COMPLETED | OUTPATIENT
Start: 2020-09-16 | End: 2020-09-16

## 2020-09-16 RX ADMIN — OXYBUTYNIN CHLORIDE 5 MG: 5 TABLET ORAL at 15:05

## 2020-09-16 RX ADMIN — GABAPENTIN 300 MG: 300 CAPSULE ORAL at 11:35

## 2020-09-16 RX ADMIN — BUPROPION HYDROCHLORIDE 150 MG: 150 TABLET, FILM COATED, EXTENDED RELEASE ORAL at 21:10

## 2020-09-16 RX ADMIN — BUPROPION HYDROCHLORIDE 150 MG: 150 TABLET, FILM COATED, EXTENDED RELEASE ORAL at 08:39

## 2020-09-16 RX ADMIN — HYDRALAZINE HYDROCHLORIDE 5 MG: 20 INJECTION INTRAMUSCULAR; INTRAVENOUS at 01:15

## 2020-09-16 RX ADMIN — PANTOPRAZOLE SODIUM 40 MG: 40 INJECTION, POWDER, LYOPHILIZED, FOR SOLUTION INTRAVENOUS at 08:39

## 2020-09-16 RX ADMIN — HYDROMORPHONE HYDROCHLORIDE 1 MG: 1 INJECTION, SOLUTION INTRAMUSCULAR; INTRAVENOUS; SUBCUTANEOUS at 08:40

## 2020-09-16 RX ADMIN — NICOTINE 1 PATCH: 7 PATCH, EXTENDED RELEASE TRANSDERMAL at 08:41

## 2020-09-16 RX ADMIN — LABETALOL 20 MG/4 ML (5 MG/ML) INTRAVENOUS SYRINGE 10 MG: at 14:04

## 2020-09-16 RX ADMIN — POTASSIUM CHLORIDE 20 MEQ: 14.9 INJECTION, SOLUTION INTRAVENOUS at 04:06

## 2020-09-16 RX ADMIN — LORAZEPAM 0.5 MG: 2 INJECTION INTRAMUSCULAR; INTRAVENOUS at 08:40

## 2020-09-16 RX ADMIN — TIZANIDINE 2 MG: 4 TABLET ORAL at 17:58

## 2020-09-16 RX ADMIN — HYDROMORPHONE HYDROCHLORIDE 1 MG: 1 INJECTION, SOLUTION INTRAMUSCULAR; INTRAVENOUS; SUBCUTANEOUS at 21:44

## 2020-09-16 RX ADMIN — POTASSIUM CHLORIDE AND SODIUM CHLORIDE 125 ML/HR: 900; 300 INJECTION, SOLUTION INTRAVENOUS at 19:04

## 2020-09-16 RX ADMIN — GABAPENTIN 300 MG: 300 CAPSULE ORAL at 08:39

## 2020-09-16 RX ADMIN — HYDROMORPHONE HYDROCHLORIDE 0.5 MG: 1 INJECTION, SOLUTION INTRAMUSCULAR; INTRAVENOUS; SUBCUTANEOUS at 02:56

## 2020-09-16 RX ADMIN — HYDROMORPHONE HYDROCHLORIDE 0.5 MG: 1 INJECTION, SOLUTION INTRAMUSCULAR; INTRAVENOUS; SUBCUTANEOUS at 01:56

## 2020-09-16 RX ADMIN — DIPHENHYDRAMINE HYDROCHLORIDE 25 MG: 50 INJECTION INTRAMUSCULAR; INTRAVENOUS at 11:35

## 2020-09-16 RX ADMIN — HYDROMORPHONE HYDROCHLORIDE 0.5 MG: 1 INJECTION, SOLUTION INTRAMUSCULAR; INTRAVENOUS; SUBCUTANEOUS at 05:53

## 2020-09-16 RX ADMIN — ENOXAPARIN SODIUM 40 MG: 100 INJECTION SUBCUTANEOUS at 08:40

## 2020-09-16 RX ADMIN — TIZANIDINE 2 MG: 4 TABLET ORAL at 21:10

## 2020-09-16 RX ADMIN — ONDANSETRON 4 MG: 2 INJECTION INTRAMUSCULAR; INTRAVENOUS at 09:41

## 2020-09-16 RX ADMIN — GABAPENTIN 300 MG: 300 CAPSULE ORAL at 21:10

## 2020-09-16 RX ADMIN — CITALOPRAM HYDROBROMIDE 40 MG: 20 TABLET ORAL at 08:39

## 2020-09-16 RX ADMIN — LORAZEPAM 0.5 MG: 2 INJECTION INTRAMUSCULAR; INTRAVENOUS at 11:35

## 2020-09-16 RX ADMIN — MAGNESIUM SULFATE IN WATER 2 G: 40 INJECTION, SOLUTION INTRAVENOUS at 02:00

## 2020-09-16 RX ADMIN — LEVETIRACETAM 1500 MG: 100 INJECTION, SOLUTION INTRAVENOUS at 21:08

## 2020-09-16 RX ADMIN — POTASSIUM CHLORIDE AND SODIUM CHLORIDE 125 ML/HR: 900; 300 INJECTION, SOLUTION INTRAVENOUS at 08:38

## 2020-09-16 RX ADMIN — POTASSIUM CHLORIDE 20 MEQ: 14.9 INJECTION, SOLUTION INTRAVENOUS at 00:18

## 2020-09-16 RX ADMIN — HYDRALAZINE HYDROCHLORIDE 10 MG: 20 INJECTION INTRAMUSCULAR; INTRAVENOUS at 08:02

## 2020-09-16 RX ADMIN — LABETALOL 20 MG/4 ML (5 MG/ML) INTRAVENOUS SYRINGE 10 MG: at 04:41

## 2020-09-16 RX ADMIN — HYDROMORPHONE HYDROCHLORIDE 1 MG: 1 INJECTION, SOLUTION INTRAMUSCULAR; INTRAVENOUS; SUBCUTANEOUS at 12:28

## 2020-09-16 RX ADMIN — HYDROMORPHONE HYDROCHLORIDE 1 MG: 1 INJECTION, SOLUTION INTRAMUSCULAR; INTRAVENOUS; SUBCUTANEOUS at 17:59

## 2020-09-16 RX ADMIN — LABETALOL 20 MG/4 ML (5 MG/ML) INTRAVENOUS SYRINGE 10 MG: at 08:03

## 2020-09-16 RX ADMIN — LORAZEPAM 0.5 MG: 2 INJECTION INTRAMUSCULAR; INTRAVENOUS at 21:11

## 2020-09-16 RX ADMIN — TEMAZEPAM 22.5 MG: 15 CAPSULE ORAL at 21:20

## 2020-09-16 RX ADMIN — OXYBUTYNIN CHLORIDE 5 MG: 5 TABLET ORAL at 21:10

## 2020-09-16 RX ADMIN — OXYBUTYNIN CHLORIDE 5 MG: 5 TABLET ORAL at 08:39

## 2020-09-16 RX ADMIN — LORAZEPAM 0.5 MG: 2 INJECTION INTRAMUSCULAR; INTRAVENOUS at 17:59

## 2020-09-16 RX ADMIN — LIDOCAINE 1 PATCH: 50 PATCH CUTANEOUS at 08:39

## 2020-09-16 RX ADMIN — HYDRALAZINE HYDROCHLORIDE 10 MG: 20 INJECTION INTRAMUSCULAR; INTRAVENOUS at 15:05

## 2020-09-16 RX ADMIN — TIZANIDINE 2 MG: 4 TABLET ORAL at 09:41

## 2020-09-16 RX ADMIN — GABAPENTIN 300 MG: 300 CAPSULE ORAL at 17:58

## 2020-09-16 RX ADMIN — DIPHENHYDRAMINE HYDROCHLORIDE 25 MG: 50 INJECTION INTRAMUSCULAR; INTRAVENOUS at 17:59

## 2020-09-16 RX ADMIN — AMLODIPINE BESYLATE 10 MG: 10 TABLET ORAL at 08:38

## 2020-09-16 RX ADMIN — HYDROMORPHONE HYDROCHLORIDE 0.5 MG: 1 INJECTION, SOLUTION INTRAMUSCULAR; INTRAVENOUS; SUBCUTANEOUS at 04:02

## 2020-09-16 RX ADMIN — LEVETIRACETAM 1500 MG: 100 INJECTION, SOLUTION INTRAVENOUS at 09:41

## 2020-09-16 NOTE — ASSESSMENT & PLAN NOTE
Secondary to intractable nausea vomiting and poor oral intake  Continue with IV replacement  Follow-up on magnesium  Follow-up BMP in a m     Maintain on telemetry monitor

## 2020-09-16 NOTE — NURSING NOTE
Pt  Still maintains high BP, labetalol given, only HR decreased to 84  Pain remains 10/10  Bladders scanned and showed 490 ml,  Will give 30 min to void, if not Straith cath per protocol, Dr Cici Dyson aware  Will continue to monitor

## 2020-09-16 NOTE — SPEECH THERAPY NOTE
Speech Language/Pathology    Speech-Language Pathology Bedside Swallow Evaluation      Patient Name: Tramaine Reynolds    IZFSB'Q Date: 2020     Problem List  Principal Problem:    Intractable nausea and vomiting  Active Problems:    Chronic midline low back pain with sciatica    Intractable low back pain    Diarrhea with dehydration    Hypokalemia    Hyponatremia    Ambulatory dysfunction    Leukocytosis    Hypertensive urgency         Past Medical History  Past Medical History:   Diagnosis Date    Abnormal gait     Abscess of abdominal cavity (HCC)     Aneurysm ([address] )     Brain with clips x 3 per pt    Arm injury     Right arm- pt present with short FA/wrist  immoblizer present on ED arrival    Cataract     Cellulitis     B/L LE    Chronic back pain     Chronic pain     Disease of thyroid gland     hypothyroid    Edema     B/L LE    Falls frequently     last fall 1 hour ago per pt    Foot drop, left foot     Fracture     Facial bones per pt- left orbit/cheek? pt unsure    Fracture closed, nasal bone     Gastric bypass status for obesity     Hypoglycemia     Memory loss, short term     Migraine     MRSA (methicillin resistant Staphylococcus aureus)     Stomach ulcer        Past Surgical History  Past Surgical History:   Procedure Laterality Date    BACK SURGERY      lumbar/sacral fusion per pt    CEREBRAL ANEURYSM REPAIR      with clips x3 per pt     SECTION      x 4 per pt    GASTRIC BYPASS      MRSA CULTURE (HISTORICAL)      stomach abscess per pt       Summary   Highly limited assessment secondary to pt c/o nausea w/ po intake  Pt tolerated few tsps puree (w/ and w/out pills) and few sips of thin liquid w/o overt s/s aspiration        Recommendations:   Diet: per primary team - consider puree/level 1 diet as tolerated, thin liquids   Meds: whole with puree   Frequent Oral care: 4x/day  Aspiration precautions and compensatory swallowing strategies: upright posture, only feed when fully alert, slow rate of feeding and small bites/sips  Other Recommendations/ considerations:   -will cont to follow to assess tolerance of diet and potential to advance given limited assessment today   -consider dietitian referral, agree w/ GI consult       Current Medical Status  Pt is a 53 y o  female who presented to [place] [id number]  with intractable nausea and vomiting  For the last 2 days she started having multiple episodes of nausea vomiting followed by diarrhea  She describes the vomitus as bilious but noticed some specks of blood  Describes the diarrhea as watery with dark colored stools walking and has had multiple falls  she denied any hematemesis, coffee-ground emesis or tammi bleeding per rectum  She denies any abdominal discomfort, fever or chills  Daughter at bedside who was the primary historian reports that patient also has been at times confused and has been having difficulty  with ambulation and multiple falls recently  No vomiting since last night he will clear liquid the morning she tolerated some slow rate she started vomiting will consult GI and bariatric surgeon  ST consulted for swallow evaluation and pt reportedly had trouble swallowing PO meds and failed RN dysphagia screen (RN reports pt coughed w/ thin liquids and noted very dry oral mucosa at time of screen)       Past medical history:   Please see H&P for details    Special Studies:  CT head: No acute intracranial abnormality    Social/Education/Vocational Hx:  Pt lives home      Swallow Information   Current Risks for Dysphagia & Aspiration: change in mental status/confusion, generalized weakness/debilitation  Current Symptoms/Concerns: failed RN dysphagia screen, poor po intake, trouble swallowing pills   Current Diet: clear liquids   Baseline Diet: pt reports limited po intake at home -initially only reported eating ice pops and tea however when probed to provide more information, pt reported eating soft boiled eggs, egg salad, and the lasagna which caused her to have vomiting   Takes pills-whole w/ water at home     Baseline Assessment   Behavior/Cognition: alert, confused, slow to respond at times   Speech/Language Status: able to participate in basic conversation and able to follow commands  Patient Positioning: upright in bed     Swallow Mechanism Exam   Facial: symmetrical  Labial: WFL  Lingual: WFL  Velum: unable to visualize  Mandible: adequate ROM  Dentition: edentulous  Vocal quality:clear/adequate   Respiratory: RA     Consistencies Assessed and Performance   Consistencies Administered: limited assessment due to pt c/o nausea - pt agreeable to few tsps of puree and pills whole w/ puree w/ RN; few sips of thin liquid via straw     Oral Stage: pt able to self-feed  Adequate retrieval  Slow manipulation and transfer w/ puree  No gross oral residue noted  No overt s/s reduced oral control  Pharyngeal Stage: swallow initiation appeared timely w/ laryngeal rise present to palpation  No overt s/s aspiration w/ minimal amounts of po intake (no cough, throat clear, change in vocal quality)         Esophageal Concerns: reported acid reflux symptoms w/ tomato sauce       Results Reviewed with: patient and RN   Dysphagia Goals: pt will tolerate puree with thin liquids without s/s of aspiration [id number]

## 2020-09-16 NOTE — PLAN OF CARE
Problem: Potential for Falls  Goal: Patient will remain free of falls  Description: INTERVENTIONS:  - Assess patient frequently for physical needs  -  Identify cognitive and physical deficits and behaviors that affect risk of falls    -  [place] fall precautions as indicated by assessment   - Educate patient/family on patient safety including physical limitations  - Instruct patient to call for assistance with activity based on assessment  - Modify environment to reduce risk of injury  - Consider OT/PT consult to assist with strengthening/mobility  Outcome: Progressing     Problem: Prexisting or High Potential for Compromised Skin Integrity  Goal: Skin integrity is maintained or improved  Description: INTERVENTIONS:  - Identify patients at risk for skin breakdown  - Assess and monitor skin integrity  - Assess and monitor nutrition and hydration status  - Monitor labs   - Assess for incontinence   - Turn and reposition patient  - Assist with mobility/ambulation  - Relieve pressure over bony prominences  - Avoid friction and shearing  - Provide appropriate hygiene as needed including keeping skin clean and dry  - Evaluate need for skin moisturizer/barrier cream  - Collaborate with interdisciplinary team   - Patient/family teaching  - Consider wound care consult   Outcome: Progressing     Problem: PAIN - ADULT  Goal: Verbalizes/displays adequate comfort level or baseline comfort level  Description: Interventions:  - Encourage patient to monitor pain and request assistance  - Assess pain using appropriate pain scale  - Administer analgesics based on type and severity of pain and evaluate response  - Implement non-pharmacological measures as appropriate and evaluate response  - Consider cultural and social influences on pain and pain management  - Notify physician/advanced practitioner if interventions unsuccessful or patient reports new pain  Outcome: Progressing     Problem: INFECTION - ADULT  Goal: Absence or prevention of progression during hospitalization  Description: INTERVENTIONS:  - Assess and monitor for signs and symptoms of infection  - Monitor lab/diagnostic results  - Monitor all insertion sites, i e  indwelling lines, tubes, and drains  - Monitor endotracheal if appropriate and nasal secretions for changes in amount and color  - [place] appropriate cooling/warming therapies per order  - Administer medications as ordered  - Instruct and encourage patient and family to use good hand hygiene technique  - Identify and instruct in appropriate isolation precautions for identified infection/condition  Outcome: Progressing  Goal: Absence of fever/infection during neutropenic period  Description: INTERVENTIONS:  - Monitor WBC    Outcome: Progressing     Problem: SAFETY ADULT  Goal: Patient will remain free of falls  Description: INTERVENTIONS:  - Assess patient frequently for physical needs  -  Identify cognitive and physical deficits and behaviors that affect risk of falls    -  [place] fall precautions as indicated by assessment   - Educate patient/family on patient safety including physical limitations  - Instruct patient to call for assistance with activity based on assessment  - Modify environment to reduce risk of injury  - Consider OT/PT consult to assist with strengthening/mobility  Outcome: Progressing  Goal: Maintain or return to baseline ADL function  Description: INTERVENTIONS:  -  Assess patient's ability to carry out ADLs; assess patient's baseline for ADL function and identify physical deficits which impact ability to perform ADLs (bathing, care of mouth/teeth, toileting, grooming, dressing, etc )  - Assess/evaluate cause of self-care deficits   - Assess range of motion  - Assess patient's mobility; develop plan if impaired  - Assess patient's need for assistive devices and provide as appropriate  - Encourage maximum independence but intervene and supervise when necessary  - Involve family in performance of ADLs  - Assess for home care needs following discharge   - Consider OT consult to assist with ADL evaluation and planning for discharge  - Provide patient education as appropriate  Outcome: Progressing  Goal: Maintain or return mobility status to optimal level  Description: INTERVENTIONS:  - Assess patient's baseline mobility status (ambulation, transfers, stairs, etc )    - Identify cognitive and physical deficits and behaviors that affect mobility  - Identify mobility aids required to assist with transfers and/or ambulation (gait belt, sit-to-stand, lift, walker, cane, etc )  - [place] fall precautions as indicated by assessment  - Record patient progress and toleration of activity level on Mobility SBAR; progress patient to next Phase/Stage  - Instruct patient to call for assistance with activity based on assessment  - Consider rehabilitation consult to assist with strengthening/weightbearing, etc   Outcome: Progressing     Problem: DISCHARGE PLANNING  Goal: Discharge to home or other facility with appropriate resources  Description: INTERVENTIONS:  - Identify barriers to discharge w/patient and caregiver  - Arrange for needed discharge resources and transportation as appropriate  - Identify discharge learning needs (meds, wound care, etc )  - Arrange for interpretive services to assist at discharge as needed  - Refer to Case Management Department for coordinating discharge planning if the patient needs post-hospital services based on physician/advanced practitioner order or complex needs related to functional status, cognitive ability, or social support system  Outcome: Progressing     Problem: Knowledge Deficit  Goal: Patient/family/caregiver demonstrates understanding of disease process, treatment plan, medications, and discharge instructions  Description: Complete learning assessment and assess knowledge base    Interventions:  - Provide teaching at level of understanding  - Provide teaching via preferred learning methods  Outcome: Progressing

## 2020-09-16 NOTE — SOCIAL WORK
LOS: 0 GMLOS: none    SW ,met w/ pt to present w/ and explain SEGOVIA  Pt had no questions, no concerns, gave verbal understanding and signed  SW provided pt w/ copy and placed original in scan bin to be scanned into EHR

## 2020-09-16 NOTE — PROGRESS NOTES
Progress Note - Kayley Jones 1965, 53 y o  female MRN: [id number]    Unit/Bed#: 7T [facility] 704-02 Encounter: [id number]    Primary Care Provider: Pete Rutledge   Date and time admitted to hospital: 9/15/2020  8:07 PM        Hypertensive urgency  Assessment & Plan  Blood pressure elevated on admission at 200/110 likely secondary to pain  Patient denies any prior history of hypertension  Will continue with p r n  Hydralazine  And pain control  Patient multiple pain medication has been complaining of pain around 7  Increase hydralazine to 10 patient tolerated p o  Okay and not right extremity mg p o  For medication just given the above target blood pressure is 16o sys  There not want to drop too fast  Discussed with Nephrology  Some blood work rule out secondary hypertension patient denied prior history of hypertension  Renal artery ultrasound to rule out renal artery stenosis    Leukocytosis  Assessment & Plan  Likely secondary to volume contraction and dehydration  Meet sirs criteria because of tachycardia leukocytosis  Follow-up on lactic acid and procalcitonin  Will continue to monitor off antibiotics  Follow-up on CBC post hydration  Repeat CBC in the morning if afebrile and does not look toxic and not look infectious process causing right flank elevated BP secondary to nausea vomiting diarrhea stress-induced and pain problem    Ambulatory dysfunction  Assessment & Plan  Patient has history of chronic low back pain and usually ambulates with assistance  Currently has worsening back pain secondary to inability to take chronic pain medications  Daughter reports frequent falls and gait instability  Maintain fall precautions  Physical therapy evaluation will be obtained  Consult PT and OT    Hyponatremia  Assessment & Plan  Sodium 128 on admission  Likely secondary to intractable nausea vomiting and diarrhea and dehydration resulting from that  Fortunately patient is hypertensive    Continue with IV hydration  Follow-up BMP in a m     Most probably secondary to diarrhea and vomiting the patient is running fluid normal saline  Repeat Chem 7 and 12 noon if continued drop most probably secondary to combination inappropriate ADH and nausea vomiting  p o  Intake in order serum and urine osmolarity and spot urinary sodium  Appreciate Nephrology consult           Hypokalemia  Assessment & Plan  Secondary to intractable nausea vomiting and poor oral intake  Continue with IV replacement  Follow-up on magnesium  Follow-up BMP in a m     Maintain on telemetry monitor  Potassium corrected a repeat potassium 4  2  Repeat Chem 7 at 12 noon    Diarrhea with dehydration  Assessment & Plan  Patient presents with multiple episodes of loose watery stools  Complains of generalized weakness  Associated with nausea vomiting but denies any abdominal discomfort fever or chills  Symptoms started after patient consumed homemade lasagna  Also admits to having antibiotic use in the last 3 months  No recent hospitalization  Rule out infectious etiology  Follow-up on stool enteric pathogen panel and C diff toxin assay  Maintain contact and hand hygiene precautions in the interim  Continue with IV hydration  Patient no  Vomiting since last night this morning tolerated p o  Small amount of fluid no problem swallowing or choking    Intractable low back pain  Assessment & Plan  Patient has acute worsening of her chronic back pain likely secondary to withdrawal from her chronic pain medications  She has not been able to follow-up with her pain management team at [place] [id number]  Multiple attempts by family to contact her physician were unsuccessful  She has not had any refill of her intrathecal Dilaudid pump and Nucynta since August   PDMP verified    Plan:  Maintain fall precautions  Physical therapy evaluation will be obtained    Start patient on intravenous Dilaudid for moderate, severe and breakthrough pain  Will attempt to get in touch with patient's outpatient pain management team in a m  Angelita Jade Continue with as needed muscle relaxant, gabapentin, Lidoderm patch, aqua  K-pad for pain control    Chronic midline low back pain with sciatica  Assessment & Plan  Patient has history of chronic lumbar pain secondary to lumbar disc disease and has had multiple surgeries in the past   She normally ambulates with cane or crutches  Has had multiple falls and has chronically been maintained on intrathecal Dilaudid pump, Nucynta and Ativan  She follows up with pain management at [facility]  Patient has not had refills of her intrathecal Dilaudid pump since August   She also ran out of her Nucynta on August 20th  She states that she has worsening back pain  States that pain radiates to the left leg and has had difficulty ambulating and frequent falls  Consult pain management      * Intractable nausea and vomiting  Assessment & Plan  Presents with 2-3 days history of multiple episodes of nausea and vomiting  Patient also reports dry heaving  Has not been able to tolerate her medications or food for over 2 days  Denies any abdominal discomfort but it has been associated with multiple episodes of diarrhea  She does report some specks of blood in her vomitus  Denies any hematemesis or coffee-ground emesis  Hemoconcentrated with a hemoglobin of 14 on admission    Her symptoms could be related to opioid Withdrawal as well  Abdominal exam is clinically benign  Continue with scheduled antiemetics and IV hydration  Continue with IV ppi    Rule out anastomotic ulcer( prior history of gastric bypass surgery), peptic ulcer disease, GI bleed  No vomiting since last night he will clear liquid the morning she tolerated some slow rate she started vomiting will consult GI and bariatric surgeon                        VTE Pharmacologic Prophylaxis:   Pharmacologic: Enoxaparin (Lovenox)  Mechanical VTE Prophylaxis in Place: Yes    Patient Centered Rounds: I have performed bedside rounds with nursing staff today  Discussions with Specialists or Other Care Team Provider:  Nephrology    Education and Discussions with Family / Patient:  Discussed with patient about the medical team for    Time Spent for Care: 30 minutes  More than 50% of total time spent on counseling and coordination of care as described above  Current Length of Stay: 0 day(s)    Current Patient Status: Observation   Certification Statement: The patient will continue to require additional inpatient hospital stay due to Hypertension and hyponatremia    Discharge Plan: home  Code Status: Level 1 - Full Code      Subjective:   Pain is still 7 not have a problem with the urinary retention this morning unable to walk straight cathed 900 cc patient from August not get her from failed with pain medication to get that Dr  Her pain management doctor bill came back to contact him again no vomiting since last night or diarrhea elevated this morning clear liquid    Objective:     Vitals:   Temp (24hrs), Av 8 °F (36 6 °C), Min:97 4 °F (36 3 °C), Max:98 2 °F (36 8 °C)    Temp:  [97 4 °F (36 3 °C)-98 2 °F (36 8 °C)] 97 4 °F (36 3 °C)  HR:  [] 88  Resp:  [18-24] 22  BP: (191-220)/() 203/108  SpO2:  [97 %-100 %] 100 %  Body mass index is 35 07 kg/m²  Input and Output Summary (last 24 hours): Intake/Output Summary (Last 24 hours) at 2020 0907  Last data filed at 2020 0615  Gross per 24 hour   Intake 0 ml   Output 900 ml   Net -900 ml       Physical Exam:     Physical Exam  Vitals signs and nursing note reviewed  Constitutional:       Appearance: Normal appearance  HENT:      Head: Normocephalic and atraumatic  Right Ear: External ear normal       Left Ear: External ear normal       Nose: Nose normal       Mouth/Throat:      Mouth: Mucous membranes are moist       Pharynx: Oropharynx is clear        Comments: Gag reflex intact  Eyes: Comments:     Dilated   Neck:      Musculoskeletal: Normal range of motion and neck supple  Cardiovascular:      Rate and Rhythm: Normal rate and regular rhythm  Pulses: Normal pulses  Pulmonary:      Effort: Pulmonary effort is normal       Breath sounds: Normal breath sounds  Abdominal:      General: Abdomen is flat  Bowel sounds are normal    Musculoskeletal: Normal range of motion  Comments: Severe back   Skin:     General: Skin is warm and dry  Neurological:      General: No focal deficit present  Mental Status: She is alert and oriented to person, place, and time  Psychiatric:         Mood and Affect: Mood normal          Behavior: Behavior normal         your exam)    Additional Data:     Labs:    Results from last 7 days   Lab Units 09/16/20  0610  09/15/20  2028 09/14/20  2025   WBC Thousand/uL 19 30*  --  21 10* 7 24   HEMOGLOBIN g/dL 13 1  --  14 2 11 9   HEMATOCRIT % 40 8  --  43 8 38 3   PLATELETS Thousands/uL 417   < > 444 347   BANDS PCT %  --   --  3  --    NEUTROS PCT %  --   --   --  63   LYMPHS PCT %  --   --   --  26   LYMPHO PCT %  --   --  24*  --    MONOS PCT %  --   --   --  7   MONO PCT %  --   --  9  --    EOS PCT %  --   --   --  2    < > = values in this interval not displayed  Results from last 7 days   Lab Units 09/16/20  0610   SODIUM mmol/L 123*   POTASSIUM mmol/L 4 2   CHLORIDE mmol/L 94*   CO2 mmol/L 15*   BUN mg/dL 12   CREATININE mg/dL 0 48*   ANION GAP mmol/L 14   CALCIUM mg/dL 9 1   ALBUMIN g/dL 4 0   TOTAL BILIRUBIN mg/dL 0 40   ALK PHOS U/L 100   ALT U/L 25   AST U/L 34   GLUCOSE RANDOM mg/dL 144*                 Results from last 7 days   Lab Units 09/16/20  0040   LACTIC ACID mmol/L 1 0           * I Have Reviewed All Lab Data Listed Above  * Additional Pertinent Lab Tests Reviewed:  Juan 65 Admission Reviewed    Imaging:    Imaging Reports Reviewed Today Include:  CT of head revealed CT of the head reviewed  Imaging Personally Reviewed by Myself Includes:  Review    Recent Cultures (last 7 days):           Last 24 Hours Medication List:   Current Facility-Administered Medications   Medication Dose Route Frequency Provider Last Rate    acetaminophen  975 mg Oral Q8H PRN Bess Tello MD      amLODIPine  10 mg Oral Daily Janet Burks MD      buPROPion  150 mg Oral BID Bess Tello MD      citalopram  40 mg Oral Daily Bess Tello MD      diphenhydrAMINE  25 mg Intravenous Angie Rawls MD      enoxaparin  40 mg Subcutaneous Daily Bess Tello MD      gabapentin  300 mg Oral 4x Daily Bess Tello MD      hydrALAZINE  10 mg Intravenous Q6H PRN Janet Burks MD      HYDROmorphone  0 2 mg Intravenous Q6H PRN Bess Tello MD      HYDROmorphone  0 5 mg Intravenous Q1H PRN Bess Tello MD      HYDROmorphone  1 mg Intravenous Q4H PRN Bess Tello MD      ipratropium-albuterol  3 mL Nebulization Q6H PRN Bess Tello MD      Labetalol HCl  10 mg Intravenous Q4H PRN Bess Tello MD      levETIRAcetam  1,500 mg Intravenous Q12H Sunny Farias MD      levothyroxine  100 mcg Oral Early Morning Bess Tello MD      lidocaine  1 patch Topical Daily Bess Tello MD      LORazepam  0 5 mg Intravenous 4x Daily Bess Tello MD      metoclopramide  10 mg Intravenous Q6H PRN Bess Tello MD      naloxone  0 04 mg Intravenous Q1MIN PRN Bess Tello MD      nicotine  1 patch Transdermal Daily Bess Tello MD      ondansetron  4 mg Intravenous Q4H PRN Bess Tello MD      oxybutynin  5 mg Oral TID Bess Tello MD      pantoprazole  40 mg Intravenous Q24H Sunny Farias MD      potassium chloride  20 mEq Intravenous BID Bess Tello MD 20 mEq (09/16/20 0406)    sodium chloride 0 9 % with KCl 40 mEq/L  125 mL/hr Intravenous Continuous Bess Tello  mL/hr (09/16/20 9892)    temazepam  22 5 mg Oral HS Bess Tello MD      tiZANidine  2 mg Oral TID Calos Miller MD          Today, Patient Was Seen By: Prince Elena MD    ** Please Note: Dictation voice to text software may have been used in the creation of this document   **

## 2020-09-16 NOTE — ASSESSMENT & PLAN NOTE
Sodium 128 on admission  Likely secondary to intractable nausea vomiting and diarrhea and dehydration resulting from that  Fortunately patient is hypertensive  Continue with IV hydration  Follow-up BMP in a m Karrie Nissen

## 2020-09-16 NOTE — ASSESSMENT & PLAN NOTE
Patient has acute worsening of her chronic back pain likely secondary to withdrawal from her chronic pain medications  She has not been able to follow-up with her pain management team at [place] [id number]  Multiple attempts by family to contact her physician were unsuccessful  She has not had any refill of her intrathecal Dilaudid pump and Nucynta since August   PDM verified    Plan:  Maintain fall precautions  Physical therapy evaluation will be obtained  Start patient on intravenous Dilaudid for moderate, severe and breakthrough pain  Will attempt to get in touch with patient's outpatient pain management team in a   [place] Organ   Continue with as needed muscle relaxant, gabapentin, Lidoderm patch, aqua  K-pad for pain control

## 2020-09-16 NOTE — ASSESSMENT & PLAN NOTE
Presents with 2-3 days history of multiple episodes of nausea and vomiting  Patient also reports dry heaving  Has not been able to tolerate her medications or food for over 2 days  Denies any abdominal discomfort but it has been associated with multiple episodes of diarrhea  She does report some specks of blood in her vomitus  Denies any hematemesis or coffee-ground emesis  Hemoconcentrated with a hemoglobin of 14 on admission    Her symptoms could be related to opioid Withdrawal as well  Abdominal exam is clinically benign  Continue with scheduled antiemetics and IV hydration  Continue with IV ppi    Rule out anastomotic ulcer( prior history of gastric bypass surgery), peptic ulcer disease, GI bleed  No vomiting since last night he will clear liquid the morning she tolerated some slow rate she started vomiting will consult GI and bariatric surgeon

## 2020-09-16 NOTE — ASSESSMENT & PLAN NOTE
Sodium 128 on admission  Likely secondary to intractable nausea vomiting and diarrhea and dehydration resulting from that  Fortunately patient is hypertensive  Continue with IV hydration  Follow-up BMP in a m     Most probably secondary to diarrhea and vomiting the patient is running fluid normal saline  Repeat Chem 7 and 12 noon if continued drop most probably secondary to combination inappropriate ADH and nausea vomiting  p o   Intake in order serum and urine osmolarity and spot urinary sodium  Appreciate Nephrology consult

## 2020-09-16 NOTE — ASSESSMENT & PLAN NOTE
Patient presents with multiple episodes of loose watery stools  Complains of generalized weakness  Associated with nausea vomiting but denies any abdominal discomfort fever or chills  Symptoms started after patient consumed homemade lasagna  Also admits to having antibiotic use in the last 3 months  No recent hospitalization  Rule out infectious etiology  Follow-up on stool enteric pathogen panel and C diff toxin assay  Maintain contact and hand hygiene precautions in the interim  Continue with IV hydration  Patient no  Vomiting since last night this morning tolerated p o   Small amount of fluid no problem swallowing or choking

## 2020-09-16 NOTE — ASSESSMENT & PLAN NOTE
Blood pressure elevated on admission at 200/110 likely secondary to pain  Patient denies any prior history of hypertension  Will continue with p r n  Hydralazine    And pain control

## 2020-09-16 NOTE — NURSING NOTE
Pt  Arrived via stretcher, unable to stand, A/O,disoriented w/ time  complaining of pain 10/10 at lower back and abd  Decreased visual field, noncreative pupils, measured 4 cm  Blurred vision per pt  Light sensitive, motor weakness noted in all 4 extremities  Decreased sensation BL LE  Abd soft, lower R/L Q tenderness noted  Pt stated last BM 9/14  Pt stated she fell today , denies head injury  No edema noted, 2/1 pulses  Pt  Was unable to swallow PO med  Failed dysphasia assesment, Dr Daniel Petersen notified, NPO ordered, speech consulted  Potasium hanged, continues fluid in place  Tele in place, pure wick placed  Will continue to monitor

## 2020-09-16 NOTE — CONSULTS
Consultation - Nephrology   Jena Xavier 53 y o  female MRN: [id number]  Unit/Bed#: 7T [facility] 704-02 Encounter: [id number]    ASSESSMENT and PLAN:  1  Hypertensive urgency,  Patient denies any prior history of HTN  Blood pressure on admission 200/110, suspected secondary to pain, nausea, vomiting  Patient received 10 mg of Norvasc p o  before my evaluation  Previously received hydralazine 10 mg intravenously as well as labetalol 10 mg intravenously  Monitor blood pressure after blood pressure medication  If nausea and vomiting continues and if blood pressure remains elevated, consider starting Cardene drip with a goal systolic blood pressure around 160  Check B[place], renin, metanephrine, TSH  Check renal Doppler  Urinalysis shows 0-1 rbc's, 0-1 wbc's, more than 500 protein  Check urine microalbumin to creatinine ratio for quantification  Follow low-salt diet    2  Hyponatremia, acute on chronic  As per previous labs noted patient had previous episode of hyponatremia with serum sodium in the 130-133 in 2014 and 2018  Suspected a component of true volume depletion given nausea and vomiting for 3 weeks as well as reported diarrhea  Noted serum sodium on admission was 130 and decreased to 123 today  Possible superimposed ADH related component secondary to pain, nausea and vomiting  Continue with intravenously fluids  Check TSH as above  I have ordered a repeat BMP, serum osmolality, urine osmolality and urine sodium at noon  Based on results will decide about further testing  Noted also patient is on Celexa that can cause SIADH    3  Hypokalemia hypomagnesemia on admission, improved after replacement, follow serial labs    4  Intractable nausea vomiting, per patient for the last 3 weeks, further management per primary team   Reported abdominal pain and diarrhea  5  History of intractable lower back pain, history of intrathecal Dilaudid pump  6  History of cerebral aneurysm status post clipping    7  Reported urinary retention with previous straight catheterization over 900, follow bladder scan Q shift as per  Urinary retention protocol, urinary retention persist consider Farias catheter placement        SUMMARY OF RECOMMENDATIONS:  Patient received labetalol and hydralazine intravenously  Patient received 10 mg of Norvasc before my evaluation  Follow vitals  If blood pressure does not improve and patient continues with nausea vomiting consider start Cardene drip, goal to have systolic blood pressure around 160 for now  Follow low-salt diet  Check metanephrines, renin, Beka, TSH  Check renal Doppler  Repeat BMP with urine osmolality, serum osmolality, urine sodium at noon  Continue with intravenously fluid for now  Follow urinary retention protocol, low threshold for Farias catheter placement urinary retention persist   My plan and recommendation with discussed with Dr Oziel Tafoya from Internal Medicine team        HISTORY OF PRESENT ILLNESS:  Requesting Physician: Dimitry Long MD  Reason for Consult:  Uncontrolled hypertension, hyponatremia, nausea vomiting    Suellen Andrew is a 53 y o  female who was admitted to [facility] after presenting with several weeks of nausea vomiting, reported abdominal pain, diarrhea  A renal consultation is requested today for assistance in the management of uncontrolled hypertension, hyponatremia  Patient with history of chronic back pain, status post intrathecal Dilaudid pump, history of cerebral aneurysm  Status post clipping was admitted to the hospital for nausea, vomiting, abdominal pain, found to have uncontrolled hypertension with systolic blood pressure in the 200/100, patient was started on intravenously fluids, also found to have mild hyponatremia with hypokalemia hypomagnesemia,  Labs this morning show worsening hyponatremia for the reason Nephrology was consulted    During my evaluation patient states she has not vomiting since yesterday night   Currently denies any chest pain, no shortness of breath  Complaining of left lower quadrant abdominal pain, previous diarrhea but no since admission  Denies any urinary problems    Reported episode urinary retention early today requiring straight catheterization    PAST MEDICAL HISTORY:  Past Medical History:   Diagnosis Date    Abnormal gait     Abscess of abdominal cavity (HCC)     Aneurysm ([address] )     Brain with clips x 3 per pt    Arm injury     Right arm- pt present with short FA/wrist  immoblizer present on ED arrival    Cataract     Cellulitis     B/L LE    Chronic back pain     Chronic pain     Disease of thyroid gland     hypothyroid    Edema     B/L LE    Falls frequently     last fall 1 hour ago per pt    Foot drop, left foot     Fracture     Facial bones per pt- left orbit/cheek? pt unsure    Fracture closed, nasal bone     Gastric bypass status for obesity     Hypoglycemia     Memory loss, short term     Migraine     MRSA (methicillin resistant Staphylococcus aureus)     Stomach ulcer        PAST SURGICAL HISTORY:  Past Surgical History:   Procedure Laterality Date    BACK SURGERY      lumbar/sacral fusion per pt    CEREBRAL ANEURYSM REPAIR      with clips x3 per pt     SECTION      x 4 per pt    GASTRIC BYPASS      MRSA CULTURE (HISTORICAL)      stomach abscess per pt       SOCIAL HISTORY:  Social History     Substance and Sexual Activity   Alcohol Use Not Currently    Comment: social     Social History     Substance and Sexual Activity   Drug Use No     Social History     Tobacco Use   Smoking Status Current Every Day Smoker    Packs/day: 0 50    Types: Cigarettes, Cigars   Smokeless Tobacco Never Used       FAMILY HISTORY:  Family History   Problem Relation Age of Onset    Diabetes Mother     Heart failure Mother     Breast cancer Mother     Heart disease Father        ALLERGIES:  Allergies   Allergen Reactions    Iodinated Diagnostic Agents Swelling    Bee Venom     Iodine        MEDICATIONS:    Current Facility-Administered Medications:     acetaminophen (TYLENOL) tablet 975 mg, 975 mg, Oral, Q8H PRN, Nayan Rose MD    amLODIPine (NORVASC) tablet 10 mg, 10 mg, Oral, Daily, Kevin Adames MD, 10 mg at 09/16/20 0838    buPROPion (WELLBUTRIN XL) 24 hr tablet 150 mg, 150 mg, Oral, BID, Nayan Rose MD, 150 mg at 09/16/20 0839    citalopram (CeleXA) tablet 40 mg, 40 mg, Oral, Daily, Nayan Rose MD, 40 mg at 09/16/20 0839    diphenhydrAMINE (BENADRYL) injection 25 mg, 25 mg, Intravenous, Q6H, Nayan Rose MD, 25 mg at 09/15/20 2316    enoxaparin (LOVENOX) subcutaneous injection 40 mg, 40 mg, Subcutaneous, Daily, Nayan Rose MD, 40 mg at 09/16/20 0840    gabapentin (NEURONTIN) capsule 300 mg, 300 mg, Oral, 4x Daily, Nayan Rose MD, 300 mg at 09/16/20 0839    hydrALAZINE (APRESOLINE) injection 10 mg, 10 mg, Intravenous, Q6H PRN, Kevin Adames MD, 10 mg at 09/16/20 0802    HYDROmorphone (DILAUDID) injection 0 2 mg, 0 2 mg, Intravenous, Q6H PRN, Nayan Rose MD    HYDROmorphone (DILAUDID) injection 0 5 mg, 0 5 mg, Intravenous, Q1H PRN, Nayan Rose MD    HYDROmorphone (DILAUDID) injection 1 mg, 1 mg, Intravenous, Q4H PRN, Nayan Rose MD, 1 mg at 09/16/20 0840    ipratropium-albuterol (DUO-NEB) 0 5-2 5 mg/3 mL inhalation solution 3 mL, 3 mL, Nebulization, Q6H PRN, Nayan Rose MD    Labetalol HCl (NORMODYNE) injection 10 mg, 10 mg, Intravenous, Q4H PRN, Nayan Rose MD, 10 mg at 09/16/20 0803    levETIRAcetam (KEPPRA) 1,500 mg in sodium chloride 0 9 % 100 mL IVPB, 1,500 mg, Intravenous, Q12H [address], Nayan Rose MD    levothyroxine tablet 100 mcg, 100 mcg, Oral, Early Morning, Nayan Rose MD    lidocaine (LIDODERM) 5 % patch 1 patch, 1 patch, Topical, Daily, Nayan Rose MD, 1 patch at 09/16/20 0839    LORazepam (ATIVAN) injection 0 5 mg, 0 5 mg, Intravenous, 4x Daily, Nayan Rose MD, 0 5 mg at 09/16/20 0840    metoclopramide (REGLAN) injection 10 mg, 10 mg, Intravenous, Q6H PRN, Valente Lorenzo MD    naloxone (NARCAN) 0 04 mg/mL syringe 0 04 mg, 0 04 mg, Intravenous, Q1MIN PRN, Valente Lorenzo MD    nicotine (NICODERM CQ) 7 mg/24hr TD 24 hr patch 1 patch, 1 patch, Transdermal, Daily, Valente Lorenzo MD, 1 patch at 09/16/20 0841    ondansetron (ZOFRAN) injection 4 mg, 4 mg, Intravenous, Q4H PRN, Valente Lorenzo MD, 4 mg at 09/15/20 2316    oxybutynin (DITROPAN) tablet 5 mg, 5 mg, Oral, TID, Valente Lorenzo MD, 5 mg at 09/16/20 0839    pantoprazole (PROTONIX) injection 40 mg, 40 mg, Intravenous, Q24H [facility], Valente Lorenzo MD, 40 mg at 09/16/20 0839    potassium chloride 20 mEq IVPB (premix), 20 mEq, Intravenous, BID, Valente Lorenzo MD, Last Rate: 50 mL/hr at 09/16/20 0406, 20 mEq at 09/16/20 0406    sodium chloride 0 9 % with KCl 40 mEq/L infusion (premix), 125 mL/hr, Intravenous, Continuous, Valente Lorenzo MD, Last Rate: 125 mL/hr at 09/16/20 0838, 125 mL/hr at 09/16/20 0838    temazepam (RESTORIL) capsule 22 5 mg, 22 5 mg, Oral, HS, Valente Lorenzo MD    tiZANidine (ZANAFLEX) tablet 2 mg, 2 mg, Oral, TID, Valente Lorenzo MD    REVIEW OF SYSTEMS:  All the systems were reviewed and were negative except as documented on the HPI      PHYSICAL EXAM:  Current Weight: Weight - Scale: 95 6 kg (210 lb 12 2 oz)  First Weight: Weight - Scale: 95 5 kg (210 lb 8 6 oz)  Vitals:    09/16/20 0430 09/16/20 0510 09/16/20 0645 09/16/20 0748   BP: (!) 202/102 (!) 210/130 (!) 205/102 (!) 203/108   BP Location: Left arm Left arm Left arm Left arm   Pulse: (!) 120 84 89 88   Resp: 20 22 22 22   Temp: 97 8 °F (36 6 °C) 97 7 °F (36 5 °C) 97 8 °F (36 6 °C) (!) 97 4 °F (36 3 °C)   TempSrc: Temporal Temporal Temporal Temporal   SpO2: 98% 98% 99% 100%   Weight:       Height:           Intake/Output Summary (Last 24 hours) at 9/16/2020 0847  Last data filed at 9/16/2020 0615  Gross per 24 hour   Intake 0 ml   Output 900 ml   Net -900 ml     General: conscious, cooperative, in not acute distress  Eyes: conjunctivae pink, anicteric sclerae  ENT: lips and mucous membranes moist  Neck: supple, no JVD  Chest: clear breath sounds bilateral, no crackles, ronchus or wheezings  CVS: distinct S1 & S2, normal rate, regular rhythm  Abdomen: soft, mildly-tender, non-distended, normoactive bowel sounds  Extremities:  Minimal edema of both legs  Skin: no rash  Neuro: awake, alert, oriented        Invasive Devices:        Lab Results:   Results from last 7 days   Lab Units 09/16/20  0610 09/16/20  0040 09/15/20  2059 09/15/20  2028 09/14/20  2025   WBC Thousand/uL 19 30*  --   --  21 10* 7 24   HEMOGLOBIN g/dL 13 1  --   --  14 2 11 9   HEMATOCRIT % 40 8  --   --  43 8 38 3   PLATELETS Thousands/uL 417 405  --  444 347   SODIUM mmol/L 123*  --  128*  --  130*   POTASSIUM mmol/L 4 2  --  2 1*  --  3 7   CHLORIDE mmol/L 94*  --  103  --  96*   CO2 mmol/L 15*  --  15*  --  26   BUN mg/dL 12  --  8  --  9   CREATININE mg/dL 0 48*  --  0 33*  --  0 91   CALCIUM mg/dL 9 1  --  6 4*  --  8 4   MAGNESIUM mg/dL 2 2 1 2*  --   --   --    ALK PHOS U/L 100  --  74  --  121*   ALT U/L 25  --  30  --  34   AST U/L 34  --  22  --  22       Other Studies:   CT scan of the head without contrast reported as no acute intracranial abnormalities      Portions of the record may have been created with voice recognition software  Occasional wrong word or "sound a like" substitutions may have occurred due to the inherent limitations of voice recognition software  Read the chart carefully and recognize, using context, where substitutions have occurred  If you have any questions, please contact the dictating provider

## 2020-09-16 NOTE — PLAN OF CARE
Problem: OCCUPATIONAL THERAPY ADULT  Goal: Performs self-care activities at highest level of function for planned discharge setting  See evaluation for individualized goals  Description: Treatment Interventions: ADL retraining, Functional transfer training, UE strengthening/ROM, Endurance training, Cognitive reorientation, Continued evaluation, Activityengagement, Energy conservation          See flowsheet documentation for full assessment, interventions and recommendations  Note: Limitation: Decreased ADL status, Decreased UE strength, Decreased Safe judgement during ADL, Decreased endurance, Decreased cognition, Decreased high-level ADLs  Prognosis: Good  Assessment: Pt is a 53 y o  female seen for OT evaluation s/p admit to [facility] on 9/15/2020 w/ Intractable nausea and vomiting  See above for extensive list of comorbidities affecting Pt's functional performance at time of assessment  Personal factors affecting Pt at time of IE include:steps to enter environment, behavioral pattern, difficulty performing ADLS, difficulty performing IADLS , limited insight into deficits and health management   Prior to admission, Pt reports being independent with ADLs, however at times needing assist for mobility, notes crawling up the stairs  Upon evaluation: Pt requires Katrina for sit-->stand transfers  Pt reports increased abd pain with standing, noted forward flexed posture and initial toe lift from floor  Pt requires maxA for brief management and donning socks  The following deficits impact occupational performance: weakness, decreased strength, decreased balance, decreased tolerance, impaired sequencing, impaired problem solving and decreased safety awareness  Pt to benefit from continued skilled OT services while in the hospital to address deficits as defined above and maximize level of functional independence w ADL's and functional mobility   Occupational performance areas to address include: grooming, bathing/shower, toilet hygiene, health maintenance, functional mobility and clothing management  From OT standpoint, recommendation at time of d/c would be TBD pending further mobility assessment and pain management         OT Discharge Recommendation: Other (Comment)(pending further assessment )

## 2020-09-16 NOTE — ASSESSMENT & PLAN NOTE
Likely secondary to volume contraction and dehydration  Meet sirs criteria because of tachycardia leukocytosis  Follow-up on lactic acid and procalcitonin  Will continue to monitor off antibiotics  Follow-up on CBC post hydration

## 2020-09-16 NOTE — NURSING NOTE
Pt  Maintains BP in 220/120, Dr Jasper Gamble is notified, Hydralazine ordered, Magnesium came 1 2 , IV mag ordered  Pt is tachycardic, maintaining 120 of HR

## 2020-09-16 NOTE — ASSESSMENT & PLAN NOTE
Patient has history of chronic lumbar pain secondary to lumbar disc disease and has had multiple surgeries in the past   She normally ambulates with cane or crutches  Has had multiple falls and has chronically been maintained on intrathecal Dilaudid pump, Nucynta and Ativan  She follows up with pain management at [facility]  Patient has not had refills of her intrathecal Dilaudid pump since August   She also ran out of her Nucynta on August 20th  She states that she has worsening back pain  States that pain radiates to the left leg and has had difficulty ambulating and frequent falls

## 2020-09-16 NOTE — PLAN OF CARE
Problem: PHYSICAL THERAPY ADULT  Goal: Performs mobility at highest level of function for planned discharge setting  See evaluation for individualized goals  Description: Treatment/Interventions: Functional transfer training, LE strengthening/ROM, Therapeutic exercise, Endurance training, Cognitive reorientation, Patient/family training, Equipment eval/education, Bed mobility, Gait training, Spoke to nursing, Spoke to MD, Spoke to case management, OT  Equipment Recommended: (TBD - forearm crutches/RW)       See flowsheet documentation for full assessment, interventions and recommendations  Outcome: Progressing  Note: Prognosis: Fair  Problem List: Decreased strength, Decreased range of motion, Decreased endurance, Impaired balance, Decreased mobility, Decreased coordination, Decreased cognition, Decreased safety awareness, Obesity, Pain, Impaired judgement  Assessment: /Pt is 53 y o  female seen for PT evaluation s/p admit to [facility] on 9/15/2020 w/ Intractable nausea and vomiting  Patient has history of chronic low back pain and follows up with pain management at [facility]  PT consulted to assess Pt's functional mobility and d/c needs  Please see above for past medical history and comorbidities  On evaluation patient presents in bed  Vitals supine resting /104, HR 93 bpm, Seated at EOB /98, HR 94 bpm  Pt reports that she has an intrathecal pump than ran out and has been trying to get seen by [facility] for 3 weeks  Pt c/o severe low back pain and left lower quadrant pain in abdomen  Pt required extensive assist with bed mobility due to impair thought process, IV line into right hand region with attention to not disrupt site, bed type(on specialized mattress with firm border impacting ability to attain sitting without assistance), recent receiving of pain meds, and pain  Ambulation held due to medical working to lower BP and nephrology addressing kidneys/low sodium level   Pt currently functioning below baseline function  Pt reports that she walked with loftstrand crutches although reports her one crutch is broken  She reports having a stair glide but stated that was broke as well  She stated that she crawled up the steps to shower and had been doing that for some time  She expressed having to provide care for her significant other but has been unable to do that  She reported past history of depression, became tearful on one occasion  May benefit from psychiatry evaluation  Complete social history difficult to obtain due to patient some confusion at times and difficulty maintaining focus during conversation of intake information  Clarification from family on prior level may be beneficial  Pt will benefit from skilled PT to increase functional mobility to allow for safe discharge  Barriers to Discharge: Inaccessible home environment, Decreased caregiver support     PT Discharge Recommendation: (Rehab vs  Home PT pending progress)     PT - OK to Discharge: No    See flowsheet documentation for full assessment

## 2020-09-16 NOTE — PLAN OF CARE
Problem: Potential for Falls  Goal: Patient will remain free of falls  Description: INTERVENTIONS:  - Assess patient frequently for physical needs  -  Identify cognitive and physical deficits and behaviors that affect risk of falls    -  [place] fall precautions as indicated by assessment   - Educate patient/family on patient safety including physical limitations  - Instruct patient to call for assistance with activity based on assessment  - Modify environment to reduce risk of injury  - Consider OT/PT consult to assist with strengthening/mobility  Outcome: Progressing     Problem: Prexisting or High Potential for Compromised Skin Integrity  Goal: Skin integrity is maintained or improved  Description: INTERVENTIONS:  - Identify patients at risk for skin breakdown  - Assess and monitor skin integrity  - Assess and monitor nutrition and hydration status  - Monitor labs   - Assess for incontinence   - Turn and reposition patient  - Assist with mobility/ambulation  - Relieve pressure over bony prominences  - Avoid friction and shearing  - Provide appropriate hygiene as needed including keeping skin clean and dry  - Evaluate need for skin moisturizer/barrier cream  - Collaborate with interdisciplinary team   - Patient/family teaching  - Consider wound care consult   Outcome: Progressing     Problem: PAIN - ADULT  Goal: Verbalizes/displays adequate comfort level or baseline comfort level  Description: Interventions:  - Encourage patient to monitor pain and request assistance  - Assess pain using appropriate pain scale  - Administer analgesics based on type and severity of pain and evaluate response  - Implement non-pharmacological measures as appropriate and evaluate response  - Consider cultural and social influences on pain and pain management  - Notify physician/advanced practitioner if interventions unsuccessful or patient reports new pain  Outcome: Progressing     Problem: INFECTION - ADULT  Goal: Absence or prevention of progression during hospitalization  Description: INTERVENTIONS:  - Assess and monitor for signs and symptoms of infection  - Monitor lab/diagnostic results  - Monitor all insertion sites, i e  indwelling lines, tubes, and drains  - Monitor endotracheal if appropriate and nasal secretions for changes in amount and color  - [place] appropriate cooling/warming therapies per order  - Administer medications as ordered  - Instruct and encourage patient and family to use good hand hygiene technique  - Identify and instruct in appropriate isolation precautions for identified infection/condition  Outcome: Progressing  Goal: Absence of fever/infection during neutropenic period  Description: INTERVENTIONS:  - Monitor WBC    Outcome: Progressing     Problem: SAFETY ADULT  Goal: Patient will remain free of falls  Description: INTERVENTIONS:  - Assess patient frequently for physical needs  -  Identify cognitive and physical deficits and behaviors that affect risk of falls    -  [place] fall precautions as indicated by assessment   - Educate patient/family on patient safety including physical limitations  - Instruct patient to call for assistance with activity based on assessment  - Modify environment to reduce risk of injury  - Consider OT/PT consult to assist with strengthening/mobility  Outcome: Progressing  Goal: Maintain or return to baseline ADL function  Description: INTERVENTIONS:  -  Assess patient's ability to carry out ADLs; assess patient's baseline for ADL function and identify physical deficits which impact ability to perform ADLs (bathing, care of mouth/teeth, toileting, grooming, dressing, etc )  - Assess/evaluate cause of self-care deficits   - Assess range of motion  - Assess patient's mobility; develop plan if impaired  - Assess patient's need for assistive devices and provide as appropriate  - Encourage maximum independence but intervene and supervise when necessary  - Involve family in performance of ADLs  - Assess for home care needs following discharge   - Consider OT consult to assist with ADL evaluation and planning for discharge  - Provide patient education as appropriate  Outcome: Progressing  Goal: Maintain or return mobility status to optimal level  Description: INTERVENTIONS:  - Assess patient's baseline mobility status (ambulation, transfers, stairs, etc )    - Identify cognitive and physical deficits and behaviors that affect mobility  - Identify mobility aids required to assist with transfers and/or ambulation (gait belt, sit-to-stand, lift, walker, cane, etc )  - [place] fall precautions as indicated by assessment  - Record patient progress and toleration of activity level on Mobility SBAR; progress patient to next Phase/Stage  - Instruct patient to call for assistance with activity based on assessment  - Consider rehabilitation consult to assist with strengthening/weightbearing, etc   Outcome: Progressing     Problem: DISCHARGE PLANNING  Goal: Discharge to home or other facility with appropriate resources  Description: INTERVENTIONS:  - Identify barriers to discharge w/patient and caregiver  - Arrange for needed discharge resources and transportation as appropriate  - Identify discharge learning needs (meds, wound care, etc )  - Arrange for interpretive services to assist at discharge as needed  - Refer to Case Management Department for coordinating discharge planning if the patient needs post-hospital services based on physician/advanced practitioner order or complex needs related to functional status, cognitive ability, or social support system  Outcome: Progressing     Problem: Knowledge Deficit  Goal: Patient/family/caregiver demonstrates understanding of disease process, treatment plan, medications, and discharge instructions  Description: Complete learning assessment and assess knowledge base    Interventions:  - Provide teaching at level of understanding  - Provide teaching via preferred learning methods  Outcome: Progressing

## 2020-09-16 NOTE — ASSESSMENT & PLAN NOTE
Patient has history of chronic low back pain and usually ambulates with assistance  Currently has worsening back pain secondary to inability to take chronic pain medications  Daughter reports frequent falls and gait instability  Maintain fall precautions  Physical therapy evaluation will be obtained    Consult PT and OT

## 2020-09-16 NOTE — ED PROCEDURE NOTE
PROCEDURE  ECG 12 Lead Documentation Only    Date/Time: 9/15/2020 8:28 PM  Performed by: Hamida Barrera MD  Authorized by: Hamida Barrera MD     Indications / Diagnosis:  Generalized weakness  ECG reviewed by me, the ED Provider: yes    Patient location:  ED  Interpretation:     Interpretation: normal    Rate:     ECG rate:  100    ECG rate assessment: normal    Rhythm:     Rhythm: sinus rhythm    Ectopy:     Ectopy: none    QRS:     QRS axis:  Normal    QRS intervals:  Normal  Conduction:     Conduction: normal    ST segments:     ST segments:  Normal  T waves:     T waves: normal           Hamida Barrera MD  09/15/20 2028

## 2020-09-16 NOTE — SOCIAL WORK
LOS: 0 GMLOS: none    Pt is not a 30 day readmission and is not a bundle pt  Pt presented at ED from home w/ chief complaint of diarrhea  Pt admitted and being treated for intractable nausea and vomiting  PT/OT speech consulted  SW met w/ pt to complete assessment  Pt was alert and able to answer assessment questions  ELIANE confirmed pt's phone number and confirmed that her daughter Guy Tejada ([phone] and [phone]) and pt's SO Naresh Mayo ([phone])  Pt's daughter Dev is POA for healthcare  Pt is disabled and receives SSD  Pt lives w/ her SO Eva Mcgee and two daughters Dev and Mark Lees who are 34 and 29  Dev and sriram are not working at this time and are both home 24/7  Pt lives in a 2 SH (+basement) w/ a 1st floor set up and a bathroom on the second floor  Prior to presentation, pt utilized 2 crutches to ambulate and her daughter assisted w/ meal prep  Pt does not drive, her daughter Dev transports as needed  Pt is not open w/ any in home resources  Pt's SO was receiving meals on wheels through the waiver program  ELIANE confirmed that pt's PCP is Edmund Cash  Pt smokes about a 1/2 pack of cigarettes a day  Pt is not a , does not have any legal issues and no SNF hx  Pt stated that she has depression but was never psychiatrically hospitalized  ELIANE confirmed w/ pt that she has medicare A and B w/ a secondary of [place] [facility]  Pt prefers to utilize [employer]  Pt cares for her SO Eva Mcgee, who pt states is about 22years older then her  Pt stated that she helps manage pt's diabetic medications as well as assist w/ some bathing  At this time pt stated that her daughter Dev and Mark Lees are caring for pt's SO Eva Mcgee  However, pt stated that she does not think her daughters feel super comfortable w/ assisting w/ bathing pt  Pt asking if ELIANE can provide [phone] phone # to see if they would be able to assist w/ bringing someone in to help   SW provided pt w/ phone number  Sw called pt's daughter Sarah Teran and confirmed that she was caring fro Jorgito Hammond and able to  Sarah Teran confirmed that she was and did not voice any concerns about being able to medically and physically care for Jorgito Hammond at this time  Sarah Teran is able to care for [facility]  SW discussed w/ pt PT and OT recommendation of rehab vs home pending progress  SW to f/u w/ pt once definite recommendation has been made  Pt had no other questions or concerns  SW will f/u w/ pt for plan of care

## 2020-09-16 NOTE — ASSESSMENT & PLAN NOTE
Secondary to intractable nausea vomiting and poor oral intake  Continue with IV replacement  Follow-up on magnesium  Follow-up BMP in a m     Maintain on telemetry monitor    Potassium corrected a repeat potassium 4  2  Repeat Chem 7 at 12 noon

## 2020-09-16 NOTE — NURSING NOTE
Pt  Still maintains high BP, hydralazine given, morphine q 1hr, , pain remains 10/10  Tachy on tele, Dr Rosa Burger aware  Will continue to monitor  full upper teeth  infusion port   WATCHMAN left atrial appendage closure device

## 2020-09-16 NOTE — ASSESSMENT & PLAN NOTE
Patient has history of chronic low back pain and usually ambulates with assistance  Currently has worsening back pain secondary to inability to take chronic pain medications  Daughter reports frequent falls and gait instability  Maintain fall precautions  Physical therapy evaluation will be obtained

## 2020-09-16 NOTE — ED PROVIDER NOTES
History  Chief Complaint   Patient presents with    Diarrhea     pt has been without pain meds for back injury since Aug 20th, started vomiting/diarreah/confusion yesterday  Patient is a 58-year-old female here with withdrawal symptoms  Patient states has a internal Dilaudid pump due to chronic back pain but had a mix up with her appointment in the office on August 20th  Has been out of her medicine since  States has her next follow-up appointment at the 28th of this month per daughter  PCP will not fill any pain meds for her due to her contract  Has had 2 weeks of symptoms nausea vomiting and diarrhea  Symptoms have become worse over last few days  Patient taking Imodium without relief  Patient was seen at an outside hospital yesterday where she did have a CT scan of the abdomen pelvis that was negative  Patient still complaining generalized weakness nausea  Per daughter patient only able to drink a small sip of broth before arriving  Prior to Admission Medications   Prescriptions Last Dose Informant Patient Reported? Taking? COMBIVENT RESPIMAT inhaler   Yes No   Sig: Inhale 2 puffs every 4 (four) hours as needed   CRANBERRY PO  Child Yes No   Sig: Take 4,200 mg by mouth daily  DiphenhydrAMINE HCl (ALLERGY MED PO)  Child Yes No   Sig: Take 25 mg by mouth as needed  [place] [facility]   Hydromorphone HCl (DILAUDID) 1 MG/ML LIQD  Child Yes No   Sig: Take 2 7 mg by mouth daily   LORazepam (ATIVAN) 1 mg tablet  Child Yes No   Sig: Take 1 mg by mouth 4 (four) times a day  SUMAtriptan (IMITREX) 100 mg tablet  Child Yes No   Sig: Take 100 mg by mouth once as needed for migraine  Tapentadol HCl (NUCYNTA PO)  Child Yes No   Sig: Take by mouth    acetaminophen (TYLENOL) 500 mg tablet   No No   Sig: Take 2 tablets (1,000 mg total) by mouth every 6 (six) hours as needed for mild pain or moderate pain   bisacodyl (DULCOLAX) 5 mg EC tablet  Child Yes No   Sig: Take 5 mg by mouth 2 (two) times a day   2 in the AM; 2 in the PM   buPROPion [facility] SR) 150 mg 12 hr tablet  Child Yes No   Sig: Take 150 mg by mouth 2 (two) times a day  2 tabs in AM; one tab in afternoon   carisoprodol (SOMA) 350 mg tablet  Child Yes No   Sig: Take 0 5 tablets by mouth 3 (three) times a day   citalopram (CeleXA) 40 mg tablet  Child Yes No   Sig: Take 40 mg by mouth daily  cyanocobalamin 1,000 mcg/mL   Yes No   Si INJECTION MONTHLY   gabapentin (NEURONTIN) 300 mg capsule  Child Yes No   Sig: Take 300 mg by mouth 4 (four) times a day   levETIRAcetam (KEPPRA) 500 mg tablet   Yes No   Sig: Take 1,500 mg by mouth 2 (two) times a day   levothyroxine 100 mcg tablet  Child Yes No   Sig: Take 100 mcg by mouth daily  lubiprostone (AMITIZA) 24 mcg capsule  Child Yes No   Sig: Take 24 mcg by mouth 2 (two) times a day with meals  metolazone (ZAROXOLYN) 2 5 mg tablet  Child Yes No   Sig: Take 2 5 mg by mouth     nitrofurantoin (MACROBID) 100 mg capsule  Child Yes No   Sig: Take 100 mg by mouth 2 (two) times a day  omeprazole (PriLOSEC) 40 MG capsule  Child Yes No   Sig: Take 40 mg by mouth daily  oxybutynin (DITROPAN) 5 mg tablet  Child Yes No   Sig: Take 5 mg by mouth 3 (three) times a day     potassium chloride (K-DUR,KLOR-CON) 20 mEq tablet  Child Yes No   Sig: Take 40 mEq by mouth 2 (two) times a day     temazepam (RESTORIL) 22 5 MG capsule  Child Yes No   Sig: Take 22 5 mg by mouth daily at bedtime        Facility-Administered Medications: None       Past Medical History:   Diagnosis Date    Abnormal gait     Abscess of abdominal cavity (HCC)     Aneurysm (HCC)     Brain with clips x 3 per pt    Arm injury     Right arm- pt present with short FA/wrist  immoblizer present on ED arrival    Cataract     Cellulitis     B/L LE    Chronic back pain     Chronic pain     Disease of thyroid gland     hypothyroid    Edema     B/L LE    Falls frequently     last fall 1 hour ago per pt    Foot drop, left foot     Fracture Facial bones per pt- left orbit/cheek? pt unsure    Fracture closed, nasal bone     Gastric bypass status for obesity     Hypoglycemia     Memory loss, short term     Migraine     MRSA (methicillin resistant Staphylococcus aureus)     Stomach ulcer        Past Surgical History:   Procedure Laterality Date    BACK SURGERY      lumbar/sacral fusion per pt    CEREBRAL ANEURYSM REPAIR      with clips x3 per pt     SECTION      x 4 per pt    GASTRIC BYPASS      MRSA CULTURE (HISTORICAL)      stomach abscess per pt       Family History   Problem Relation Age of Onset    Diabetes Mother     Heart failure Mother     Breast cancer Mother     Heart disease Father      I have reviewed and agree with the history as documented  E-Cigarette/Vaping    E-Cigarette Use Never User      E-Cigarette/Vaping Substances    Nicotine No     THC No     CBD No     Flavoring No     Other No     Unknown No      Social History     Tobacco Use    Smoking status: Current Every Day Smoker     Packs/day: 0 50     Types: Cigarettes, Cigars    Smokeless tobacco: Never Used   Substance Use Topics    Alcohol use: Not Currently     Comment: social    Drug use: No       Review of Systems   Constitutional: Positive for activity change, appetite change and fatigue  Negative for fever  HENT: Negative  Eyes: Negative  Respiratory: Negative  Cardiovascular: Negative  Gastrointestinal: Positive for abdominal pain, diarrhea and vomiting  Endocrine: Negative  Genitourinary: Negative  Musculoskeletal: Negative  Skin: Negative  Allergic/Immunologic: Negative  Neurological: Positive for weakness  Hematological: Negative  Psychiatric/Behavioral: Negative  All other systems reviewed and are negative  Physical Exam  Physical Exam  Vitals signs and nursing note reviewed  Constitutional:       Appearance: Normal appearance  She is obese  HENT:      Head: Normocephalic and atraumatic  Right Ear: Tympanic membrane, ear canal and external ear normal       Left Ear: Tympanic membrane, ear canal and external ear normal       Nose: Nose normal       Mouth/Throat:      Mouth: Mucous membranes are moist       Pharynx: Oropharynx is clear  Eyes:      Conjunctiva/sclera: Conjunctivae normal       Pupils: Pupils are equal, round, and reactive to light  Neck:      Musculoskeletal: Normal range of motion  Cardiovascular:      Rate and Rhythm: Normal rate and regular rhythm  Pulses: Normal pulses  Heart sounds: Normal heart sounds  Pulmonary:      Effort: Pulmonary effort is normal       Breath sounds: Normal breath sounds  Abdominal:      General: Bowel sounds are normal       Palpations: Abdomen is soft  There is no mass  Tenderness: There is abdominal tenderness  Comments: Generalized tenderness no rebound no guarding  No hernia  Musculoskeletal: Normal range of motion  Skin:     General: Skin is warm and dry  Capillary Refill: Capillary refill takes less than 2 seconds  Neurological:      General: No focal deficit present  Mental Status: She is alert and oriented to person, place, and time  Cranial Nerves: No cranial nerve deficit  Sensory: No sensory deficit  Motor: Weakness present        Gait: Gait abnormal       Deep Tendon Reflexes: Reflexes normal    Psychiatric:         Mood and Affect: Mood normal          Behavior: Behavior normal          Vital Signs  ED Triage Vitals   Temperature Pulse Respirations Blood Pressure SpO2   09/15/20 2013 09/15/20 2013 09/15/20 2013 09/15/20 2013 09/15/20 2013   98 °F (36 7 °C) 98 18 (!) 191/101 100 %      Temp Source Heart Rate Source Patient Position - Orthostatic VS BP Location FiO2 (%)   09/15/20 2013 09/15/20 2013 09/15/20 2013 09/15/20 2013 --   Tympanic Monitor Lying Left arm       Pain Score       09/15/20 2046       Worst Possible Pain           Vitals:    09/15/20 2013 09/15/20 2127   BP: (!) 191/101 (!) 200/110   Pulse: 98 95   Patient Position - Orthostatic VS: Lying Lying         Visual Acuity      ED Medications  Medications   potassium chloride 20 mEq IVPB (premix) (20 mEq Intravenous New Bag 9/15/20 2151)   SUMAtriptan (IMITREX) subcutaneous injection 6 mg (has no administration in time range)   ondansetron (ZOFRAN) injection 4 mg (4 mg Intravenous Given 9/15/20 2046)   HYDROmorphone (DILAUDID) injection 1 mg (1 mg Intravenous Given 9/15/20 2046)       Diagnostic Studies  Results Reviewed     Procedure Component Value Units Date/Time    Comprehensive metabolic panel [[id number]]  (Abnormal) Collected:  09/15/20 2059    Lab Status:  Final result Specimen:  Blood from Arm, Left Updated:  09/15/20 2141     Sodium 128 mmol/L      Potassium 2 1 mmol/L      Chloride 103 mmol/L      CO2 15 mmol/L      ANION GAP 10 mmol/L      BUN 8 mg/dL      Creatinine 0 33 mg/dL      Glucose 111 mg/dL      Calcium 6 4 mg/dL      AST 22 U/L      ALT 30 U/L      Alkaline Phosphatase 74 U/L      Total Protein 5 2 g/dL      Albumin 3 0 g/dL      Total Bilirubin 0 20 mg/dL      eGFR 125 ml/min/1 73sq m     Narrative:       [place] guidelines for Chronic Kidney Disease (CKD):     Stage 1 with normal or high GFR (GFR > 90 mL/min/1 73 square meters)    Stage 2 Mild CKD (GFR = 60-89 mL/min/1 73 square meters)    Stage 3A Moderate CKD (GFR = 45-59 mL/min/1 73 square meters)    Stage 3B Moderate CKD (GFR = 30-44 mL/min/1 73 square meters)    Stage 4 Severe CKD (GFR = 15-29 mL/min/1 73 square meters)    Stage 5 End Stage CKD (GFR <15 mL/min/1 73 square meters)  Note: GFR calculation is accurate only with a steady state creatinine    Lipase [[id number]]  (Normal) Collected:  09/15/20 2059    Lab Status:  Final result Specimen:  Blood from Arm, Left Updated:  09/15/20 2139     Lipase 27 u/L     Troponin I [[id number]]  (Normal) Collected:  09/15/20 2059    Lab Status:  Final result Specimen:  Blood from Arm, Left Updated:  09/15/20 2132     Troponin I 0 03 ng/mL     UA w Reflex to Microscopic w Reflex to Culture [[id number]]     Lab Status:  No result Specimen:  Urine     CBC and differential [[id number]]  (Abnormal) Collected:  09/15/20 2028    Lab Status:  Final result Specimen:  Blood from Arm, Left Updated:  09/15/20 2039     WBC 21 10 Thousand/uL      RBC 5 72 Million/uL      Hemoglobin 14 2 g/dL      Hematocrit 43 8 %      MCV 77 fL      MCH 24 8 pg      MCHC 32 3 g/dL      RDW 18 6 %      MPV 8 1 fL      Platelets 569 Thousands/uL                  No orders to display              Procedures  Procedures         ED Course  ED Course as of Sep 15 2200   Tue Sep 15, 2020   2152 Lab called, K 2 1   spoke with patient, SLIM paged  2157 Spoke with Dr Olimpia Coelho, will admit  Tele obs       2159 Patient states having a migraine come on, would like Imitrex  Ordered  SBIRT 20yo+      Most Recent Value   SBIRT (24 yo +)   In order to provide better care to our patients, we are screening all of our patients for alcohol and drug use  Would it be okay to ask you these screening questions? Yes Filed at: 09/15/2020 2028   Initial Alcohol Screen: US AUDIT-C    1  How often do you have a drink containing alcohol?  0 Filed at: 09/15/2020 2028   2  How many drinks containing alcohol do you have on a typical day you are drinking? 0 Filed at: 09/15/2020 2028   3a  Male UNDER 65: How often do you have five or more drinks on one occasion? 0 Filed at: 09/15/2020 2028   3b  FEMALE Any Age, or MALE 65+: How often do you have 4 or more drinks on one occassion? 0 Filed at: 09/15/2020 2028   Audit-C Score  0 Filed at: 09/15/2020 2028   KRISTEL: How many times in the past year have you    Used an illegal drug or used a prescription medication for non-medical reasons?   Never Filed at: 09/15/2020 2028                  MDM  Number of Diagnoses or Management Options  Hypokalemia:   Nausea vomiting and diarrhea:      Amount and/or Complexity of Data Reviewed  Clinical lab tests: ordered and reviewed  Tests in the medicine section of CPT®: reviewed and ordered  Obtain history from someone other than the patient: yes  Review and summarize past medical records: yes  Discuss the patient with other providers: yes  Independent visualization of images, tracings, or specimens: yes        Disposition  Final diagnoses:   Hypokalemia   Nausea vomiting and diarrhea     Time reflects when diagnosis was documented in both MDM as applicable and the Disposition within this note     Time User Action Codes Description Comment    9/15/2020  9:58 PM Tonja Favors Add [E87 6] Hypokalemia     9/15/2020  9:58 PM Tonja Favors Add [R11 2,  R19 7] Nausea vomiting and diarrhea       ED Disposition     ED Disposition Condition Date/Time Comment    Admit Stable Tue Sep 15, 2020  9:57 PM Case was discussed with Dr Tessie Larios and the patient's admission status was agreed to be Admission Status: observation status to the service of Dr Khang Gavin   Follow-up Information    None         Patient's Medications   Discharge Prescriptions    No medications on file     No discharge procedures on file      PDMP Review     None          ED Provider  Electronically Signed by           Loretta Zhao MD  09/15/20 2694       Loretta Zhao MD  09/15/20 4033

## 2020-09-16 NOTE — H&P
H&P- Sherleen Felty 1965, 53 y o  female MRN: [id number]    Unit/Bed#: [facility]O Encounter: [id number]    Primary Care Provider: Javier Riojas   Date and time admitted to hospital: 9/15/2020  8:07 PM        * Intractable nausea and vomiting  Assessment & Plan  Presents with 2-3 days history of multiple episodes of nausea and vomiting  Patient also reports dry heaving  Has not been able to tolerate her medications or food for over 2 days  Denies any abdominal discomfort but it has been associated with multiple episodes of diarrhea  She does report some specks of blood in her vomitus  Denies any hematemesis or coffee-ground emesis  Hemoconcentrated with a hemoglobin of 14 on admission    Her symptoms could be related to opioid Withdrawal as well  Abdominal exam is clinically benign  Continue with scheduled antiemetics and IV hydration  Continue with IV ppi  Rule out anastomotic ulcer( prior history of gastric bypass surgery), peptic ulcer disease, GI bleed    Hypertensive urgency  Assessment & Plan  Blood pressure elevated on admission at 200/110 likely secondary to pain  Patient denies any prior history of hypertension  Will continue with p r n  Hydralazine  And pain control    Leukocytosis  Assessment & Plan  Likely secondary to volume contraction and dehydration  Meet sirs criteria because of tachycardia leukocytosis  Follow-up on lactic acid and procalcitonin  Will continue to monitor off antibiotics  Follow-up on CBC post hydration  Ambulatory dysfunction  Assessment & Plan  Patient has history of chronic low back pain and usually ambulates with assistance  Currently has worsening back pain secondary to inability to take chronic pain medications  Daughter reports frequent falls and gait instability  Maintain fall precautions  Physical therapy evaluation will be obtained  Hyponatremia  Assessment & Plan  Sodium 128 on admission    Likely secondary to intractable nausea vomiting and diarrhea and dehydration resulting from that  Fortunately patient is hypertensive  Continue with IV hydration  Follow-up BMP in a [facility] Hypokalemia  Assessment & Plan  Secondary to intractable nausea vomiting and poor oral intake  Continue with IV replacement  Follow-up on magnesium  Follow-up BMP in a      Maintain on telemetry monitor  Diarrhea with dehydration  Assessment & Plan  Patient presents with multiple episodes of loose watery stools  Complains of generalized weakness  Associated with nausea vomiting but denies any abdominal discomfort fever or chills  Symptoms started after patient consumed homemade lasagna  Also admits to having antibiotic use in the last 3 months  No recent hospitalization  Rule out infectious etiology  Follow-up on stool enteric pathogen panel and C diff toxin assay  Maintain contact and hand hygiene precautions in the interim  Continue with IV hydration  Intractable low back pain  Assessment & Plan  Patient has acute worsening of her chronic back pain likely secondary to withdrawal from her chronic pain medications  She has not been able to follow-up with her pain management team at [place] [id number]  Multiple attempts by family to contact her physician were unsuccessful  She has not had any refill of her intrathecal Dilaudid pump and Nucynta since August   PDM verified    Plan:  Maintain fall precautions  Physical therapy evaluation will be obtained  Start patient on intravenous Dilaudid for moderate, severe and breakthrough pain  Will attempt to get in touch with patient's outpatient pain management team in a [facility] Continue with as needed muscle relaxant, gabapentin, Lidoderm patch, aqua  K-pad for pain control    Chronic midline low back pain with sciatica  Assessment & Plan  Patient has history of chronic lumbar pain secondary to lumbar disc disease and has had multiple surgeries in the past   She normally ambulates with cane or crutches    Has had multiple falls and has chronically been maintained on intrathecal Dilaudid pump, Nucynta and Ativan  She [facility] up with [facility]  Patient has not had refills of her intrathecal Dilaudid pump since August   She also ran out of her Nucynta on August 20th  She states that she has worsening back pain  States that pain radiates to the left leg and has had difficulty ambulating and frequent falls  VTE Prophylaxis: Enoxaparin (Lovenox)  / sequential compression device   Code Status:  Full code  POLST: POLST form is not discussed and not completed at this time  Discussion with family:  Discussed with daughter at bedside    Anticipated Length of Stay:  Patient will be admitted on an Observation basis with an anticipated length of stay of  < 2 midnights  Justification for Hospital Stay:  Electrolyte replacement, IV hydration and pain control  Total Time for Visit, including Counseling / Coordination of Care: 45 minutes  Greater than 50% of this total time spent on direct patient counseling and coordination of care  Chief Complaint:   Intractable nausea vomiting and diarrhea    History of Present Illness:    Francisco Cordero is a 53 y o  female who presents with few days history of intractable nausea vomiting and diarrhea  Patient has history of chronic low back pain and follows up with pain management [facility]  She has an intrathecal Dilaudid pump and takes multiple medications alongside with that including Nucynta, gabapentin and benzodiazepines  Her last medication refill was in August of 2020  She has subsequently not been able to get in touch with her pain management team   Unit of her medications and has been having intractable pain since then  For the last 2 days she started having multiple episodes of nausea vomiting followed by diarrhea  She describes the vomitus as bilious but noticed some specks of blood    Describes the diarrhea as watery with dark colored stools walking and has had multiple falls  she denied any hematemesis, coffee-ground emesis or tammi bleeding per rectum  She denies any abdominal discomfort, fever or chills  Daughter at bedside who was the primary historian reports that patient also has been at times confused and has been having difficulty  with ambulation and multiple falls recently  Review of Systems:    Review of Systems   Constitutional: Positive for activity change, appetite change and fatigue  HENT: Negative  Eyes: Negative  Respiratory: Negative  Cardiovascular: Negative  Gastrointestinal: Positive for diarrhea, nausea and vomiting  Endocrine: Negative  Genitourinary: Negative  Musculoskeletal: Positive for back pain and gait problem  Allergic/Immunologic: Negative  Neurological: Positive for dizziness, weakness and headaches  Hematological: Negative  Psychiatric/Behavioral: Positive for agitation and confusion         Past Medical and Surgical History:     Past Medical History:   Diagnosis Date    Abnormal gait     Abscess of abdominal cavity (HCC)     Aneurysm ([address] )     Brain with clips x 3 per pt    Arm injury     Right arm- pt present with short FA/wrist  immoblizer present on ED arrival    Cataract     Cellulitis     B/L LE    Chronic back pain     Chronic pain     Disease of thyroid gland     hypothyroid    Edema     B/L LE    Falls frequently     last fall 1 hour ago per pt    Foot drop, left foot     Fracture     Facial bones per pt- left orbit/cheek? pt unsure    Fracture closed, nasal bone     Gastric bypass status for obesity     Hypoglycemia     Memory loss, short term     Migraine     MRSA (methicillin resistant Staphylococcus aureus)     Stomach ulcer        Past Surgical History:   Procedure Laterality Date    BACK SURGERY      lumbar/sacral fusion per pt    CEREBRAL ANEURYSM REPAIR      with clips x3 per pt     SECTION      x 4 per pt    GASTRIC BYPASS      MRSA CULTURE (HISTORICAL)      stomach abscess per pt       Meds/Allergies:    Prior to Admission medications    Medication Sig Start Date End Date Taking? Authorizing Provider   acetaminophen (TYLENOL) 500 mg tablet Take 2 tablets (1,000 mg total) by mouth every 6 (six) hours as needed for mild pain or moderate pain 9/12/20   [place] PA-C   bisacodyl (DULCOLAX) 5 mg EC tablet Take 5 mg by mouth 2 (two) times a day  2 in the AM; 2 in the PM    Historical Provider, MD   buPROPion [facility]) 150 mg 12 hr tablet Take 150 mg by mouth 2 (two) times a day  2 tabs in AM; one tab in afternoon    Historical Provider, MD   carisoprodol (SOMA) 350 mg tablet Take 0 5 tablets by mouth 3 (three) times a day    Historical Provider, MD   citalopram (CeleXA) 40 mg tablet Take 40 mg by mouth daily  Historical Provider, MD   COMBIVENT RESPIMAT inhaler Inhale 2 puffs every 4 (four) hours as needed 8/10/19   Historical Provider, MD   CRANBERRY PO Take 4,200 mg by mouth daily  Historical Provider, MD   cyanocobalamin 1,000 mcg/mL 1 INJECTION MONTHLY    Historical Provider, MD   DiphenhydrAMINE HCl (ALLERGY MED PO) Take 25 mg by mouth as needed  [place] [facility]    Historical Provider, MD   gabapentin (NEURONTIN) 300 mg capsule Take 300 mg by mouth 4 (four) times a day 2/11/16   Historical Provider, MD   Hydromorphone HCl (DILAUDID) 1 MG/ML LIQD Take 2 7 mg by mouth daily 10/17/13   Historical Provider, MD   levETIRAcetam (KEPPRA) 500 mg tablet Take 1,500 mg by mouth 2 (two) times a day 7/29/19   Historical Provider, MD   levothyroxine 100 mcg tablet Take 100 mcg by mouth daily  Historical Provider, MD   LORazepam (ATIVAN) 1 mg tablet Take 1 mg by mouth 4 (four) times a day  Historical Provider, MD   lubiprostone (AMITIZA) 24 mcg capsule Take 24 mcg by mouth 2 (two) times a day with meals      Historical Provider, MD   metolazone (ZAROXOLYN) 2 5 mg tablet Take 2 5 mg by mouth      Historical Provider, MD   nitrofurantoin (MACROBID) 100 mg capsule Take 100 mg by mouth 2 (two) times a day  Historical Provider, MD   omeprazole (PriLOSEC) 40 MG capsule Take 40 mg by mouth daily  Historical Provider, MD   oxybutynin (DITROPAN) 5 mg tablet Take 5 mg by mouth 3 (three) times a day      Historical Provider, MD   potassium chloride (K-DUR,KLOR-CON) 20 mEq tablet Take 40 mEq by mouth 2 (two) times a day      Historical Provider, MD   SUMAtriptan (IMITREX) 100 mg tablet Take 100 mg by mouth once as needed for migraine  Historical Provider, MD   Tapentadol HCl (NUCYNTA PO) Take by mouth     Historical Provider, MD   temazepam (RESTORIL) 22 5 MG capsule Take 22 5 mg by mouth daily at bedtime  Historical Provider, MD     I have reviewed home medications with patient family member  Allergies: Allergies   Allergen Reactions    Iodinated Diagnostic Agents Swelling    Bee Venom     Iodine        Social History:     Marital Status:    OSubstance Use History:   Social History     Substance and Sexual Activity   Alcohol Use Not Currently    Comment: social     Social History     Tobacco Use   Smoking Status Current Every Day Smoker    Packs/day: 0 50    Types: Cigarettes, Cigars   Smokeless Tobacco Never Used     Social History     Substance and Sexual Activity   Drug Use No       Family History:    non-contributory    Physical Exam:     Vitals:   Blood Pressure: (!) 200/110 (09/15/20 2127)  Pulse: 95 (09/15/20 2127)  Temperature: 98 °F (36 7 °C) (09/15/20 2013)  Temp Source: Tympanic (09/15/20 2013)  Respirations: 18 (09/15/20 2127)  Weight - Scale: 95 5 kg (210 lb 8 6 oz) (09/15/20 2013)  SpO2: 100 % (09/15/20 2013)    Physical Exam  Constitutional:       General: She is in acute distress  Appearance: She is obese  She is ill-appearing  HENT:      Mouth/Throat:      Mouth: Mucous membranes are dry  Eyes:      Pupils: Pupils are equal, round, and reactive to light     Neck:      Musculoskeletal: Neck supple  Cardiovascular:      Rate and Rhythm: Regular rhythm  Tachycardia present  Pulses: Normal pulses  Pulmonary:      Comments: Poor respiratory effort with decreased breath sounds at bases  Abdominal:      Palpations: Abdomen is soft  Tenderness: There is no abdominal tenderness  Hernia: No hernia is present  Comments:  Dilaudid pump felt in right lower quadrant   Musculoskeletal:         General: No swelling  Skin:     General: Skin is dry  Neurological:      General: No focal deficit present  Comments: Oriented to place and person  Able to move all extremities  Psychiatric:      Comments: Appears agitated       Additional Data:     Lab Results: I have personally reviewed pertinent reports  Results from last 7 days   Lab Units 09/15/20  2028 09/14/20  2025   WBC Thousand/uL 21 10* 7 24   HEMOGLOBIN g/dL 14 2 11 9   HEMATOCRIT % 43 8 38 3   PLATELETS Thousands/uL 444 347   BANDS PCT % 3  --    NEUTROS PCT %  --  63   LYMPHS PCT %  --  26   LYMPHO PCT % 24*  --    MONOS PCT %  --  7   MONO PCT % 9  --    EOS PCT %  --  2     Results from last 7 days   Lab Units 09/15/20  2059   SODIUM mmol/L 128*   POTASSIUM mmol/L 2 1*   CHLORIDE mmol/L 103   CO2 mmol/L 15*   BUN mg/dL 8   CREATININE mg/dL 0 33*   ANION GAP mmol/L 10   CALCIUM mg/dL 6 4*   ALBUMIN g/dL 3 0   TOTAL BILIRUBIN mg/dL 0 20   ALK PHOS U/L 74   ALT U/L 30   AST U/L 22   GLUCOSE RANDOM mg/dL 111*                       Imaging: I have personally reviewed pertinent reports  No orders to display       EKG, Pathology, and Other Studies Reviewed on Admission:   EKG:  Sinus tachycardia with no acute ST T-wave changes  [facility] Yes     ** Please Note: This note has been constructed using a voice recognition system   **

## 2020-09-16 NOTE — PHYSICAL THERAPY NOTE
Physical Therapy Evaluation and Treatment Note    Time In/Out: 053-085    Patient's Name: Chen Fox    Admitting Diagnosis  Diarrhea [R19 7]  Hypokalemia [E87 6]  Nausea vomiting and diarrhea [R11 2, R19 7]    Problem List  Patient Active Problem List   Diagnosis    Pain in left hip    Chronic midline low back pain with sciatica    Intractable low back pain    Intractable nausea and vomiting    Diarrhea with dehydration    Hypokalemia    Hyponatremia    Ambulatory dysfunction    Leukocytosis    Hypertensive urgency       Past Medical History  Past Medical History:   Diagnosis Date    Abnormal gait     Abscess of abdominal cavity (HCC)     Aneurysm ([address] )     Brain with clips x 3 per pt    Arm injury     Right arm- pt present with short FA/wrist  immoblizer present on ED arrival    Cataract     Cellulitis     B/L LE    Chronic back pain     Chronic pain     Disease of thyroid gland     hypothyroid    Edema     B/L LE    Falls frequently     last fall 1 hour ago per pt    Foot drop, left foot     Fracture     Facial bones per pt- left orbit/cheek? pt unsure    Fracture closed, nasal bone     Gastric bypass status for obesity     Hypoglycemia     Memory loss, short term     Migraine     MRSA (methicillin resistant Staphylococcus aureus)     Stomach ulcer        Past Surgical History  Past Surgical History:   Procedure Laterality Date    BACK SURGERY      lumbar/sacral fusion per pt    CEREBRAL ANEURYSM REPAIR      with clips x3 per pt     SECTION      x 4 per pt    GASTRIC BYPASS      MRSA CULTURE (HISTORICAL)      stomach abscess per pt        20 0915   PT Last Visit   PT Visit Date 20   Note Type   Note type Eval/Treat   Pain Assessment   Pain Assessment Tool 0-10   Pain Score 8   Pain Location/Orientation Location: Abdomen;Orientation: Lower;Orientation: Left; Location: Back   Pain Onset/Description Onset: Ongoing   Patient's Stated Pain Goal No pain Hospital Pain Intervention(s) Medication (See MAR); Repositioned; Emotional support   Home Living   Type of [address] Two level; Able to live on main level with bedroom/bathroom; Ramped entrance  (stair glide (per patient does not work))   Home Equipment Crutches; Wheelchair-manual  (Forearm crutches (one forearm piece broke); ? WC)   Additional Comments Pt with impaired thought process during session, required redirection frequently during session  Prior Function   Level of [place] Other (Comment)  (per patient limited distance with crutches, crawled up steps)   Lives With Significant other;Daughter   Receives Help From [facility] in the last 6 months   (pt reports 2, nurses states multiple were reported)   Restrictions/Precautions   Weight Bearing Precautions Per Order No   Braces or Orthoses   (none reported)   Other Precautions Cognitive; Fall Risk;Pain;Contact/isolation   General   Additional Pertinent History currently with elevated BP and difficulty with urination requiring need to be straight cathed  Family/Caregiver Present No   Cognition   Overall Cognitive Status Impaired   Arousal/Participation Cooperative   Orientation Level Oriented to person;Oriented to place;Oriented to time   Following Commands Follows one step commands with increased time or repetition   RLE Assessment   RLE Assessment WFL   LLE Assessment   LLE Assessment WFL   Coordination   Movements are Fluid and Coordinated 0  (movements are slow)   Bed Mobility   Rolling R 4  Minimal assistance   Additional items Assist x 1; Increased time required;Verbal cues   Rolling L 4  Minimal assistance   Additional items Assist x 1; Increased time required;Verbal cues   Supine to Sit 2  Maximal assistance   Additional items Assist x 1; Increased time required;Verbal cues;LE management  (upper trunk)   Sit to Supine 3  Moderate assistance   Additional items Assist x 1; Increased time required;Verbal cues;LE management   Additional Comments Pt required min assist to align self in bed with VC   Transfers   Sit to Stand 3  Moderate assistance   Additional items Assist x 1; Increased time required;Verbal cues   Stand to Sit 4  Minimal assistance   Additional items Assist x 1;Verbal cues   Ambulation/Elevation   Gait pattern Improper Weight shift   Gait Assistance 4  Minimal assist   Additional items Assist x 1;Verbal cues   Assistive Device Rolling walker   Distance 2 feet  (Sidestepping to right to [facility] in prep for return to bed)   Balance   Static Sitting Fair -   Static Standing Fair -  (RW)   Ambulatory Fair -  (RW)   Activity Tolerance   Activity Tolerance Patient limited by pain;Treatment limited secondary to medical complications (Comment)  (elevated BP)   Nurse Made Aware RN clears to see   Assessment   Prognosis Fair   Problem List Decreased strength;Decreased range of motion;Decreased endurance; Impaired balance;Decreased mobility; Decreased coordination;Decreased cognition;Decreased safety awareness; Obesity;Pain; Impaired judgement   Assessment /Pt is 53 y o  female seen for PT evaluation s/p admit to [facility] on 9/15/2020 w/ Intractable nausea and vomiting  Patient has history of chronic low back pain and follows up with pain management at [facility]  PT consulted to assess Pt's functional mobility and d/c needs  Please see above for past medical history and comorbidities  On evaluation patient presents in bed  Vitals supine resting /104, HR 93 bpm, Seated at EOB /98, HR 94 bpm  Pt reports that she has an intrathecal pump than ran out and has been trying to get seen by [facility] for 3 weeks  Pt c/o severe low back pain and left lower quadrant pain in abdomen   Pt required extensive assist with bed mobility due to impair thought process, IV line into right hand region with attention to not disrupt site, bed type(on specialized mattress with firm border impacting ability to attain sitting without assistance), recent receiving of pain meds, and pain  Ambulation held due to medical working to lower BP and nephrology addressing kidneys/low sodium level  Pt currently functioning below baseline function  Pt reports that she walked with loftstrand crutches although reports her one crutch is broken  She reports having a stair glide but stated that was broke as well  She stated that she crawled up the steps to shower and had been doing that for some time  She expressed having to provide care for her significant other but has been unable to do that  She reported past history of depression, became tearful on one occasion  May benefit from psychiatry evaluation  Complete social history difficult to obtain due to patient some confusion at times and difficulty maintaining focus during conversation of intake information  Clarification from family on prior level may be beneficial  Pt will benefit from skilled PT to increase functional mobility to allow for safe discharge  Barriers to Discharge Inaccessible home environment;Decreased caregiver support   Goals   Patient Goals To have less pain and get better   STG Expiration Date 09/30/20   Short Term Goal #1 1) Bed mobility skills with modified independent assistance to facilitate safe return to previous living environment 2) Functional transfers with modified independent assistance to facilitate safe return to previous living environment  3) Ambulation with least restrictive AD modified independent assistance without LOB and stable vitals for safe ambulation home/ community distances  4) Improve balance grades to fair +   6) Perform LE HEP independently  7) PT for ongoing pt and family education; DME needs and D/C planning to promote highest level of function in least restrictive environment  PT Treatment Day 1   Plan   Treatment/Interventions Functional transfer training;LE strengthening/ROM; Therapeutic exercise; Endurance training;Cognitive reorientation;Patient/family training;Equipment eval/education; Bed mobility;Gait training;Spoke to nursing;Spoke to MD;Spoke to case management;OT   PT Frequency Other (Comment)  (3-5x/wk)   Recommendation   PT Discharge Recommendation   (Rehab vs  Home PT pending progress)   Equipment Recommended   (TBD - forearm crutches/RW)   PT - OK to Discharge No   Barthel Index   Feeding 5   Bathing 0   Grooming Score 0   Dressing Score 5   Bladder Score 0   Bowels Score 10   Toilet Use Score 5   Transfers (Bed/Chair) Score 5   Mobility (Level Surface) Score 0   Stairs Score 0   Barthel Index Score 30       ________________________________________________________    PT Treatment Note    Time In/Out: 156-270    S: "I don't want to have [facility] for my doctors  I am changing to Leroy Potts"    O: Pt education and performance in rolling for position change and application of pain patch by nursing  Pt require tactile and verbal cues for flexion of LE's prior to rolling  Requires mod/min assist for repositioning in bed in prep for rolling  Pt sitting balance decreased with posterior pelvic tilt, mattress has firm border 2" at edge giving altered surface  Pt instructed in proper hand placement for safe sit to stand to RW  Pt required VC for posture in standing with RW  Intermittent forward flexion with forearm WB on walker due to fatigue/pain  Pt education in self initiated TE of ankle pumps, hip IR/ER to facilitate mobility LE's and decrease risk for thrombus  A: Pt will benefit from SCD's, nursing reports in process of getting  Pillow for offloading at this time to decrease risk for breakdown at heel  Ambulation straight path held on evaluation due to current medical status  Will progress ambulation when appropriate with RW to lesser AD if able    P:Continue to follow to address goals per Ricardo 30, PT

## 2020-09-16 NOTE — OCCUPATIONAL THERAPY NOTE
Occupational Therapy Evaluation & Treatment Note      Patient Name: King METCALFV'H Date: 2020  Problem List  Principal Problem:    Intractable nausea and vomiting  Active Problems:    Chronic midline low back pain with sciatica    Intractable low back pain    Diarrhea with dehydration    Hypokalemia    Hyponatremia    Ambulatory dysfunction    Leukocytosis    Hypertensive urgency    Past Medical History  Past Medical History:   Diagnosis Date    Abnormal gait     Abscess of abdominal cavity (HCC)     Aneurysm ([address] )     Brain with clips x 3 per pt    Arm injury     Right arm- pt present with short FA/wrist  immoblizer present on ED arrival    Cataract     Cellulitis     B/L LE    Chronic back pain     Chronic pain     Disease of thyroid gland     hypothyroid    Edema     B/L LE    Falls frequently     last fall 1 hour ago per pt    Foot drop, left foot     Fracture     Facial bones per pt- left orbit/cheek? pt unsure    Fracture closed, nasal bone     Gastric bypass status for obesity     Hypoglycemia     Memory loss, short term     Migraine     MRSA (methicillin resistant Staphylococcus aureus)     Stomach ulcer      Past Surgical History  Past Surgical History:   Procedure Laterality Date    BACK SURGERY      lumbar/sacral fusion per pt    CEREBRAL ANEURYSM REPAIR      with clips x3 per pt     SECTION      x 4 per pt    GASTRIC BYPASS      MRSA CULTURE (HISTORICAL)      stomach abscess per pt             20 1141 --> 24 min txt    OT Last Visit   OT Visit Date 20   Note Type   Note type Eval/Treat   Restrictions/Precautions   Weight Bearing Precautions Per Order No   Other Precautions Contact/isolation; Fall Risk;Pain   Pain Assessment   Pain Assessment Tool 0-10   Pain Score 4   Pain Location/Orientation Location: Abdomen   Pain Onset/Description Onset: Ongoing   Patient's Stated Pain Goal No pain   Hospital Pain Intervention(s) Medication (See MAR) Home Living   Type of [address] [place] [address] Two level;Ramped entrance; Able to live on main level with bedroom/bathroom   Bathroom Shower/Tub Tub/shower unit   Bathroom Toilet Standard   Bathroom Accessibility Accessible via walker   Home Equipment Wheelchair-manual;Crutches   Additional Comments Pt very tangential, noted cognitive deficits- unclear if home set-up accurate  Prior Function   Level of [place] Independent with ADLs and functional mobility; Needs assistance with IADLs   Lives With Significant other   Receives Help From Family   ADL Assistance Independent   IADLs Needs assistance   Comments Pt with conflicting report of PLOF at times  Pt stating she is able to dress herself and use the bathroom, later stating her SO needs to assist with this  Pt reports crawling up the stairs  Subjective   Subjective "I need to get better"   ADL   Grooming Assistance 5  Supervision/Setup   Grooming Deficit Wash/dry hands; Wash/dry face   UB Bathing Assistance 4  Minimal Assistance   LB Bathing Assistance 3  Moderate Assistance   UB Dressing Assistance 4  Minimal Assistance   LB Dressing Assistance 2  Maximal Assistance   Toileting Assistance  2  Maximal Assistance   Bed Mobility   Supine to Sit 3  Moderate assistance   Additional items Assist x 1;Verbal cues; Impulsive;HOB elevated   Sit to Supine 3  Moderate assistance   Additional items Assist x 1;Verbal cues; Increased time required   Transfers   Sit to Stand 4  Minimal assistance   Additional items Assist x 1; Increased time required;Verbal cues   Stand to Sit 4  Minimal assistance   Additional items Assist x 1; Increased time required;Verbal cues   Functional Mobility   Additional items   (Pt deferred at this time )   Balance   Static Sitting Fair +   Dynamic Sitting Fair   Static Standing Fair -   Dynamic Standing Poor +   Activity Tolerance   Activity Tolerance Patient limited by fatigue;Patient limited by pain   RUE Assessment   RUE Assessment [facility] LUE Assessment   LUE Assessment WFL   Hand Function   Gross Motor Coordination Functional   Fine Motor Coordination Functional   Sensation   Light Touch No apparent deficits   Proprioception   Proprioception No apparent deficits   Cognition   Overall Cognitive Status Impaired   Arousal/Participation Cooperative; Alert   Attention Difficulty attending to directions   Orientation Level Oriented to person;Oriented to place;Oriented to time   Memory Within functional limits   Following Commands Follows one step commands with increased time or repetition   Comments Pt with poor attention to task, requires frequent redirection  Pt very tangential, limited insight into medical hx and current deficits  Pt at times reports conflicting/inconsistent information  Assessment   Limitation Decreased ADL status; Decreased UE strength;Decreased Safe judgement during ADL;Decreased endurance;Decreased cognition;Decreased high-level ADLs   Prognosis Good   Assessment   Pt is a 53 y o  female seen for OT evaluation s/p admit to [facility] on 9/15/2020 w/ Intractable nausea and vomiting  See above for extensive list of comorbidities affecting Pt's functional performance at time of assessment  Personal factors affecting Pt at time of IE include:steps to enter environment, behavioral pattern, difficulty performing ADLS, difficulty performing IADLS , limited insight into deficits and health management   Prior to admission, Pt reports being independent with ADLs, however at times needing assist for mobility, notes crawling up the stairs  Upon evaluation: Pt requires Katrina for sit-->stand transfers  Pt reports increased abd pain with standing, noted forward flexed posture and initial toe lift from floor  Pt requires maxA for brief management and donning socks   The following deficits impact occupational performance: weakness, decreased strength, decreased balance, decreased tolerance, impaired sequencing, impaired problem solving and decreased safety awareness  Pt to benefit from continued skilled OT services while in the hospital to address deficits as defined above and maximize level of functional independence w ADL's and functional mobility  Occupational performance areas to address include: grooming, bathing/shower, toilet hygiene, health maintenance, functional mobility and clothing management  From OT standpoint, recommendation at time of d/c would be TBD pending further mobility assessment and pain management  Goals   STG Time Frame   (2 weeks )   Short Term Goals 1  Pt will complete bathroom mobility Kellee/I    2  Pt will complete toilet hygiene and clothing management Kellee/I    3  Pt will complete functional ambulation Kellee/I  Plan   Treatment Interventions ADL retraining;Functional transfer training;UE strengthening/ROM; Endurance training;Cognitive reorientation;Continued evaluation; Activityengagement; Energy conservation   Goal Expiration Date 09/30/20   OT Treatment Day 1   OT Frequency 2-3x/wk   Additional Treatment Session   Start Time 1400   End Time 0949   Treatment Assessment Pt seen for OT txt following initial evaluation  Pt agreeable to activity while seated EOB  Pt able to maintain unsupported sitting position for ~ 5 minutes before supported rest break  Pt completed grooming tasks with set-up, as well as dynamic reach and return to midline to facilitate trunk strengthening  Pt continues to be highly pain limited, with poor activity tolerance  Pt would benefit from continued OT services to further address deficits as defined above, maximize function, and decrease caregiver burden      Recommendation   OT Discharge Recommendation Other (Comment)  (pending further assessment )

## 2020-09-16 NOTE — UTILIZATION REVIEW
Initial Clinical Review - admitted as Observation on 9/15/20 @ 2158  Changed to Inpatient on 9/16/20 @ 1643  Last edited by  on  at    Start    Ordered    09/16/20 1643    Inpatient Admission  Once      Transfer Service: Hospitalist       Question  Answer  Comment    Admitting Physician  Sophia Bentley     Level of Care  Level 2 Stepdown / HOT     Estimated length of stay  More than 2 Midnights     Certification  I certify that inpatient services are medically necessary for this patient for a duration of greater than two midnights  See H&P and MD Progress Notes for additional information about the patient's course of treatment  09/16/20 1643            Admission: Date/Time/Statement:   Admission Orders (From admission, onward)     Ordered        09/15/20 2158  Place in Observation  Once                     ED Arrival Information     Expected Arrival Acuity Means of Arrival Escorted By Service Admission Type    - 9/15/2020 20:05 Urgent Walk-In Self General Medicine Urgent    Arrival Complaint    fatigue        Chief Complaint   Patient presents with    Diarrhea     pt has been without pain meds for back injury since Aug 20th, started vomiting/diarreah/confusion yesterday  HPI:   53 y o  female who presents with few days history of intractable nausea vomiting and diarrhea  Patient has history of chronic low back pain and [facility]  She has an intrathecal Dilaudid pump and takes multiple medications alongside with that including Nucynta, gabapentin and benzodiazepines  Her last medication refill was in August of 2020  She has subsequently not been able to get in touch with her pain management team   Unit of her medications and has been having intractable pain since then  For the last 2 days she started having multiple episodes of nausea vomiting followed by diarrhea  She describes the vomitus as bilious but noticed some specks of blood  Describes the diarrhea as watery with dark colored stools walking and has had multiple falls  she denied any hematemesis, coffee-ground emesis or tammi bleeding per rectum  She denies any abdominal discomfort, fever or chills  Daughter at bedside who was the primary historian reports that patient also has been at times confused and has been having difficulty  with ambulation and multiple falls recently  * Intractable nausea and vomiting  Assessment & Plan  Presents with 2-3 days history of multiple episodes of nausea and vomiting  Patient also reports dry heaving  Has not been able to tolerate her medications or food for over 2 days  Denies any abdominal discomfort but it has been associated with multiple episodes of diarrhea  She does report some specks of blood in her vomitus  Denies any hematemesis or coffee-ground emesis  Hemoconcentrated with a hemoglobin of 14 on admission    Her symptoms could be related to opioid Withdrawal as well  Abdominal exam is clinically benign  Continue with scheduled antiemetics and IV hydration  Continue with IV ppi  Rule out anastomotic ulcer( prior history of gastric bypass surgery), peptic ulcer disease, GI bleed     Hypertensive urgency  Assessment & Plan  Blood pressure elevated on admission at 200/110 likely secondary to pain  Patient denies any prior history of hypertension  Will continue with p r n  Hydralazine  And pain control     Leukocytosis  Assessment & Plan  Likely secondary to volume contraction and dehydration  Meet sirs criteria because of tachycardia leukocytosis  Follow-up on lactic acid and procalcitonin  Will continue to monitor off antibiotics  Follow-up on CBC post hydration      Ambulatory dysfunction  Assessment & Plan  Patient has history of chronic low back pain and usually ambulates with assistance  Currently has worsening back pain secondary to inability to take chronic pain medications    Daughter reports frequent falls and gait instability  Maintain fall precautions  Physical therapy evaluation will be obtained      Hyponatremia  Assessment & Plan  Sodium 128 on admission  Likely secondary to intractable nausea vomiting and diarrhea and dehydration resulting from that  Fortunately patient is hypertensive  Continue with IV hydration  Follow-up [facility] in a         Hypokalemia  Assessment & Plan  Secondary to intractable nausea vomiting and poor oral intake  Continue with IV replacement  Follow-up on magnesium  Follow-up [facility] in a      Maintain on telemetry monitor      Diarrhea with dehydration  Assessment & Plan  Patient presents with multiple episodes of loose watery stools  Complains of generalized weakness  Associated with nausea vomiting but denies any abdominal discomfort fever or chills  Symptoms started after patient consumed homemade lasagna  Also admits to having antibiotic use in the last 3 months  No recent hospitalization      Rule out infectious etiology  Follow-up on stool enteric pathogen panel and C diff toxin assay  Maintain contact and hand hygiene precautions in the interim  Continue with IV hydration      Intractable low back pain  Assessment & Plan  Patient has acute worsening of her chronic back pain likely secondary to withdrawal from her chronic pain medications  She has not been able to follow-up with her pain management team at [place] [address]  Multiple attempts by family to contact her physician were unsuccessful  She has not had any refill of her intrathecal Dilaudid pump and Nucynta since August   [facility] verified     Plan:  Maintain fall precautions  Physical therapy evaluation will be obtained  Start patient on intravenous Dilaudid for moderate, severe and breakthrough pain  Will attempt to get in touch with patient's outpatient pain management team in a   Rolo Ball   Continue with as needed muscle relaxant, gabapentin, Lidoderm patch, aqua  K-pad for pain control     Chronic midline low back pain with sciatica  Assessment & Plan  Patient has history of chronic lumbar pain secondary to lumbar disc disease and has had multiple surgeries in the past   She normally ambulates with cane or crutches  Has had multiple falls and has chronically been maintained on intrathecal Dilaudid pump, Nucynta and Ativan  She follows up with pain management at [facility]  Patient has not had refills of her intrathecal Dilaudid pump since August   She also ran out of her Nucynta on August 20th  She states that she has worsening back pain  States that pain radiates to the left leg and has had difficulty ambulating and frequent falls        VTE Prophylaxis: Enoxaparin (Lovenox)  / sequential compression device   Code Status:  Full code  POLST: POLST form is not discussed and not completed at this time  Discussion with family:  Discussed with daughter at bedside     Anticipated Length of Stay:  Patient will be admitted on an Observation basis with an anticipated length of stay of  < 2 midnights  Justification for Hospital Stay:  Electrolyte replacement, IV hydration and pain control    9/16 Nephrology Consult:      ASSESSMENT and PLAN:  1  Hypertensive urgency,  Patient denies any prior history of blood pressure  Blood pressure on admission 200/110, suspected secondary to pain, nausea, vomiting  Patient received 10 mg of Norvasc p o  before my evaluation  Previously received hydralazine 10 mg intravenously as well as labetalol 10 mg intravenously  Monitor blood pressure after blood pressure medication  If nausea and vomiting continues and if blood pressure remains elevated, consider starting Cardene drip with a goal systolic blood pressure around 160  Check Beka, renin, metanephrine, TSH  Check renal Doppler  Urinalysis shows 0-1 rbc's, 0-1 wbc's, more than 500 protein  Check urine microalbumin to creatinine ratio for quantification  Follow low-salt diet     2  Hyponatremia, acute on chronic    As per previous labs noted patient had previous episode of hyponatremia with serum sodium in the 130-133 in 2014 and 2018  Suspected a component of true volume depletion given nausea and vomiting for 3 weeks as well as reported diarrhea  Noted serum sodium on admission was 130 and decreased to 123 today  Possible superimposed ADH related component secondary to pain, nausea and vomiting  Continue with intravenously fluids  Check TSH as above  I have ordered a repeat BMP, serum osmolality, urine osmolality and urine sodium at noon  Based on results will decide about further testing  Noted also patient is on Celexa that can cause SIADH     3  Hypokalemia hypomagnesemia on admission, improved after replacement, follow serial labs     4  Intractable nausea vomiting, per patient for the last 3 weeks, further management per primary team   Reported abdominal pain and diarrhea      5  History of intractable lower back pain, history of intrathecal Dilaudid pump      6  History of cerebral aneurysm status post clipping     7  Reported urinary retention with previous straight catheterization over 900, follow bladder scan Q shift as per  Urinary retention protocol, urinary retention persist consider Farias catheter placement           SUMMARY OF RECOMMENDATIONS:  Patient received labetalol and hydralazine intravenously  Patient received 10 mg of Norvasc before my evaluation  Follow vitals  If blood pressure does not improve and patient continues with nausea vomiting consider start Cardene drip, goal to have systolic blood pressure around 160 for now  Follow low-salt diet  Check metanephrines, renin, Beka, TSH  Check renal Doppler  Repeat BMP with urine osmolality, serum osmolality, urine sodium at noon  Continue with intravenously fluid for now    Follow urinary retention protocol, low threshold for Farias catheter placement urinary retention persist   My plan and recommendation with discussed with Dr Kait Alejandre from Internal Medicine team       9/16 Internal Medicine:    Hypertensive urgency  Assessment & Plan  Blood pressure elevated on admission at 200/110 likely secondary to pain  Patient denies any prior history of hypertension  Will continue with p r n  Hydralazine  And pain control  Patient multiple pain medication has been complaining of pain around 7  Increase hydralazine to 10 patient tolerated p o  Okay and not right extremity mg p o  For medication just given the above target blood pressure is 16o sys  There not want to drop too fast  Discussed with Nephrology  Some blood work rule out secondary hypertension patient denied prior history of hypertension  Renal artery ultrasound to rule out renal artery stenosis     Leukocytosis  Assessment & Plan  Likely secondary to volume contraction and dehydration  Meet sirs criteria because of tachycardia leukocytosis  Follow-up on lactic acid and procalcitonin  Will continue to monitor off antibiotics  Follow-up on CBC post hydration  Repeat CBC in the morning if afebrile and does not look toxic and not look infectious process causing right flank elevated BP secondary to nausea vomiting diarrhea stress-induced and pain problem     Ambulatory dysfunction  Assessment & Plan  Patient has history of chronic low back pain and usually ambulates with assistance  Currently has worsening back pain secondary to inability to take chronic pain medications  Daughter reports frequent falls and gait instability  Maintain fall precautions  Physical therapy evaluation will be obtained  Consult PT and OT     Hyponatremia  Assessment & Plan  Sodium 128 on admission  Likely secondary to intractable nausea vomiting and diarrhea and dehydration resulting from that  Fortunately patient is hypertensive  Continue with IV hydration  Follow-up BMP in a m     Most probably secondary to diarrhea and vomiting the patient is running fluid normal saline    Repeat Chem 7 and 12 noon if continued drop most probably secondary to combination inappropriate ADH and nausea vomiting  p o  Intake in order serum and urine osmolarity and spot urinary sodium  Appreciate Nephrology consult              Hypokalemia  Assessment & Plan  Secondary to intractable nausea vomiting and poor oral intake  Continue with IV replacement  Follow-up on magnesium  Follow-up BMP in a m     Maintain on telemetry monitor  Potassium corrected a repeat potassium 4  2  Repeat Chem 7 at 12 noon     Diarrhea with dehydration  Assessment & Plan  Patient presents with multiple episodes of loose watery stools  Complains of generalized weakness  Associated with nausea vomiting but denies any abdominal discomfort fever or chills  Symptoms started after patient consumed homemade lasagna  Also admits to having antibiotic use in the last 3 months  No recent hospitalization      Rule out infectious etiology  Follow-up on stool enteric pathogen panel and C diff toxin assay  Maintain contact and hand hygiene precautions in the interim  Continue with IV hydration  Patient no  Vomiting since last night this morning tolerated p o  Small amount of fluid no problem swallowing or choking     Intractable low back pain  Assessment & Plan  Patient has acute worsening of her chronic back pain likely secondary to withdrawal from her chronic pain medications  She has not been able to follow-up with her pain management team at [facility] ([facility])  Multiple attempts by family to contact her physician were unsuccessful  She has not had any refill of her intrathecal Dilaudid pump and Nucynta since August   PDMP verified     Plan:  Maintain fall precautions  Physical therapy evaluation will be obtained  Start patient on intravenous Dilaudid for moderate, severe and breakthrough pain  Will attempt to get in touch with patient's outpatient pain management team in a m  Daiana Peace   Continue with as needed muscle relaxant, gabapentin, Lidoderm patch, aqua  K-pad for pain control     Chronic midline low back pain with sciatica  Assessment & Plan  Patient has history of chronic lumbar pain secondary to lumbar disc disease and has had multiple surgeries in the past   She normally ambulates with cane or crutches  Has had multiple falls and has chronically been maintained on intrathecal Dilaudid pump, Nucynta and Ativan  She [facility]  Patient has not had refills of her intrathecal Dilaudid pump since August   She also ran out of her Nucynta on August 20th  She states that she has worsening back pain  States that pain radiates to the left leg and has had difficulty ambulating and frequent falls  Consult pain management        * Intractable nausea and vomiting  Assessment & Plan  Presents with 2-3 days history of multiple episodes of nausea and vomiting  Patient also reports dry heaving  Has not been able to tolerate her medications or food for over 2 days  Denies any abdominal discomfort but it has been associated with multiple episodes of diarrhea  She does report some specks of blood in her vomitus  Denies any hematemesis or coffee-ground emesis  Hemoconcentrated with a hemoglobin of 14 on admission    Her symptoms could be related to opioid Withdrawal as well  Abdominal exam is clinically benign  Continue with scheduled antiemetics and IV hydration  Continue with IV ppi  Rule out anastomotic ulcer( prior history of gastric bypass surgery), peptic ulcer disease, GI bleed  No vomiting since last night he will clear liquid the morning she tolerated some slow rate she started vomiting will consult GI and bariatric surgeon        VTE Pharmacologic Prophylaxis:   Pharmacologic: Enoxaparin (Lovenox)  Mechanical VTE Prophylaxis in Place:  Yes        Current Patient Status: Observation   Certification Statement: The patient will continue to require additional inpatient hospital stay due to Hypertension and hyponatremia       ED Triage Vitals   Temperature Pulse Respirations Blood Pressure SpO2   09/15/20 2013 09/15/20 2013 09/15/20 2013 09/15/20 2013 09/15/20 2013   98 °F (36 7 °C) 98 18 (!) 191/101 100 %      Temp Source Heart Rate Source Patient Position - Orthostatic VS BP Location FiO2 (%)   09/15/20 2013 09/15/20 2013 09/15/20 2013 09/15/20 2013 --   Tympanic Monitor Lying Left arm       Pain Score       09/15/20 2046       Worst Possible Pain          Wt Readings from Last 1 Encounters:   09/15/20 95 6 kg (210 lb 12 2 oz)     Additional Vital Signs:       Date/Time   Temp   Pulse   Resp   BP   MAP (mmHg)   SpO2   O2 Device     09/16/20 0948      90      181/99Abnormal                09/16/20 0748   97 4 °F (36 3 °C)Abnormal     88   22   203/108Abnormal        100 %   None (Room air)     09/16/20 0645   97 8 °F (36 6 °C)   89   22   205/102Abnormal        99 %   None (Room air)     09/16/20 0510   97 7 °F (36 5 °C)   84   22   210/130Abnormal        98 %   None (Room air)     09/16/20 0430   97 8 °F (36 6 °C)   120Abnormal     20   202/102Abnormal        98 %   None (Room air)     09/16/20 0400   97 9 °F (36 6 °C)   122Abnormal     20   205/102Abnormal        99 %   None (Room air)     09/16/20 0330   97 9 °F (36 6 °C)   120Abnormal     20   195/100Abnormal        99 %   None (Room air)     09/16/20 0300   97 7 °F (36 5 °C)   122Abnormal     20   200/100Abnormal        99 %   None (Room air)     09/16/20 0245   97 9 °F (36 6 °C)   124Abnormal     20   197/101Abnormal        98 %   None (Room air)     09/16/20 0230   97 7 °F (36 5 °C)   120Abnormal     22   196/101Abnormal     146   99 %   None (Room air)     09/16/20 0215   97 8 °F (36 6 °C)   124Abnormal     24Abnormal     198/102Abnormal        99 %   None (Room air)     09/16/20 0156   97 7 °F (36 5 °C)   129Abnormal     24Abnormal     201/11Abnormal     144   97 %   None (Room air)     09/16/20 0100   97 8 °F (36 6 °C)   97   20   220/120Abnormal        99 %   None (Room air)     09/15/20 2255   98 2 °F (36 8 °C)   89   18   210/102Abnormal        100 %   None (Room air)     09/15/20 2127      95   18   200/110Abnormal                      Pertinent Labs/Diagnostic Test Results:     9/16 CT head: No acute intracranial abnormality  9/15 EKG:  Normal sinus rhythm  Possible Left atrial enlargement  Nonspecific ST abnormality  Abnormal ECG  When compared with ECG of 14-SEP-2020 22:12,  No significant change was found         Results from last 7 days   Lab Units 09/16/20  0610 09/16/20  0040 09/15/20  2028 09/14/20  2025   WBC Thousand/uL 19 30*  --  21 10* 7 24   HEMOGLOBIN g/dL 13 1  --  14 2 11 9   HEMATOCRIT % 40 8  --  43 8 38 3   PLATELETS Thousands/uL 417 405 444 347   NEUTROS ABS Thousands/µL  --   --   --  4 55   TOTAL NEUT ABS Thousand/uL  --   --  13 72*  --    BANDS PCT %  --   --  3  --          Results from last 7 days   Lab Units 09/16/20  0610 09/16/20  0040 09/15/20  2059 09/14/20  2025   SODIUM mmol/L 123*  --  128* 130*   POTASSIUM mmol/L 4 2  --  2 1* 3 7   CHLORIDE mmol/L 94*  --  103 96*   CO2 mmol/L 15*  --  15* 26   ANION GAP mmol/L 14  --  10 8   BUN mg/dL 12  --  8 9   CREATININE mg/dL 0 48*  --  0 33* 0 91   EGFR ml/min/1 73sq m 111  --  125 71   CALCIUM mg/dL 9 1  --  6 4* 8 4   MAGNESIUM mg/dL 2 2 1 2*  --   --      Results from last 7 days   Lab Units 09/16/20  0610 09/15/20  2059 09/14/20  2025   AST U/L 34 22 22   ALT U/L 25 30 34   ALK PHOS U/L 100 74 121*   TOTAL PROTEIN g/dL 7 2 5 2* 7 3   ALBUMIN g/dL 4 0 3 0 4 2   TOTAL BILIRUBIN mg/dL 0 40 0 20 0 20         Results from last 7 days   Lab Units 09/16/20  0610 09/15/20  2059 09/14/20  2025   GLUCOSE RANDOM mg/dL 144* 111* 121             Results from last 7 days   Lab Units 09/15/20  2059 09/14/20  2126   TROPONIN I ng/mL 0 03 <0 02                     Results from last 7 days   Lab Units 09/16/20  0040   LACTIC ACID mmol/L 1 0                         Results from last 7 days   Lab Units 09/15/20  2059 09/14/20  2025   LIPASE u/L 27 83             Results from last 7 days   Lab Units 09/16/20  0637   CLARITY UA  Clear   COLOR UA  Yellow   SPEC GRAV UA  1 020   PH UA  6 0   GLUCOSE UA mg/dl Negative   KETONES UA mg/dl >=150 (3+)*   BLOOD UA  25 0*   PROTEIN UA mg/dl >=500*   NITRITE UA  Negative   BILIRUBIN UA  Negative   UROBILINOGEN UA mg/dL Negative   LEUKOCYTES UA  Negative   WBC UA /hpf 0-1*   RBC UA /hpf 0-1*   BACTERIA UA /hpf Occasional   EPITHELIAL CELLS WET PREP /hpf Occasional                                     Results from last 7 days   Lab Units 09/15/20  2028   TOTAL COUNTED  100           ED Treatment:   Medication Administration from 09/15/2020 2005 to 09/15/2020 2251       Date/Time Order Dose Route Action Action by Comments     09/15/2020 2046 ondansetron (ZOFRAN) injection 4 mg 4 mg Intravenous Given Tammy Shahid RN      09/15/2020 2046 HYDROmorphone (DILAUDID) injection 1 mg 1 mg Intravenous Given Tammy Shahid RN      09/15/2020 2151 potassium chloride 20 mEq IVPB (premix) 20 mEq Intravenous [address], [address] [place] [address]      09/15/2020 2208 SUMAtriptan (IMITREX) subcutaneous injection 6 mg 6 mg Subcutaneous Given Tammy Shahid RN         Past Medical History:   Diagnosis Date    Abnormal gait     Abscess of abdominal cavity (HCC)     Aneurysm ([address] )     Brain with clips x 3 per pt    Arm injury     Right arm- pt present with short FA/wrist  immoblizer present on ED arrival    Cataract     Cellulitis     B/L LE    Chronic back pain     Chronic pain     Disease of thyroid gland     hypothyroid    Edema     B/L LE    Falls frequently     last fall 1 hour ago per pt    Foot drop, left foot     Fracture     Facial bones per pt- left orbit/cheek? pt unsure    Fracture closed, nasal bone     Gastric bypass status for obesity     Hypoglycemia     Memory loss, short term     Migraine     MRSA (methicillin resistant Staphylococcus aureus)     Stomach ulcer      Present on Admission:  **None**      Admitting Diagnosis: Diarrhea [R19 7]  Hypokalemia [E87 6]  Nausea vomiting and diarrhea [R11 2, R19 7]  Age/Sex: 53 y o  female     Admission Orders: Telemetry, neuro checks, PT/OT, Speech eval        Scheduled Medications:    amLODIPine, 10 mg, Oral, Daily  buPROPion, 150 mg, Oral, BID  citalopram, 40 mg, Oral, Daily  diphenhydrAMINE, 25 mg, Intravenous, Q6H  enoxaparin, 40 mg, Subcutaneous, Daily  gabapentin, 300 mg, Oral, 4x Daily  levETIRAcetam, 1,500 mg, Intravenous, Q12H [facility]  levothyroxine, 100 mcg, Oral, Early Morning  lidocaine, 1 patch, Topical, Daily  LORazepam, 0 5 mg, Intravenous, 4x Daily  nicotine, 1 patch, Transdermal, Daily  oxybutynin, 5 mg, Oral, TID  pantoprazole, 40 mg, Intravenous, Q24H TASHA  potassium chloride, 20 mEq, Intravenous, BID  temazepam, 22 5 mg, Oral, HS  tiZANidine, 2 mg, Oral, TID      Continuous IV Infusions:  sodium chloride 0 9 % with KCl 40 mEq/L, 125 mL/hr, Intravenous, Continuous      PRN Meds:  acetaminophen, 975 mg, Oral, Q8H PRN  hydrALAZINE, 10 mg, Intravenous, Q6H PRN  HYDROmorphone, 0 2 mg, Intravenous, Q6H PRN  HYDROmorphone, 0 5 mg, Intravenous, Q1H PRN  HYDROmorphone, 1 mg, Intravenous, Q4H PRN  ipratropium-albuterol, 3 mL, Nebulization, Q6H PRN  Labetalol HCl, 10 mg, Intravenous, Q4H PRN  metoclopramide, 10 mg, Intravenous, Q6H PRN  naloxone, 0 04 mg, Intravenous, Q1MIN PRN  ondansetron, 4 mg, Intravenous, Q4H PRN        IP CONSULT TO NEPHROLOGY    Network Utilization Review Department  [email or web address]  ATTENTION: Please call with any questions or concerns to [phone] and carefully listen to the prompts so that you are directed to the right person  All voicemails are confidential   Virginie Doing all requests for admission clinical reviews, approved or denied determinations and any other requests to dedicated fax number below belonging to the campus where the patient is receiving treatment   List of dedicated fax numbers for the Facilities:  FACILITY NAME UR FAX NUMBER   ADMISSION DENIALS (Administrative/Medical Necessity) [phone]   [address]  (Maternity/NICU/Pediatrics) [phone]   Yonatan Poole [phone]   Cristal Arvizu [phone]   Salima Adams [phone]   [facility] [phone]   [address] [facility] [phone]   [facility]  [phone]   [address] [facility] [facility]  [address] [address] [address] [facility] [phone]

## 2020-09-16 NOTE — ASSESSMENT & PLAN NOTE
Blood pressure elevated on admission at 200/110 likely secondary to pain  Patient denies any prior history of hypertension  Will continue with p r n  Hydralazine  And pain control  Patient multiple pain medication has been complaining of pain around 7  Increase hydralazine to 10 patient tolerated p o  Okay and not right extremity mg p o  For medication just given the above target blood pressure is 16o sys    There not want to drop too fast  Discussed with Nephrology  Some blood work rule out secondary hypertension patient denied prior history of hypertension  Renal artery ultrasound to rule out renal artery stenosis

## 2020-09-16 NOTE — ASSESSMENT & PLAN NOTE
Patient has acute worsening of her chronic back pain likely secondary to withdrawal from her chronic pain medications  She has not been able to follow-up with her pain management team at [facility] ([facility])  Multiple attempts by family to contact her physician were unsuccessful  She has not had any refill of her intrathecal Dilaudid pump and Nucynta since August   PDMP verified    Plan:  Maintain fall precautions  Physical therapy evaluation will be obtained  Start patient on intravenous Dilaudid for moderate, severe and breakthrough pain  Will attempt to get in touch with patient's outpatient pain management team in a m  Stutsman Acosta   Continue with as needed muscle relaxant, gabapentin, Lidoderm patch, aqua  K-pad for pain control

## 2020-09-16 NOTE — ASSESSMENT & PLAN NOTE
Patient has history of chronic lumbar pain secondary to lumbar disc disease and has had multiple surgeries in the past   She normally ambulates with cane or crutches  Has had multiple falls and has chronically been maintained on intrathecal Dilaudid pump, Nucynta and Ativan  She follows up with pain management at [facility]  Patient has not had refills of her intrathecal Dilaudid pump since August   She also ran out of her Nucynta on August 20th  She states that she has worsening back pain  States that pain radiates to the left leg and has had difficulty ambulating and frequent falls    Consult pain management

## 2020-09-16 NOTE — ASSESSMENT & PLAN NOTE
Likely secondary to volume contraction and dehydration  Meet sirs criteria because of tachycardia leukocytosis  Follow-up on lactic acid and procalcitonin  Will continue to monitor off antibiotics  Follow-up on CBC post hydration    Repeat CBC in the morning if afebrile and does not look toxic and not look infectious process causing right flank elevated BP secondary to nausea vomiting diarrhea stress-induced and pain problem

## 2020-09-16 NOTE — ASSESSMENT & PLAN NOTE
Presents with 2-3 days history of multiple episodes of nausea and vomiting  Patient also reports dry heaving  Has not been able to tolerate her medications or food for over 2 days  Denies any abdominal discomfort but it has been associated with multiple episodes of diarrhea  She does report some specks of blood in her vomitus  Denies any hematemesis or coffee-ground emesis  Hemoconcentrated with a hemoglobin of 14 on admission    Her symptoms could be related to opioid Withdrawal as well  Abdominal exam is clinically benign  Continue with scheduled antiemetics and IV hydration  Continue with IV ppi    Rule out anastomotic ulcer( prior history of gastric bypass surgery), peptic ulcer disease, GI bleed

## 2020-09-17 ENCOUNTER — HOSPITAL ENCOUNTER (INPATIENT)
Facility: HOSPITAL | Age: 55
LOS: 15 days | Discharge: HOME HEALTH | DRG: 305 | End: 2020-10-02
Attending: INTERNAL MEDICINE | Admitting: INTERNAL MEDICINE
Payer: MEDICARE

## 2020-09-17 VITALS
WEIGHT: 210.76 LBS | HEIGHT: 65 IN | OXYGEN SATURATION: 97 % | HEART RATE: 83 BPM | TEMPERATURE: 98.7 F | SYSTOLIC BLOOD PRESSURE: 206 MMHG | BODY MASS INDEX: 35.11 KG/M2 | DIASTOLIC BLOOD PRESSURE: 109 MMHG | RESPIRATION RATE: 22 BRPM

## 2020-09-17 DIAGNOSIS — M54.59 INTRACTABLE LOW BACK PAIN: ICD-10-CM

## 2020-09-17 DIAGNOSIS — I16.0 HYPERTENSIVE URGENCY: ICD-10-CM

## 2020-09-17 DIAGNOSIS — F22 DELUSION (HCC): ICD-10-CM

## 2020-09-17 DIAGNOSIS — G89.29 CHRONIC MIDLINE LOW BACK PAIN WITH SCIATICA: ICD-10-CM

## 2020-09-17 DIAGNOSIS — E87.1 HYPONATREMIA: Primary | ICD-10-CM

## 2020-09-17 DIAGNOSIS — M54.40 CHRONIC MIDLINE LOW BACK PAIN WITH SCIATICA: ICD-10-CM

## 2020-09-17 LAB
ANION GAP SERPL CALCULATED.3IONS-SCNC: 9 MMOL/L (ref 5–14)
BUN SERPL-MCNC: 10 MG/DL (ref 5–25)
CALCIUM SERPL-MCNC: 9 MG/DL (ref 8.4–10.2)
CHLORIDE SERPL-SCNC: 98 MMOL/L (ref 97–108)
CHLORIDE UR-SCNC: 178 MMOL/L
CO2 SERPL-SCNC: 19 MMOL/L (ref 22–30)
CREAT SERPL-MCNC: 0.51 MG/DL (ref 0.6–1.2)
ERYTHROCYTE [DISTWIDTH] IN BLOOD BY AUTOMATED COUNT: 18.9 %
FERRITIN SERPL-MCNC: 12 NG/ML (ref 8–388)
GFR SERPL CREATININE-BSD FRML MDRD: 109 ML/MIN/1.73SQ M
GLUCOSE SERPL-MCNC: 121 MG/DL (ref 65–140)
GLUCOSE SERPL-MCNC: 163 MG/DL (ref 70–99)
HCT VFR BLD AUTO: 37.1 % (ref 36–46)
HGB BLD-MCNC: 11.7 G/DL (ref 12–16)
IRON SATN MFR SERPL: 12 %
IRON SERPL-MCNC: 49 UG/DL (ref 50–170)
MCH RBC QN AUTO: 24.4 PG (ref 26–34)
MCHC RBC AUTO-ENTMCNC: 31.6 G/DL (ref 31–36)
MCV RBC AUTO: 77 FL (ref 80–100)
PLATELET # BLD AUTO: 352 THOUSANDS/UL (ref 150–450)
PMV BLD AUTO: 7.6 FL (ref 8.9–12.7)
POTASSIUM SERPL-SCNC: 3.8 MMOL/L (ref 3.6–5)
POTASSIUM UR-SCNC: 46.7 MMOL/L
RBC # BLD AUTO: 4.79 MILLION/UL (ref 4–5.2)
SODIUM 24H UR-SCNC: 116 MOL/L
SODIUM SERPL-SCNC: 126 MMOL/L (ref 137–147)
TIBC SERPL-MCNC: 398 UG/DL (ref 250–450)
WBC # BLD AUTO: 12.3 THOUSAND/UL (ref 4.5–11)

## 2020-09-17 PROCEDURE — 99223 1ST HOSP IP/OBS HIGH 75: CPT | Performed by: INTERNAL MEDICINE

## 2020-09-17 PROCEDURE — C9113 INJ PANTOPRAZOLE SODIUM, VIA: HCPCS | Performed by: INTERNAL MEDICINE

## 2020-09-17 PROCEDURE — 99222 1ST HOSP IP/OBS MODERATE 55: CPT | Performed by: NURSE PRACTITIONER

## 2020-09-17 PROCEDURE — 80048 BASIC METABOLIC PNL TOTAL CA: CPT | Performed by: INTERNAL MEDICINE

## 2020-09-17 PROCEDURE — 84133 ASSAY OF URINE POTASSIUM: CPT | Performed by: INTERNAL MEDICINE

## 2020-09-17 PROCEDURE — 92610 EVALUATE SWALLOWING FUNCTION: CPT

## 2020-09-17 PROCEDURE — 84300 ASSAY OF URINE SODIUM: CPT | Performed by: INTERNAL MEDICINE

## 2020-09-17 PROCEDURE — 83550 IRON BINDING TEST: CPT | Performed by: INTERNAL MEDICINE

## 2020-09-17 PROCEDURE — 82948 REAGENT STRIP/BLOOD GLUCOSE: CPT

## 2020-09-17 PROCEDURE — 99233 SBSQ HOSP IP/OBS HIGH 50: CPT | Performed by: INTERNAL MEDICINE

## 2020-09-17 PROCEDURE — 83036 HEMOGLOBIN GLYCOSYLATED A1C: CPT | Performed by: INTERNAL MEDICINE

## 2020-09-17 PROCEDURE — 85027 COMPLETE CBC AUTOMATED: CPT | Performed by: INTERNAL MEDICINE

## 2020-09-17 PROCEDURE — 99239 HOSP IP/OBS DSCHRG MGMT >30: CPT | Performed by: INTERNAL MEDICINE

## 2020-09-17 PROCEDURE — 82436 ASSAY OF URINE CHLORIDE: CPT | Performed by: INTERNAL MEDICINE

## 2020-09-17 PROCEDURE — 82728 ASSAY OF FERRITIN: CPT | Performed by: INTERNAL MEDICINE

## 2020-09-17 PROCEDURE — 83540 ASSAY OF IRON: CPT | Performed by: INTERNAL MEDICINE

## 2020-09-17 RX ORDER — TAMSULOSIN HYDROCHLORIDE 0.4 MG/1
0.4 CAPSULE ORAL
Status: DISCONTINUED | OUTPATIENT
Start: 2020-09-17 | End: 2020-10-02 | Stop reason: HOSPADM

## 2020-09-17 RX ORDER — PANTOPRAZOLE SODIUM 40 MG/1
40 INJECTION, POWDER, FOR SOLUTION INTRAVENOUS
Status: CANCELLED | OUTPATIENT
Start: 2020-09-18

## 2020-09-17 RX ORDER — ONDANSETRON 2 MG/ML
4 INJECTION INTRAMUSCULAR; INTRAVENOUS EVERY 4 HOURS PRN
Status: DISCONTINUED | OUTPATIENT
Start: 2020-09-17 | End: 2020-10-02 | Stop reason: HOSPADM

## 2020-09-17 RX ORDER — GABAPENTIN 300 MG/1
300 CAPSULE ORAL 4 TIMES DAILY
Status: CANCELLED | OUTPATIENT
Start: 2020-09-17

## 2020-09-17 RX ORDER — LORAZEPAM 2 MG/ML
0.5 INJECTION INTRAMUSCULAR 4 TIMES DAILY
Status: DISCONTINUED | OUTPATIENT
Start: 2020-09-17 | End: 2020-09-30

## 2020-09-17 RX ORDER — OXYBUTYNIN CHLORIDE 5 MG/1
5 TABLET ORAL 3 TIMES DAILY
Status: DISCONTINUED | OUTPATIENT
Start: 2020-09-17 | End: 2020-09-17

## 2020-09-17 RX ORDER — HYDROMORPHONE HCL/PF 1 MG/ML
0.5 SYRINGE (ML) INJECTION
Status: DISCONTINUED | OUTPATIENT
Start: 2020-09-17 | End: 2020-09-28

## 2020-09-17 RX ORDER — OXYBUTYNIN CHLORIDE 5 MG/1
5 TABLET ORAL 3 TIMES DAILY
Status: CANCELLED | OUTPATIENT
Start: 2020-09-17

## 2020-09-17 RX ORDER — METOCLOPRAMIDE HYDROCHLORIDE 5 MG/ML
10 INJECTION INTRAMUSCULAR; INTRAVENOUS EVERY 6 HOURS PRN
Status: DISCONTINUED | OUTPATIENT
Start: 2020-09-17 | End: 2020-09-18

## 2020-09-17 RX ORDER — LEVOTHYROXINE SODIUM 0.1 MG/1
100 TABLET ORAL
Status: DISCONTINUED | OUTPATIENT
Start: 2020-09-18 | End: 2020-10-02 | Stop reason: HOSPADM

## 2020-09-17 RX ORDER — BUPROPION HYDROCHLORIDE 150 MG/1
150 TABLET ORAL 2 TIMES DAILY
Status: DISCONTINUED | OUTPATIENT
Start: 2020-09-17 | End: 2020-10-02 | Stop reason: HOSPADM

## 2020-09-17 RX ORDER — CITALOPRAM 20 MG/1
40 TABLET ORAL DAILY
Status: CANCELLED | OUTPATIENT
Start: 2020-09-18

## 2020-09-17 RX ORDER — HYDROMORPHONE HCL/PF 1 MG/ML
1 SYRINGE (ML) INJECTION ONCE
Status: COMPLETED | OUTPATIENT
Start: 2020-09-17 | End: 2020-09-17

## 2020-09-17 RX ORDER — LABETALOL 20 MG/4 ML (5 MG/ML) INTRAVENOUS SYRINGE
10 EVERY 4 HOURS PRN
Status: CANCELLED | OUTPATIENT
Start: 2020-09-17

## 2020-09-17 RX ORDER — POTASSIUM CHLORIDE AND SODIUM CHLORIDE 900; 300 MG/100ML; MG/100ML
75 INJECTION, SOLUTION INTRAVENOUS CONTINUOUS
Status: DISCONTINUED | OUTPATIENT
Start: 2020-09-17 | End: 2020-09-19

## 2020-09-17 RX ORDER — AMLODIPINE BESYLATE 10 MG/1
10 TABLET ORAL DAILY
Status: CANCELLED | OUTPATIENT
Start: 2020-09-18

## 2020-09-17 RX ORDER — IPRATROPIUM BROMIDE AND ALBUTEROL SULFATE 2.5; .5 MG/3ML; MG/3ML
3 SOLUTION RESPIRATORY (INHALATION) EVERY 6 HOURS PRN
Status: CANCELLED | OUTPATIENT
Start: 2020-09-17

## 2020-09-17 RX ORDER — LORAZEPAM 2 MG/ML
0.5 INJECTION INTRAMUSCULAR 4 TIMES DAILY
Status: CANCELLED | OUTPATIENT
Start: 2020-09-17

## 2020-09-17 RX ORDER — HYDRALAZINE HYDROCHLORIDE 20 MG/ML
10 INJECTION INTRAMUSCULAR; INTRAVENOUS EVERY 6 HOURS PRN
Status: DISCONTINUED | OUTPATIENT
Start: 2020-09-17 | End: 2020-09-18

## 2020-09-17 RX ORDER — PANTOPRAZOLE SODIUM 40 MG/1
40 INJECTION, POWDER, FOR SOLUTION INTRAVENOUS
Status: DISCONTINUED | OUTPATIENT
Start: 2020-09-18 | End: 2020-09-18

## 2020-09-17 RX ORDER — HYDROMORPHONE HCL/PF 1 MG/ML
0.5 SYRINGE (ML) INJECTION
Status: CANCELLED | OUTPATIENT
Start: 2020-09-17

## 2020-09-17 RX ORDER — HYDROMORPHONE HCL/PF 1 MG/ML
1 SYRINGE (ML) INJECTION EVERY 4 HOURS PRN
Status: CANCELLED | OUTPATIENT
Start: 2020-09-17

## 2020-09-17 RX ORDER — LEVOTHYROXINE SODIUM 0.1 MG/1
100 TABLET ORAL
Status: CANCELLED | OUTPATIENT
Start: 2020-09-18

## 2020-09-17 RX ORDER — BUPROPION HYDROCHLORIDE 150 MG/1
150 TABLET ORAL 2 TIMES DAILY
Status: CANCELLED | OUTPATIENT
Start: 2020-09-17

## 2020-09-17 RX ORDER — IPRATROPIUM BROMIDE AND ALBUTEROL SULFATE 2.5; .5 MG/3ML; MG/3ML
3 SOLUTION RESPIRATORY (INHALATION) EVERY 6 HOURS PRN
Status: DISCONTINUED | OUTPATIENT
Start: 2020-09-17 | End: 2020-10-02 | Stop reason: HOSPADM

## 2020-09-17 RX ORDER — TIZANIDINE 2 MG/1
2 TABLET ORAL 3 TIMES DAILY
Status: DISCONTINUED | OUTPATIENT
Start: 2020-09-17 | End: 2020-10-02 | Stop reason: HOSPADM

## 2020-09-17 RX ORDER — TEMAZEPAM 7.5 MG/1
22.5 CAPSULE ORAL
Status: DISCONTINUED | OUTPATIENT
Start: 2020-09-17 | End: 2020-10-02 | Stop reason: HOSPADM

## 2020-09-17 RX ORDER — POTASSIUM CHLORIDE AND SODIUM CHLORIDE 900; 300 MG/100ML; MG/100ML
100 INJECTION, SOLUTION INTRAVENOUS CONTINUOUS
Status: DISCONTINUED | OUTPATIENT
Start: 2020-09-17 | End: 2020-09-17 | Stop reason: SDUPTHER

## 2020-09-17 RX ORDER — LABETALOL 20 MG/4 ML (5 MG/ML) INTRAVENOUS SYRINGE
10 ONCE
Status: COMPLETED | OUTPATIENT
Start: 2020-09-17 | End: 2020-09-17

## 2020-09-17 RX ORDER — ACETAMINOPHEN 325 MG/1
975 TABLET ORAL EVERY 8 HOURS PRN
Status: DISCONTINUED | OUTPATIENT
Start: 2020-09-17 | End: 2020-09-17

## 2020-09-17 RX ORDER — AMLODIPINE BESYLATE 5 MG/1
10 TABLET ORAL DAILY
Status: DISCONTINUED | OUTPATIENT
Start: 2020-09-18 | End: 2020-09-17

## 2020-09-17 RX ORDER — HYDROMORPHONE HCL/PF 1 MG/ML
1 SYRINGE (ML) INJECTION EVERY 4 HOURS PRN
Status: DISCONTINUED | OUTPATIENT
Start: 2020-09-17 | End: 2020-09-28

## 2020-09-17 RX ORDER — CITALOPRAM 20 MG/1
40 TABLET ORAL DAILY
Status: DISCONTINUED | OUTPATIENT
Start: 2020-09-18 | End: 2020-10-02 | Stop reason: HOSPADM

## 2020-09-17 RX ORDER — ACETAMINOPHEN 325 MG/1
975 TABLET ORAL EVERY 8 HOURS PRN
Status: CANCELLED | OUTPATIENT
Start: 2020-09-17

## 2020-09-17 RX ORDER — METOCLOPRAMIDE HYDROCHLORIDE 5 MG/ML
10 INJECTION INTRAMUSCULAR; INTRAVENOUS EVERY 6 HOURS PRN
Status: CANCELLED | OUTPATIENT
Start: 2020-09-17

## 2020-09-17 RX ORDER — HYDRALAZINE HYDROCHLORIDE 20 MG/ML
10 INJECTION INTRAMUSCULAR; INTRAVENOUS EVERY 6 HOURS PRN
Status: CANCELLED | OUTPATIENT
Start: 2020-09-17

## 2020-09-17 RX ORDER — GABAPENTIN 300 MG/1
300 CAPSULE ORAL 4 TIMES DAILY
Status: DISCONTINUED | OUTPATIENT
Start: 2020-09-17 | End: 2020-10-02 | Stop reason: HOSPADM

## 2020-09-17 RX ORDER — ONDANSETRON 2 MG/ML
4 INJECTION INTRAMUSCULAR; INTRAVENOUS EVERY 4 HOURS PRN
Status: CANCELLED | OUTPATIENT
Start: 2020-09-17

## 2020-09-17 RX ORDER — LABETALOL 20 MG/4 ML (5 MG/ML) INTRAVENOUS SYRINGE
10 EVERY 4 HOURS PRN
Status: DISCONTINUED | OUTPATIENT
Start: 2020-09-17 | End: 2020-09-18

## 2020-09-17 RX ORDER — LISINOPRIL 20 MG/1
20 TABLET ORAL DAILY
Status: DISCONTINUED | OUTPATIENT
Start: 2020-09-17 | End: 2020-09-21

## 2020-09-17 RX ORDER — HYDROMORPHONE HCL/PF 1 MG/ML
0.2 SYRINGE (ML) INJECTION EVERY 6 HOURS PRN
Status: CANCELLED | OUTPATIENT
Start: 2020-09-17

## 2020-09-17 RX ORDER — HYDROMORPHONE HCL/PF 1 MG/ML
0.2 SYRINGE (ML) INJECTION EVERY 6 HOURS PRN
Status: DISCONTINUED | OUTPATIENT
Start: 2020-09-17 | End: 2020-09-17

## 2020-09-17 RX ORDER — TIZANIDINE 2 MG/1
2 TABLET ORAL 3 TIMES DAILY
Status: CANCELLED | OUTPATIENT
Start: 2020-09-17

## 2020-09-17 RX ORDER — LISINOPRIL 20 MG/1
20 TABLET ORAL DAILY
Status: DISCONTINUED | OUTPATIENT
Start: 2020-09-17 | End: 2020-09-17

## 2020-09-17 RX ADMIN — HYDROMORPHONE HYDROCHLORIDE 0.5 MG: 1 INJECTION, SOLUTION INTRAMUSCULAR; INTRAVENOUS; SUBCUTANEOUS at 04:12

## 2020-09-17 RX ADMIN — HYDROMORPHONE HYDROCHLORIDE 1 MG: 1 INJECTION, SOLUTION INTRAMUSCULAR; INTRAVENOUS; SUBCUTANEOUS at 03:00

## 2020-09-17 RX ADMIN — TEMAZEPAM 22.5 MG: 15 CAPSULE ORAL at 21:44

## 2020-09-17 RX ADMIN — LEVETIRACETAM 1500 MG: 100 INJECTION, SOLUTION INTRAVENOUS at 21:50

## 2020-09-17 RX ADMIN — PANTOPRAZOLE SODIUM 40 MG: 40 INJECTION, POWDER, LYOPHILIZED, FOR SOLUTION INTRAVENOUS at 08:50

## 2020-09-17 RX ADMIN — LISINOPRIL 20 MG: 20 TABLET ORAL at 16:40

## 2020-09-17 RX ADMIN — TIZANIDINE 2 MG: 2 TABLET ORAL at 16:40

## 2020-09-17 RX ADMIN — TIZANIDINE 2 MG: 2 TABLET ORAL at 21:44

## 2020-09-17 RX ADMIN — HYDROMORPHONE HYDROCHLORIDE 0.5 MG: 1 INJECTION, SOLUTION INTRAMUSCULAR; INTRAVENOUS; SUBCUTANEOUS at 17:55

## 2020-09-17 RX ADMIN — HYDROMORPHONE HYDROCHLORIDE 0.5 MG: 1 INJECTION, SOLUTION INTRAMUSCULAR; INTRAVENOUS; SUBCUTANEOUS at 10:47

## 2020-09-17 RX ADMIN — LEVOTHYROXINE SODIUM 100 MCG: 100 TABLET ORAL at 05:13

## 2020-09-17 RX ADMIN — ENOXAPARIN SODIUM 40 MG: 100 INJECTION SUBCUTANEOUS at 08:54

## 2020-09-17 RX ADMIN — HYDRALAZINE HYDROCHLORIDE 10 MG: 20 INJECTION INTRAMUSCULAR; INTRAVENOUS at 08:08

## 2020-09-17 RX ADMIN — BUPROPION HYDROCHLORIDE 150 MG: 150 TABLET, FILM COATED, EXTENDED RELEASE ORAL at 21:44

## 2020-09-17 RX ADMIN — LABETALOL 20 MG/4 ML (5 MG/ML) INTRAVENOUS SYRINGE 10 MG: at 14:48

## 2020-09-17 RX ADMIN — POTASSIUM CHLORIDE: 149 INJECTION, SOLUTION, CONCENTRATE INTRAVENOUS at 08:45

## 2020-09-17 RX ADMIN — ONDANSETRON 4 MG: 2 INJECTION INTRAMUSCULAR; INTRAVENOUS at 08:48

## 2020-09-17 RX ADMIN — HYDROMORPHONE HYDROCHLORIDE 1 MG: 1 INJECTION, SOLUTION INTRAMUSCULAR; INTRAVENOUS; SUBCUTANEOUS at 14:28

## 2020-09-17 RX ADMIN — HYDROMORPHONE HYDROCHLORIDE 0.5 MG: 1 INJECTION, SOLUTION INTRAMUSCULAR; INTRAVENOUS; SUBCUTANEOUS at 12:39

## 2020-09-17 RX ADMIN — LORAZEPAM 0.5 MG: 2 INJECTION INTRAMUSCULAR; INTRAVENOUS at 12:45

## 2020-09-17 RX ADMIN — LORAZEPAM 0.5 MG: 2 INJECTION INTRAMUSCULAR; INTRAVENOUS at 08:46

## 2020-09-17 RX ADMIN — HYDROMORPHONE HYDROCHLORIDE 1 MG: 1 INJECTION, SOLUTION INTRAMUSCULAR; INTRAVENOUS; SUBCUTANEOUS at 08:07

## 2020-09-17 RX ADMIN — LABETALOL 20 MG/4 ML (5 MG/ML) INTRAVENOUS SYRINGE 10 MG: at 12:48

## 2020-09-17 RX ADMIN — HYDROMORPHONE HYDROCHLORIDE 0.5 MG: 1 INJECTION, SOLUTION INTRAMUSCULAR; INTRAVENOUS; SUBCUTANEOUS at 23:36

## 2020-09-17 RX ADMIN — HYDROMORPHONE HYDROCHLORIDE 0.5 MG: 1 INJECTION, SOLUTION INTRAMUSCULAR; INTRAVENOUS; SUBCUTANEOUS at 05:13

## 2020-09-17 RX ADMIN — POTASSIUM CHLORIDE AND SODIUM CHLORIDE 100 ML/HR: 900; 300 INJECTION, SOLUTION INTRAVENOUS at 14:31

## 2020-09-17 RX ADMIN — HYDROMORPHONE HYDROCHLORIDE 0.5 MG: 1 INJECTION, SOLUTION INTRAMUSCULAR; INTRAVENOUS; SUBCUTANEOUS at 06:28

## 2020-09-17 RX ADMIN — GABAPENTIN 300 MG: 300 CAPSULE ORAL at 21:44

## 2020-09-17 RX ADMIN — TAMSULOSIN HYDROCHLORIDE 0.4 MG: 0.4 CAPSULE ORAL at 16:40

## 2020-09-17 RX ADMIN — HYDRALAZINE HYDROCHLORIDE 10 MG: 20 INJECTION INTRAMUSCULAR; INTRAVENOUS at 03:41

## 2020-09-17 RX ADMIN — LABETALOL 20 MG/4 ML (5 MG/ML) INTRAVENOUS SYRINGE 10 MG: at 10:47

## 2020-09-17 RX ADMIN — POTASSIUM CHLORIDE: 149 INJECTION, SOLUTION, CONCENTRATE INTRAVENOUS at 03:00

## 2020-09-17 RX ADMIN — LORAZEPAM 0.5 MG: 2 INJECTION INTRAMUSCULAR; INTRAVENOUS at 17:57

## 2020-09-17 RX ADMIN — LEVETIRACETAM 1500 MG: 100 INJECTION, SOLUTION INTRAVENOUS at 08:53

## 2020-09-17 RX ADMIN — GABAPENTIN 300 MG: 300 CAPSULE ORAL at 12:43

## 2020-09-17 RX ADMIN — LORAZEPAM 0.5 MG: 2 INJECTION INTRAMUSCULAR; INTRAVENOUS at 21:44

## 2020-09-17 RX ADMIN — POTASSIUM CHLORIDE: 149 INJECTION, SOLUTION, CONCENTRATE INTRAVENOUS at 08:05

## 2020-09-17 RX ADMIN — HYDROMORPHONE HYDROCHLORIDE 1 MG: 1 INJECTION, SOLUTION INTRAMUSCULAR; INTRAVENOUS; SUBCUTANEOUS at 09:12

## 2020-09-17 RX ADMIN — HYDRALAZINE HYDROCHLORIDE 10 MG: 20 INJECTION INTRAMUSCULAR; INTRAVENOUS at 16:42

## 2020-09-17 RX ADMIN — HYDROMORPHONE HYDROCHLORIDE 0.5 MG: 1 INJECTION, SOLUTION INTRAMUSCULAR; INTRAVENOUS; SUBCUTANEOUS at 21:44

## 2020-09-17 RX ADMIN — GABAPENTIN 300 MG: 300 CAPSULE ORAL at 17:54

## 2020-09-17 RX ADMIN — HYDROMORPHONE HYDROCHLORIDE 1 MG: 1 INJECTION, SOLUTION INTRAMUSCULAR; INTRAVENOUS; SUBCUTANEOUS at 19:41

## 2020-09-17 NOTE — SPEECH THERAPY NOTE
Speech Language/Pathology    Speech/Language Pathology Progress Note    Patient Name: Fatemeh Velazquez  MQEGA'O Date: 9/17/2020    Attempted to see pt for dysphagia f/u  Spoke w/ RN- pt is not appropriate and reports feeling worse today as compared to yesterday  Continues w/ intractable nausea and has been unable to consume any PO  Per RN, pt is to be transferred to [facility] today for higher level of care  Please reconsult there as needed       Seven Munoz, SLP

## 2020-09-17 NOTE — SOCIAL WORK
SW met w/ pt to present w/ and explain IMM #1  Pt had no questions, no concerns, gave verbal understanding and signed  SW provided pt w/ copy and placed original in scan bin to be scanned into EHR

## 2020-09-17 NOTE — NURSING NOTE
Blood pressure improved but continues to have 10/10 lower back pain  Dr Adi Bell updated  Verbal order given for additional 1 mg of dilaudid to be given   Will monitor

## 2020-09-17 NOTE — ASSESSMENT & PLAN NOTE
Patient has acute worsening of her chronic back pain likely secondary to withdrawal from her chronic pain medications  She has not been able to follow-up with her pain management team at [place] [address]  Multiple attempts by family to contact her physician were unsuccessful  She has not had any refill of her intrathecal Dilaudid pump and Nucynta since August   PDM verified    Plan:  Maintain fall precautions  Physical therapy evaluation will be obtained  Start patient on intravenous Dilaudid for moderate, severe and breakthrough pain  Will attempt to get in touch with patient's outpatient pain management team in a   Joe Glover   Continue with as needed muscle relaxant, gabapentin, Lidoderm patch, aqua  K-pad for pain control

## 2020-09-17 NOTE — ASSESSMENT & PLAN NOTE
Patient presents with multiple episodes of loose watery stools  Complains of generalized weakness  Associated with nausea vomiting but denies any abdominal discomfort fever or chills  Symptoms started after patient consumed homemade lasagna  Also admits to having antibiotic use in the last 3 months  No recent hospitalization  Rule out infectious etiology  Follow-up on stool enteric pathogen panel and C diff toxin assay  Maintain contact and hand hygiene precautions in the interim  Continue with IV hydration  Patient no  Vomiting since last night this morning tolerated p o  Small amount of fluid no problem swallowing or choking  No stool since admission unable to get sample for C diff S no further diarrhea it was felt most probably not due to c diff    Id  recommend to DC isolation

## 2020-09-17 NOTE — NURSING NOTE
Report called to [address] [facility] ICU receiving RN Rafa Urena  Patient to be transported with artie ZIEGLER to receiving faculty  Personal belongings packed   time scheduled for 1030  Patient updated of events   Will monitor

## 2020-09-17 NOTE — PLAN OF CARE
Problem: Potential for Falls  Goal: Patient will remain free of falls  Description: INTERVENTIONS:  - Assess patient frequently for physical needs  -  Identify cognitive and physical deficits and behaviors that affect risk of falls    -  [place] fall precautions as indicated by assessment   - Educate patient/family on patient safety including physical limitations  - Instruct patient to call for assistance with activity based on assessment  - Modify environment to reduce risk of injury  - Consider OT/PT consult to assist with strengthening/mobility  Outcome: Progressing     Problem: Prexisting or High Potential for Compromised Skin Integrity  Goal: Skin integrity is maintained or improved  Description: INTERVENTIONS:  - Identify patients at risk for skin breakdown  - Assess and monitor skin integrity  - Assess and monitor nutrition and hydration status  - Monitor labs   - Assess for incontinence   - Turn and reposition patient  - Assist with mobility/ambulation  - Relieve pressure over bony prominences  - Avoid friction and shearing  - Provide appropriate hygiene as needed including keeping skin clean and dry  - Evaluate need for skin moisturizer/barrier cream  - Collaborate with interdisciplinary team   - Patient/family teaching  - Consider wound care consult   Outcome: Progressing     Problem: PAIN - ADULT  Goal: Verbalizes/displays adequate comfort level or baseline comfort level  Description: Interventions:  - Encourage patient to monitor pain and request assistance  - Assess pain using appropriate pain scale  - Administer analgesics based on type and severity of pain and evaluate response  - Implement non-pharmacological measures as appropriate and evaluate response  - Consider cultural and social influences on pain and pain management  - Notify physician/advanced practitioner if interventions unsuccessful or patient reports new pain  Outcome: Progressing     Problem: INFECTION - ADULT  Goal: Absence or prevention of progression during hospitalization  Description: INTERVENTIONS:  - Assess and monitor for signs and symptoms of infection  - Monitor lab/diagnostic results  - Monitor all insertion sites, i e  indwelling lines, tubes, and drains  - Monitor endotracheal if appropriate and nasal secretions for changes in amount and color  - [place] appropriate cooling/warming therapies per order  - Administer medications as ordered  - Instruct and encourage patient and family to use good hand hygiene technique  - Identify and instruct in appropriate isolation precautions for identified infection/condition  Outcome: Progressing  Goal: Absence of fever/infection during neutropenic period  Description: INTERVENTIONS:  - Monitor WBC    Outcome: Progressing     Problem: SAFETY ADULT  Goal: Patient will remain free of falls  Description: INTERVENTIONS:  - Assess patient frequently for physical needs  -  Identify cognitive and physical deficits and behaviors that affect risk of falls    -  [place] fall precautions as indicated by assessment   - Educate patient/family on patient safety including physical limitations  - Instruct patient to call for assistance with activity based on assessment  - Modify environment to reduce risk of injury  - Consider OT/PT consult to assist with strengthening/mobility  Outcome: Progressing  Goal: Maintain or return to baseline ADL function  Description: INTERVENTIONS:  -  Assess patient's ability to carry out ADLs; assess patient's baseline for ADL function and identify physical deficits which impact ability to perform ADLs (bathing, care of mouth/teeth, toileting, grooming, dressing, etc )  - Assess/evaluate cause of self-care deficits   - Assess range of motion  - Assess patient's mobility; develop plan if impaired  - Assess patient's need for assistive devices and provide as appropriate  - Encourage maximum independence but intervene and supervise when necessary  - Involve family in performance of ADLs  - Assess for home care needs following discharge   - Consider OT consult to assist with ADL evaluation and planning for discharge  - Provide patient education as appropriate  Outcome: Progressing  Goal: Maintain or return mobility status to optimal level  Description: INTERVENTIONS:  - Assess patient's baseline mobility status (ambulation, transfers, stairs, etc )    - Identify cognitive and physical deficits and behaviors that affect mobility  - Identify mobility aids required to assist with transfers and/or ambulation (gait belt, sit-to-stand, lift, walker, cane, etc )  - [place] fall precautions as indicated by assessment  - Record patient progress and toleration of activity level on Mobility SBAR; progress patient to next Phase/Stage  - Instruct patient to call for assistance with activity based on assessment  - Consider rehabilitation consult to assist with strengthening/weightbearing, etc   Outcome: Progressing     Problem: DISCHARGE PLANNING  Goal: Discharge to home or other facility with appropriate resources  Description: INTERVENTIONS:  - Identify barriers to discharge w/patient and caregiver  - Arrange for needed discharge resources and transportation as appropriate  - Identify discharge learning needs (meds, wound care, etc )  - Arrange for interpretive services to assist at discharge as needed  - Refer to Case Management Department for coordinating discharge planning if the patient needs post-hospital services based on physician/advanced practitioner order or complex needs related to functional status, cognitive ability, or social support system  Outcome: Progressing     Problem: Knowledge Deficit  Goal: Patient/family/caregiver demonstrates understanding of disease process, treatment plan, medications, and discharge instructions  Description: Complete learning assessment and assess knowledge base    Interventions:  - Provide teaching at level of understanding  - Provide teaching via preferred learning methods  Outcome: Progressing     Problem: Nutrition/Hydration-ADULT  Goal: Nutrient/Hydration intake appropriate for improving, restoring or maintaining nutritional needs  Description: Monitor and assess patient's nutrition/hydration status for malnutrition  Collaborate with interdisciplinary team and initiate plan and interventions as ordered  Monitor patient's weight and dietary intake as ordered or per policy  Utilize nutrition screening tool and intervene as necessary  Determine patient's food preferences and provide high-protein, high-caloric foods as appropriate       INTERVENTIONS:  - Monitor oral intake, urinary output, labs, and treatment plans  - Assess nutrition and hydration status and recommend course of action  - Evaluate amount of meals eaten  - Assist patient with eating if necessary   - Allow adequate time for meals  - Recommend/ encourage appropriate diets, oral nutritional supplements, and vitamin/mineral supplements  - Order, calculate, and assess calorie counts as needed  - Recommend, monitor, and adjust tube feedings and TPN/PPN based on assessed needs  - Assess need for intravenous fluids  - Provide specific nutrition/hydration education as appropriate  - Include patient/family/caregiver in decisions related to nutrition  Outcome: Progressing

## 2020-09-17 NOTE — ASSESSMENT & PLAN NOTE
Sodium 128 on admission  Likely secondary to intractable nausea vomiting and diarrhea and dehydration resulting from that  Fortunately patient is hypertensive  Continue with IV hydration  Follow-up BMP in a m     Most probably secondary to diarrhea and vomiting the patient is running fluid normal saline  Repeat Chem 7 and 12 noon if continued drop most probably secondary to combination inappropriate ADH and nausea vomiting  p o   Intake in order serum and urine osmolarity and spot urinary sodium  Appreciate Nephrology consult  Spot urinary sodium pending  Decreased fluid to 100 cc per

## 2020-09-17 NOTE — NURSING NOTE
artie arrived and report given with updates on vitals and medications  Paper work given to them as well as where patient is to go with room number and receiving RN

## 2020-09-17 NOTE — DISCHARGE SUMMARY
Discharge- Powell Night 1965, 53 y o  female MRN: [id number]    Unit/Bed#: 7T [facility] 704-02 Encounter: [id number]    Primary Care Provider: Kyle Toussaint   Date and time admitted to hospital: 9/15/2020  8:07 PM        Hypertensive urgency  Assessment & Plan  Blood pressure elevated on admission at 200/110 likely secondary to pain  Patient denies any prior history of hypertension  Will continue with p r n  Hydralazine  And pain control  Patient multiple pain medication has been complaining of pain around 7  Increase hydralazine to 10 patient tolerated p o  Okay and not right extremity mg p o  For medication just given the above target blood pressure is 16o sys  There not want to drop too fast  Discussed with Nephrology  Some blood work rule out secondary hypertension patient denied prior history of hypertension  Renal artery ultrasound to rule out renal artery stenosis    Leukocytosis  Assessment & Plan  Likely secondary to volume contraction and dehydration  Meet sirs criteria because of tachycardia leukocytosis  Follow-up on lactic acid and procalcitonin  Will continue to monitor off antibiotics  Follow-up on CBC post hydration  Repeat CBC in the morning if afebrile and does not look toxic and not look infectious process causing right flank elevated BP secondary to nausea vomiting diarrhea stress-induced and pain problem    Ambulatory dysfunction  Assessment & Plan  Patient has history of chronic low back pain and usually ambulates with assistance  Currently has worsening back pain secondary to inability to take chronic pain medications  Daughter reports frequent falls and gait instability  Maintain fall precautions  Physical therapy evaluation will be obtained  Consult PT and OT    Hyponatremia  Assessment & Plan  Sodium 128 on admission  Likely secondary to intractable nausea vomiting and diarrhea and dehydration resulting from that  Fortunately patient is hypertensive    Continue with IV hydration  Follow-up BMP in a m     Most probably secondary to diarrhea and vomiting the patient is running fluid normal saline  Repeat Chem 7 and 12 noon if continued drop most probably secondary to combination inappropriate ADH and nausea vomiting  p o  Intake in order serum and urine osmolarity and spot urinary sodium  Appreciate Nephrology consult  Spot urinary sodium pending  Decreased fluid to 100 cc per           Hypokalemia  Assessment & Plan  Secondary to intractable nausea vomiting and poor oral intake  Continue with IV replacement  Follow-up on magnesium  Follow-up BMP in a m     Maintain on telemetry monitor  Potassium corrected a repeat potassium 4  2  Repeat Chem 7 at 12 noon    Diarrhea with dehydration  Assessment & Plan  Patient presents with multiple episodes of loose watery stools  Complains of generalized weakness  Associated with nausea vomiting but denies any abdominal discomfort fever or chills  Symptoms started after patient consumed homemade lasagna  Also admits to having antibiotic use in the last 3 months  No recent hospitalization  Rule out infectious etiology  Follow-up on stool enteric pathogen panel and C diff toxin assay  Maintain contact and hand hygiene precautions in the interim  Continue with IV hydration  Patient no  Vomiting since last night this morning tolerated p o  Small amount of fluid no problem swallowing or choking  No stool since admission unable to get sample for C diff S no further diarrhea it was felt most probably not due to c diff  Id  recommend to DC isolation    Intractable low back pain  Assessment & Plan  Patient has acute worsening of her chronic back pain likely secondary to withdrawal from her chronic pain medications  She has not been able to follow-up with her pain management team at [place] [id number]  Multiple attempts by family to contact her physician were unsuccessful    She has not had any refill of her intrathecal Dilaudid pump and Nucynta since August   PDMP verified    Plan:  Maintain fall precautions  Physical therapy evaluation will be obtained  Start patient on intravenous Dilaudid for moderate, severe and breakthrough pain  Will attempt to get in touch with patient's outpatient pain management team in [place] Continue with as needed muscle relaxant, gabapentin, Lidoderm patch, aqua  K-pad for pain control    Chronic midline low back pain with sciatica  Assessment & Plan  Patient has history of chronic lumbar pain secondary to lumbar disc disease and has had multiple surgeries in the past   She normally ambulates with cane or crutches  Has had multiple falls and has chronically been maintained on intrathecal Dilaudid pump, Nucynta and Ativan  She follows up with pain management at [facility]  Patient has not had refills of her intrathecal Dilaudid pump since August   She also ran out of her Nucynta on August 20th  She states that she has worsening back pain  States that pain radiates to the left leg and has had difficulty ambulating and frequent falls  Consult pain management  In spite of multiple doses of medication pain is still 9 /10      * Intractable nausea and vomiting  Assessment & Plan  Presents with 2-3 days history of multiple episodes of nausea and vomiting  Patient also reports dry heaving  Has not been able to tolerate her medications or food for over 2 days  Denies any abdominal discomfort but it has been associated with multiple episodes of diarrhea  She does report some specks of blood in her vomitus  Denies any hematemesis or coffee-ground emesis  Hemoconcentrated with a hemoglobin of 14 on admission    Her symptoms could be related to opioid Withdrawal as well  Abdominal exam is clinically benign  Continue with scheduled antiemetics and IV hydration  Continue with IV ppi    Rule out anastomotic ulcer( prior history of gastric bypass surgery), peptic ulcer disease, GI bleed  No vomiting since last night he will clear liquid the morning she tolerated some slow rate she started vomiting will consult GI and bariatric surgeon                        Discharging Physician / Practitioner: Maria Dolores Bajwa MD  PCP: Alex Hill  Admission Date:   Admission Orders (From admission, onward)     Ordered        09/16/20 1643  Inpatient Admission  Once         09/15/20 2158  Place in Observation  Once                   Discharge Date: 09/17/20    Resolved Problems  Date Reviewed: 9/17/2020    None          Consultations During Hospital Stay:  · Nephrology    Procedures Performed:   · None    Significant Findings / Test Results:   · Elevated blood pressure    Incidental Findings:   · Elevated blood pressure  ·      Test Results Pending at Discharge (will require follow up): · None     Outpatient Tests Requested:  · None    Complications:  Severe back pain elevated blood pressure    Reason for Admission:  Nausea vomiting and back pain    Hospital Course:     Blanca Brannon is a 53 y o  female patient who originally presented to the hospital on 9/15/2020 due to severe back pain nausea vomiting patient is on a pump pain management chronic back pain ran out of from medication unable to get it unable to reach her doctor for pain management started vomiting and diarrhea came in emergency room found to have elevated blood pressure no prior history of hypertension multiple medication IV unable to control blood pressure also pain unable to controlled with IV medication needed more and ICU sitting start drip for blood pressure control and pain management        Please see above list of diagnoses and related plan for additional information       Condition at Discharge: serious     Discharge Day Visit / Exam:     Subjective:  Complaining of severe back  Vitals: Blood Pressure: 150/92 (09/17/20 0851)  Pulse: (!) 115 (09/17/20 0851)  Temperature: 98 °F (36 7 °C) (09/17/20 0751)  Temp Source: Temporal (09/17/20 8788)  Respirations: 22 (09/17/20 0851)  Height: 5' 5" (165 1 cm) (09/16/20 1355)  Weight - Scale: 95 6 kg (210 lb 12 2 oz) (09/16/20 1355)  SpO2: 97 % (09/17/20 0751)  Exam:   Physical Exam  Vitals signs and nursing note reviewed  Constitutional:       General: She is in acute distress  Appearance: She is ill-appearing  HENT:      Head: Normocephalic and atraumatic  Right Ear: External ear normal       Left Ear: External ear normal       Nose: Nose normal       Mouth/Throat:      Mouth: Mucous membranes are moist    Eyes:      Conjunctiva/sclera: Conjunctivae normal       Comments: Pupil dilated   Neck:      Musculoskeletal: Normal range of motion and neck supple  Cardiovascular:      Rate and Rhythm: Normal rate and regular rhythm  Pulses: Normal pulses  Pulmonary:      Effort: Pulmonary effort is normal       Breath sounds: Normal breath sounds  Abdominal:      General: Bowel sounds are normal       Palpations: Abdomen is soft  Comments: Lower abdomen some discomfort no acute guarding or tenderness   Musculoskeletal:         General: Tenderness present  Comments: Lower spine   Skin:     General: Skin is warm and dry  Neurological:      General: No focal deficit present  Mental Status: She is oriented to person, place, and time  Psychiatric:      Comments: Patient is stream leak stress with pain        exam)  Discussion with Family:  Discussed with patient also call the daughter gave her all the medical information also informed her that she is going to be transferred    Discharge instructions/Information to patient and family:   See after visit summary for information provided to patient and family  Provisions for Follow-Up Care:  See after visit summary for information related to follow-up care and any pertinent home health orders        Disposition:     [address] Transfer to [facility]    For Discharges to [facility] SNF:   · Not Applicable to this Patient - Not Applicable to this Patient    Planned Readmission:  None     Discharge Statement:  I spent 45 minutes minutes discharging the patient  This time was spent on the day of discharge  I had direct contact with the patient on the day of discharge  Greater than 50% of the total time was spent examining patient, answering all patient questions, arranging and discussing plan of care with patient as well as directly providing post-discharge instructions  Additional time then spent on discharge activities  Discharge Medications:  See after visit summary for reconciled discharge medications provided to patient and family        ** Please Note: This note has been constructed using a voice recognition system **

## 2020-09-17 NOTE — NURSING NOTE
Patient has not had relief for 10/10 pain  Typically found moving legs and feet  When asked, " its because the pain is so bad" blood pressure elevated and medicated per PRN orders   Continue to wait for Willam's arrival  Will irene

## 2020-09-17 NOTE — SOCIAL WORK
LOS: 1 GMLOS: none    Medical necessity completed and provided to RN w/ copy of facesheet  Copy of medical necessity made and placed in scan bin to be scanned into EHR  SW called pt's daughter Sally Barone ([phone]) and notified her that the pt will be transferred to [facility] ICU  ELIANE confirmed w/ Lavonne Lal that herself and her sister Hailee Smiley are caring for pt's SO Chico Downey and able to care for him 24/7 as pt was expressing concerns regarding  Lavonne Getting stated that pt does not have a new pain management physician and will need to have an appointment scheduled with a new pain management physician prior to d/c  No other needs identified at this time

## 2020-09-17 NOTE — NURSING NOTE
Discussed medications given now and bladder scan  Acknowledge transfer order  Patient now q one hour vitals and assessment of mentation  Patient feeling very nauseated  " I feel worse today than yesterday" " I have all this pressure at my kidneys" sinus tach on monitor  Will monitor

## 2020-09-17 NOTE — ASSESSMENT & PLAN NOTE
Patient has history of chronic lumbar pain secondary to lumbar disc disease and has had multiple surgeries in the past   She normally ambulates with cane or crutches  Has had multiple falls and has chronically been maintained on intrathecal Dilaudid pump, Nucynta and Ativan  She follows up with pain management at [facility]  Patient has not had refills of her intrathecal Dilaudid pump since August   She also ran out of her Nucynta on August 20th  She states that she has worsening back pain  States that pain radiates to the left leg and has had difficulty ambulating and frequent falls    Consult pain management  In spite of multiple doses of medication pain is still 9 /10

## 2020-09-17 NOTE — PROGRESS NOTES
NEPHROLOGY PROGRESS NOTE   Claudene Sierras 53 y o  female MRN: [id number]  Unit/Bed#: 7T [facility] 704-02 Encounter: [id number]      ASSESSMENT & PLAN:  This is a 58-year-old lady admitted with severe headaches, nausea, vomiting, lower back pain, nephrology seen her for hypertensive urgency as well as hyponatremia    1  Hypertensive urgency  No reported prior history of hypertension  Patient was started on Norvasc 10 mg yesterday  Unfortunately continue with nausea and poor p o  Intake  Workup in process: renin, aldosterone, metanephrines in process  Renal Doppler limited but no evidence of significant renal artery stenosis  Continue with labetalol and hydralazine intravenously p r n  Given persistently uncontrolled high blood pressure and poor p o  Tolerance recommend to start Cardene drip, discussed with Internal Medicine attending, patient will need to be transferred  2  Hypo osmolar Hyponatremia, acute on chronic, noted patient have some prior episode of hyponatremia back in 2014 and 2018  Suspected component of true volume depletion as well as possible ADH related in the setting of nausea, vomiting and pain  Continue with intravenously fluids, serum sodium slightly better at 126 from 123 yesterday  Noted urine osmolality 610, urine sodium in process  TSH normal    3  Hypokalemia hypomagnesemia on admission, improved after replacement    4  Intractable nausea, vomiting, lower back pain, headache, patient with history of intrathecal Dilaudid pump  History of cerebral aneurysm status post clipping  Further management per primary team    5   Urinary retention, continue with straight catheterization as per protocol, if urinary retention persist may need Farias catheter placement    My plan and recommendations were discussed with Dr Adi Bell from Internal Medicine      SUBJECTIVE:  Patient seen and examined, feeling worse than yesterday, continue with severe headaches as well as lower back pain, feeling nauseated        OBJECTIVE:  Current Weight: Weight - Scale: 95 6 kg (210 lb 12 2 oz)  Vitals:    09/17/20 0751   BP: (!) 184/83   Pulse: (!) 111   Resp: 22   Temp: 98 °F (36 7 °C)   SpO2: 97%       Intake/Output Summary (Last 24 hours) at 9/17/2020 2997  Last data filed at 9/17/2020 1278  Gross per 24 hour   Intake 2640 ml   Output 2450 ml   Net 190 ml     General:  Acutely ill, cooperative, in mild distress due to pain  Eyes: conjunctivae pink, anicteric sclerae  ENT: lips and mucous membranes moist  Neck: supple, no JVD  Chest: clear breath sounds bilateral, no crackles, ronchus or wheezings  CVS: distinct S1 & S2, normal rate, regular rhythm  Abdomen: soft, non-tender, non-distended, normoactive bowel sounds  Extremities: no significant edema of both legs  Skin: no rash  Neuro: awake, alert, oriented        Medications:    Current Facility-Administered Medications:     acetaminophen (TYLENOL) tablet 975 mg, 975 mg, Oral, Q8H PRN, Dimitry Long MD    amLODIPine (NORVASC) tablet 10 mg, 10 mg, Oral, Daily, Violeta Britton MD, 10 mg at 09/16/20 0838    buPROPion (WELLBUTRIN XL) 24 hr tablet 150 mg, 150 mg, Oral, BID, Dimitry Long MD, 150 mg at 09/16/20 2110    citalopram (CeleXA) tablet 40 mg, 40 mg, Oral, Daily, Dimitry Long MD, 40 mg at 09/16/20 0839    enoxaparin (LOVENOX) subcutaneous injection 40 mg, 40 mg, Subcutaneous, Daily, Dimitry Long MD, 40 mg at 09/16/20 0840    gabapentin (NEURONTIN) capsule 300 mg, 300 mg, Oral, 4x Daily, Dimitry Long MD, 300 mg at 09/16/20 2110    hydrALAZINE (APRESOLINE) injection 10 mg, 10 mg, Intravenous, Q6H PRN, Violeta Britton MD, 10 mg at 09/17/20 0808    HYDROmorphone (DILAUDID) injection 0 2 mg, 0 2 mg, Intravenous, Q6H PRN, Dimitry Long MD    HYDROmorphone (DILAUDID) injection 0 5 mg, 0 5 mg, Intravenous, Q1H PRN, Dimitry Long MD, 0 5 mg at 09/17/20 0628    HYDROmorphone (DILAUDID) injection 1 mg, 1 mg, Intravenous, Q4H PRN, Dimitry Long, MD, 1 mg at 09/17/20 0807    ipratropium-albuterol (DUO-NEB) 0 5-2 5 mg/3 mL inhalation solution 3 mL, 3 mL, Nebulization, Q6H PRN, Julia Roach MD    Labetalol HCl (NORMODYNE) injection 10 mg, 10 mg, Intravenous, Q4H PRN, Mc Townsend MD, 10 mg at 09/16/20 1404    levETIRAcetam (KEPPRA) 1,500 mg in sodium chloride 0 9 % 100 mL IVPB, 1,500 mg, Intravenous, Q12H [address], Julia Roach MD, Last Rate: 400 mL/hr at 09/16/20 2108, 1,500 mg at 09/16/20 2108    levothyroxine tablet 100 mcg, 100 mcg, Oral, Early Morning, Julia Roach MD, 100 mcg at 09/17/20 0513    LORazepam (ATIVAN) injection 0 5 mg, 0 5 mg, Intravenous, 4x Daily, Julia Roach MD, 0 5 mg at 09/16/20 2111    metoclopramide (REGLAN) injection 10 mg, 10 mg, Intravenous, Q6H PRN, Julia Roach MD    naloxone (NARCAN) 0 04 mg/mL syringe 0 04 mg, 0 04 mg, Intravenous, Q1MIN PRN, Julia Roach MD    nicotine (NICODERM CQ) 7 mg/24hr TD 24 hr patch 1 patch, 1 patch, Transdermal, Daily, Julia Roach MD, 1 patch at 09/16/20 0841    ondansetron (ZOFRAN) injection 4 mg, 4 mg, Intravenous, Q4H PRN, Julia Roach MD, 4 mg at 09/16/20 0941    oxybutynin (DITROPAN) tablet 5 mg, 5 mg, Oral, TID, Julia Roach MD, 5 mg at 09/16/20 2110    pantoprazole (PROTONIX) injection 40 mg, 40 mg, Intravenous, Q24H [address], Julia Roach MD, 40 mg at 09/16/20 0839    potassium chloride 40 mEq in sodium chloride 0 9 % 1,000 mL infusion, , Intravenous, Continuous, Julia Roach MD, Last Rate: 125 mL/hr at 09/17/20 0805    sodium chloride 0 9 % with KCl 40 mEq/L infusion (premix), 100 mL/hr, Intravenous, Continuous, Jose Martin MD    temazepam (RESTORIL) capsule 22 5 mg, 22 5 mg, Oral, HS, Julia Roach MD, 22 5 mg at 09/16/20 2120    tiZANidine (ZANAFLEX) tablet 2 mg, 2 mg, Oral, TID, Julia Roach MD, 2 mg at 09/16/20 2110    Invasive Devices:        Lab Results:   Results from last 7 days   Lab Units 09/17/20  0505 09/16/20  1151 09/16/20  0610 09/16/20  0040 09/15/20  2059 09/15/20  2028 09/14/20  2025   WBC Thousand/uL 12 30*  --  19 30*  --   --  21 10* 7 24   HEMOGLOBIN g/dL 11 7*  --  13 1  --   --  14 2 11 9   HEMATOCRIT % 37 1  --  40 8  --   --  43 8 38 3   PLATELETS Thousands/uL 352  --  417 405  --  444 347   SODIUM mmol/L 126* 126* 123*  --  128*  --  130*   POTASSIUM mmol/L 3 8 4 1 4 2  --  2 1*  --  3 7   CHLORIDE mmol/L 98 95* 94*  --  103  --  96*   CO2 mmol/L 19* 20* 15*  --  15*  --  26   BUN mg/dL 10 13 12  --  8  --  9   CREATININE mg/dL 0 51* 0 54* 0 48*  --  0 33*  --  0 91   CALCIUM mg/dL 9 0 9 4 9 1  --  6 4*  --  8 4   MAGNESIUM mg/dL  --   --  2 2 1 2*  --   --   --    ALK PHOS U/L  --   --  100  --  74  --  121*   ALT U/L  --   --  25  --  30  --  34   AST U/L  --   --  34  --  22  --  22       Previous work up:  CT scan of the head without contrast reported as no acute intracranial abnormalities    Renin, metanephrines, aldosterone in process    Renal Doppler, limited study due to body habitus and overlying bowel gas, no evidence of significant renal artery stenosis      Portions of the record may have been created with voice recognition software  Occasional wrong word or "sound a like" substitutions may have occurred due to the inherent limitations of voice recognition software  Read the chart carefully and recognize, using context, where substitutions have occurred  If you have any questions, please contact the dictating provider

## 2020-09-18 PROBLEM — D50.9 IRON DEFICIENCY ANEMIA: Status: ACTIVE | Noted: 2020-09-18

## 2020-09-18 PROBLEM — E03.9 HYPOTHYROIDISM: Chronic | Status: ACTIVE | Noted: 2020-09-18

## 2020-09-18 LAB
ANION GAP SERPL CALCULATED.3IONS-SCNC: 11 MMOL/L (ref 4–13)
BUN SERPL-MCNC: 6 MG/DL (ref 5–25)
CALCIUM SERPL-MCNC: 8.5 MG/DL (ref 8.3–10.1)
CHLORIDE SERPL-SCNC: 97 MMOL/L (ref 100–108)
CO2 SERPL-SCNC: 21 MMOL/L (ref 21–32)
CREAT SERPL-MCNC: 0.56 MG/DL (ref 0.6–1.3)
CREAT UR-MCNC: 26.8 MG/DL
ERYTHROCYTE [DISTWIDTH] IN BLOOD BY AUTOMATED COUNT: 17.6 % (ref 11.6–15.1)
EST. AVERAGE GLUCOSE BLD GHB EST-MCNC: 128 MG/DL
GFR SERPL CREATININE-BSD FRML MDRD: 105 ML/MIN/1.73SQ M
GLUCOSE SERPL-MCNC: 105 MG/DL (ref 65–140)
HBA1C MFR BLD: 6.1 %
HCT VFR BLD AUTO: 34.9 % (ref 34.8–46.1)
HGB BLD-MCNC: 10.6 G/DL (ref 11.5–15.4)
MCH RBC QN AUTO: 24.7 PG (ref 26.8–34.3)
MCHC RBC AUTO-ENTMCNC: 30.4 G/DL (ref 31.4–37.4)
MCV RBC AUTO: 81 FL (ref 82–98)
MICROALBUMIN UR-MCNC: 41.4 MG/L (ref 0–20)
MICROALBUMIN/CREAT 24H UR: 154 MG/G CREATININE (ref 0–30)
PLATELET # BLD AUTO: 273 THOUSANDS/UL (ref 149–390)
PMV BLD AUTO: 9.5 FL (ref 8.9–12.7)
POTASSIUM SERPL-SCNC: 3.8 MMOL/L (ref 3.5–5.3)
RBC # BLD AUTO: 4.3 MILLION/UL (ref 3.81–5.12)
SODIUM SERPL-SCNC: 129 MMOL/L (ref 136–145)
WBC # BLD AUTO: 9.59 THOUSAND/UL (ref 4.31–10.16)

## 2020-09-18 PROCEDURE — 97167 OT EVAL HIGH COMPLEX 60 MIN: CPT

## 2020-09-18 PROCEDURE — 82570 ASSAY OF URINE CREATININE: CPT | Performed by: INTERNAL MEDICINE

## 2020-09-18 PROCEDURE — 82043 UR ALBUMIN QUANTITATIVE: CPT | Performed by: INTERNAL MEDICINE

## 2020-09-18 PROCEDURE — 97163 PT EVAL HIGH COMPLEX 45 MIN: CPT

## 2020-09-18 PROCEDURE — 99232 SBSQ HOSP IP/OBS MODERATE 35: CPT | Performed by: INTERNAL MEDICINE

## 2020-09-18 PROCEDURE — 85027 COMPLETE CBC AUTOMATED: CPT | Performed by: NURSE PRACTITIONER

## 2020-09-18 PROCEDURE — 92526 ORAL FUNCTION THERAPY: CPT

## 2020-09-18 PROCEDURE — 80048 BASIC METABOLIC PNL TOTAL CA: CPT | Performed by: NURSE PRACTITIONER

## 2020-09-18 RX ORDER — METOPROLOL TARTRATE 5 MG/5ML
10 INJECTION INTRAVENOUS EVERY 6 HOURS PRN
Status: DISCONTINUED | OUTPATIENT
Start: 2020-09-18 | End: 2020-09-18

## 2020-09-18 RX ORDER — HYDRALAZINE HYDROCHLORIDE 20 MG/ML
10 INJECTION INTRAMUSCULAR; INTRAVENOUS EVERY 6 HOURS PRN
Status: DISCONTINUED | OUTPATIENT
Start: 2020-09-18 | End: 2020-09-18

## 2020-09-18 RX ORDER — HYDRALAZINE HYDROCHLORIDE 20 MG/ML
10 INJECTION INTRAMUSCULAR; INTRAVENOUS EVERY 4 HOURS PRN
Status: DISCONTINUED | OUTPATIENT
Start: 2020-09-18 | End: 2020-10-02 | Stop reason: HOSPADM

## 2020-09-18 RX ORDER — PANTOPRAZOLE SODIUM 40 MG/1
40 TABLET, DELAYED RELEASE ORAL
Status: DISCONTINUED | OUTPATIENT
Start: 2020-09-18 | End: 2020-10-02 | Stop reason: HOSPADM

## 2020-09-18 RX ORDER — FERROUS SULFATE 325(65) MG
325 TABLET ORAL
Status: DISCONTINUED | OUTPATIENT
Start: 2020-09-18 | End: 2020-10-02 | Stop reason: HOSPADM

## 2020-09-18 RX ORDER — METOPROLOL TARTRATE 5 MG/5ML
10 INJECTION INTRAVENOUS ONCE
Status: COMPLETED | OUTPATIENT
Start: 2020-09-18 | End: 2020-09-18

## 2020-09-18 RX ADMIN — HYDROMORPHONE HYDROCHLORIDE 0.5 MG: 1 INJECTION, SOLUTION INTRAMUSCULAR; INTRAVENOUS; SUBCUTANEOUS at 12:18

## 2020-09-18 RX ADMIN — GABAPENTIN 300 MG: 300 CAPSULE ORAL at 12:18

## 2020-09-18 RX ADMIN — ENOXAPARIN SODIUM 40 MG: 40 INJECTION SUBCUTANEOUS at 08:32

## 2020-09-18 RX ADMIN — HYDROMORPHONE HYDROCHLORIDE 0.5 MG: 1 INJECTION, SOLUTION INTRAMUSCULAR; INTRAVENOUS; SUBCUTANEOUS at 21:19

## 2020-09-18 RX ADMIN — LABETALOL 20 MG/4 ML (5 MG/ML) INTRAVENOUS SYRINGE 10 MG: at 06:42

## 2020-09-18 RX ADMIN — POTASSIUM CHLORIDE AND SODIUM CHLORIDE 100 ML/HR: 900; 300 INJECTION, SOLUTION INTRAVENOUS at 22:06

## 2020-09-18 RX ADMIN — HYDROMORPHONE HYDROCHLORIDE 0.5 MG: 1 INJECTION, SOLUTION INTRAMUSCULAR; INTRAVENOUS; SUBCUTANEOUS at 13:30

## 2020-09-18 RX ADMIN — LORAZEPAM 0.5 MG: 2 INJECTION INTRAMUSCULAR; INTRAVENOUS at 17:40

## 2020-09-18 RX ADMIN — GABAPENTIN 300 MG: 300 CAPSULE ORAL at 17:39

## 2020-09-18 RX ADMIN — HYDROMORPHONE HYDROCHLORIDE 0.5 MG: 1 INJECTION, SOLUTION INTRAMUSCULAR; INTRAVENOUS; SUBCUTANEOUS at 17:00

## 2020-09-18 RX ADMIN — POTASSIUM CHLORIDE AND SODIUM CHLORIDE 100 ML/HR: 900; 300 INJECTION, SOLUTION INTRAVENOUS at 00:15

## 2020-09-18 RX ADMIN — HYDRALAZINE HYDROCHLORIDE 10 MG: 20 INJECTION INTRAMUSCULAR; INTRAVENOUS at 07:00

## 2020-09-18 RX ADMIN — GABAPENTIN 300 MG: 300 CAPSULE ORAL at 08:32

## 2020-09-18 RX ADMIN — LORAZEPAM 0.5 MG: 2 INJECTION INTRAMUSCULAR; INTRAVENOUS at 12:18

## 2020-09-18 RX ADMIN — LORAZEPAM 0.5 MG: 2 INJECTION INTRAMUSCULAR; INTRAVENOUS at 08:33

## 2020-09-18 RX ADMIN — METOROPROLOL TARTRATE 10 MG: 5 INJECTION, SOLUTION INTRAVENOUS at 08:53

## 2020-09-18 RX ADMIN — LORAZEPAM 0.5 MG: 2 INJECTION INTRAMUSCULAR; INTRAVENOUS at 21:20

## 2020-09-18 RX ADMIN — BUPROPION HYDROCHLORIDE 150 MG: 150 TABLET, FILM COATED, EXTENDED RELEASE ORAL at 08:32

## 2020-09-18 RX ADMIN — TEMAZEPAM 22.5 MG: 15 CAPSULE ORAL at 21:28

## 2020-09-18 RX ADMIN — TIZANIDINE 2 MG: 2 TABLET ORAL at 08:34

## 2020-09-18 RX ADMIN — HYDROMORPHONE HYDROCHLORIDE 0.5 MG: 1 INJECTION, SOLUTION INTRAMUSCULAR; INTRAVENOUS; SUBCUTANEOUS at 06:01

## 2020-09-18 RX ADMIN — LEVOTHYROXINE SODIUM 100 MCG: 100 TABLET ORAL at 06:01

## 2020-09-18 RX ADMIN — GABAPENTIN 300 MG: 300 CAPSULE ORAL at 21:28

## 2020-09-18 RX ADMIN — LEVETIRACETAM 1500 MG: 100 INJECTION, SOLUTION INTRAVENOUS at 08:32

## 2020-09-18 RX ADMIN — TIZANIDINE 2 MG: 2 TABLET ORAL at 17:00

## 2020-09-18 RX ADMIN — PANTOPRAZOLE SODIUM 40 MG: 40 TABLET, DELAYED RELEASE ORAL at 08:32

## 2020-09-18 RX ADMIN — POTASSIUM CHLORIDE AND SODIUM CHLORIDE 100 ML/HR: 900; 300 INJECTION, SOLUTION INTRAVENOUS at 10:45

## 2020-09-18 RX ADMIN — CITALOPRAM HYDROBROMIDE 40 MG: 20 TABLET ORAL at 08:32

## 2020-09-18 RX ADMIN — TIZANIDINE 2 MG: 2 TABLET ORAL at 21:28

## 2020-09-18 RX ADMIN — ONDANSETRON 4 MG: 2 INJECTION INTRAMUSCULAR; INTRAVENOUS at 13:29

## 2020-09-18 RX ADMIN — TAMSULOSIN HYDROCHLORIDE 0.4 MG: 0.4 CAPSULE ORAL at 17:00

## 2020-09-18 RX ADMIN — LISINOPRIL 20 MG: 20 TABLET ORAL at 08:33

## 2020-09-18 RX ADMIN — LEVETIRACETAM 1500 MG: 100 INJECTION, SOLUTION INTRAVENOUS at 22:06

## 2020-09-18 RX ADMIN — BUPROPION HYDROCHLORIDE 150 MG: 150 TABLET, FILM COATED, EXTENDED RELEASE ORAL at 21:28

## 2020-09-18 RX ADMIN — HYDRALAZINE HYDROCHLORIDE 10 MG: 20 INJECTION INTRAMUSCULAR; INTRAVENOUS at 13:29

## 2020-09-18 NOTE — ED ATTENDING ATTESTATION
I was the attending physician on duty at the time the patient visited the emergency department  The patient was evaluated and dispositioned by the APC  I was personally available for consultation  I am administratively signing the chart after the fact      Stefan Hickey MD

## 2020-09-19 PROBLEM — I67.1 BRAIN ANEURYSM: Status: ACTIVE | Noted: 2020-09-19

## 2020-09-19 PROBLEM — R33.9 URINARY RETENTION: Status: ACTIVE | Noted: 2020-09-19

## 2020-09-19 PROBLEM — R56.9 SEIZURE (HCC): Status: ACTIVE | Noted: 2020-09-19

## 2020-09-19 LAB
ANION GAP SERPL CALCULATED.3IONS-SCNC: 10 MMOL/L (ref 4–13)
BUN SERPL-MCNC: 6 MG/DL (ref 5–25)
CALCIUM SERPL-MCNC: 8.6 MG/DL (ref 8.3–10.1)
CHLORIDE SERPL-SCNC: 99 MMOL/L (ref 100–108)
CO2 SERPL-SCNC: 21 MMOL/L (ref 21–32)
CREAT SERPL-MCNC: 0.5 MG/DL (ref 0.6–1.3)
ERYTHROCYTE [DISTWIDTH] IN BLOOD BY AUTOMATED COUNT: 17.9 % (ref 11.6–15.1)
GFR SERPL CREATININE-BSD FRML MDRD: 109 ML/MIN/1.73SQ M
GLUCOSE SERPL-MCNC: 123 MG/DL (ref 65–140)
HCT VFR BLD AUTO: 33 % (ref 34.8–46.1)
HGB BLD-MCNC: 10.2 G/DL (ref 11.5–15.4)
MCH RBC QN AUTO: 24.9 PG (ref 26.8–34.3)
MCHC RBC AUTO-ENTMCNC: 30.9 G/DL (ref 31.4–37.4)
MCV RBC AUTO: 81 FL (ref 82–98)
PLATELET # BLD AUTO: 238 THOUSANDS/UL (ref 149–390)
PMV BLD AUTO: 9.7 FL (ref 8.9–12.7)
POTASSIUM SERPL-SCNC: 4 MMOL/L (ref 3.5–5.3)
RBC # BLD AUTO: 4.1 MILLION/UL (ref 3.81–5.12)
SODIUM SERPL-SCNC: 130 MMOL/L (ref 136–145)
WBC # BLD AUTO: 7.97 THOUSAND/UL (ref 4.31–10.16)

## 2020-09-19 PROCEDURE — 85027 COMPLETE CBC AUTOMATED: CPT | Performed by: INTERNAL MEDICINE

## 2020-09-19 PROCEDURE — 99233 SBSQ HOSP IP/OBS HIGH 50: CPT | Performed by: INTERNAL MEDICINE

## 2020-09-19 PROCEDURE — 80048 BASIC METABOLIC PNL TOTAL CA: CPT | Performed by: INTERNAL MEDICINE

## 2020-09-19 RX ORDER — HYDRALAZINE HYDROCHLORIDE 25 MG/1
25 TABLET, FILM COATED ORAL EVERY 8 HOURS SCHEDULED
Status: DISCONTINUED | OUTPATIENT
Start: 2020-09-19 | End: 2020-09-23

## 2020-09-19 RX ORDER — SODIUM CHLORIDE, SODIUM GLUCONATE, SODIUM ACETATE, POTASSIUM CHLORIDE, MAGNESIUM CHLORIDE, SODIUM PHOSPHATE, DIBASIC, AND POTASSIUM PHOSPHATE .53; .5; .37; .037; .03; .012; .00082 G/100ML; G/100ML; G/100ML; G/100ML; G/100ML; G/100ML; G/100ML
75 INJECTION, SOLUTION INTRAVENOUS CONTINUOUS
Status: DISCONTINUED | OUTPATIENT
Start: 2020-09-19 | End: 2020-09-20

## 2020-09-19 RX ORDER — SODIUM BICARBONATE 650 MG/1
650 TABLET ORAL
Status: DISCONTINUED | OUTPATIENT
Start: 2020-09-19 | End: 2020-09-20

## 2020-09-19 RX ADMIN — HYDROMORPHONE HYDROCHLORIDE 0.5 MG: 1 INJECTION, SOLUTION INTRAMUSCULAR; INTRAVENOUS; SUBCUTANEOUS at 06:18

## 2020-09-19 RX ADMIN — HYDROMORPHONE HYDROCHLORIDE 0.5 MG: 1 INJECTION, SOLUTION INTRAMUSCULAR; INTRAVENOUS; SUBCUTANEOUS at 20:29

## 2020-09-19 RX ADMIN — TIZANIDINE 2 MG: 2 TABLET ORAL at 20:23

## 2020-09-19 RX ADMIN — TEMAZEPAM 22.5 MG: 15 CAPSULE ORAL at 22:17

## 2020-09-19 RX ADMIN — LEVOTHYROXINE SODIUM 100 MCG: 100 TABLET ORAL at 06:18

## 2020-09-19 RX ADMIN — LISINOPRIL 20 MG: 20 TABLET ORAL at 08:17

## 2020-09-19 RX ADMIN — TAMSULOSIN HYDROCHLORIDE 0.4 MG: 0.4 CAPSULE ORAL at 17:58

## 2020-09-19 RX ADMIN — LORAZEPAM 0.5 MG: 2 INJECTION INTRAMUSCULAR; INTRAVENOUS at 17:59

## 2020-09-19 RX ADMIN — CITALOPRAM HYDROBROMIDE 40 MG: 20 TABLET ORAL at 08:17

## 2020-09-19 RX ADMIN — LEVETIRACETAM 1500 MG: 100 INJECTION, SOLUTION INTRAVENOUS at 21:53

## 2020-09-19 RX ADMIN — HYDROMORPHONE HYDROCHLORIDE 0.5 MG: 1 INJECTION, SOLUTION INTRAMUSCULAR; INTRAVENOUS; SUBCUTANEOUS at 11:25

## 2020-09-19 RX ADMIN — PANTOPRAZOLE SODIUM 40 MG: 40 TABLET, DELAYED RELEASE ORAL at 06:18

## 2020-09-19 RX ADMIN — ENOXAPARIN SODIUM 40 MG: 40 INJECTION SUBCUTANEOUS at 08:26

## 2020-09-19 RX ADMIN — GABAPENTIN 300 MG: 300 CAPSULE ORAL at 17:58

## 2020-09-19 RX ADMIN — POTASSIUM CHLORIDE AND SODIUM CHLORIDE 100 ML/HR: 900; 300 INJECTION, SOLUTION INTRAVENOUS at 08:33

## 2020-09-19 RX ADMIN — HYDRALAZINE HYDROCHLORIDE 25 MG: 25 TABLET, FILM COATED ORAL at 21:53

## 2020-09-19 RX ADMIN — HYDROMORPHONE HYDROCHLORIDE 0.5 MG: 1 INJECTION, SOLUTION INTRAMUSCULAR; INTRAVENOUS; SUBCUTANEOUS at 21:52

## 2020-09-19 RX ADMIN — HYDRALAZINE HYDROCHLORIDE 25 MG: 25 TABLET, FILM COATED ORAL at 15:51

## 2020-09-19 RX ADMIN — HYDROMORPHONE HYDROCHLORIDE 0.5 MG: 1 INJECTION, SOLUTION INTRAMUSCULAR; INTRAVENOUS; SUBCUTANEOUS at 17:59

## 2020-09-19 RX ADMIN — HYDROMORPHONE HYDROCHLORIDE 0.5 MG: 1 INJECTION, SOLUTION INTRAMUSCULAR; INTRAVENOUS; SUBCUTANEOUS at 15:12

## 2020-09-19 RX ADMIN — HYDRALAZINE HYDROCHLORIDE 10 MG: 20 INJECTION INTRAMUSCULAR; INTRAVENOUS at 07:29

## 2020-09-19 RX ADMIN — TIZANIDINE 2 MG: 2 TABLET ORAL at 08:17

## 2020-09-19 RX ADMIN — LEVETIRACETAM 1500 MG: 100 INJECTION, SOLUTION INTRAVENOUS at 08:31

## 2020-09-19 RX ADMIN — GABAPENTIN 300 MG: 300 CAPSULE ORAL at 08:17

## 2020-09-19 RX ADMIN — HYDROMORPHONE HYDROCHLORIDE 0.5 MG: 1 INJECTION, SOLUTION INTRAMUSCULAR; INTRAVENOUS; SUBCUTANEOUS at 07:30

## 2020-09-19 RX ADMIN — GABAPENTIN 300 MG: 300 CAPSULE ORAL at 21:53

## 2020-09-19 RX ADMIN — SODIUM CHLORIDE, SODIUM GLUCONATE, SODIUM ACETATE, POTASSIUM CHLORIDE, MAGNESIUM CHLORIDE, SODIUM PHOSPHATE, DIBASIC, AND POTASSIUM PHOSPHATE 75 ML/HR: .53; .5; .37; .037; .03; .012; .00082 INJECTION, SOLUTION INTRAVENOUS at 14:04

## 2020-09-19 RX ADMIN — BUPROPION HYDROCHLORIDE 150 MG: 150 TABLET, FILM COATED, EXTENDED RELEASE ORAL at 20:23

## 2020-09-19 RX ADMIN — HYDROMORPHONE HYDROCHLORIDE 0.5 MG: 1 INJECTION, SOLUTION INTRAMUSCULAR; INTRAVENOUS; SUBCUTANEOUS at 01:02

## 2020-09-19 RX ADMIN — TIZANIDINE 2 MG: 2 TABLET ORAL at 17:59

## 2020-09-19 RX ADMIN — BUPROPION HYDROCHLORIDE 150 MG: 150 TABLET, FILM COATED, EXTENDED RELEASE ORAL at 08:17

## 2020-09-19 RX ADMIN — LORAZEPAM 0.5 MG: 2 INJECTION INTRAMUSCULAR; INTRAVENOUS at 12:18

## 2020-09-19 RX ADMIN — SODIUM CHLORIDE, SODIUM GLUCONATE, SODIUM ACETATE, POTASSIUM CHLORIDE, MAGNESIUM CHLORIDE, SODIUM PHOSPHATE, DIBASIC, AND POTASSIUM PHOSPHATE 75 ML/HR: .53; .5; .37; .037; .03; .012; .00082 INJECTION, SOLUTION INTRAVENOUS at 23:06

## 2020-09-19 RX ADMIN — LORAZEPAM 0.5 MG: 2 INJECTION INTRAMUSCULAR; INTRAVENOUS at 21:53

## 2020-09-19 RX ADMIN — LORAZEPAM 0.5 MG: 2 INJECTION INTRAMUSCULAR; INTRAVENOUS at 08:21

## 2020-09-19 RX ADMIN — SODIUM BICARBONATE 650 MG TABLET 650 MG: at 17:59

## 2020-09-19 RX ADMIN — GABAPENTIN 300 MG: 300 CAPSULE ORAL at 11:25

## 2020-09-20 LAB
ANION GAP SERPL CALCULATED.3IONS-SCNC: 8 MMOL/L (ref 4–13)
BASOPHILS # BLD AUTO: 0.04 THOUSANDS/ΜL (ref 0–0.1)
BASOPHILS NFR BLD AUTO: 0 % (ref 0–1)
BUN SERPL-MCNC: 6 MG/DL (ref 5–25)
CALCIUM SERPL-MCNC: 8.3 MG/DL (ref 8.3–10.1)
CHLORIDE SERPL-SCNC: 98 MMOL/L (ref 100–108)
CO2 SERPL-SCNC: 26 MMOL/L (ref 21–32)
CREAT SERPL-MCNC: 0.56 MG/DL (ref 0.6–1.3)
EOSINOPHIL # BLD AUTO: 0.21 THOUSAND/ΜL (ref 0–0.61)
EOSINOPHIL NFR BLD AUTO: 2 % (ref 0–6)
ERYTHROCYTE [DISTWIDTH] IN BLOOD BY AUTOMATED COUNT: 18 % (ref 11.6–15.1)
GFR SERPL CREATININE-BSD FRML MDRD: 105 ML/MIN/1.73SQ M
GLUCOSE SERPL-MCNC: 159 MG/DL (ref 65–140)
HCT VFR BLD AUTO: 35.2 % (ref 34.8–46.1)
HGB BLD-MCNC: 10.9 G/DL (ref 11.5–15.4)
IMM GRANULOCYTES # BLD AUTO: 0.08 THOUSAND/UL (ref 0–0.2)
IMM GRANULOCYTES NFR BLD AUTO: 1 % (ref 0–2)
LYMPHOCYTES # BLD AUTO: 1.57 THOUSANDS/ΜL (ref 0.6–4.47)
LYMPHOCYTES NFR BLD AUTO: 17 % (ref 14–44)
MAGNESIUM SERPL-MCNC: 1.4 MG/DL (ref 1.6–2.6)
MCH RBC QN AUTO: 24.9 PG (ref 26.8–34.3)
MCHC RBC AUTO-ENTMCNC: 31 G/DL (ref 31.4–37.4)
MCV RBC AUTO: 81 FL (ref 82–98)
MONOCYTES # BLD AUTO: 0.68 THOUSAND/ΜL (ref 0.17–1.22)
MONOCYTES NFR BLD AUTO: 7 % (ref 4–12)
NEUTROPHILS # BLD AUTO: 6.87 THOUSANDS/ΜL (ref 1.85–7.62)
NEUTS SEG NFR BLD AUTO: 73 % (ref 43–75)
NRBC BLD AUTO-RTO: 0 /100 WBCS
PHOSPHATE SERPL-MCNC: 3.6 MG/DL (ref 2.7–4.5)
PLATELET # BLD AUTO: 248 THOUSANDS/UL (ref 149–390)
PMV BLD AUTO: 9.6 FL (ref 8.9–12.7)
POTASSIUM SERPL-SCNC: 3.4 MMOL/L (ref 3.5–5.3)
RBC # BLD AUTO: 4.37 MILLION/UL (ref 3.81–5.12)
SODIUM SERPL-SCNC: 132 MMOL/L (ref 136–145)
WBC # BLD AUTO: 9.45 THOUSAND/UL (ref 4.31–10.16)

## 2020-09-20 PROCEDURE — 84100 ASSAY OF PHOSPHORUS: CPT | Performed by: NURSE PRACTITIONER

## 2020-09-20 PROCEDURE — 99233 SBSQ HOSP IP/OBS HIGH 50: CPT | Performed by: INTERNAL MEDICINE

## 2020-09-20 PROCEDURE — 80048 BASIC METABOLIC PNL TOTAL CA: CPT | Performed by: NURSE PRACTITIONER

## 2020-09-20 PROCEDURE — 85025 COMPLETE CBC W/AUTO DIFF WBC: CPT | Performed by: INTERNAL MEDICINE

## 2020-09-20 PROCEDURE — 99232 SBSQ HOSP IP/OBS MODERATE 35: CPT | Performed by: INTERNAL MEDICINE

## 2020-09-20 PROCEDURE — 83735 ASSAY OF MAGNESIUM: CPT | Performed by: NURSE PRACTITIONER

## 2020-09-20 RX ORDER — POTASSIUM CHLORIDE 20 MEQ/1
20 TABLET, EXTENDED RELEASE ORAL ONCE
Status: COMPLETED | OUTPATIENT
Start: 2020-09-20 | End: 2020-09-20

## 2020-09-20 RX ORDER — BISACODYL 10 MG
10 SUPPOSITORY, RECTAL RECTAL ONCE
Status: COMPLETED | OUTPATIENT
Start: 2020-09-20 | End: 2020-09-20

## 2020-09-20 RX ORDER — AMOXICILLIN 250 MG
1 CAPSULE ORAL
Status: DISCONTINUED | OUTPATIENT
Start: 2020-09-20 | End: 2020-10-02 | Stop reason: HOSPADM

## 2020-09-20 RX ORDER — BISACODYL 10 MG
10 SUPPOSITORY, RECTAL RECTAL DAILY PRN
Status: DISCONTINUED | OUTPATIENT
Start: 2020-09-20 | End: 2020-10-02 | Stop reason: HOSPADM

## 2020-09-20 RX ORDER — POLYETHYLENE GLYCOL 3350 17 G/17G
17 POWDER, FOR SOLUTION ORAL DAILY
Status: DISCONTINUED | OUTPATIENT
Start: 2020-09-20 | End: 2020-10-02 | Stop reason: HOSPADM

## 2020-09-20 RX ORDER — NIFEDIPINE 60 MG/1
60 TABLET, EXTENDED RELEASE ORAL DAILY
Status: DISCONTINUED | OUTPATIENT
Start: 2020-09-20 | End: 2020-09-20

## 2020-09-20 RX ORDER — MAGNESIUM SULFATE HEPTAHYDRATE 40 MG/ML
4 INJECTION, SOLUTION INTRAVENOUS ONCE
Status: COMPLETED | OUTPATIENT
Start: 2020-09-20 | End: 2020-09-20

## 2020-09-20 RX ORDER — DIPHENHYDRAMINE HYDROCHLORIDE 50 MG/ML
25 INJECTION INTRAMUSCULAR; INTRAVENOUS ONCE
Status: COMPLETED | OUTPATIENT
Start: 2020-09-20 | End: 2020-09-20

## 2020-09-20 RX ORDER — NIFEDIPINE 60 MG/1
60 TABLET, EXTENDED RELEASE ORAL DAILY
Status: DISCONTINUED | OUTPATIENT
Start: 2020-09-21 | End: 2020-10-02 | Stop reason: HOSPADM

## 2020-09-20 RX ADMIN — DIPHENHYDRAMINE HYDROCHLORIDE 25 MG: 50 INJECTION, SOLUTION INTRAMUSCULAR; INTRAVENOUS at 18:33

## 2020-09-20 RX ADMIN — ENOXAPARIN SODIUM 40 MG: 40 INJECTION SUBCUTANEOUS at 10:18

## 2020-09-20 RX ADMIN — BUPROPION HYDROCHLORIDE 150 MG: 150 TABLET, FILM COATED, EXTENDED RELEASE ORAL at 21:45

## 2020-09-20 RX ADMIN — POLYETHYLENE GLYCOL (3350) 17 G: 17 POWDER, FOR SOLUTION ORAL at 11:47

## 2020-09-20 RX ADMIN — NIFEDIPINE 60 MG: 60 TABLET, FILM COATED, EXTENDED RELEASE ORAL at 11:47

## 2020-09-20 RX ADMIN — LEVETIRACETAM 1500 MG: 100 INJECTION, SOLUTION INTRAVENOUS at 10:15

## 2020-09-20 RX ADMIN — LEVETIRACETAM 1500 MG: 100 INJECTION, SOLUTION INTRAVENOUS at 21:49

## 2020-09-20 RX ADMIN — ONDANSETRON 4 MG: 2 INJECTION INTRAMUSCULAR; INTRAVENOUS at 16:52

## 2020-09-20 RX ADMIN — GABAPENTIN 300 MG: 300 CAPSULE ORAL at 21:45

## 2020-09-20 RX ADMIN — BISACODYL 10 MG: 10 SUPPOSITORY RECTAL at 11:47

## 2020-09-20 RX ADMIN — BUPROPION HYDROCHLORIDE 150 MG: 150 TABLET, FILM COATED, EXTENDED RELEASE ORAL at 10:16

## 2020-09-20 RX ADMIN — LORAZEPAM 0.5 MG: 2 INJECTION INTRAMUSCULAR; INTRAVENOUS at 13:11

## 2020-09-20 RX ADMIN — LEVOTHYROXINE SODIUM 100 MCG: 100 TABLET ORAL at 06:18

## 2020-09-20 RX ADMIN — HYDROMORPHONE HYDROCHLORIDE 0.5 MG: 1 INJECTION, SOLUTION INTRAMUSCULAR; INTRAVENOUS; SUBCUTANEOUS at 11:47

## 2020-09-20 RX ADMIN — HYDROMORPHONE HYDROCHLORIDE 0.5 MG: 1 INJECTION, SOLUTION INTRAMUSCULAR; INTRAVENOUS; SUBCUTANEOUS at 16:52

## 2020-09-20 RX ADMIN — LISINOPRIL 20 MG: 20 TABLET ORAL at 10:16

## 2020-09-20 RX ADMIN — POTASSIUM CHLORIDE 20 MEQ: 1500 TABLET, EXTENDED RELEASE ORAL at 10:16

## 2020-09-20 RX ADMIN — LORAZEPAM 0.5 MG: 2 INJECTION INTRAMUSCULAR; INTRAVENOUS at 19:47

## 2020-09-20 RX ADMIN — HYDROMORPHONE HYDROCHLORIDE 0.5 MG: 1 INJECTION, SOLUTION INTRAMUSCULAR; INTRAVENOUS; SUBCUTANEOUS at 15:02

## 2020-09-20 RX ADMIN — TIZANIDINE 2 MG: 2 TABLET ORAL at 10:17

## 2020-09-20 RX ADMIN — GABAPENTIN 300 MG: 300 CAPSULE ORAL at 10:16

## 2020-09-20 RX ADMIN — PANTOPRAZOLE SODIUM 40 MG: 40 TABLET, DELAYED RELEASE ORAL at 06:18

## 2020-09-20 RX ADMIN — GABAPENTIN 300 MG: 300 CAPSULE ORAL at 11:47

## 2020-09-20 RX ADMIN — TAMSULOSIN HYDROCHLORIDE 0.4 MG: 0.4 CAPSULE ORAL at 16:52

## 2020-09-20 RX ADMIN — SENNOSIDES AND DOCUSATE SODIUM 1 TABLET: 8.6; 5 TABLET ORAL at 21:45

## 2020-09-20 RX ADMIN — HYDROMORPHONE HYDROCHLORIDE 0.5 MG: 1 INJECTION, SOLUTION INTRAMUSCULAR; INTRAVENOUS; SUBCUTANEOUS at 19:47

## 2020-09-20 RX ADMIN — TIZANIDINE 2 MG: 2 TABLET ORAL at 15:02

## 2020-09-20 RX ADMIN — HYDRALAZINE HYDROCHLORIDE 25 MG: 25 TABLET, FILM COATED ORAL at 13:11

## 2020-09-20 RX ADMIN — HYDROMORPHONE HYDROCHLORIDE 0.5 MG: 1 INJECTION, SOLUTION INTRAMUSCULAR; INTRAVENOUS; SUBCUTANEOUS at 10:18

## 2020-09-20 RX ADMIN — HYDRALAZINE HYDROCHLORIDE 25 MG: 25 TABLET, FILM COATED ORAL at 21:45

## 2020-09-20 RX ADMIN — HYDROMORPHONE HYDROCHLORIDE 0.5 MG: 1 INJECTION, SOLUTION INTRAMUSCULAR; INTRAVENOUS; SUBCUTANEOUS at 04:13

## 2020-09-20 RX ADMIN — HYDRALAZINE HYDROCHLORIDE 25 MG: 25 TABLET, FILM COATED ORAL at 06:18

## 2020-09-20 RX ADMIN — TIZANIDINE 2 MG: 2 TABLET ORAL at 21:45

## 2020-09-20 RX ADMIN — HYDROMORPHONE HYDROCHLORIDE 0.5 MG: 1 INJECTION, SOLUTION INTRAMUSCULAR; INTRAVENOUS; SUBCUTANEOUS at 07:45

## 2020-09-20 RX ADMIN — HYDROMORPHONE HYDROCHLORIDE 0.5 MG: 1 INJECTION, SOLUTION INTRAMUSCULAR; INTRAVENOUS; SUBCUTANEOUS at 06:19

## 2020-09-20 RX ADMIN — CITALOPRAM HYDROBROMIDE 40 MG: 20 TABLET ORAL at 10:16

## 2020-09-20 RX ADMIN — TEMAZEPAM 22.5 MG: 15 CAPSULE ORAL at 21:45

## 2020-09-20 RX ADMIN — GABAPENTIN 300 MG: 300 CAPSULE ORAL at 18:33

## 2020-09-20 RX ADMIN — LORAZEPAM 0.5 MG: 2 INJECTION INTRAMUSCULAR; INTRAVENOUS at 22:35

## 2020-09-20 RX ADMIN — HYDROMORPHONE HYDROCHLORIDE 0.5 MG: 1 INJECTION, SOLUTION INTRAMUSCULAR; INTRAVENOUS; SUBCUTANEOUS at 22:35

## 2020-09-20 RX ADMIN — HYDROMORPHONE HYDROCHLORIDE 0.5 MG: 1 INJECTION, SOLUTION INTRAMUSCULAR; INTRAVENOUS; SUBCUTANEOUS at 13:11

## 2020-09-20 RX ADMIN — SODIUM BICARBONATE 650 MG TABLET 650 MG: at 10:16

## 2020-09-20 RX ADMIN — MAGNESIUM SULFATE IN WATER 4 G: 40 INJECTION, SOLUTION INTRAVENOUS at 11:46

## 2020-09-20 RX ADMIN — LORAZEPAM 0.5 MG: 2 INJECTION INTRAMUSCULAR; INTRAVENOUS at 10:17

## 2020-09-21 PROBLEM — R56.9 SEIZURE (HCC): Status: RESOLVED | Noted: 2020-09-19 | Resolved: 2020-09-21

## 2020-09-21 PROBLEM — F22 DELUSIONAL DISORDER (HCC): Status: ACTIVE | Noted: 2020-09-21

## 2020-09-21 PROBLEM — D72.829 LEUKOCYTOSIS: Status: RESOLVED | Noted: 2020-09-15 | Resolved: 2020-09-21

## 2020-09-21 LAB
ANION GAP SERPL CALCULATED.3IONS-SCNC: 4 MMOL/L (ref 4–13)
BUN SERPL-MCNC: 5 MG/DL (ref 5–25)
CALCIUM SERPL-MCNC: 8.7 MG/DL (ref 8.3–10.1)
CHLORIDE SERPL-SCNC: 99 MMOL/L (ref 100–108)
CO2 SERPL-SCNC: 30 MMOL/L (ref 21–32)
CREAT SERPL-MCNC: 0.57 MG/DL (ref 0.6–1.3)
GFR SERPL CREATININE-BSD FRML MDRD: 105 ML/MIN/1.73SQ M
GLUCOSE SERPL-MCNC: 170 MG/DL (ref 65–140)
MAGNESIUM SERPL-MCNC: 1.9 MG/DL (ref 1.6–2.6)
POTASSIUM SERPL-SCNC: 3.6 MMOL/L (ref 3.5–5.3)
SODIUM SERPL-SCNC: 133 MMOL/L (ref 136–145)

## 2020-09-21 PROCEDURE — 80048 BASIC METABOLIC PNL TOTAL CA: CPT | Performed by: NURSE PRACTITIONER

## 2020-09-21 PROCEDURE — 97110 THERAPEUTIC EXERCISES: CPT

## 2020-09-21 PROCEDURE — 99222 1ST HOSP IP/OBS MODERATE 55: CPT | Performed by: NURSE PRACTITIONER

## 2020-09-21 PROCEDURE — 99232 SBSQ HOSP IP/OBS MODERATE 35: CPT | Performed by: INTERNAL MEDICINE

## 2020-09-21 PROCEDURE — 99232 SBSQ HOSP IP/OBS MODERATE 35: CPT | Performed by: STUDENT IN AN ORGANIZED HEALTH CARE EDUCATION/TRAINING PROGRAM

## 2020-09-21 PROCEDURE — 83735 ASSAY OF MAGNESIUM: CPT | Performed by: NURSE PRACTITIONER

## 2020-09-21 PROCEDURE — 92526 ORAL FUNCTION THERAPY: CPT

## 2020-09-21 PROCEDURE — 97530 THERAPEUTIC ACTIVITIES: CPT

## 2020-09-21 RX ORDER — LISINOPRIL 20 MG/1
20 TABLET ORAL DAILY
Status: DISCONTINUED | OUTPATIENT
Start: 2020-09-22 | End: 2020-09-22

## 2020-09-21 RX ADMIN — HYDROMORPHONE HYDROCHLORIDE 0.5 MG: 1 INJECTION, SOLUTION INTRAMUSCULAR; INTRAVENOUS; SUBCUTANEOUS at 05:30

## 2020-09-21 RX ADMIN — GABAPENTIN 300 MG: 300 CAPSULE ORAL at 17:00

## 2020-09-21 RX ADMIN — HYDRALAZINE HYDROCHLORIDE 25 MG: 25 TABLET, FILM COATED ORAL at 15:05

## 2020-09-21 RX ADMIN — POLYETHYLENE GLYCOL (3350) 17 G: 17 POWDER, FOR SOLUTION ORAL at 09:05

## 2020-09-21 RX ADMIN — LORAZEPAM 0.5 MG: 2 INJECTION INTRAMUSCULAR; INTRAVENOUS at 08:49

## 2020-09-21 RX ADMIN — LEVETIRACETAM 1500 MG: 100 INJECTION, SOLUTION INTRAVENOUS at 21:31

## 2020-09-21 RX ADMIN — TIZANIDINE 2 MG: 2 TABLET ORAL at 21:28

## 2020-09-21 RX ADMIN — LEVOTHYROXINE SODIUM 100 MCG: 100 TABLET ORAL at 05:28

## 2020-09-21 RX ADMIN — PANTOPRAZOLE SODIUM 40 MG: 40 TABLET, DELAYED RELEASE ORAL at 05:28

## 2020-09-21 RX ADMIN — CITALOPRAM HYDROBROMIDE 40 MG: 20 TABLET ORAL at 08:47

## 2020-09-21 RX ADMIN — LISINOPRIL 20 MG: 20 TABLET ORAL at 08:48

## 2020-09-21 RX ADMIN — LEVETIRACETAM 1500 MG: 100 INJECTION, SOLUTION INTRAVENOUS at 09:59

## 2020-09-21 RX ADMIN — HYDRALAZINE HYDROCHLORIDE 25 MG: 25 TABLET, FILM COATED ORAL at 05:29

## 2020-09-21 RX ADMIN — BUPROPION HYDROCHLORIDE 150 MG: 150 TABLET, FILM COATED, EXTENDED RELEASE ORAL at 08:47

## 2020-09-21 RX ADMIN — TIZANIDINE 2 MG: 2 TABLET ORAL at 09:59

## 2020-09-21 RX ADMIN — TIZANIDINE 2 MG: 2 TABLET ORAL at 15:49

## 2020-09-21 RX ADMIN — LORAZEPAM 0.5 MG: 2 INJECTION INTRAMUSCULAR; INTRAVENOUS at 21:24

## 2020-09-21 RX ADMIN — TAMSULOSIN HYDROCHLORIDE 0.4 MG: 0.4 CAPSULE ORAL at 15:49

## 2020-09-21 RX ADMIN — BUPROPION HYDROCHLORIDE 150 MG: 150 TABLET, FILM COATED, EXTENDED RELEASE ORAL at 21:29

## 2020-09-21 RX ADMIN — NIFEDIPINE 60 MG: 60 TABLET, FILM COATED, EXTENDED RELEASE ORAL at 08:47

## 2020-09-21 RX ADMIN — SENNOSIDES AND DOCUSATE SODIUM 1 TABLET: 8.6; 5 TABLET ORAL at 21:28

## 2020-09-21 RX ADMIN — TEMAZEPAM 22.5 MG: 15 CAPSULE ORAL at 21:28

## 2020-09-21 RX ADMIN — HYDROMORPHONE HYDROCHLORIDE 0.5 MG: 1 INJECTION, SOLUTION INTRAMUSCULAR; INTRAVENOUS; SUBCUTANEOUS at 10:20

## 2020-09-21 RX ADMIN — GABAPENTIN 300 MG: 300 CAPSULE ORAL at 08:47

## 2020-09-21 RX ADMIN — LORAZEPAM 0.5 MG: 2 INJECTION INTRAMUSCULAR; INTRAVENOUS at 17:58

## 2020-09-21 RX ADMIN — HYDROMORPHONE HYDROCHLORIDE 0.5 MG: 1 INJECTION, SOLUTION INTRAMUSCULAR; INTRAVENOUS; SUBCUTANEOUS at 13:32

## 2020-09-21 RX ADMIN — LORAZEPAM 0.5 MG: 2 INJECTION INTRAMUSCULAR; INTRAVENOUS at 12:06

## 2020-09-21 RX ADMIN — HYDROMORPHONE HYDROCHLORIDE 0.5 MG: 1 INJECTION, SOLUTION INTRAMUSCULAR; INTRAVENOUS; SUBCUTANEOUS at 21:22

## 2020-09-21 RX ADMIN — GABAPENTIN 300 MG: 300 CAPSULE ORAL at 21:28

## 2020-09-21 RX ADMIN — ENOXAPARIN SODIUM 40 MG: 40 INJECTION SUBCUTANEOUS at 08:47

## 2020-09-21 RX ADMIN — GABAPENTIN 300 MG: 300 CAPSULE ORAL at 12:07

## 2020-09-21 RX ADMIN — HYDROMORPHONE HYDROCHLORIDE 0.5 MG: 1 INJECTION, SOLUTION INTRAMUSCULAR; INTRAVENOUS; SUBCUTANEOUS at 17:00

## 2020-09-22 PROBLEM — R11.2 INTRACTABLE NAUSEA AND VOMITING: Status: RESOLVED | Noted: 2020-09-15 | Resolved: 2020-09-22

## 2020-09-22 LAB
ALDOST SERPL-MCNC: 4 NG/DL (ref 0–30)
ANION GAP SERPL CALCULATED.3IONS-SCNC: 11 MMOL/L (ref 4–13)
BUN SERPL-MCNC: 9 MG/DL (ref 5–25)
CALCIUM SERPL-MCNC: 9 MG/DL (ref 8.3–10.1)
CHLORIDE SERPL-SCNC: 98 MMOL/L (ref 100–108)
CO2 SERPL-SCNC: 25 MMOL/L (ref 21–32)
CREAT SERPL-MCNC: 0.6 MG/DL (ref 0.6–1.3)
GFR SERPL CREATININE-BSD FRML MDRD: 103 ML/MIN/1.73SQ M
GLUCOSE SERPL-MCNC: 149 MG/DL (ref 65–140)
POTASSIUM SERPL-SCNC: 3.9 MMOL/L (ref 3.5–5.3)
SODIUM SERPL-SCNC: 134 MMOL/L (ref 136–145)

## 2020-09-22 PROCEDURE — 80048 BASIC METABOLIC PNL TOTAL CA: CPT | Performed by: INTERNAL MEDICINE

## 2020-09-22 PROCEDURE — 97116 GAIT TRAINING THERAPY: CPT

## 2020-09-22 PROCEDURE — 97530 THERAPEUTIC ACTIVITIES: CPT

## 2020-09-22 PROCEDURE — 99232 SBSQ HOSP IP/OBS MODERATE 35: CPT | Performed by: INTERNAL MEDICINE

## 2020-09-22 PROCEDURE — 99232 SBSQ HOSP IP/OBS MODERATE 35: CPT | Performed by: STUDENT IN AN ORGANIZED HEALTH CARE EDUCATION/TRAINING PROGRAM

## 2020-09-22 RX ORDER — LISINOPRIL 20 MG/1
20 TABLET ORAL
Status: DISCONTINUED | OUTPATIENT
Start: 2020-09-23 | End: 2020-09-24

## 2020-09-22 RX ORDER — LISINOPRIL 10 MG/1
10 TABLET ORAL
Status: DISCONTINUED | OUTPATIENT
Start: 2020-09-22 | End: 2020-09-23

## 2020-09-22 RX ADMIN — TIZANIDINE 2 MG: 2 TABLET ORAL at 08:37

## 2020-09-22 RX ADMIN — PANTOPRAZOLE SODIUM 40 MG: 40 TABLET, DELAYED RELEASE ORAL at 06:36

## 2020-09-22 RX ADMIN — NIFEDIPINE 60 MG: 60 TABLET, FILM COATED, EXTENDED RELEASE ORAL at 08:36

## 2020-09-22 RX ADMIN — GABAPENTIN 300 MG: 300 CAPSULE ORAL at 11:18

## 2020-09-22 RX ADMIN — HYDROMORPHONE HYDROCHLORIDE 0.5 MG: 1 INJECTION, SOLUTION INTRAMUSCULAR; INTRAVENOUS; SUBCUTANEOUS at 15:19

## 2020-09-22 RX ADMIN — CITALOPRAM HYDROBROMIDE 40 MG: 20 TABLET ORAL at 08:33

## 2020-09-22 RX ADMIN — GABAPENTIN 300 MG: 300 CAPSULE ORAL at 21:45

## 2020-09-22 RX ADMIN — TEMAZEPAM 22.5 MG: 15 CAPSULE ORAL at 23:33

## 2020-09-22 RX ADMIN — HYDROMORPHONE HYDROCHLORIDE 0.5 MG: 1 INJECTION, SOLUTION INTRAMUSCULAR; INTRAVENOUS; SUBCUTANEOUS at 21:45

## 2020-09-22 RX ADMIN — HYDRALAZINE HYDROCHLORIDE 25 MG: 25 TABLET, FILM COATED ORAL at 13:21

## 2020-09-22 RX ADMIN — HYDRALAZINE HYDROCHLORIDE 25 MG: 25 TABLET, FILM COATED ORAL at 06:34

## 2020-09-22 RX ADMIN — HYDROMORPHONE HYDROCHLORIDE 0.5 MG: 1 INJECTION, SOLUTION INTRAMUSCULAR; INTRAVENOUS; SUBCUTANEOUS at 17:53

## 2020-09-22 RX ADMIN — GABAPENTIN 300 MG: 300 CAPSULE ORAL at 17:06

## 2020-09-22 RX ADMIN — ENOXAPARIN SODIUM 40 MG: 40 INJECTION SUBCUTANEOUS at 08:34

## 2020-09-22 RX ADMIN — LEVETIRACETAM 1500 MG: 100 INJECTION, SOLUTION INTRAVENOUS at 08:16

## 2020-09-22 RX ADMIN — HYDROMORPHONE HYDROCHLORIDE 0.5 MG: 1 INJECTION, SOLUTION INTRAMUSCULAR; INTRAVENOUS; SUBCUTANEOUS at 10:06

## 2020-09-22 RX ADMIN — GABAPENTIN 300 MG: 300 CAPSULE ORAL at 08:34

## 2020-09-22 RX ADMIN — TAMSULOSIN HYDROCHLORIDE 0.4 MG: 0.4 CAPSULE ORAL at 17:05

## 2020-09-22 RX ADMIN — LORAZEPAM 0.5 MG: 2 INJECTION INTRAMUSCULAR; INTRAVENOUS at 08:38

## 2020-09-22 RX ADMIN — NICOTINE 1 PATCH: 7 PATCH TRANSDERMAL at 08:32

## 2020-09-22 RX ADMIN — TIZANIDINE 2 MG: 2 TABLET ORAL at 17:05

## 2020-09-22 RX ADMIN — TIZANIDINE 2 MG: 2 TABLET ORAL at 20:42

## 2020-09-22 RX ADMIN — HYDROMORPHONE HYDROCHLORIDE 0.5 MG: 1 INJECTION, SOLUTION INTRAMUSCULAR; INTRAVENOUS; SUBCUTANEOUS at 13:22

## 2020-09-22 RX ADMIN — LORAZEPAM 0.5 MG: 2 INJECTION INTRAMUSCULAR; INTRAVENOUS at 17:05

## 2020-09-22 RX ADMIN — LISINOPRIL 10 MG: 10 TABLET ORAL at 17:05

## 2020-09-22 RX ADMIN — LISINOPRIL 20 MG: 20 TABLET ORAL at 08:35

## 2020-09-22 RX ADMIN — BUPROPION HYDROCHLORIDE 150 MG: 150 TABLET, FILM COATED, EXTENDED RELEASE ORAL at 08:32

## 2020-09-22 RX ADMIN — LORAZEPAM 0.5 MG: 2 INJECTION INTRAMUSCULAR; INTRAVENOUS at 12:23

## 2020-09-22 RX ADMIN — LORAZEPAM 0.5 MG: 2 INJECTION INTRAMUSCULAR; INTRAVENOUS at 22:59

## 2020-09-22 RX ADMIN — HYDROMORPHONE HYDROCHLORIDE 0.5 MG: 1 INJECTION, SOLUTION INTRAMUSCULAR; INTRAVENOUS; SUBCUTANEOUS at 06:34

## 2020-09-22 RX ADMIN — SENNOSIDES AND DOCUSATE SODIUM 1 TABLET: 8.6; 5 TABLET ORAL at 21:45

## 2020-09-22 RX ADMIN — LEVETIRACETAM 1500 MG: 100 INJECTION, SOLUTION INTRAVENOUS at 20:52

## 2020-09-22 RX ADMIN — BUPROPION HYDROCHLORIDE 150 MG: 150 TABLET, FILM COATED, EXTENDED RELEASE ORAL at 20:41

## 2020-09-22 RX ADMIN — LEVOTHYROXINE SODIUM 100 MCG: 100 TABLET ORAL at 06:34

## 2020-09-23 PROBLEM — E87.1 HYPONATREMIA: Status: RESOLVED | Noted: 2020-09-15 | Resolved: 2020-09-23

## 2020-09-23 LAB
ANION GAP SERPL CALCULATED.3IONS-SCNC: 10 MMOL/L (ref 4–13)
BUN SERPL-MCNC: 9 MG/DL (ref 5–25)
CALCIUM SERPL-MCNC: 9.3 MG/DL (ref 8.3–10.1)
CHLORIDE SERPL-SCNC: 100 MMOL/L (ref 100–108)
CO2 SERPL-SCNC: 27 MMOL/L (ref 21–32)
CREAT SERPL-MCNC: 0.67 MG/DL (ref 0.6–1.3)
GFR SERPL CREATININE-BSD FRML MDRD: 99 ML/MIN/1.73SQ M
GLUCOSE SERPL-MCNC: 149 MG/DL (ref 65–140)
METANEPH FREE SERPL-MCNC: 32.5 PG/ML (ref 0–88)
NORMETANEPHRINE SERPL-MCNC: 171.8 PG/ML (ref 0–136.8)
POTASSIUM SERPL-SCNC: 4 MMOL/L (ref 3.5–5.3)
SODIUM SERPL-SCNC: 137 MMOL/L (ref 136–145)

## 2020-09-23 PROCEDURE — 80048 BASIC METABOLIC PNL TOTAL CA: CPT | Performed by: INTERNAL MEDICINE

## 2020-09-23 PROCEDURE — 99232 SBSQ HOSP IP/OBS MODERATE 35: CPT | Performed by: STUDENT IN AN ORGANIZED HEALTH CARE EDUCATION/TRAINING PROGRAM

## 2020-09-23 PROCEDURE — 99232 SBSQ HOSP IP/OBS MODERATE 35: CPT | Performed by: INTERNAL MEDICINE

## 2020-09-23 RX ADMIN — LEVETIRACETAM 1500 MG: 100 INJECTION, SOLUTION INTRAVENOUS at 09:43

## 2020-09-23 RX ADMIN — NICOTINE 1 PATCH: 7 PATCH TRANSDERMAL at 09:44

## 2020-09-23 RX ADMIN — TAMSULOSIN HYDROCHLORIDE 0.4 MG: 0.4 CAPSULE ORAL at 17:18

## 2020-09-23 RX ADMIN — HYDROMORPHONE HYDROCHLORIDE 0.5 MG: 1 INJECTION, SOLUTION INTRAMUSCULAR; INTRAVENOUS; SUBCUTANEOUS at 03:58

## 2020-09-23 RX ADMIN — HYDROMORPHONE HYDROCHLORIDE 0.5 MG: 1 INJECTION, SOLUTION INTRAMUSCULAR; INTRAVENOUS; SUBCUTANEOUS at 11:48

## 2020-09-23 RX ADMIN — TIZANIDINE 2 MG: 2 TABLET ORAL at 09:35

## 2020-09-23 RX ADMIN — PANTOPRAZOLE SODIUM 40 MG: 40 TABLET, DELAYED RELEASE ORAL at 06:11

## 2020-09-23 RX ADMIN — BUPROPION HYDROCHLORIDE 150 MG: 150 TABLET, FILM COATED, EXTENDED RELEASE ORAL at 09:35

## 2020-09-23 RX ADMIN — ENOXAPARIN SODIUM 40 MG: 40 INJECTION SUBCUTANEOUS at 09:35

## 2020-09-23 RX ADMIN — GABAPENTIN 300 MG: 300 CAPSULE ORAL at 21:26

## 2020-09-23 RX ADMIN — BUPROPION HYDROCHLORIDE 150 MG: 150 TABLET, FILM COATED, EXTENDED RELEASE ORAL at 21:26

## 2020-09-23 RX ADMIN — HYDROMORPHONE HYDROCHLORIDE 0.5 MG: 1 INJECTION, SOLUTION INTRAMUSCULAR; INTRAVENOUS; SUBCUTANEOUS at 06:50

## 2020-09-23 RX ADMIN — HYDROMORPHONE HYDROCHLORIDE 0.5 MG: 1 INJECTION, SOLUTION INTRAMUSCULAR; INTRAVENOUS; SUBCUTANEOUS at 09:37

## 2020-09-23 RX ADMIN — TIZANIDINE 2 MG: 2 TABLET ORAL at 21:26

## 2020-09-23 RX ADMIN — HYDROMORPHONE HYDROCHLORIDE 0.5 MG: 1 INJECTION, SOLUTION INTRAMUSCULAR; INTRAVENOUS; SUBCUTANEOUS at 21:26

## 2020-09-23 RX ADMIN — POLYETHYLENE GLYCOL (3350) 17 G: 17 POWDER, FOR SOLUTION ORAL at 09:37

## 2020-09-23 RX ADMIN — LORAZEPAM 0.5 MG: 2 INJECTION INTRAMUSCULAR; INTRAVENOUS at 17:18

## 2020-09-23 RX ADMIN — HYDROMORPHONE HYDROCHLORIDE 0.5 MG: 1 INJECTION, SOLUTION INTRAMUSCULAR; INTRAVENOUS; SUBCUTANEOUS at 13:53

## 2020-09-23 RX ADMIN — SENNOSIDES AND DOCUSATE SODIUM 1 TABLET: 8.6; 5 TABLET ORAL at 21:25

## 2020-09-23 RX ADMIN — TEMAZEPAM 22.5 MG: 15 CAPSULE ORAL at 23:40

## 2020-09-23 RX ADMIN — GABAPENTIN 300 MG: 300 CAPSULE ORAL at 11:48

## 2020-09-23 RX ADMIN — TIZANIDINE 2 MG: 2 TABLET ORAL at 17:17

## 2020-09-23 RX ADMIN — HYDROMORPHONE HYDROCHLORIDE 0.5 MG: 1 INJECTION, SOLUTION INTRAMUSCULAR; INTRAVENOUS; SUBCUTANEOUS at 23:22

## 2020-09-23 RX ADMIN — GABAPENTIN 300 MG: 300 CAPSULE ORAL at 09:36

## 2020-09-23 RX ADMIN — GABAPENTIN 300 MG: 300 CAPSULE ORAL at 17:18

## 2020-09-23 RX ADMIN — LEVETIRACETAM 1500 MG: 100 INJECTION, SOLUTION INTRAVENOUS at 22:36

## 2020-09-23 RX ADMIN — LORAZEPAM 0.5 MG: 2 INJECTION INTRAMUSCULAR; INTRAVENOUS at 11:48

## 2020-09-23 RX ADMIN — LORAZEPAM 0.5 MG: 2 INJECTION INTRAMUSCULAR; INTRAVENOUS at 22:36

## 2020-09-23 RX ADMIN — CITALOPRAM HYDROBROMIDE 40 MG: 20 TABLET ORAL at 09:35

## 2020-09-23 RX ADMIN — HYDROMORPHONE HYDROCHLORIDE 0.5 MG: 1 INJECTION, SOLUTION INTRAMUSCULAR; INTRAVENOUS; SUBCUTANEOUS at 17:18

## 2020-09-23 RX ADMIN — LORAZEPAM 0.5 MG: 2 INJECTION INTRAMUSCULAR; INTRAVENOUS at 09:36

## 2020-09-23 RX ADMIN — LEVOTHYROXINE SODIUM 100 MCG: 100 TABLET ORAL at 06:11

## 2020-09-24 PROCEDURE — 97535 SELF CARE MNGMENT TRAINING: CPT

## 2020-09-24 PROCEDURE — 99232 SBSQ HOSP IP/OBS MODERATE 35: CPT | Performed by: INTERNAL MEDICINE

## 2020-09-24 PROCEDURE — 97116 GAIT TRAINING THERAPY: CPT

## 2020-09-24 PROCEDURE — 99232 SBSQ HOSP IP/OBS MODERATE 35: CPT | Performed by: STUDENT IN AN ORGANIZED HEALTH CARE EDUCATION/TRAINING PROGRAM

## 2020-09-24 PROCEDURE — 97530 THERAPEUTIC ACTIVITIES: CPT

## 2020-09-24 RX ORDER — LISINOPRIL 10 MG/1
10 TABLET ORAL
Status: DISCONTINUED | OUTPATIENT
Start: 2020-09-25 | End: 2020-10-02 | Stop reason: HOSPADM

## 2020-09-24 RX ADMIN — HYDROMORPHONE HYDROCHLORIDE 0.5 MG: 1 INJECTION, SOLUTION INTRAMUSCULAR; INTRAVENOUS; SUBCUTANEOUS at 10:12

## 2020-09-24 RX ADMIN — HYDROMORPHONE HYDROCHLORIDE 0.5 MG: 1 INJECTION, SOLUTION INTRAMUSCULAR; INTRAVENOUS; SUBCUTANEOUS at 06:07

## 2020-09-24 RX ADMIN — POLYETHYLENE GLYCOL (3350) 17 G: 17 POWDER, FOR SOLUTION ORAL at 10:11

## 2020-09-24 RX ADMIN — HYDROMORPHONE HYDROCHLORIDE 0.5 MG: 1 INJECTION, SOLUTION INTRAMUSCULAR; INTRAVENOUS; SUBCUTANEOUS at 21:40

## 2020-09-24 RX ADMIN — LORAZEPAM 0.5 MG: 2 INJECTION INTRAMUSCULAR; INTRAVENOUS at 09:10

## 2020-09-24 RX ADMIN — BUPROPION HYDROCHLORIDE 150 MG: 150 TABLET, FILM COATED, EXTENDED RELEASE ORAL at 09:06

## 2020-09-24 RX ADMIN — LEVETIRACETAM 1500 MG: 100 INJECTION, SOLUTION INTRAVENOUS at 21:40

## 2020-09-24 RX ADMIN — LEVETIRACETAM 1500 MG: 100 INJECTION, SOLUTION INTRAVENOUS at 10:13

## 2020-09-24 RX ADMIN — HYDROMORPHONE HYDROCHLORIDE 0.5 MG: 1 INJECTION, SOLUTION INTRAMUSCULAR; INTRAVENOUS; SUBCUTANEOUS at 16:42

## 2020-09-24 RX ADMIN — LORAZEPAM 0.5 MG: 2 INJECTION INTRAMUSCULAR; INTRAVENOUS at 11:40

## 2020-09-24 RX ADMIN — GABAPENTIN 300 MG: 300 CAPSULE ORAL at 18:15

## 2020-09-24 RX ADMIN — TIZANIDINE 2 MG: 2 TABLET ORAL at 16:43

## 2020-09-24 RX ADMIN — LEVOTHYROXINE SODIUM 100 MCG: 100 TABLET ORAL at 06:07

## 2020-09-24 RX ADMIN — SENNOSIDES AND DOCUSATE SODIUM 1 TABLET: 8.6; 5 TABLET ORAL at 21:40

## 2020-09-24 RX ADMIN — LISINOPRIL 20 MG: 20 TABLET ORAL at 09:09

## 2020-09-24 RX ADMIN — PANTOPRAZOLE SODIUM 40 MG: 40 TABLET, DELAYED RELEASE ORAL at 06:07

## 2020-09-24 RX ADMIN — LORAZEPAM 0.5 MG: 2 INJECTION INTRAMUSCULAR; INTRAVENOUS at 21:40

## 2020-09-24 RX ADMIN — GABAPENTIN 300 MG: 300 CAPSULE ORAL at 21:39

## 2020-09-24 RX ADMIN — TEMAZEPAM 22.5 MG: 15 CAPSULE ORAL at 21:39

## 2020-09-24 RX ADMIN — HYDROMORPHONE HYDROCHLORIDE 0.5 MG: 1 INJECTION, SOLUTION INTRAMUSCULAR; INTRAVENOUS; SUBCUTANEOUS at 11:39

## 2020-09-24 RX ADMIN — BUPROPION HYDROCHLORIDE 150 MG: 150 TABLET, FILM COATED, EXTENDED RELEASE ORAL at 21:40

## 2020-09-24 RX ADMIN — GABAPENTIN 300 MG: 300 CAPSULE ORAL at 09:07

## 2020-09-24 RX ADMIN — HYDROMORPHONE HYDROCHLORIDE 0.5 MG: 1 INJECTION, SOLUTION INTRAMUSCULAR; INTRAVENOUS; SUBCUTANEOUS at 03:46

## 2020-09-24 RX ADMIN — TAMSULOSIN HYDROCHLORIDE 0.4 MG: 0.4 CAPSULE ORAL at 16:43

## 2020-09-24 RX ADMIN — CITALOPRAM HYDROBROMIDE 40 MG: 20 TABLET ORAL at 09:07

## 2020-09-24 RX ADMIN — ENOXAPARIN SODIUM 40 MG: 40 INJECTION SUBCUTANEOUS at 09:07

## 2020-09-24 RX ADMIN — LORAZEPAM 0.5 MG: 2 INJECTION INTRAMUSCULAR; INTRAVENOUS at 18:16

## 2020-09-24 RX ADMIN — TIZANIDINE 2 MG: 2 TABLET ORAL at 09:11

## 2020-09-24 RX ADMIN — TIZANIDINE 2 MG: 2 TABLET ORAL at 21:39

## 2020-09-24 RX ADMIN — NIFEDIPINE 60 MG: 60 TABLET, FILM COATED, EXTENDED RELEASE ORAL at 09:11

## 2020-09-24 RX ADMIN — GABAPENTIN 300 MG: 300 CAPSULE ORAL at 11:40

## 2020-09-24 RX ADMIN — NICOTINE 1 PATCH: 7 PATCH TRANSDERMAL at 09:11

## 2020-09-25 PROCEDURE — 97530 THERAPEUTIC ACTIVITIES: CPT

## 2020-09-25 PROCEDURE — 97110 THERAPEUTIC EXERCISES: CPT

## 2020-09-25 PROCEDURE — 99232 SBSQ HOSP IP/OBS MODERATE 35: CPT | Performed by: STUDENT IN AN ORGANIZED HEALTH CARE EDUCATION/TRAINING PROGRAM

## 2020-09-25 PROCEDURE — 97116 GAIT TRAINING THERAPY: CPT

## 2020-09-25 PROCEDURE — 97535 SELF CARE MNGMENT TRAINING: CPT

## 2020-09-25 RX ORDER — LUBIPROSTONE 8 UG/1
8 CAPSULE, GELATIN COATED ORAL 2 TIMES DAILY WITH MEALS
Status: DISCONTINUED | OUTPATIENT
Start: 2020-09-25 | End: 2020-10-02 | Stop reason: HOSPADM

## 2020-09-25 RX ADMIN — TIZANIDINE 2 MG: 2 TABLET ORAL at 16:37

## 2020-09-25 RX ADMIN — LEVETIRACETAM 1500 MG: 100 INJECTION, SOLUTION INTRAVENOUS at 23:03

## 2020-09-25 RX ADMIN — LORAZEPAM 0.5 MG: 2 INJECTION INTRAMUSCULAR; INTRAVENOUS at 12:57

## 2020-09-25 RX ADMIN — HYDROMORPHONE HYDROCHLORIDE 0.5 MG: 1 INJECTION, SOLUTION INTRAMUSCULAR; INTRAVENOUS; SUBCUTANEOUS at 07:10

## 2020-09-25 RX ADMIN — GABAPENTIN 300 MG: 300 CAPSULE ORAL at 18:07

## 2020-09-25 RX ADMIN — POLYETHYLENE GLYCOL (3350) 17 G: 17 POWDER, FOR SOLUTION ORAL at 08:53

## 2020-09-25 RX ADMIN — HYDROMORPHONE HYDROCHLORIDE 0.5 MG: 1 INJECTION, SOLUTION INTRAMUSCULAR; INTRAVENOUS; SUBCUTANEOUS at 18:07

## 2020-09-25 RX ADMIN — HYDROMORPHONE HYDROCHLORIDE 0.5 MG: 1 INJECTION, SOLUTION INTRAMUSCULAR; INTRAVENOUS; SUBCUTANEOUS at 14:21

## 2020-09-25 RX ADMIN — TIZANIDINE 2 MG: 2 TABLET ORAL at 23:04

## 2020-09-25 RX ADMIN — LORAZEPAM 0.5 MG: 2 INJECTION INTRAMUSCULAR; INTRAVENOUS at 18:08

## 2020-09-25 RX ADMIN — TAMSULOSIN HYDROCHLORIDE 0.4 MG: 0.4 CAPSULE ORAL at 16:37

## 2020-09-25 RX ADMIN — GABAPENTIN 300 MG: 300 CAPSULE ORAL at 08:52

## 2020-09-25 RX ADMIN — TEMAZEPAM 22.5 MG: 15 CAPSULE ORAL at 23:04

## 2020-09-25 RX ADMIN — HYDROMORPHONE HYDROCHLORIDE 0.5 MG: 1 INJECTION, SOLUTION INTRAMUSCULAR; INTRAVENOUS; SUBCUTANEOUS at 23:04

## 2020-09-25 RX ADMIN — HYDROMORPHONE HYDROCHLORIDE 0.5 MG: 1 INJECTION, SOLUTION INTRAMUSCULAR; INTRAVENOUS; SUBCUTANEOUS at 10:58

## 2020-09-25 RX ADMIN — HYDROMORPHONE HYDROCHLORIDE 0.5 MG: 1 INJECTION, SOLUTION INTRAMUSCULAR; INTRAVENOUS; SUBCUTANEOUS at 16:38

## 2020-09-25 RX ADMIN — GABAPENTIN 300 MG: 300 CAPSULE ORAL at 23:05

## 2020-09-25 RX ADMIN — HYDROMORPHONE HYDROCHLORIDE 0.5 MG: 1 INJECTION, SOLUTION INTRAMUSCULAR; INTRAVENOUS; SUBCUTANEOUS at 06:06

## 2020-09-25 RX ADMIN — LEVOTHYROXINE SODIUM 100 MCG: 100 TABLET ORAL at 06:06

## 2020-09-25 RX ADMIN — BUPROPION HYDROCHLORIDE 150 MG: 150 TABLET, FILM COATED, EXTENDED RELEASE ORAL at 23:05

## 2020-09-25 RX ADMIN — GABAPENTIN 300 MG: 300 CAPSULE ORAL at 12:57

## 2020-09-25 RX ADMIN — BUPROPION HYDROCHLORIDE 150 MG: 150 TABLET, FILM COATED, EXTENDED RELEASE ORAL at 08:52

## 2020-09-25 RX ADMIN — TIZANIDINE 2 MG: 2 TABLET ORAL at 08:53

## 2020-09-25 RX ADMIN — PANTOPRAZOLE SODIUM 40 MG: 40 TABLET, DELAYED RELEASE ORAL at 06:06

## 2020-09-25 RX ADMIN — SENNOSIDES AND DOCUSATE SODIUM 1 TABLET: 8.6; 5 TABLET ORAL at 23:05

## 2020-09-25 RX ADMIN — LUBIPROSTONE 8 MCG: 8 CAPSULE, GELATIN COATED ORAL at 18:15

## 2020-09-25 RX ADMIN — LEVETIRACETAM 1500 MG: 100 INJECTION, SOLUTION INTRAVENOUS at 09:18

## 2020-09-25 RX ADMIN — NICOTINE 1 PATCH: 7 PATCH TRANSDERMAL at 08:53

## 2020-09-25 RX ADMIN — LORAZEPAM 0.5 MG: 2 INJECTION INTRAMUSCULAR; INTRAVENOUS at 23:04

## 2020-09-25 RX ADMIN — CITALOPRAM HYDROBROMIDE 40 MG: 20 TABLET ORAL at 08:52

## 2020-09-25 RX ADMIN — LORAZEPAM 0.5 MG: 2 INJECTION INTRAMUSCULAR; INTRAVENOUS at 08:53

## 2020-09-25 RX ADMIN — ENOXAPARIN SODIUM 40 MG: 40 INJECTION SUBCUTANEOUS at 08:52

## 2020-09-26 LAB
BACTERIA UR QL AUTO: ABNORMAL /HPF
BILIRUB UR QL STRIP: NEGATIVE
CLARITY UR: CLEAR
COLOR UR: YELLOW
GLUCOSE UR STRIP-MCNC: NEGATIVE MG/DL
HGB UR QL STRIP.AUTO: NEGATIVE
KETONES UR STRIP-MCNC: NEGATIVE MG/DL
LEUKOCYTE ESTERASE UR QL STRIP: ABNORMAL
NITRITE UR QL STRIP: NEGATIVE
NON-SQ EPI CELLS URNS QL MICRO: ABNORMAL /HPF
OTHER STN SPEC: ABNORMAL
PH UR STRIP.AUTO: 7 [PH]
PROT UR STRIP-MCNC: NEGATIVE MG/DL
RBC #/AREA URNS AUTO: ABNORMAL /HPF
SP GR UR STRIP.AUTO: 1.01 (ref 1–1.03)
UROBILINOGEN UR QL STRIP.AUTO: 0.2 E.U./DL
WBC #/AREA URNS AUTO: ABNORMAL /HPF

## 2020-09-26 PROCEDURE — 81001 URINALYSIS AUTO W/SCOPE: CPT | Performed by: STUDENT IN AN ORGANIZED HEALTH CARE EDUCATION/TRAINING PROGRAM

## 2020-09-26 PROCEDURE — 99232 SBSQ HOSP IP/OBS MODERATE 35: CPT | Performed by: STUDENT IN AN ORGANIZED HEALTH CARE EDUCATION/TRAINING PROGRAM

## 2020-09-26 RX ADMIN — LEVETIRACETAM 1500 MG: 100 INJECTION, SOLUTION INTRAVENOUS at 08:04

## 2020-09-26 RX ADMIN — BUPROPION HYDROCHLORIDE 150 MG: 150 TABLET, FILM COATED, EXTENDED RELEASE ORAL at 20:14

## 2020-09-26 RX ADMIN — FERROUS SULFATE TAB 325 MG (65 MG ELEMENTAL FE) 325 MG: 325 (65 FE) TAB at 12:43

## 2020-09-26 RX ADMIN — LORAZEPAM 0.5 MG: 2 INJECTION INTRAMUSCULAR; INTRAVENOUS at 17:42

## 2020-09-26 RX ADMIN — LUBIPROSTONE 8 MCG: 8 CAPSULE, GELATIN COATED ORAL at 07:59

## 2020-09-26 RX ADMIN — TIZANIDINE 2 MG: 2 TABLET ORAL at 08:00

## 2020-09-26 RX ADMIN — HYDROMORPHONE HYDROCHLORIDE 0.5 MG: 1 INJECTION, SOLUTION INTRAMUSCULAR; INTRAVENOUS; SUBCUTANEOUS at 12:42

## 2020-09-26 RX ADMIN — HYDROMORPHONE HYDROCHLORIDE 0.5 MG: 1 INJECTION, SOLUTION INTRAMUSCULAR; INTRAVENOUS; SUBCUTANEOUS at 15:18

## 2020-09-26 RX ADMIN — SENNOSIDES AND DOCUSATE SODIUM 1 TABLET: 8.6; 5 TABLET ORAL at 22:30

## 2020-09-26 RX ADMIN — LORAZEPAM 0.5 MG: 2 INJECTION INTRAMUSCULAR; INTRAVENOUS at 08:01

## 2020-09-26 RX ADMIN — TAMSULOSIN HYDROCHLORIDE 0.4 MG: 0.4 CAPSULE ORAL at 17:37

## 2020-09-26 RX ADMIN — TIZANIDINE 2 MG: 2 TABLET ORAL at 20:14

## 2020-09-26 RX ADMIN — HYDROMORPHONE HYDROCHLORIDE 0.5 MG: 1 INJECTION, SOLUTION INTRAMUSCULAR; INTRAVENOUS; SUBCUTANEOUS at 22:33

## 2020-09-26 RX ADMIN — PANTOPRAZOLE SODIUM 40 MG: 40 TABLET, DELAYED RELEASE ORAL at 06:38

## 2020-09-26 RX ADMIN — ENOXAPARIN SODIUM 40 MG: 40 INJECTION SUBCUTANEOUS at 08:00

## 2020-09-26 RX ADMIN — HYDROMORPHONE HYDROCHLORIDE 0.5 MG: 1 INJECTION, SOLUTION INTRAMUSCULAR; INTRAVENOUS; SUBCUTANEOUS at 06:38

## 2020-09-26 RX ADMIN — BUPROPION HYDROCHLORIDE 150 MG: 150 TABLET, FILM COATED, EXTENDED RELEASE ORAL at 08:00

## 2020-09-26 RX ADMIN — LEVOTHYROXINE SODIUM 100 MCG: 100 TABLET ORAL at 06:38

## 2020-09-26 RX ADMIN — NIFEDIPINE 60 MG: 60 TABLET, FILM COATED, EXTENDED RELEASE ORAL at 08:04

## 2020-09-26 RX ADMIN — LUBIPROSTONE 8 MCG: 8 CAPSULE, GELATIN COATED ORAL at 17:37

## 2020-09-26 RX ADMIN — CITALOPRAM HYDROBROMIDE 40 MG: 20 TABLET ORAL at 08:00

## 2020-09-26 RX ADMIN — GABAPENTIN 300 MG: 300 CAPSULE ORAL at 08:00

## 2020-09-26 RX ADMIN — GABAPENTIN 300 MG: 300 CAPSULE ORAL at 17:37

## 2020-09-26 RX ADMIN — LISINOPRIL 10 MG: 10 TABLET ORAL at 08:04

## 2020-09-26 RX ADMIN — GABAPENTIN 300 MG: 300 CAPSULE ORAL at 12:43

## 2020-09-26 RX ADMIN — LEVETIRACETAM 1500 MG: 100 INJECTION, SOLUTION INTRAVENOUS at 20:15

## 2020-09-26 RX ADMIN — GABAPENTIN 300 MG: 300 CAPSULE ORAL at 22:30

## 2020-09-26 RX ADMIN — TIZANIDINE 2 MG: 2 TABLET ORAL at 17:39

## 2020-09-26 RX ADMIN — TEMAZEPAM 22.5 MG: 15 CAPSULE ORAL at 22:30

## 2020-09-26 RX ADMIN — LORAZEPAM 0.5 MG: 2 INJECTION INTRAMUSCULAR; INTRAVENOUS at 12:42

## 2020-09-26 RX ADMIN — NICOTINE 1 PATCH: 7 PATCH TRANSDERMAL at 08:00

## 2020-09-26 RX ADMIN — HYDROMORPHONE HYDROCHLORIDE 0.5 MG: 1 INJECTION, SOLUTION INTRAMUSCULAR; INTRAVENOUS; SUBCUTANEOUS at 08:52

## 2020-09-26 RX ADMIN — LORAZEPAM 0.5 MG: 2 INJECTION INTRAMUSCULAR; INTRAVENOUS at 22:33

## 2020-09-26 RX ADMIN — POLYETHYLENE GLYCOL (3350) 17 G: 17 POWDER, FOR SOLUTION ORAL at 08:00

## 2020-09-26 RX ADMIN — HYDROMORPHONE HYDROCHLORIDE 0.5 MG: 1 INJECTION, SOLUTION INTRAMUSCULAR; INTRAVENOUS; SUBCUTANEOUS at 17:41

## 2020-09-26 RX ADMIN — HYDROMORPHONE HYDROCHLORIDE 0.5 MG: 1 INJECTION, SOLUTION INTRAMUSCULAR; INTRAVENOUS; SUBCUTANEOUS at 20:15

## 2020-09-27 PROBLEM — R33.9 URINARY RETENTION: Status: RESOLVED | Noted: 2020-09-19 | Resolved: 2020-09-27

## 2020-09-27 PROBLEM — N30.00 ACUTE CYSTITIS WITHOUT HEMATURIA: Status: ACTIVE | Noted: 2020-09-27

## 2020-09-27 PROCEDURE — 99232 SBSQ HOSP IP/OBS MODERATE 35: CPT | Performed by: STUDENT IN AN ORGANIZED HEALTH CARE EDUCATION/TRAINING PROGRAM

## 2020-09-27 RX ADMIN — CITALOPRAM HYDROBROMIDE 40 MG: 20 TABLET ORAL at 08:20

## 2020-09-27 RX ADMIN — CEFTRIAXONE 1000 MG: 1 INJECTION, POWDER, FOR SOLUTION INTRAMUSCULAR; INTRAVENOUS at 14:10

## 2020-09-27 RX ADMIN — POLYETHYLENE GLYCOL (3350) 17 G: 17 POWDER, FOR SOLUTION ORAL at 08:19

## 2020-09-27 RX ADMIN — TIZANIDINE 2 MG: 2 TABLET ORAL at 16:30

## 2020-09-27 RX ADMIN — TEMAZEPAM 22.5 MG: 15 CAPSULE ORAL at 21:28

## 2020-09-27 RX ADMIN — LEVETIRACETAM 1500 MG: 100 INJECTION, SOLUTION INTRAVENOUS at 21:17

## 2020-09-27 RX ADMIN — LEVOTHYROXINE SODIUM 100 MCG: 100 TABLET ORAL at 05:38

## 2020-09-27 RX ADMIN — LORAZEPAM 0.5 MG: 2 INJECTION INTRAMUSCULAR; INTRAVENOUS at 12:50

## 2020-09-27 RX ADMIN — GABAPENTIN 300 MG: 300 CAPSULE ORAL at 21:28

## 2020-09-27 RX ADMIN — LUBIPROSTONE 8 MCG: 8 CAPSULE, GELATIN COATED ORAL at 08:20

## 2020-09-27 RX ADMIN — LISINOPRIL 10 MG: 10 TABLET ORAL at 08:19

## 2020-09-27 RX ADMIN — HYDROMORPHONE HYDROCHLORIDE 0.5 MG: 1 INJECTION, SOLUTION INTRAMUSCULAR; INTRAVENOUS; SUBCUTANEOUS at 21:21

## 2020-09-27 RX ADMIN — LEVETIRACETAM 1500 MG: 100 INJECTION, SOLUTION INTRAVENOUS at 08:20

## 2020-09-27 RX ADMIN — TIZANIDINE 2 MG: 2 TABLET ORAL at 21:28

## 2020-09-27 RX ADMIN — BUPROPION HYDROCHLORIDE 150 MG: 150 TABLET, FILM COATED, EXTENDED RELEASE ORAL at 21:28

## 2020-09-27 RX ADMIN — NICOTINE 1 PATCH: 7 PATCH TRANSDERMAL at 08:21

## 2020-09-27 RX ADMIN — BUPROPION HYDROCHLORIDE 150 MG: 150 TABLET, FILM COATED, EXTENDED RELEASE ORAL at 08:20

## 2020-09-27 RX ADMIN — LORAZEPAM 0.5 MG: 2 INJECTION INTRAMUSCULAR; INTRAVENOUS at 08:19

## 2020-09-27 RX ADMIN — TAMSULOSIN HYDROCHLORIDE 0.4 MG: 0.4 CAPSULE ORAL at 17:27

## 2020-09-27 RX ADMIN — HYDROMORPHONE HYDROCHLORIDE 0.5 MG: 1 INJECTION, SOLUTION INTRAMUSCULAR; INTRAVENOUS; SUBCUTANEOUS at 12:38

## 2020-09-27 RX ADMIN — GABAPENTIN 300 MG: 300 CAPSULE ORAL at 12:50

## 2020-09-27 RX ADMIN — ENOXAPARIN SODIUM 40 MG: 40 INJECTION SUBCUTANEOUS at 08:20

## 2020-09-27 RX ADMIN — TIZANIDINE 2 MG: 2 TABLET ORAL at 08:20

## 2020-09-27 RX ADMIN — HYDROMORPHONE HYDROCHLORIDE 0.5 MG: 1 INJECTION, SOLUTION INTRAMUSCULAR; INTRAVENOUS; SUBCUTANEOUS at 14:10

## 2020-09-27 RX ADMIN — HYDROMORPHONE HYDROCHLORIDE 0.5 MG: 1 INJECTION, SOLUTION INTRAMUSCULAR; INTRAVENOUS; SUBCUTANEOUS at 08:33

## 2020-09-27 RX ADMIN — LUBIPROSTONE 8 MCG: 8 CAPSULE, GELATIN COATED ORAL at 17:25

## 2020-09-27 RX ADMIN — HYDROMORPHONE HYDROCHLORIDE 0.5 MG: 1 INJECTION, SOLUTION INTRAMUSCULAR; INTRAVENOUS; SUBCUTANEOUS at 05:39

## 2020-09-27 RX ADMIN — HYDROMORPHONE HYDROCHLORIDE 1 MG: 1 INJECTION, SOLUTION INTRAMUSCULAR; INTRAVENOUS; SUBCUTANEOUS at 23:53

## 2020-09-27 RX ADMIN — HYDROMORPHONE HYDROCHLORIDE 0.5 MG: 1 INJECTION, SOLUTION INTRAMUSCULAR; INTRAVENOUS; SUBCUTANEOUS at 17:26

## 2020-09-27 RX ADMIN — GABAPENTIN 300 MG: 300 CAPSULE ORAL at 17:25

## 2020-09-27 RX ADMIN — PANTOPRAZOLE SODIUM 40 MG: 40 TABLET, DELAYED RELEASE ORAL at 05:38

## 2020-09-27 RX ADMIN — LORAZEPAM 0.5 MG: 2 INJECTION INTRAMUSCULAR; INTRAVENOUS at 17:27

## 2020-09-27 RX ADMIN — SENNOSIDES AND DOCUSATE SODIUM 1 TABLET: 8.6; 5 TABLET ORAL at 21:28

## 2020-09-27 RX ADMIN — LORAZEPAM 0.5 MG: 2 INJECTION INTRAMUSCULAR; INTRAVENOUS at 21:23

## 2020-09-27 RX ADMIN — GABAPENTIN 300 MG: 300 CAPSULE ORAL at 08:20

## 2020-09-28 PROCEDURE — 99232 SBSQ HOSP IP/OBS MODERATE 35: CPT | Performed by: STUDENT IN AN ORGANIZED HEALTH CARE EDUCATION/TRAINING PROGRAM

## 2020-09-28 RX ORDER — HYDROMORPHONE HCL/PF 1 MG/ML
0.2 SYRINGE (ML) INJECTION
Status: COMPLETED | OUTPATIENT
Start: 2020-09-28 | End: 2020-09-29

## 2020-09-28 RX ORDER — HYDROMORPHONE HCL/PF 1 MG/ML
0.5 SYRINGE (ML) INJECTION EVERY 4 HOURS PRN
Status: DISCONTINUED | OUTPATIENT
Start: 2020-09-28 | End: 2020-10-01

## 2020-09-28 RX ORDER — DIPHENHYDRAMINE HCL 25 MG
25 TABLET ORAL EVERY 6 HOURS PRN
Status: DISCONTINUED | OUTPATIENT
Start: 2020-09-28 | End: 2020-10-02 | Stop reason: HOSPADM

## 2020-09-28 RX ADMIN — TIZANIDINE 2 MG: 2 TABLET ORAL at 20:50

## 2020-09-28 RX ADMIN — NICOTINE 1 PATCH: 7 PATCH TRANSDERMAL at 09:45

## 2020-09-28 RX ADMIN — ENOXAPARIN SODIUM 40 MG: 40 INJECTION SUBCUTANEOUS at 09:45

## 2020-09-28 RX ADMIN — GABAPENTIN 300 MG: 300 CAPSULE ORAL at 11:17

## 2020-09-28 RX ADMIN — LORAZEPAM 0.5 MG: 2 INJECTION INTRAMUSCULAR; INTRAVENOUS at 09:43

## 2020-09-28 RX ADMIN — HYDROMORPHONE HYDROCHLORIDE 0.2 MG: 1 INJECTION, SOLUTION INTRAMUSCULAR; INTRAVENOUS; SUBCUTANEOUS at 22:07

## 2020-09-28 RX ADMIN — LUBIPROSTONE 8 MCG: 8 CAPSULE, GELATIN COATED ORAL at 17:09

## 2020-09-28 RX ADMIN — LORAZEPAM 0.5 MG: 2 INJECTION INTRAMUSCULAR; INTRAVENOUS at 11:18

## 2020-09-28 RX ADMIN — CEFTRIAXONE 1000 MG: 1 INJECTION, POWDER, FOR SOLUTION INTRAMUSCULAR; INTRAVENOUS at 12:25

## 2020-09-28 RX ADMIN — TIZANIDINE 2 MG: 2 TABLET ORAL at 09:42

## 2020-09-28 RX ADMIN — LORAZEPAM 0.5 MG: 2 INJECTION INTRAMUSCULAR; INTRAVENOUS at 17:10

## 2020-09-28 RX ADMIN — DIPHENHYDRAMINE HCL 25 MG: 25 TABLET ORAL at 12:25

## 2020-09-28 RX ADMIN — HYDROMORPHONE HYDROCHLORIDE 0.2 MG: 1 INJECTION, SOLUTION INTRAMUSCULAR; INTRAVENOUS; SUBCUTANEOUS at 17:09

## 2020-09-28 RX ADMIN — TAMSULOSIN HYDROCHLORIDE 0.4 MG: 0.4 CAPSULE ORAL at 17:09

## 2020-09-28 RX ADMIN — LEVOTHYROXINE SODIUM 100 MCG: 100 TABLET ORAL at 06:05

## 2020-09-28 RX ADMIN — LORAZEPAM 0.5 MG: 2 INJECTION INTRAMUSCULAR; INTRAVENOUS at 22:07

## 2020-09-28 RX ADMIN — GABAPENTIN 300 MG: 300 CAPSULE ORAL at 17:08

## 2020-09-28 RX ADMIN — HYDROMORPHONE HYDROCHLORIDE 0.5 MG: 1 INJECTION, SOLUTION INTRAMUSCULAR; INTRAVENOUS; SUBCUTANEOUS at 06:06

## 2020-09-28 RX ADMIN — LEVETIRACETAM 1500 MG: 100 INJECTION, SOLUTION INTRAVENOUS at 09:53

## 2020-09-28 RX ADMIN — TIZANIDINE 2 MG: 2 TABLET ORAL at 17:08

## 2020-09-28 RX ADMIN — HYDROMORPHONE HYDROCHLORIDE 0.5 MG: 1 INJECTION, SOLUTION INTRAMUSCULAR; INTRAVENOUS; SUBCUTANEOUS at 03:20

## 2020-09-28 RX ADMIN — LEVETIRACETAM 1500 MG: 100 INJECTION, SOLUTION INTRAVENOUS at 20:50

## 2020-09-28 RX ADMIN — HYDROMORPHONE HYDROCHLORIDE 0.5 MG: 1 INJECTION, SOLUTION INTRAMUSCULAR; INTRAVENOUS; SUBCUTANEOUS at 11:17

## 2020-09-28 RX ADMIN — GABAPENTIN 300 MG: 300 CAPSULE ORAL at 09:42

## 2020-09-28 RX ADMIN — HYDROMORPHONE HYDROCHLORIDE 1 MG: 1 INJECTION, SOLUTION INTRAMUSCULAR; INTRAVENOUS; SUBCUTANEOUS at 13:26

## 2020-09-28 RX ADMIN — TEMAZEPAM 22.5 MG: 15 CAPSULE ORAL at 22:06

## 2020-09-28 RX ADMIN — POLYETHYLENE GLYCOL (3350) 17 G: 17 POWDER, FOR SOLUTION ORAL at 09:43

## 2020-09-28 RX ADMIN — BUPROPION HYDROCHLORIDE 150 MG: 150 TABLET, FILM COATED, EXTENDED RELEASE ORAL at 09:42

## 2020-09-28 RX ADMIN — CITALOPRAM HYDROBROMIDE 40 MG: 20 TABLET ORAL at 09:42

## 2020-09-28 RX ADMIN — GABAPENTIN 300 MG: 300 CAPSULE ORAL at 22:06

## 2020-09-28 RX ADMIN — LISINOPRIL 10 MG: 10 TABLET ORAL at 09:43

## 2020-09-28 RX ADMIN — HYDROMORPHONE HYDROCHLORIDE 0.5 MG: 1 INJECTION, SOLUTION INTRAMUSCULAR; INTRAVENOUS; SUBCUTANEOUS at 07:52

## 2020-09-28 RX ADMIN — PANTOPRAZOLE SODIUM 40 MG: 40 TABLET, DELAYED RELEASE ORAL at 06:05

## 2020-09-28 RX ADMIN — LUBIPROSTONE 8 MCG: 8 CAPSULE, GELATIN COATED ORAL at 09:44

## 2020-09-28 RX ADMIN — BUPROPION HYDROCHLORIDE 150 MG: 150 TABLET, FILM COATED, EXTENDED RELEASE ORAL at 20:50

## 2020-09-29 PROBLEM — N30.00 ACUTE CYSTITIS WITHOUT HEMATURIA: Status: RESOLVED | Noted: 2020-09-27 | Resolved: 2020-09-29

## 2020-09-29 PROCEDURE — 99232 SBSQ HOSP IP/OBS MODERATE 35: CPT | Performed by: STUDENT IN AN ORGANIZED HEALTH CARE EDUCATION/TRAINING PROGRAM

## 2020-09-29 RX ADMIN — BUPROPION HYDROCHLORIDE 150 MG: 150 TABLET, FILM COATED, EXTENDED RELEASE ORAL at 10:01

## 2020-09-29 RX ADMIN — HYDROMORPHONE HYDROCHLORIDE 0.5 MG: 1 INJECTION, SOLUTION INTRAMUSCULAR; INTRAVENOUS; SUBCUTANEOUS at 19:36

## 2020-09-29 RX ADMIN — HYDROMORPHONE HYDROCHLORIDE 0.5 MG: 1 INJECTION, SOLUTION INTRAMUSCULAR; INTRAVENOUS; SUBCUTANEOUS at 00:54

## 2020-09-29 RX ADMIN — LUBIPROSTONE 8 MCG: 8 CAPSULE, GELATIN COATED ORAL at 10:04

## 2020-09-29 RX ADMIN — LORAZEPAM 0.5 MG: 2 INJECTION INTRAMUSCULAR; INTRAVENOUS at 10:02

## 2020-09-29 RX ADMIN — GABAPENTIN 300 MG: 300 CAPSULE ORAL at 10:01

## 2020-09-29 RX ADMIN — LEVETIRACETAM 1500 MG: 100 INJECTION, SOLUTION INTRAVENOUS at 21:45

## 2020-09-29 RX ADMIN — POLYETHYLENE GLYCOL (3350) 17 G: 17 POWDER, FOR SOLUTION ORAL at 10:00

## 2020-09-29 RX ADMIN — CITALOPRAM HYDROBROMIDE 40 MG: 20 TABLET ORAL at 10:01

## 2020-09-29 RX ADMIN — DIPHENHYDRAMINE HCL 25 MG: 25 TABLET ORAL at 11:23

## 2020-09-29 RX ADMIN — TIZANIDINE 2 MG: 2 TABLET ORAL at 21:45

## 2020-09-29 RX ADMIN — CEFTRIAXONE 1000 MG: 1 INJECTION, POWDER, FOR SOLUTION INTRAMUSCULAR; INTRAVENOUS at 11:22

## 2020-09-29 RX ADMIN — GABAPENTIN 300 MG: 300 CAPSULE ORAL at 17:32

## 2020-09-29 RX ADMIN — GABAPENTIN 300 MG: 300 CAPSULE ORAL at 21:45

## 2020-09-29 RX ADMIN — PANTOPRAZOLE SODIUM 40 MG: 40 TABLET, DELAYED RELEASE ORAL at 06:14

## 2020-09-29 RX ADMIN — LORAZEPAM 0.5 MG: 2 INJECTION INTRAMUSCULAR; INTRAVENOUS at 17:32

## 2020-09-29 RX ADMIN — HYDROMORPHONE HYDROCHLORIDE 0.2 MG: 1 INJECTION, SOLUTION INTRAMUSCULAR; INTRAVENOUS; SUBCUTANEOUS at 17:33

## 2020-09-29 RX ADMIN — ENOXAPARIN SODIUM 40 MG: 40 INJECTION SUBCUTANEOUS at 10:00

## 2020-09-29 RX ADMIN — HYDROMORPHONE HYDROCHLORIDE 0.5 MG: 1 INJECTION, SOLUTION INTRAMUSCULAR; INTRAVENOUS; SUBCUTANEOUS at 10:02

## 2020-09-29 RX ADMIN — TIZANIDINE 2 MG: 2 TABLET ORAL at 17:32

## 2020-09-29 RX ADMIN — GABAPENTIN 300 MG: 300 CAPSULE ORAL at 11:22

## 2020-09-29 RX ADMIN — TIZANIDINE 2 MG: 2 TABLET ORAL at 10:00

## 2020-09-29 RX ADMIN — LUBIPROSTONE 8 MCG: 8 CAPSULE, GELATIN COATED ORAL at 17:32

## 2020-09-29 RX ADMIN — LEVOTHYROXINE SODIUM 100 MCG: 100 TABLET ORAL at 06:14

## 2020-09-29 RX ADMIN — SENNOSIDES AND DOCUSATE SODIUM 1 TABLET: 8.6; 5 TABLET ORAL at 21:45

## 2020-09-29 RX ADMIN — NICOTINE 1 PATCH: 7 PATCH TRANSDERMAL at 10:03

## 2020-09-29 RX ADMIN — HYDROMORPHONE HYDROCHLORIDE 0.2 MG: 1 INJECTION, SOLUTION INTRAMUSCULAR; INTRAVENOUS; SUBCUTANEOUS at 14:06

## 2020-09-29 RX ADMIN — HYDROMORPHONE HYDROCHLORIDE 0.2 MG: 1 INJECTION, SOLUTION INTRAMUSCULAR; INTRAVENOUS; SUBCUTANEOUS at 06:14

## 2020-09-29 RX ADMIN — TAMSULOSIN HYDROCHLORIDE 0.4 MG: 0.4 CAPSULE ORAL at 17:32

## 2020-09-29 RX ADMIN — LEVETIRACETAM 1500 MG: 100 INJECTION, SOLUTION INTRAVENOUS at 10:02

## 2020-09-29 RX ADMIN — HYDROMORPHONE HYDROCHLORIDE 0.2 MG: 1 INJECTION, SOLUTION INTRAMUSCULAR; INTRAVENOUS; SUBCUTANEOUS at 11:21

## 2020-09-29 RX ADMIN — BUPROPION HYDROCHLORIDE 150 MG: 150 TABLET, FILM COATED, EXTENDED RELEASE ORAL at 21:45

## 2020-09-29 RX ADMIN — LORAZEPAM 0.5 MG: 2 INJECTION INTRAMUSCULAR; INTRAVENOUS at 14:06

## 2020-09-30 LAB
ANION GAP SERPL CALCULATED.3IONS-SCNC: 7 MMOL/L (ref 4–13)
BUN SERPL-MCNC: 10 MG/DL (ref 5–25)
CALCIUM SERPL-MCNC: 8.8 MG/DL (ref 8.3–10.1)
CHLORIDE SERPL-SCNC: 99 MMOL/L (ref 100–108)
CO2 SERPL-SCNC: 28 MMOL/L (ref 21–32)
CREAT SERPL-MCNC: 0.67 MG/DL (ref 0.6–1.3)
GFR SERPL CREATININE-BSD FRML MDRD: 99 ML/MIN/1.73SQ M
GLUCOSE SERPL-MCNC: 151 MG/DL (ref 65–140)
POTASSIUM SERPL-SCNC: 4.2 MMOL/L (ref 3.5–5.3)
SARS-COV-2 RNA RESP QL NAA+PROBE: NEGATIVE
SODIUM SERPL-SCNC: 134 MMOL/L (ref 136–145)

## 2020-09-30 PROCEDURE — 97130 THER IVNTJ EA ADDL 15 MIN: CPT

## 2020-09-30 PROCEDURE — 97129 THER IVNTJ 1ST 15 MIN: CPT

## 2020-09-30 PROCEDURE — 97116 GAIT TRAINING THERAPY: CPT

## 2020-09-30 PROCEDURE — 80048 BASIC METABOLIC PNL TOTAL CA: CPT | Performed by: STUDENT IN AN ORGANIZED HEALTH CARE EDUCATION/TRAINING PROGRAM

## 2020-09-30 PROCEDURE — 99232 SBSQ HOSP IP/OBS MODERATE 35: CPT | Performed by: INTERNAL MEDICINE

## 2020-09-30 PROCEDURE — 97530 THERAPEUTIC ACTIVITIES: CPT

## 2020-09-30 PROCEDURE — 97110 THERAPEUTIC EXERCISES: CPT

## 2020-09-30 PROCEDURE — 87635 SARS-COV-2 COVID-19 AMP PRB: CPT | Performed by: INTERNAL MEDICINE

## 2020-09-30 RX ORDER — LORAZEPAM 0.5 MG/1
0.5 TABLET ORAL 3 TIMES DAILY
Status: DISCONTINUED | OUTPATIENT
Start: 2020-09-30 | End: 2020-10-02 | Stop reason: HOSPADM

## 2020-09-30 RX ORDER — LEVETIRACETAM 500 MG/1
1500 TABLET ORAL EVERY 12 HOURS SCHEDULED
Status: DISCONTINUED | OUTPATIENT
Start: 2020-09-30 | End: 2020-10-02 | Stop reason: HOSPADM

## 2020-09-30 RX ADMIN — TAMSULOSIN HYDROCHLORIDE 0.4 MG: 0.4 CAPSULE ORAL at 17:02

## 2020-09-30 RX ADMIN — HYDROMORPHONE HYDROCHLORIDE 0.5 MG: 1 INJECTION, SOLUTION INTRAMUSCULAR; INTRAVENOUS; SUBCUTANEOUS at 09:22

## 2020-09-30 RX ADMIN — LEVETIRACETAM 1500 MG: 500 TABLET, FILM COATED ORAL at 17:01

## 2020-09-30 RX ADMIN — GABAPENTIN 300 MG: 300 CAPSULE ORAL at 17:02

## 2020-09-30 RX ADMIN — TEMAZEPAM 22.5 MG: 15 CAPSULE ORAL at 00:02

## 2020-09-30 RX ADMIN — LUBIPROSTONE 8 MCG: 8 CAPSULE, GELATIN COATED ORAL at 17:04

## 2020-09-30 RX ADMIN — BUPROPION HYDROCHLORIDE 150 MG: 150 TABLET, FILM COATED, EXTENDED RELEASE ORAL at 08:59

## 2020-09-30 RX ADMIN — TIZANIDINE 2 MG: 2 TABLET ORAL at 21:55

## 2020-09-30 RX ADMIN — POLYETHYLENE GLYCOL (3350) 17 G: 17 POWDER, FOR SOLUTION ORAL at 09:00

## 2020-09-30 RX ADMIN — SENNOSIDES AND DOCUSATE SODIUM 1 TABLET: 8.6; 5 TABLET ORAL at 21:55

## 2020-09-30 RX ADMIN — LORAZEPAM 0.5 MG: 2 INJECTION INTRAMUSCULAR; INTRAVENOUS at 00:02

## 2020-09-30 RX ADMIN — LUBIPROSTONE 8 MCG: 8 CAPSULE, GELATIN COATED ORAL at 09:00

## 2020-09-30 RX ADMIN — TEMAZEPAM 22.5 MG: 15 CAPSULE ORAL at 21:55

## 2020-09-30 RX ADMIN — LEVOTHYROXINE SODIUM 100 MCG: 100 TABLET ORAL at 05:36

## 2020-09-30 RX ADMIN — GABAPENTIN 300 MG: 300 CAPSULE ORAL at 13:34

## 2020-09-30 RX ADMIN — GABAPENTIN 300 MG: 300 CAPSULE ORAL at 08:59

## 2020-09-30 RX ADMIN — LEVETIRACETAM 1500 MG: 100 INJECTION, SOLUTION INTRAVENOUS at 09:00

## 2020-09-30 RX ADMIN — GABAPENTIN 300 MG: 300 CAPSULE ORAL at 21:55

## 2020-09-30 RX ADMIN — TIZANIDINE 2 MG: 2 TABLET ORAL at 17:01

## 2020-09-30 RX ADMIN — BUPROPION HYDROCHLORIDE 150 MG: 150 TABLET, FILM COATED, EXTENDED RELEASE ORAL at 21:59

## 2020-09-30 RX ADMIN — HYDROMORPHONE HYDROCHLORIDE 0.5 MG: 1 INJECTION, SOLUTION INTRAMUSCULAR; INTRAVENOUS; SUBCUTANEOUS at 17:02

## 2020-09-30 RX ADMIN — PANTOPRAZOLE SODIUM 40 MG: 40 TABLET, DELAYED RELEASE ORAL at 05:36

## 2020-09-30 RX ADMIN — HYDROMORPHONE HYDROCHLORIDE 0.5 MG: 1 INJECTION, SOLUTION INTRAMUSCULAR; INTRAVENOUS; SUBCUTANEOUS at 13:34

## 2020-09-30 RX ADMIN — HYDROMORPHONE HYDROCHLORIDE 0.5 MG: 1 INJECTION, SOLUTION INTRAMUSCULAR; INTRAVENOUS; SUBCUTANEOUS at 21:56

## 2020-09-30 RX ADMIN — LORAZEPAM 0.5 MG: 2 INJECTION INTRAMUSCULAR; INTRAVENOUS at 08:58

## 2020-09-30 RX ADMIN — HYDROMORPHONE HYDROCHLORIDE 0.5 MG: 1 INJECTION, SOLUTION INTRAMUSCULAR; INTRAVENOUS; SUBCUTANEOUS at 05:36

## 2020-09-30 RX ADMIN — NICOTINE 1 PATCH: 7 PATCH TRANSDERMAL at 08:59

## 2020-09-30 RX ADMIN — LORAZEPAM 0.5 MG: 0.5 TABLET ORAL at 21:55

## 2020-09-30 RX ADMIN — LORAZEPAM 0.5 MG: 0.5 TABLET ORAL at 17:02

## 2020-09-30 RX ADMIN — CITALOPRAM HYDROBROMIDE 40 MG: 20 TABLET ORAL at 08:59

## 2020-09-30 RX ADMIN — TIZANIDINE 2 MG: 2 TABLET ORAL at 08:59

## 2020-09-30 RX ADMIN — ENOXAPARIN SODIUM 40 MG: 40 INJECTION SUBCUTANEOUS at 08:58

## 2020-09-30 RX ADMIN — HYDROMORPHONE HYDROCHLORIDE 0.5 MG: 1 INJECTION, SOLUTION INTRAMUSCULAR; INTRAVENOUS; SUBCUTANEOUS at 01:30

## 2020-10-01 LAB
ANION GAP SERPL CALCULATED.3IONS-SCNC: 7 MMOL/L (ref 4–13)
BASOPHILS # BLD AUTO: 0.04 THOUSANDS/ΜL (ref 0–0.1)
BASOPHILS NFR BLD AUTO: 1 % (ref 0–1)
BUN SERPL-MCNC: 11 MG/DL (ref 5–25)
CALCIUM SERPL-MCNC: 9 MG/DL (ref 8.3–10.1)
CHLORIDE SERPL-SCNC: 98 MMOL/L (ref 100–108)
CO2 SERPL-SCNC: 27 MMOL/L (ref 21–32)
CREAT SERPL-MCNC: 0.63 MG/DL (ref 0.6–1.3)
EOSINOPHIL # BLD AUTO: 0.31 THOUSAND/ΜL (ref 0–0.61)
EOSINOPHIL NFR BLD AUTO: 5 % (ref 0–6)
ERYTHROCYTE [DISTWIDTH] IN BLOOD BY AUTOMATED COUNT: 17.6 % (ref 11.6–15.1)
GFR SERPL CREATININE-BSD FRML MDRD: 101 ML/MIN/1.73SQ M
GLUCOSE SERPL-MCNC: 128 MG/DL (ref 65–140)
HCT VFR BLD AUTO: 35.1 % (ref 34.8–46.1)
HGB BLD-MCNC: 10.4 G/DL (ref 11.5–15.4)
IMM GRANULOCYTES # BLD AUTO: 0.04 THOUSAND/UL (ref 0–0.2)
IMM GRANULOCYTES NFR BLD AUTO: 1 % (ref 0–2)
LYMPHOCYTES # BLD AUTO: 2.07 THOUSANDS/ΜL (ref 0.6–4.47)
LYMPHOCYTES NFR BLD AUTO: 32 % (ref 14–44)
MCH RBC QN AUTO: 25.6 PG (ref 26.8–34.3)
MCHC RBC AUTO-ENTMCNC: 29.6 G/DL (ref 31.4–37.4)
MCV RBC AUTO: 87 FL (ref 82–98)
MONOCYTES # BLD AUTO: 0.56 THOUSAND/ΜL (ref 0.17–1.22)
MONOCYTES NFR BLD AUTO: 9 % (ref 4–12)
NEUTROPHILS # BLD AUTO: 3.47 THOUSANDS/ΜL (ref 1.85–7.62)
NEUTS SEG NFR BLD AUTO: 52 % (ref 43–75)
NRBC BLD AUTO-RTO: 0 /100 WBCS
PLATELET # BLD AUTO: 229 THOUSANDS/UL (ref 149–390)
PMV BLD AUTO: 10.1 FL (ref 8.9–12.7)
POTASSIUM SERPL-SCNC: 4.3 MMOL/L (ref 3.5–5.3)
RBC # BLD AUTO: 4.06 MILLION/UL (ref 3.81–5.12)
SODIUM SERPL-SCNC: 132 MMOL/L (ref 136–145)
WBC # BLD AUTO: 6.49 THOUSAND/UL (ref 4.31–10.16)

## 2020-10-01 PROCEDURE — 99232 SBSQ HOSP IP/OBS MODERATE 35: CPT | Performed by: NURSE PRACTITIONER

## 2020-10-01 PROCEDURE — 80048 BASIC METABOLIC PNL TOTAL CA: CPT | Performed by: INTERNAL MEDICINE

## 2020-10-01 PROCEDURE — 85025 COMPLETE CBC W/AUTO DIFF WBC: CPT | Performed by: INTERNAL MEDICINE

## 2020-10-01 PROCEDURE — 97530 THERAPEUTIC ACTIVITIES: CPT

## 2020-10-01 PROCEDURE — 97110 THERAPEUTIC EXERCISES: CPT

## 2020-10-01 PROCEDURE — 99231 SBSQ HOSP IP/OBS SF/LOW 25: CPT | Performed by: INTERNAL MEDICINE

## 2020-10-01 PROCEDURE — 97116 GAIT TRAINING THERAPY: CPT

## 2020-10-01 RX ORDER — TRAMADOL HYDROCHLORIDE 50 MG/1
50 TABLET ORAL EVERY 6 HOURS PRN
Status: DISCONTINUED | OUTPATIENT
Start: 2020-10-01 | End: 2020-10-02 | Stop reason: HOSPADM

## 2020-10-01 RX ORDER — ACETAMINOPHEN 160 MG/5ML
650 SUSPENSION, ORAL (FINAL DOSE FORM) ORAL EVERY 4 HOURS PRN
Status: DISCONTINUED | OUTPATIENT
Start: 2020-10-01 | End: 2020-10-02 | Stop reason: HOSPADM

## 2020-10-01 RX ADMIN — BUPROPION HYDROCHLORIDE 150 MG: 150 TABLET, FILM COATED, EXTENDED RELEASE ORAL at 22:42

## 2020-10-01 RX ADMIN — ENOXAPARIN SODIUM 40 MG: 40 INJECTION SUBCUTANEOUS at 08:43

## 2020-10-01 RX ADMIN — GABAPENTIN 300 MG: 300 CAPSULE ORAL at 13:25

## 2020-10-01 RX ADMIN — BUPROPION HYDROCHLORIDE 150 MG: 150 TABLET, FILM COATED, EXTENDED RELEASE ORAL at 08:43

## 2020-10-01 RX ADMIN — POLYETHYLENE GLYCOL (3350) 17 G: 17 POWDER, FOR SOLUTION ORAL at 08:46

## 2020-10-01 RX ADMIN — LEVOTHYROXINE SODIUM 100 MCG: 100 TABLET ORAL at 05:32

## 2020-10-01 RX ADMIN — SENNOSIDES AND DOCUSATE SODIUM 1 TABLET: 8.6; 5 TABLET ORAL at 22:43

## 2020-10-01 RX ADMIN — TIZANIDINE 2 MG: 2 TABLET ORAL at 17:14

## 2020-10-01 RX ADMIN — LEVETIRACETAM 1500 MG: 500 TABLET, FILM COATED ORAL at 08:46

## 2020-10-01 RX ADMIN — TEMAZEPAM 22.5 MG: 15 CAPSULE ORAL at 22:43

## 2020-10-01 RX ADMIN — GABAPENTIN 300 MG: 300 CAPSULE ORAL at 08:43

## 2020-10-01 RX ADMIN — NICOTINE 1 PATCH: 7 PATCH TRANSDERMAL at 08:45

## 2020-10-01 RX ADMIN — LUBIPROSTONE 8 MCG: 8 CAPSULE, GELATIN COATED ORAL at 08:47

## 2020-10-01 RX ADMIN — LORAZEPAM 0.5 MG: 0.5 TABLET ORAL at 17:14

## 2020-10-01 RX ADMIN — TAMSULOSIN HYDROCHLORIDE 0.4 MG: 0.4 CAPSULE ORAL at 17:15

## 2020-10-01 RX ADMIN — LUBIPROSTONE 8 MCG: 8 CAPSULE, GELATIN COATED ORAL at 17:15

## 2020-10-01 RX ADMIN — LEVETIRACETAM 1500 MG: 500 TABLET, FILM COATED ORAL at 22:41

## 2020-10-01 RX ADMIN — TIZANIDINE 2 MG: 2 TABLET ORAL at 22:42

## 2020-10-01 RX ADMIN — LORAZEPAM 0.5 MG: 0.5 TABLET ORAL at 22:42

## 2020-10-01 RX ADMIN — GABAPENTIN 300 MG: 300 CAPSULE ORAL at 17:14

## 2020-10-01 RX ADMIN — PANTOPRAZOLE SODIUM 40 MG: 40 TABLET, DELAYED RELEASE ORAL at 05:32

## 2020-10-01 RX ADMIN — CITALOPRAM HYDROBROMIDE 40 MG: 20 TABLET ORAL at 08:45

## 2020-10-01 RX ADMIN — TIZANIDINE 2 MG: 2 TABLET ORAL at 08:46

## 2020-10-01 RX ADMIN — LORAZEPAM 0.5 MG: 0.5 TABLET ORAL at 08:46

## 2020-10-01 RX ADMIN — HYDROMORPHONE HYDROCHLORIDE 0.5 MG: 1 INJECTION, SOLUTION INTRAMUSCULAR; INTRAVENOUS; SUBCUTANEOUS at 04:20

## 2020-10-01 RX ADMIN — GABAPENTIN 300 MG: 300 CAPSULE ORAL at 22:42

## 2020-10-01 RX ADMIN — TRAMADOL HYDROCHLORIDE 50 MG: 50 TABLET, FILM COATED ORAL at 17:14

## 2020-10-02 VITALS
HEIGHT: 67 IN | BODY MASS INDEX: 30.87 KG/M2 | SYSTOLIC BLOOD PRESSURE: 121 MMHG | WEIGHT: 196.65 LBS | DIASTOLIC BLOOD PRESSURE: 58 MMHG | TEMPERATURE: 96.3 F | OXYGEN SATURATION: 98 % | HEART RATE: 89 BPM | RESPIRATION RATE: 20 BRPM

## 2020-10-02 LAB
ANION GAP SERPL CALCULATED.3IONS-SCNC: 5 MMOL/L (ref 4–13)
BASOPHILS # BLD AUTO: 0.03 THOUSANDS/ΜL (ref 0–0.1)
BASOPHILS NFR BLD AUTO: 1 % (ref 0–1)
BUN SERPL-MCNC: 11 MG/DL (ref 5–25)
CALCIUM SERPL-MCNC: 8.8 MG/DL (ref 8.3–10.1)
CHLORIDE SERPL-SCNC: 102 MMOL/L (ref 100–108)
CO2 SERPL-SCNC: 28 MMOL/L (ref 21–32)
CREAT SERPL-MCNC: 0.66 MG/DL (ref 0.6–1.3)
EOSINOPHIL # BLD AUTO: 0.2 THOUSAND/ΜL (ref 0–0.61)
EOSINOPHIL NFR BLD AUTO: 4 % (ref 0–6)
ERYTHROCYTE [DISTWIDTH] IN BLOOD BY AUTOMATED COUNT: 17.8 % (ref 11.6–15.1)
GFR SERPL CREATININE-BSD FRML MDRD: 100 ML/MIN/1.73SQ M
GLUCOSE SERPL-MCNC: 137 MG/DL (ref 65–140)
HCT VFR BLD AUTO: 34.4 % (ref 34.8–46.1)
HGB BLD-MCNC: 9.9 G/DL (ref 11.5–15.4)
IMM GRANULOCYTES # BLD AUTO: 0.03 THOUSAND/UL (ref 0–0.2)
IMM GRANULOCYTES NFR BLD AUTO: 1 % (ref 0–2)
LYMPHOCYTES # BLD AUTO: 1.94 THOUSANDS/ΜL (ref 0.6–4.47)
LYMPHOCYTES NFR BLD AUTO: 36 % (ref 14–44)
MCH RBC QN AUTO: 25.4 PG (ref 26.8–34.3)
MCHC RBC AUTO-ENTMCNC: 28.8 G/DL (ref 31.4–37.4)
MCV RBC AUTO: 88 FL (ref 82–98)
MONOCYTES # BLD AUTO: 0.48 THOUSAND/ΜL (ref 0.17–1.22)
MONOCYTES NFR BLD AUTO: 9 % (ref 4–12)
NEUTROPHILS # BLD AUTO: 2.71 THOUSANDS/ΜL (ref 1.85–7.62)
NEUTS SEG NFR BLD AUTO: 49 % (ref 43–75)
NRBC BLD AUTO-RTO: 0 /100 WBCS
PLATELET # BLD AUTO: 200 THOUSANDS/UL (ref 149–390)
PMV BLD AUTO: 10.9 FL (ref 8.9–12.7)
POTASSIUM SERPL-SCNC: 4.2 MMOL/L (ref 3.5–5.3)
RBC # BLD AUTO: 3.89 MILLION/UL (ref 3.81–5.12)
SODIUM SERPL-SCNC: 135 MMOL/L (ref 136–145)
WBC # BLD AUTO: 5.39 THOUSAND/UL (ref 4.31–10.16)

## 2020-10-02 PROCEDURE — 99239 HOSP IP/OBS DSCHRG MGMT >30: CPT | Performed by: INTERNAL MEDICINE

## 2020-10-02 PROCEDURE — 85025 COMPLETE CBC W/AUTO DIFF WBC: CPT | Performed by: INTERNAL MEDICINE

## 2020-10-02 PROCEDURE — 97535 SELF CARE MNGMENT TRAINING: CPT

## 2020-10-02 PROCEDURE — 80048 BASIC METABOLIC PNL TOTAL CA: CPT | Performed by: INTERNAL MEDICINE

## 2020-10-02 RX ORDER — TAMSULOSIN HYDROCHLORIDE 0.4 MG/1
0.4 CAPSULE ORAL
Qty: 30 CAPSULE | Refills: 0 | Status: SHIPPED | OUTPATIENT
Start: 2020-10-02

## 2020-10-02 RX ORDER — NIFEDIPINE 60 MG/1
60 TABLET, FILM COATED, EXTENDED RELEASE ORAL DAILY
Qty: 30 TABLET | Refills: 0 | Status: SHIPPED | OUTPATIENT
Start: 2020-10-03

## 2020-10-02 RX ORDER — PANTOPRAZOLE SODIUM 40 MG/1
40 TABLET, DELAYED RELEASE ORAL
Qty: 60 TABLET | Refills: 0 | Status: SHIPPED | OUTPATIENT
Start: 2020-10-03 | End: 2020-10-02

## 2020-10-02 RX ORDER — AMOXICILLIN 250 MG
1 CAPSULE ORAL
Qty: 30 TABLET | Refills: 0 | Status: SHIPPED | OUTPATIENT
Start: 2020-10-02

## 2020-10-02 RX ORDER — LISINOPRIL 10 MG/1
10 TABLET ORAL
Qty: 30 TABLET | Refills: 0 | Status: SHIPPED | OUTPATIENT
Start: 2020-10-03

## 2020-10-02 RX ORDER — FERROUS SULFATE 325(65) MG
325 TABLET ORAL
Qty: 60 TABLET | Refills: 0 | Status: SHIPPED | OUTPATIENT
Start: 2020-10-02

## 2020-10-02 RX ORDER — LISINOPRIL 10 MG/1
10 TABLET ORAL
Qty: 30 TABLET | Refills: 0 | Status: SHIPPED | OUTPATIENT
Start: 2020-10-03 | End: 2020-10-02

## 2020-10-02 RX ORDER — TIZANIDINE 2 MG/1
2 TABLET ORAL 2 TIMES DAILY PRN
Qty: 6 TABLET | Refills: 0 | Status: SHIPPED | OUTPATIENT
Start: 2020-10-02 | End: 2020-10-05

## 2020-10-02 RX ORDER — AMOXICILLIN 250 MG
1 CAPSULE ORAL
Qty: 30 TABLET | Refills: 0 | Status: SHIPPED | OUTPATIENT
Start: 2020-10-02 | End: 2020-10-02

## 2020-10-02 RX ORDER — NIFEDIPINE 60 MG/1
60 TABLET, FILM COATED, EXTENDED RELEASE ORAL DAILY
Qty: 30 TABLET | Refills: 0 | Status: SHIPPED | OUTPATIENT
Start: 2020-10-03 | End: 2020-10-02

## 2020-10-02 RX ORDER — LORAZEPAM 1 MG/1
1 TABLET ORAL 4 TIMES DAILY
Qty: 40 TABLET | Refills: 0 | Status: SHIPPED | COMMUNITY
Start: 2020-10-02 | End: 2020-10-12

## 2020-10-02 RX ORDER — TAMSULOSIN HYDROCHLORIDE 0.4 MG/1
0.4 CAPSULE ORAL
Qty: 30 CAPSULE | Refills: 0 | Status: SHIPPED | OUTPATIENT
Start: 2020-10-02 | End: 2020-10-02

## 2020-10-02 RX ORDER — PANTOPRAZOLE SODIUM 40 MG/1
40 TABLET, DELAYED RELEASE ORAL
Qty: 60 TABLET | Refills: 0 | Status: SHIPPED | OUTPATIENT
Start: 2020-10-03

## 2020-10-02 RX ORDER — TIZANIDINE 2 MG/1
2 TABLET ORAL 2 TIMES DAILY PRN
Qty: 6 TABLET | Refills: 0 | Status: SHIPPED | OUTPATIENT
Start: 2020-10-02 | End: 2020-10-02

## 2020-10-02 RX ORDER — FERROUS SULFATE 325(65) MG
325 TABLET ORAL
Qty: 60 TABLET | Refills: 0 | Status: SHIPPED | OUTPATIENT
Start: 2020-10-02 | End: 2020-10-02

## 2020-10-02 RX ADMIN — TAMSULOSIN HYDROCHLORIDE 0.4 MG: 0.4 CAPSULE ORAL at 15:30

## 2020-10-02 RX ADMIN — LORAZEPAM 0.5 MG: 0.5 TABLET ORAL at 15:29

## 2020-10-02 RX ADMIN — LEVOTHYROXINE SODIUM 100 MCG: 100 TABLET ORAL at 06:00

## 2020-10-02 RX ADMIN — BUPROPION HYDROCHLORIDE 150 MG: 150 TABLET, FILM COATED, EXTENDED RELEASE ORAL at 09:36

## 2020-10-02 RX ADMIN — LORAZEPAM 0.5 MG: 0.5 TABLET ORAL at 09:36

## 2020-10-02 RX ADMIN — LUBIPROSTONE 8 MCG: 8 CAPSULE, GELATIN COATED ORAL at 09:44

## 2020-10-02 RX ADMIN — TRAMADOL HYDROCHLORIDE 50 MG: 50 TABLET, FILM COATED ORAL at 09:55

## 2020-10-02 RX ADMIN — POLYETHYLENE GLYCOL (3350) 17 G: 17 POWDER, FOR SOLUTION ORAL at 09:44

## 2020-10-02 RX ADMIN — LEVETIRACETAM 1500 MG: 500 TABLET, FILM COATED ORAL at 09:36

## 2020-10-02 RX ADMIN — PANTOPRAZOLE SODIUM 40 MG: 40 TABLET, DELAYED RELEASE ORAL at 06:00

## 2020-10-02 RX ADMIN — TRAMADOL HYDROCHLORIDE 50 MG: 50 TABLET, FILM COATED ORAL at 02:13

## 2020-10-02 RX ADMIN — CITALOPRAM HYDROBROMIDE 40 MG: 20 TABLET ORAL at 09:36

## 2020-10-02 RX ADMIN — NICOTINE 1 PATCH: 7 PATCH TRANSDERMAL at 09:44

## 2020-10-02 RX ADMIN — ENOXAPARIN SODIUM 40 MG: 40 INJECTION SUBCUTANEOUS at 09:36

## 2020-10-02 RX ADMIN — TRAMADOL HYDROCHLORIDE 50 MG: 50 TABLET, FILM COATED ORAL at 15:29

## 2020-10-02 RX ADMIN — LUBIPROSTONE 8 MCG: 8 CAPSULE, GELATIN COATED ORAL at 15:31

## 2020-10-02 RX ADMIN — TIZANIDINE 2 MG: 2 TABLET ORAL at 09:36

## 2020-10-02 RX ADMIN — GABAPENTIN 300 MG: 300 CAPSULE ORAL at 09:36

## 2020-10-02 RX ADMIN — TIZANIDINE 2 MG: 2 TABLET ORAL at 15:29

## 2020-10-02 RX ADMIN — GABAPENTIN 300 MG: 300 CAPSULE ORAL at 15:29

## 2021-03-17 LAB — RENIN PLAS-CCNC: 0.67 NG/ML/HR (ref 0.17–5.38)

## 2021-03-30 DIAGNOSIS — Z23 ENCOUNTER FOR IMMUNIZATION: ICD-10-CM

## 2021-09-10 ENCOUNTER — HOSPITAL ENCOUNTER (OUTPATIENT)
Dept: RADIOLOGY | Facility: HOSPITAL | Age: 56
Discharge: HOME | End: 2021-09-10
Payer: MEDICARE

## 2021-09-10 ENCOUNTER — HOSPITAL ENCOUNTER (OUTPATIENT)
Dept: NON INVASIVE DIAGNOSTICS | Facility: HOSPITAL | Age: 56
Discharge: HOME | End: 2021-09-10
Payer: MEDICARE

## 2021-09-10 DIAGNOSIS — M79.672 LEFT FOOT PAIN: ICD-10-CM

## 2021-09-10 DIAGNOSIS — M79.652 LEFT THIGH PAIN: ICD-10-CM

## 2021-09-10 PROCEDURE — 93970 EXTREMITY STUDY: CPT

## 2021-09-10 PROCEDURE — 93970 EXTREMITY STUDY: CPT | Performed by: SURGERY

## 2021-09-10 PROCEDURE — 73630 X-RAY EXAM OF FOOT: CPT

## 2022-08-01 ENCOUNTER — HOSPITAL ENCOUNTER (EMERGENCY)
Facility: HOSPITAL | Age: 57
Discharge: HOME | End: 2022-08-02
Attending: EMERGENCY MEDICINE | Admitting: EMERGENCY MEDICINE
Payer: MEDICARE

## 2022-08-01 ENCOUNTER — APPOINTMENT (EMERGENCY)
Dept: RADIOLOGY | Facility: HOSPITAL | Age: 57
End: 2022-08-01
Payer: MEDICARE

## 2022-08-01 ENCOUNTER — APPOINTMENT (EMERGENCY)
Dept: CT IMAGING | Facility: HOSPITAL | Age: 57
End: 2022-08-01
Payer: MEDICARE

## 2022-08-01 VITALS
OXYGEN SATURATION: 100 % | WEIGHT: 211.2 LBS | TEMPERATURE: 98.9 F | RESPIRATION RATE: 18 BRPM | HEART RATE: 68 BPM | SYSTOLIC BLOOD PRESSURE: 169 MMHG | BODY MASS INDEX: 33.08 KG/M2 | DIASTOLIC BLOOD PRESSURE: 84 MMHG

## 2022-08-01 DIAGNOSIS — M54.50 ACUTE EXACERBATION OF CHRONIC LOW BACK PAIN: ICD-10-CM

## 2022-08-01 DIAGNOSIS — S40.021A ARM CONTUSION, RIGHT, INITIAL ENCOUNTER: Primary | ICD-10-CM

## 2022-08-01 DIAGNOSIS — R93.41 ABNORMAL CT SCAN, BLADDER: ICD-10-CM

## 2022-08-01 DIAGNOSIS — G89.29 ACUTE EXACERBATION OF CHRONIC LOW BACK PAIN: ICD-10-CM

## 2022-08-01 PROCEDURE — 73080 X-RAY EXAM OF ELBOW: CPT

## 2022-08-01 PROCEDURE — 73090 X-RAY EXAM OF FOREARM: CPT

## 2022-08-01 PROCEDURE — 73030 X-RAY EXAM OF SHOULDER: CPT

## 2022-08-01 PROCEDURE — 99284 EMERGENCY DEPT VISIT MOD MDM: CPT | Performed by: EMERGENCY MEDICINE

## 2022-08-01 PROCEDURE — 72131 CT LUMBAR SPINE W/O DYE: CPT

## 2022-08-01 PROCEDURE — G1004 CDSM NDSC: HCPCS

## 2022-08-01 PROCEDURE — 99284 EMERGENCY DEPT VISIT MOD MDM: CPT

## 2022-08-01 PROCEDURE — 72128 CT CHEST SPINE W/O DYE: CPT

## 2022-08-01 RX ORDER — METHOCARBAMOL 500 MG/1
500 TABLET, FILM COATED ORAL ONCE
Status: COMPLETED | OUTPATIENT
Start: 2022-08-01 | End: 2022-08-02

## 2022-08-01 RX ORDER — METHOCARBAMOL 500 MG/1
500 TABLET, FILM COATED ORAL 3 TIMES DAILY PRN
Qty: 30 TABLET | Refills: 0 | Status: SHIPPED | OUTPATIENT
Start: 2022-08-01

## 2022-08-01 RX ORDER — KETOROLAC TROMETHAMINE 30 MG/ML
15 INJECTION, SOLUTION INTRAMUSCULAR; INTRAVENOUS ONCE
Status: COMPLETED | OUTPATIENT
Start: 2022-08-01 | End: 2022-08-02

## 2022-08-02 PROCEDURE — 96372 THER/PROPH/DIAG INJ SC/IM: CPT

## 2022-08-02 RX ADMIN — METHOCARBAMOL 500 MG: 500 TABLET ORAL at 00:12

## 2022-08-02 RX ADMIN — KETOROLAC TROMETHAMINE 15 MG: 30 INJECTION, SOLUTION INTRAMUSCULAR; INTRAVENOUS at 00:11

## 2022-08-02 NOTE — DISCHARGE INSTRUCTIONS
FINDINGS:     ALIGNMENT:  Levocurvature centered at L1-2 on this nonweightbearing study  VERTEBRAL BODIES: No fracture  DEGENERATIVE CHANGES:  Moderate multilevel degenerative disc disease, most prominent at L1-2  PARASPINAL SOFT TISSUES:  Unremarkable  Other: Pulmonary and splenic calcified granulomata     IVC filter     Bladder wall thickening without significant surrounding inflammation     Spinal pain pump noted     IMPRESSION:     1  No acute abnormality  2  Bladder wall thickening without inflammatory changes, may represent neoplasm  Recommend nonemergent follow-up with urology

## 2022-08-02 NOTE — ED NOTES
X-ray at bedside      [facility], [facility]  08/01/22 7288

## 2022-08-02 NOTE — ED PROVIDER NOTES
History  Chief Complaint   Patient presents with    Fall     Pt reports fell yesterday denies loc denies thinners - Pt reports right shoulder, arm, hip and right side of back are hurting - Pt hit head as well  This is a 65-year-old female with a history of chronic low back pain with intrathecal Dilaudid pump, migraine, hypothyroidism who presents with back and right upper extremity pain after a fall yesterday  Patient states that she was walking and tripped over a bike  She fell onto her right side  She did strike her head, but did not lose consciousness and does not take blood thinners  Currently, complaining of back pain and right upper extremity pain  Prior to Admission Medications   Prescriptions Last Dose Informant Patient Reported? Taking? COMBIVENT RESPIMAT inhaler   Yes No   Sig: Inhale 2 puffs every 4 (four) hours as needed   CRANBERRY PO  Child Yes No   Sig: Take 4,200 mg by mouth daily  DiphenhydrAMINE HCl (ALLERGY MED PO)  Child Yes No   Sig: Take 25 mg by mouth as needed  [place] [facility]   LORazepam (ATIVAN) 1 mg tablet   No No   Sig: Take 1 tablet (1 mg total) by mouth 4 (four) times a day for 10 days   NIFEdipine ER (ADALAT CC) 60 MG 24 hr tablet   No No   Sig: Take 1 tablet (60 mg total) by mouth daily   SUMAtriptan (IMITREX) 100 mg tablet  Child Yes No   Sig: Take 100 mg by mouth once as needed for migraine  acetaminophen (TYLENOL) 500 mg tablet   No No   Sig: Take 2 tablets (1,000 mg total) by mouth every 6 (six) hours as needed for mild pain or moderate pain   buPROPion (WELLBUTRIN SR) 150 mg 12 hr tablet  Child Yes No   Sig: Take 150 mg by mouth 2 (two) times a day  2 tabs in AM; one tab in afternoon   citalopram (CeleXA) 40 mg tablet  Child Yes No   Sig: Take 40 mg by mouth daily     cyanocobalamin 1,000 mcg/mL   Yes No   Si INJECTION MONTHLY   ferrous sulfate 325 (65 Fe) mg tablet   No No   Sig: Take 1 tablet (325 mg total) by mouth every other day   gabapentin (NEURONTIN) 300 mg capsule  Child Yes No   Sig: Take 300 mg by mouth 4 (four) times a day   levETIRAcetam (KEPPRA) 500 mg tablet   Yes No   Sig: Take 1,500 mg by mouth 2 (two) times a day   levothyroxine 100 mcg tablet  Child Yes No   Sig: Take 100 mcg by mouth daily  lisinopril (ZESTRIL) 10 mg tablet   No No   Sig: Take 1 tablet (10 mg total) by mouth daily after breakfast   lubiprostone (AMITIZA) 24 mcg capsule  Child Yes No   Sig: Take 24 mcg by mouth 2 (two) times a day with meals  pantoprazole (PROTONIX) 40 mg tablet   No No   Sig: Take 1 tablet (40 mg total) by mouth daily in the early morning   senna-docusate sodium (SENOKOT S) 8 6-50 mg per tablet   No No   Sig: Take 1 tablet by mouth daily at bedtime   tamsulosin (FLOMAX) 0 4 mg   No No   Sig: Take 1 capsule (0 4 mg total) by mouth daily with dinner   temazepam (RESTORIL) 22 5 MG capsule  Child Yes No   Sig: Take 22 5 mg by mouth daily at bedtime     tiZANidine (ZANAFLEX) 2 mg tablet   No No   Sig: Take 1 tablet (2 mg total) by mouth 2 (two) times a day as needed for muscle spasms for up to 3 days      Facility-Administered Medications: None       Past Medical History:   Diagnosis Date    Abnormal gait     Abscess of abdominal cavity (HCC)     Aneurysm ([address] )     Brain with clips x 3 per pt    Arm injury     Right arm- pt present with short FA/wrist  immoblizer present on ED arrival    Cataract     Cellulitis     B/L LE    Chronic back pain     Chronic pain     Disease of thyroid gland     hypothyroid    Edema     B/L LE    Falls frequently     last fall 1 hour ago per pt    Foot drop, left foot     Fracture     Facial bones per pt- left orbit/cheek? pt unsure    Fracture closed, nasal bone     Gastric bypass status for obesity     Hypoglycemia     Memory loss, short term     Migraine     MRSA (methicillin resistant Staphylococcus aureus)     Stomach ulcer        Past Surgical History:   Procedure Laterality Date    BACK SURGERY lumbar/sacral fusion per pt    CEREBRAL ANEURYSM REPAIR      with clips x3 per pt     SECTION      x 4 per pt    GASTRIC BYPASS      MRSA CULTURE (HISTORICAL)      stomach abscess per pt       Family History   Problem Relation Age of Onset    Diabetes Mother     Heart failure Mother     Breast cancer Mother     Heart disease Father      I have reviewed and agree with the history as documented  E-Cigarette/Vaping    E-Cigarette Use Never User      E-Cigarette/Vaping Substances    Nicotine No     THC No     CBD No     Flavoring No     Other No     Unknown No      Social History     Tobacco Use    Smoking status: Current Every Day Smoker     Packs/day: 0 50     Types: Cigarettes, Cigars    Smokeless tobacco: Never Used   Vaping Use    Vaping Use: Never used   Substance Use Topics    Alcohol use: Not Currently     Comment: social    Drug use: No       Review of Systems   Constitutional: Negative for chills and fever  HENT: Negative for rhinorrhea, sore throat and trouble swallowing  Eyes: Negative for photophobia and visual disturbance  Respiratory: Negative for cough, chest tightness and shortness of breath  Cardiovascular: Negative for chest pain, palpitations and leg swelling  Gastrointestinal: Negative for abdominal pain, blood in stool, diarrhea, nausea and vomiting  Endocrine: Negative for polyuria  Genitourinary: Negative for dysuria, flank pain, hematuria, vaginal bleeding and vaginal discharge  Musculoskeletal: Positive for arthralgias and back pain  Negative for neck pain  Skin: Negative for color change and rash  Allergic/Immunologic: Negative for immunocompromised state  Neurological: Negative for dizziness, weakness, light-headedness, numbness and headaches  All other systems reviewed and are negative  Physical Exam  Physical Exam  Constitutional:       Appearance: Normal appearance  She is well-developed  She is obese   She is not ill-appearing or toxic-appearing  Comments: Chronically ill-appearing  HENT:      Head: Normocephalic and atraumatic  Nose: Nose normal       Mouth/Throat:      Lips: Pink  Mouth: Mucous membranes are moist    Eyes:      General: Lids are normal       Conjunctiva/sclera: Conjunctivae normal       Pupils: Pupils are equal, round, and reactive to light  Cardiovascular:      Rate and Rhythm: Normal rate and regular rhythm  Pulmonary:      Effort: Pulmonary effort is normal  No tachypnea  Chest:      Chest wall: No tenderness or crepitus  Abdominal:      Palpations: Abdomen is soft  Abdomen is not rigid  Tenderness: There is no abdominal tenderness  There is no guarding or rebound  Musculoskeletal:      Cervical back: Full passive range of motion without pain, normal range of motion and neck supple  No spinous process tenderness or muscular tenderness  Comments: No C-spine tenderness  She does have T-spine and L-spine tenderness  Surgical scar over the L-spine  No bony tenderness throughout but otherwise noted  No other evidence of trauma  Patient able to range all joints without pain  Pelvis is stable and nontender  Tenderness over right shoulder without evidence of trauma  Tenderness over right elbow without evidence of trauma  Small hematoma to the mid radius with overlying tenderness  2+ pitting edema bilateral lower extremities  Venous stasis skin changes bilateral lower extremities  Neurological:      Mental Status: She is alert and oriented to person, place, and time  GCS: GCS eye subscore is 4  GCS verbal subscore is 5  GCS motor subscore is 6  Psychiatric:         Speech: Speech normal          Behavior: Behavior normal  Behavior is cooperative  Thought Content:  Thought content normal          Vital Signs  ED Triage Vitals   Temperature Pulse Respirations Blood Pressure SpO2   08/01/22 2003 08/01/22 2002 08/01/22 2002 08/01/22 2002 08/01/22 2002   98 9 °F (37 2 °C) 71 18 (!) 180/81 100 %      Temp Source Heart Rate Source Patient Position - Orthostatic VS BP Location FiO2 (%)   08/01/22 2003 08/01/22 2317 08/01/22 2002 08/01/22 2002 --   Oral Monitor Sitting Left arm       Pain Score       08/01/22 2317       9           Vitals:    08/01/22 2002 08/01/22 2317   BP: (!) 180/81 169/84   Pulse: 71 68   Patient Position - Orthostatic VS: Sitting Lying         Visual Acuity      ED Medications  Medications   ketorolac (TORADOL) injection 15 mg (has no administration in time range)   methocarbamol (ROBAXIN) tablet 500 mg (has no administration in time range)       Diagnostic Studies  Results Reviewed     None                 XR shoulder 2+ views RIGHT   ED Interpretation by Suma Ball MD (08/01 2323)   No acute osseous abnormality as interpreted by myself  XR elbow 3+ views RIGHT   ED Interpretation by Suma Ball MD (08/01 2323)   No acute osseous abnormality as interpreted by myself  XR forearm 2 views RIGHT   ED Interpretation by Suma Ball MD (08/01 2323)   No acute osseous abnormality as interpreted by myself  CT spine thoracic & lumbar wo contrast   Final Result by Cruz Camara MD (08/01 2312)      1  No acute abnormality   2  Bladder wall thickening without inflammatory changes, may represent neoplasm  Recommend nonemergent follow-up with urology  The study was marked in EPIC for significant notification  Workstation performed: [id number]                    Procedures  Procedures         ED Course                               SBIRT 22yo+    Flowsheet Row Most Recent Value   SBIRT (23 yo +)    In order to provide better care to our patients, we are screening all of our patients for alcohol and drug use  Would it be okay to ask you these screening questions? No Filed at: 08/01/2022 2128                    MDM  Number of Diagnoses or Management Options  Diagnosis management comments: X-rays right upper extremity    CT thoracolumbar spine  Disposition pending results  Disposition  Final diagnoses:   Arm contusion, right, initial encounter   Acute exacerbation of chronic low back pain   Abnormal CT scan, bladder     Time reflects when diagnosis was documented in both MDM as applicable and the Disposition within this note     Time User Action Codes Description Comment    8/1/2022 11:24 PM Debora Yossi Add [S40 021A] Arm contusion, right, initial encounter     8/1/2022 11:24 PM Debora Yossi Add [M54 50,  G89 29] Acute exacerbation of chronic low back pain     8/1/2022 11:25 PM Debora Yossi Add [R93 41] Abnormal CT scan, bladder       ED Disposition     ED Disposition   Discharge    Condition   Stable    Date/Time   Mon Aug 1, 2022 11:25 PM    [place] discharge to home/self care                 Follow-up Information     Follow up With Specialties Details Why Contact Info Additional Information    [facility] Schedule an appointment as soon as possible for a visit   [address] [facility] [address]  [facility] [address] For Urology [facility] Urology Schedule an appointment as soon as possible for a visit  for abnormal appearing bladder on [address]  [id number]  [address] [place] [address] [facility] [address] [facility] [place], [place], [place], [address]   [facility] Emergency [facility] Emergency Medicine Go to  If symptoms worsen [facility] [address]  [address] [place] [address] Emergency Department, [facility] , [place], [place], [address]          Patient's Medications   Discharge Prescriptions    METHOCARBAMOL (ROBAXIN) 500 MG TABLET    Take 1 tablet (500 mg total) by mouth 3 (three) times a day as needed for muscle spasms       Start Date: 8/1/2022  End Date: --       Order Dose: 500 mg Quantity: 30 tablet    Refills: 0       No discharge procedures on file      PDMP Review       Value Time User    PDMP Reviewed  Yes 9/21/2020  2:39 PM Mayur Desai MD          ED Provider  Electronically Signed by           Ender Zendejas MD  08/01/22 [address], MD  08/01/22 7420

## 2022-08-30 ENCOUNTER — HOSPITAL ENCOUNTER (EMERGENCY)
Facility: HOSPITAL | Age: 57
Discharge: HOME | End: 2022-08-31
Attending: EMERGENCY MEDICINE
Payer: COMMERCIAL

## 2022-08-30 DIAGNOSIS — T78.2XXA ANAPHYLAXIS, INITIAL ENCOUNTER: Primary | ICD-10-CM

## 2022-08-30 PROCEDURE — 96372 THER/PROPH/DIAG INJ SC/IM: CPT

## 2022-08-30 PROCEDURE — 99285 EMERGENCY DEPT VISIT HI MDM: CPT | Performed by: EMERGENCY MEDICINE

## 2022-08-30 PROCEDURE — 96375 TX/PRO/DX INJ NEW DRUG ADDON: CPT

## 2022-08-30 PROCEDURE — 96374 THER/PROPH/DIAG INJ IV PUSH: CPT

## 2022-08-30 PROCEDURE — 99283 EMERGENCY DEPT VISIT LOW MDM: CPT

## 2022-08-30 RX ORDER — EPINEPHRINE 1 MG/ML
0.3 INJECTION, SOLUTION, CONCENTRATE INTRAVENOUS ONCE
Status: COMPLETED | OUTPATIENT
Start: 2022-08-30 | End: 2022-08-30

## 2022-08-30 RX ORDER — METHYLPREDNISOLONE SODIUM SUCCINATE 125 MG/2ML
125 INJECTION, POWDER, LYOPHILIZED, FOR SOLUTION INTRAMUSCULAR; INTRAVENOUS ONCE
Status: COMPLETED | OUTPATIENT
Start: 2022-08-30 | End: 2022-08-30

## 2022-08-30 RX ORDER — DIPHENHYDRAMINE HYDROCHLORIDE 50 MG/ML
50 INJECTION INTRAMUSCULAR; INTRAVENOUS ONCE
Status: DISCONTINUED | OUTPATIENT
Start: 2022-08-30 | End: 2022-08-30

## 2022-08-30 RX ORDER — FAMOTIDINE 10 MG/ML
20 INJECTION, SOLUTION INTRAVENOUS ONCE
Status: COMPLETED | OUTPATIENT
Start: 2022-08-30 | End: 2022-08-30

## 2022-08-30 RX ADMIN — FAMOTIDINE 20 MG: 10 INJECTION INTRAVENOUS at 21:22

## 2022-08-30 RX ADMIN — EPINEPHRINE 0.3 MG: 1 INJECTION, SOLUTION, CONCENTRATE INTRAVENOUS at 20:58

## 2022-08-30 RX ADMIN — METHYLPREDNISOLONE SODIUM SUCCINATE 125 MG: 125 INJECTION, POWDER, FOR SOLUTION INTRAMUSCULAR; INTRAVENOUS at 21:23

## 2022-08-31 VITALS
WEIGHT: 211 LBS | TEMPERATURE: 97.6 F | SYSTOLIC BLOOD PRESSURE: 110 MMHG | HEART RATE: 75 BPM | BODY MASS INDEX: 33.12 KG/M2 | HEIGHT: 67 IN | OXYGEN SATURATION: 92 % | DIASTOLIC BLOOD PRESSURE: 62 MMHG | RESPIRATION RATE: 18 BRPM

## 2022-08-31 RX ORDER — EPINEPHRINE 0.3 MG/.3ML
0.3 INJECTION SUBCUTANEOUS ONCE
Qty: 0.6 ML | Refills: 0 | Status: SHIPPED | OUTPATIENT
Start: 2022-08-31 | End: 2022-08-31

## 2022-08-31 RX ORDER — PREDNISONE 20 MG/1
50 TABLET ORAL DAILY
Qty: 13 TABLET | Refills: 0 | Status: SHIPPED | OUTPATIENT
Start: 2022-08-31 | End: 2022-09-05

## 2022-08-31 NOTE — ED PROVIDER NOTES
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 Taking? COMBIVENT RESPIMAT inhaler   Yes No   Sig: Inhale 2 puffs every 4 (four) hours as needed   CRANBERRY PO  Child Yes No   Sig: Take 4,200 mg by mouth daily  DiphenhydrAMINE HCl (ALLERGY MED PO)  Child Yes No   Sig: Take 25 mg by mouth as needed  [place] [facility]   LORazepam (ATIVAN) 1 mg tablet   No No   Sig: Take 1 tablet (1 mg total) by mouth 4 (four) times a day for 10 days   NIFEdipine ER (ADALAT CC) 60 MG 24 hr tablet   No No   Sig: Take 1 tablet (60 mg total) by mouth daily   SUMAtriptan (IMITREX) 100 mg tablet  Child Yes No   Sig: Take 100 mg by mouth once as needed for migraine  acetaminophen (TYLENOL) 500 mg tablet   No No   Sig: Take 2 tablets (1,000 mg total) by mouth every 6 (six) hours as needed for mild pain or moderate pain   buPROPion (WELLBUTRIN SR) 150 mg 12 hr tablet  Child Yes No   Sig: Take 150 mg by mouth 2 (two) times a day  2 tabs in AM; one tab in afternoon   citalopram (CeleXA) 40 mg tablet  Child Yes No   Sig: Take 40 mg by mouth daily     cyanocobalamin 1,000 mcg/mL   Yes No   Si INJECTION MONTHLY   ferrous sulfate 325 (65 Fe) mg tablet   No No   Sig: Take 1 tablet (325 mg total) by mouth every other day   gabapentin (NEURONTIN) 300 mg capsule  Child Yes No   Sig: Take 300 mg by mouth 4 (four) times a day   levETIRAcetam (KEPPRA) 500 mg tablet   Yes No   Sig: Take 1,500 mg by mouth 2 (two) times a day   levothyroxine 100 mcg tablet  Child Yes No   Sig: Take 100 mcg by mouth daily  lisinopril (ZESTRIL) 10 mg tablet   No No   Sig: Take 1 tablet (10 mg total) by mouth daily after breakfast   lubiprostone (AMITIZA) 24 mcg capsule  Child Yes No   Sig: Take 24 mcg by mouth 2 (two) times a day with meals  methocarbamol (ROBAXIN) 500 mg tablet   No No   Sig: Take 1 tablet (500 mg total) by mouth 3 (three) times a day as needed for muscle spasms   pantoprazole (PROTONIX) 40 mg tablet   No No   Sig: Take 1 tablet (40 mg total) by mouth daily in the early morning   senna-docusate sodium (SENOKOT S) 8 6-50 mg per tablet   No No   Sig: Take 1 tablet by mouth daily at bedtime   tamsulosin (FLOMAX) 0 4 mg   No No   Sig: Take 1 capsule (0 4 mg total) by mouth daily with dinner   temazepam (RESTORIL) 22 5 MG capsule  Child Yes No   Sig: Take 22 5 mg by mouth daily at bedtime     tiZANidine (ZANAFLEX) 2 mg tablet   No No   Sig: Take 1 tablet (2 mg total) by mouth 2 (two) times a day as needed for muscle spasms for up to 3 days      Facility-Administered Medications: None       Past Medical History:   Diagnosis Date    Abnormal gait     Abscess of abdominal cavity (HCC)     Aneurysm ([address] )     Brain with clips x 3 per pt    Arm injury     Right arm- pt present with short FA/wrist  immoblizer present on ED arrival    Cataract     Cellulitis     B/L LE    Chronic back pain     Chronic pain     Disease of thyroid gland     hypothyroid    Edema     B/L LE    Falls frequently     last fall 1 hour ago per pt    Foot drop, left foot     Fracture     Facial bones per pt- left orbit/cheek? pt unsure    Fracture closed, nasal bone     Gastric bypass status for obesity     Hypoglycemia     Memory loss, short term     Migraine     MRSA (methicillin resistant Staphylococcus aureus)     Stomach ulcer        Past Surgical History:   Procedure Laterality Date    BACK SURGERY      lumbar/sacral fusion per pt    CEREBRAL ANEURYSM REPAIR      with clips x3 per pt     SECTION      x 4 per pt    GASTRIC BYPASS      MRSA CULTURE (HISTORICAL)      stomach abscess per pt       Family History   Problem Relation Age of Onset    Diabetes Mother     Heart failure Mother     Breast cancer Mother     Heart disease Father      I have reviewed and agree with the history as documented  E-Cigarette/Vaping    E-Cigarette Use Never User      E-Cigarette/Vaping Substances    Nicotine No     THC No     CBD No     Flavoring No     Other No     Unknown No      Social History     Tobacco Use    Smoking status: Current Every Day Smoker     Packs/day: 0 50     Types: Cigarettes, Cigars    Smokeless tobacco: Never Used   Vaping Use    Vaping Use: Never used   Substance Use Topics    Alcohol use: Not Currently     Comment: social    Drug use: No       Review of Systems   Constitutional: Negative for chills and fever  HENT: Positive for facial swelling and voice change  Negative for congestion, drooling, rhinorrhea and trouble swallowing  Eyes: Negative for photophobia and visual disturbance  Respiratory: Positive for shortness of breath  Negative for cough, chest tightness and wheezing  Cardiovascular: Negative for chest pain, palpitations and leg swelling  Gastrointestinal: Negative for abdominal pain, constipation, diarrhea, nausea and vomiting  Genitourinary: Negative for dysuria, flank pain and hematuria  Musculoskeletal: Negative for back pain and neck pain  Skin: Negative for color change and pallor     Neurological: Negative for dizziness, weakness, light-headedness, numbness and headaches  Physical Exam  Physical Exam  Vitals and nursing note reviewed  Constitutional:       General: She is not in acute distress  Appearance: Normal appearance  She is not ill-appearing, toxic-appearing or diaphoretic  HENT:      Head: Normocephalic and atraumatic  Mouth/Throat:      Lips: Pink  Mouth: Mucous membranes are moist       Pharynx: Oropharynx is clear  Uvula midline  No pharyngeal swelling, oropharyngeal exudate, posterior oropharyngeal erythema or uvula swelling  Eyes:      Conjunctiva/sclera: Conjunctivae normal       Pupils: Pupils are equal, round, and reactive to light  Neck:      Trachea: Trachea and phonation normal    Cardiovascular:      Rate and Rhythm: Normal rate and regular rhythm  Pulses: Normal pulses  Heart sounds: Normal heart sounds  No murmur heard  Pulmonary:      Effort: Pulmonary effort is normal  No respiratory distress  Breath sounds: Normal breath sounds  No stridor  No wheezing, rhonchi or rales  Chest:      Chest wall: No tenderness  Abdominal:      General: Bowel sounds are normal  There is no distension  Palpations: Abdomen is soft  Tenderness: There is no abdominal tenderness  There is no guarding or rebound  Musculoskeletal:      Cervical back: Neck supple  Right lower leg: No edema  Left lower leg: No edema  Comments: Redness and swelling at LUE at hornet site bite   Skin:     General: Skin is warm and dry  Neurological:      General: No focal deficit present  Mental Status: She is alert and oriented to person, place, and time  Mental status is at baseline     Psychiatric:         Mood and Affect: Mood normal          Behavior: Behavior normal          Vital Signs  ED Triage Vitals [08/30/22 2048]   Temperature Pulse Respirations Blood Pressure SpO2   97 6 °F (36 4 °C) 78 18 134/78 100 %      Temp Source Heart Rate Source Patient Position - Orthostatic VS BP Location FiO2 (%)   Oral Monitor Sitting Left arm --      Pain Score       6           Vitals:    08/30/22 2048 08/30/22 2130 08/30/22 2330   BP: 134/78 127/80 110/62   Pulse: 78 88 75   Patient Position - Orthostatic VS: Sitting           Visual Acuity      ED Medications  Medications   EPINEPHrine PF (ADRENALIN) 1 mg/mL injection 0 3 mg (0 3 mg Intramuscular Given 8/30/22 2058)   Famotidine (PF) (PEPCID) injection 20 mg (20 mg Intravenous Given 8/30/22 2122)   methylPREDNISolone sodium succinate (Solu-MEDROL) injection 125 mg (125 mg Intravenous Given 8/30/22 2123)       Diagnostic Studies  Results Reviewed     None                 No orders to display              Procedures  Procedures         ED Course  ED Course as of 08/31/22 0007   Wed Aug 31, 2022   0001 Patient has been observed x3 hours  Her symptoms have remained completely resolved since receiving the epinephrine, steroids and famotidine  No lip swelling, voice normal, states can swallow normally, no SOB, and lungs are CTABL  I will prescribe her an EpiPen, prednisone x 5 days  Discussed return precautions with patient and daughter who both verbalized understanding  MDM  Number of Diagnoses or Management Options  Diagnosis management comments: Assessment and plan: Anaphylaxis to hornet sting with lower lip swelling  Will administer IM epi, steroids, famotidine  Hold benadryl as patient already took 4 pills  Monitor closely  Disposition  Final diagnoses:   Anaphylaxis, initial encounter     Time reflects when diagnosis was documented in both MDM as applicable and the Disposition within this note     Time User Action Codes Description Comment    8/31/2022 12:05 AM Nikita Stoddard Add [T78  2XXA] Anaphylaxis, initial encounter       ED Disposition     ED Disposition   Discharge    Condition   Stable    Date/Time   Wed Aug 31, 2022 12:05 AM    Comment   [place] discharge to home/self care                Follow-up Information     Follow up With Specialties Details Why Contact Info Additional Willie Ruff MD [facility] Medicine Schedule an appointment as soon as possible for a visit in 3 days for re-evaluation [facility] Emergency Department Emergency Medicine Go to  As needed, If symptoms worsen, for re-evaluation [address] [place],[address] [address]  [address] [facility] Emergency Department, [address] [facility] Destiney Hernandez [address]          Patient's Medications   Discharge Prescriptions    EPINEPHRINE (EPIPEN) 0 3 MG/0 3 ML SOAJ    Inject 0 3 mL (0 3 mg total) into a muscle once for 1 dose       Start Date: 8/31/2022 End Date: 8/31/2022       Order Dose: 0 3 mg       Quantity: 0 6 mL    Refills: 0    PREDNISONE 20 MG TABLET    Take 2 5 tablets (50 mg total) by mouth daily for 5 days       Start Date: 8/31/2022 End Date: 9/5/2022       Order Dose: 50 mg       Quantity: 13 tablet    Refills: 0       No discharge procedures on file      PDMP Review       Value Time User    PDMP Reviewed  Yes 9/21/2020  2:39 PM Evelyn Pete MD          ED Provider  Electronically Signed by           Keyla Addison DO  08/31/22 0007

## 2023-01-07 ENCOUNTER — APPOINTMENT (EMERGENCY)
Dept: CT IMAGING | Facility: HOSPITAL | Age: 58
End: 2023-01-07

## 2023-01-07 ENCOUNTER — HOSPITAL ENCOUNTER (EMERGENCY)
Facility: HOSPITAL | Age: 58
Discharge: HOME | End: 2023-01-08
Attending: EMERGENCY MEDICINE

## 2023-01-07 VITALS
TEMPERATURE: 97.5 F | BODY MASS INDEX: 27.32 KG/M2 | HEIGHT: 66 IN | HEART RATE: 78 BPM | WEIGHT: 170 LBS | OXYGEN SATURATION: 93 % | DIASTOLIC BLOOD PRESSURE: 71 MMHG | SYSTOLIC BLOOD PRESSURE: 111 MMHG | RESPIRATION RATE: 16 BRPM

## 2023-01-07 DIAGNOSIS — S09.90XA CLOSED HEAD INJURY, INITIAL ENCOUNTER: Primary | ICD-10-CM

## 2023-01-08 NOTE — DISCHARGE INSTRUCTIONS
Take tylenol every 4-6 hours for your pain   Use ice on the area as well  If your symptoms persist, schedule an appointment with your PCP  Return to the ER if you develop an intense headache, new visual changes, new numbness, new weakness, difficulty speaking, difficulty swallowing, difficulty walking, feel dizzy/faint/weak, confused, seizure-like activity

## 2023-01-08 NOTE — ED PROVIDER NOTES
History  Chief Complaint   Patient presents with   • Head Injury     Pt to ED c/o head pain after bowls fell off the top of fridge and hit her on head  Denies blood thinners  Pt had bump above L eye where she was hit  States that she always has double vision but seems worse today     This is a 62 y/o female with PMH brain aneurysm s/p clips and surgery, chronic back pain, cataracts, hypothyroidism, left foot drop who presents to the ER today after ceramic plates and bowls falling on her head at 0400 today  Patient states she was in the kitchen and the bowls/plates fell on the left side of her head, she admits to losing consciousness for a couple of seconds, falling on her bottom and then vomiting afterwards  Denies any other injuries  Denies any confusion after  She does admit to just eating prior to this  She states she is still having 8/10 pain and it is localized to the left side of her forehead  She admits to double vision but states that she always has this due to her cataracts  She denies any headache, dizziness, new numbness (patient admits to chronic left leg numbness from prior injury), new focal weakness, neck pain, neck stiffness, nausea, chest pain, shortness of breath  Denies taking any medications for her symptoms  Patient does have a history of brain surgery many years ago - cerebral aneurysm repair - that is on the same side of her injury that occurred early this AM so she was concerned  History provided by:  Patient   used: No    Head Injury w/LOC  Location:  Frontal  Time since incident:  18 hours  Mechanism of injury: direct blow    Pain details:     Quality:  Dull    Severity:  Moderate    Timing:  Intermittent    Progression:  Waxing and waning  Chronicity:  New  Ineffective treatments:   Ice  Associated symptoms: double vision (chronic), headache, loss of consciousness, nausea (resolved), numbness (left leg is chronic, no other numbness) and vomiting (resolved) Associated symptoms: no blurred vision and no neck pain        Prior to Admission Medications   Prescriptions Last Dose Informant Patient Reported? Taking? COMBIVENT RESPIMAT inhaler   Yes No   Sig: Inhale 2 puffs every 4 (four) hours as needed   CRANBERRY PO   Yes No   Sig: Take 4,200 mg by mouth daily  DiphenhydrAMINE HCl (ALLERGY MED PO)   Yes No   Sig: Take 25 mg by mouth as needed  [place] Allergy   EPINEPHrine (EPIPEN) 0 3 mg/0 3 mL SOAJ   No No   Sig: Inject 0 3 mL (0 3 mg total) into a muscle once for 1 dose   LORazepam (ATIVAN) 1 mg tablet   No No   Sig: Take 1 tablet (1 mg total) by mouth 4 (four) times a day for 10 days   NIFEdipine ER (ADALAT CC) 60 MG 24 hr tablet   No No   Sig: Take 1 tablet (60 mg total) by mouth daily   SUMAtriptan (IMITREX) 100 mg tablet   Yes No   Sig: Take 100 mg by mouth once as needed for migraine  acetaminophen (TYLENOL) 500 mg tablet   No No   Sig: Take 2 tablets (1,000 mg total) by mouth every 6 (six) hours as needed for mild pain or moderate pain   buPROPion (WELLBUTRIN SR) 150 mg 12 hr tablet   Yes No   Sig: Take 150 mg by mouth 2 (two) times a day  2 tabs in AM; one tab in afternoon   citalopram (CeleXA) 40 mg tablet   Yes No   Sig: Take 40 mg by mouth daily  cyanocobalamin 1,000 mcg/mL   Yes No   Si INJECTION MONTHLY   ferrous sulfate 325 (65 Fe) mg tablet   No No   Sig: Take 1 tablet (325 mg total) by mouth every other day   gabapentin (NEURONTIN) 300 mg capsule   Yes No   Sig: Take 300 mg by mouth 4 (four) times a day   levETIRAcetam (KEPPRA) 500 mg tablet   Yes No   Sig: Take 1,500 mg by mouth 2 (two) times a day   levothyroxine 100 mcg tablet   Yes No   Sig: Take 100 mcg by mouth daily  lisinopril (ZESTRIL) 10 mg tablet   No No   Sig: Take 1 tablet (10 mg total) by mouth daily after breakfast   lubiprostone (AMITIZA) 24 mcg capsule   Yes No   Sig: Take 24 mcg by mouth 2 (two) times a day with meals     methocarbamol (ROBAXIN) 500 mg tablet   No No   Sig: Take 1 tablet (500 mg total) by mouth 3 (three) times a day as needed for muscle spasms   pantoprazole (PROTONIX) 40 mg tablet   No No   Sig: Take 1 tablet (40 mg total) by mouth daily in the early morning   senna-docusate sodium (SENOKOT S) 8 6-50 mg per tablet   No No   Sig: Take 1 tablet by mouth daily at bedtime   tamsulosin (FLOMAX) 0 4 mg   No No   Sig: Take 1 capsule (0 4 mg total) by mouth daily with dinner   temazepam (RESTORIL) 22 5 MG capsule   Yes No   Sig: Take 22 5 mg by mouth daily at bedtime     tiZANidine (ZANAFLEX) 2 mg tablet   No No   Sig: Take 1 tablet (2 mg total) by mouth 2 (two) times a day as needed for muscle spasms for up to 3 days      Facility-Administered Medications: None       Past Medical History:   Diagnosis Date   • Abnormal gait    • Abscess of abdominal cavity (HCC)    • Aneurysm (HCC)     Brain with clips x 3 per pt   • Arm injury     Right arm- pt present with short FA/wrist  immoblizer present on ED arrival   • Cataract    • Cellulitis     B/L LE   • Chronic back pain    • Chronic pain    • Disease of thyroid gland     hypothyroid   • Edema     B/L LE   • Falls frequently     last fall 1 hour ago per pt   • Foot drop, left foot    • Fracture     Facial bones per pt- left orbit/cheek? pt unsure   • Fracture closed, nasal bone    • Gastric bypass status for obesity    • Hypoglycemia    • Memory loss, short term    • Migraine    • MRSA (methicillin resistant Staphylococcus aureus)    • Stomach ulcer        Past Surgical History:   Procedure Laterality Date   • BACK SURGERY      lumbar/sacral fusion per pt   • CEREBRAL ANEURYSM REPAIR      with clips x3 per pt   •  SECTION      x 4 per pt   • GASTRIC BYPASS     • MRSA CULTURE (HISTORICAL)      stomach abscess per pt       Family History   Problem Relation Age of Onset   • Diabetes Mother    • Heart failure Mother    • Breast cancer Mother    • Heart disease Father      I have reviewed and agree with the history as documented  E-Cigarette/Vaping   • E-Cigarette Use Never User      E-Cigarette/Vaping Substances   • Nicotine No    • THC No    • CBD No    • Flavoring No    • Other No    • Unknown No      Social History     Tobacco Use   • Smoking status: Every Day     Packs/day: 0 50     Types: Cigarettes, Cigars   • Smokeless tobacco: Never   Vaping Use   • Vaping Use: Never used   Substance Use Topics   • Alcohol use: Not Currently     Comment: social   • Drug use: No       Review of Systems   Constitutional: Negative for chills and fever  Eyes: Positive for double vision (chronic)  Negative for blurred vision  Respiratory: Negative for chest tightness and shortness of breath  Cardiovascular: Negative for chest pain  Gastrointestinal: Positive for nausea (resolved) and vomiting (resolved)  Negative for abdominal pain  Musculoskeletal: Negative for neck pain and neck stiffness  Skin: Negative for color change  Neurological: Positive for loss of consciousness, numbness (left leg is chronic, no other numbness) and headaches  Negative for dizziness, facial asymmetry, weakness and light-headedness  Psychiatric/Behavioral: Negative for behavioral problems, confusion and sleep disturbance  All other systems reviewed and are negative  Physical Exam  Physical Exam  Vitals and nursing note reviewed  Constitutional:       General: She is awake  Appearance: Normal appearance  She is well-developed  HENT:      Head: Normocephalic and atraumatic  No raccoon eyes or Méndez's sign  Comments: Tenderness to left frontal area with small 2x2 cm ecchymotic area  No hematoma or swelling noted  No laceration noted  Right Ear: Tympanic membrane, ear canal and external ear normal       Left Ear: Tympanic membrane and external ear normal       Ears:      Comments: No hemotympanum     Nose: Nose normal       Comments: No septal hematoma     Mouth/Throat:      Lips: Pink        Mouth: Mucous membranes are moist       Pharynx: Oropharynx is clear  Uvula midline  No oropharyngeal exudate or posterior oropharyngeal erythema  Eyes:      General: No scleral icterus  Extraocular Movements: Extraocular movements intact  Conjunctiva/sclera: Conjunctivae normal       Pupils: Pupils are equal, round, and reactive to light  Neck:      Comments: Decreased ROM due to spinal fusion   Cardiovascular:      Rate and Rhythm: Normal rate and regular rhythm  Heart sounds: Normal heart sounds, S1 normal and S2 normal  No murmur heard  No gallop  Pulmonary:      Effort: Pulmonary effort is normal       Breath sounds: Normal breath sounds  No wheezing, rhonchi or rales  Musculoskeletal:         General: Normal range of motion  Cervical back: Normal range of motion and neck supple  No spinous process tenderness  Skin:     General: Skin is warm and dry  Neurological:      General: No focal deficit present  Mental Status: She is alert and oriented to person, place, and time  GCS: GCS eye subscore is 4  GCS verbal subscore is 5  GCS motor subscore is 6  Cranial Nerves: No facial asymmetry  Sensory: Sensation is intact  Motor: Motor function is intact  No pronator drift  Coordination: Coordination is intact  Finger-Nose-Finger Test and Heel to [facility] Test normal  Rapid alternating movements normal       Gait: Gait is intact  Comments: Note: patient chronically has decreased sensation in the left leg due to prior injury  Psychiatric:         Attention and Perception: Attention and perception normal          Mood and Affect: Mood normal          Behavior: Behavior normal  Behavior is cooperative           Vital Signs  ED Triage Vitals   Temperature Pulse Respirations Blood Pressure SpO2   01/07/23 1719 01/07/23 1719 01/07/23 1719 01/07/23 1719 01/07/23 1720   97 5 °F (36 4 °C) 90 18 144/80 100 %      Temp Source Heart Rate Source Patient Position - Orthostatic VS BP Location FiO2 (%)   01/07/23 1719 01/07/23 1719 01/07/23 1719 01/07/23 1719 --   Temporal Monitor Lying Left arm       Pain Score       01/07/23 1720       7           Vitals:    01/07/23 1719 01/07/23 1720 01/07/23 2343   BP: 144/80  111/71   Pulse: 90  78   Patient Position - Orthostatic VS: Lying Sitting Sitting         Visual Acuity      ED Medications  Medications - No data to display    Diagnostic Studies  Results Reviewed     None                 CT head without contrast   Final Result by Jennifer Porras MD (01/08 0008)      No acute intracranial abnormality  Workstation performed: [id number]                    Procedures  Procedures         ED Course               Medical Decision Making  62 y/o female with closed head injury at 0400 today with associated LOC and one episode of vomiting   Differential diagnosis: intracranial abnormality, concussion, closed head injury  Assessment: closed head injury  Plan: normal CT  Patient instructed if sx persist follow up with PCP  She was given strict return to ER precautions both verbally and in discharge papers  Patient verbalized understanding and agrees with plan  Closed head injury, initial encounter: acute illness or injury  Amount and/or Complexity of Data Reviewed  Radiology: ordered  Disposition  Final diagnoses:   Closed head injury, initial encounter     Time reflects when diagnosis was documented in both MDM as applicable and the Disposition within this note     Time User Action Codes Description Comment    1/7/2023 11:20 PM Radha Domínguez Add [S09 [id number]] Closed head injury, initial encounter       ED Disposition     ED Disposition   Discharge    Condition   Stable    Date/Time   Sun Jan 8, 2023 12:10 AM    Comment   Sally Shape discharge to home/self care                 Follow-up Information     Follow up With Specialties Details Why Contact Info Additional Willie Ruff MD Family Medicine Call  As needed [address]  [place] [address]  [phone]        [address] Emergency Department Emergency Medicine Go to  if you develop an intense headache, new visual changes, new numbness, new weakness, difficulty speaking, difficulty swallowing, difficulty walking, feel dizzy/faint/weak, confused, seizure-like activity [address] [place], [id number]  [phone] [address] Emergency Department, [address] [place] [address], [facility] [address]          Patient's Medications   Discharge Prescriptions    No medications on file       No discharge procedures on file      PDMP Review       Value Time User    PDMP Reviewed  Yes 9/21/2020  2:39 PM Lokesh Padilla MD          ED Provider  Electronically Signed by           Yelena Byrd PA-C  01/08/23 0011

## 2023-08-27 ENCOUNTER — HOSPITAL ENCOUNTER (EMERGENCY)
Facility: HOSPITAL | Age: 58
Discharge: HOME | End: 2023-08-27
Attending: EMERGENCY MEDICINE
Payer: COMMERCIAL

## 2023-08-27 VITALS
HEART RATE: 66 BPM | TEMPERATURE: 98 F | RESPIRATION RATE: 17 BRPM | OXYGEN SATURATION: 92 % | SYSTOLIC BLOOD PRESSURE: 163 MMHG | DIASTOLIC BLOOD PRESSURE: 75 MMHG

## 2023-08-27 DIAGNOSIS — T63.441A BEE STING: Primary | ICD-10-CM

## 2023-08-27 DIAGNOSIS — T78.2XXA ANAPHYLAXIS, INITIAL ENCOUNTER: ICD-10-CM

## 2023-08-27 PROCEDURE — 96374 THER/PROPH/DIAG INJ IV PUSH: CPT

## 2023-08-27 PROCEDURE — 99283 EMERGENCY DEPT VISIT LOW MDM: CPT

## 2023-08-27 PROCEDURE — 93005 ELECTROCARDIOGRAM TRACING: CPT

## 2023-08-27 PROCEDURE — 96375 TX/PRO/DX INJ NEW DRUG ADDON: CPT

## 2023-08-27 PROCEDURE — 99284 EMERGENCY DEPT VISIT MOD MDM: CPT | Performed by: EMERGENCY MEDICINE

## 2023-08-27 RX ORDER — EPINEPHRINE 0.3 MG/.3ML
0.3 INJECTION SUBCUTANEOUS ONCE
Qty: 0.6 ML | Refills: 0 | Status: SHIPPED | OUTPATIENT
Start: 2023-08-27 | End: 2023-08-27

## 2023-08-27 RX ORDER — FAMOTIDINE 10 MG/ML
20 INJECTION, SOLUTION INTRAVENOUS ONCE
Status: COMPLETED | OUTPATIENT
Start: 2023-08-27 | End: 2023-08-27

## 2023-08-27 RX ORDER — METHYLPREDNISOLONE SODIUM SUCCINATE 125 MG/2ML
125 INJECTION, POWDER, LYOPHILIZED, FOR SOLUTION INTRAMUSCULAR; INTRAVENOUS ONCE
Status: COMPLETED | OUTPATIENT
Start: 2023-08-27 | End: 2023-08-27

## 2023-08-27 RX ORDER — PREDNISONE 20 MG/1
40 TABLET ORAL DAILY
Qty: 10 TABLET | Refills: 0 | Status: SHIPPED | OUTPATIENT
Start: 2023-08-27 | End: 2023-09-01

## 2023-08-27 RX ORDER — DIPHENHYDRAMINE HYDROCHLORIDE 50 MG/ML
25 INJECTION INTRAMUSCULAR; INTRAVENOUS ONCE
Status: COMPLETED | OUTPATIENT
Start: 2023-08-27 | End: 2023-08-27

## 2023-08-27 RX ADMIN — METHYLPREDNISOLONE SODIUM SUCCINATE 125 MG: 125 INJECTION, POWDER, FOR SOLUTION INTRAMUSCULAR; INTRAVENOUS at 21:08

## 2023-08-27 RX ADMIN — DIPHENHYDRAMINE HYDROCHLORIDE 25 MG: 50 INJECTION, SOLUTION INTRAMUSCULAR; INTRAVENOUS at 21:08

## 2023-08-27 RX ADMIN — FAMOTIDINE 20 MG: 10 INJECTION INTRAVENOUS at 21:08

## 2023-08-28 LAB
ATRIAL RATE: 75 BPM
P AXIS: 40 DEGREES
PR INTERVAL: 168 MS
QRS AXIS: 32 DEGREES
QRSD INTERVAL: 96 MS
QT INTERVAL: 412 MS
QTC INTERVAL: 460 MS
T WAVE AXIS: 52 DEGREES
VENTRICULAR RATE: 75 BPM

## 2023-08-28 PROCEDURE — 93010 ELECTROCARDIOGRAM REPORT: CPT | Performed by: INTERNAL MEDICINE

## 2023-08-28 NOTE — ED PROVIDER NOTES
History  Chief Complaint   Patient presents with   • Insect Bite     Patient reports being bit or stung on left ankle, she's unsure. Daughter administered Epi pen at approximately 2030. She reports feeling SOB but that resolved after the Epi was administered. 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 Patient states that shortly after the bee sting she felt like her left leg swelled up around the sting and she became short of breath. She called her daughter who was 40 minutes away who drove the EpiPen to her and she administered it. She states that shortly after the EpiPen, she felt significant improvement and at this point, the shortness of breath has completely resolved. Currently she complains of pain and itching to the left leg sting wound. Denies any lip or facial swelling, throat tingling, change in voice, nausea or vomiting. Prior to Admission Medications   Prescriptions Last Dose Informant Patient Reported? Taking? COMBIVENT RESPIMAT inhaler   Yes No   Sig: Inhale 2 puffs every 4 (four) hours as needed   CRANBERRY PO  Child Yes No   Sig: Take 4,200 mg by mouth daily. DiphenhydrAMINE HCl (ALLERGY MED PO)  Child Yes No   Sig: Take 25 mg by mouth as needed. [facility]   EPINEPHrine (EPIPEN) 0.3 mg/0.3 mL SOAJ   No No   Sig: Inject 0.3 mL (0.3 mg total) into a muscle once for 1 dose   LORazepam (ATIVAN) 1 mg tablet   No No   Sig: Take 1 tablet (1 mg total) by mouth 4 (four) times a day for 10 days   NIFEdipine ER (ADALAT CC) 60 MG 24 hr tablet   No No   Sig: Take 1 tablet (60 mg total) by mouth daily   SUMAtriptan (IMITREX) 100 mg tablet  Child Yes No   Sig: Take 100 mg by mouth once as needed for migraine.    acetaminophen (TYLENOL) 500 mg tablet   No No   Sig: Take 2 tablets (1,000 mg total) by mouth every 6 (six) hours as needed for mild pain or moderate pain buPROPion (WELLBUTRIN SR) 150 mg 12 hr tablet  Child Yes No   Sig: Take 150 mg by mouth 2 (two) times a day. 2 tabs in AM; one tab in afternoon   citalopram (CeleXA) 40 mg tablet  Child Yes No   Sig: Take 40 mg by mouth daily. cyanocobalamin 1,000 mcg/mL   Yes No   Si INJECTION MONTHLY   ferrous sulfate 325 (65 Fe) mg tablet   No No   Sig: Take 1 tablet (325 mg total) by mouth every other day   gabapentin (NEURONTIN) 300 mg capsule  Child Yes No   Sig: Take 300 mg by mouth 4 (four) times a day   levETIRAcetam (KEPPRA) 500 mg tablet   Yes No   Sig: Take 1,500 mg by mouth 2 (two) times a day   levothyroxine 100 mcg tablet  Child Yes No   Sig: Take 100 mcg by mouth daily. lisinopril (ZESTRIL) 10 mg tablet   No No   Sig: Take 1 tablet (10 mg total) by mouth daily after breakfast   lubiprostone (AMITIZA) 24 mcg capsule  Child Yes No   Sig: Take 24 mcg by mouth 2 (two) times a day with meals. methocarbamol (ROBAXIN) 500 mg tablet   No No   Sig: Take 1 tablet (500 mg total) by mouth 3 (three) times a day as needed for muscle spasms   pantoprazole (PROTONIX) 40 mg tablet   No No   Sig: Take 1 tablet (40 mg total) by mouth daily in the early morning   senna-docusate sodium (SENOKOT S) 8.6-50 mg per tablet   No No   Sig: Take 1 tablet by mouth daily at bedtime   tamsulosin (FLOMAX) 0.4 mg   No No   Sig: Take 1 capsule (0.4 mg total) by mouth daily with dinner   temazepam (RESTORIL) 22.5 MG capsule  Child Yes No   Sig: Take 22.5 mg by mouth daily at bedtime.    tiZANidine (ZANAFLEX) 2 mg tablet   No No   Sig: Take 1 tablet (2 mg total) by mouth 2 (two) times a day as needed for muscle spasms for up to 3 days      Facility-Administered Medications: None       Past Medical History:   Diagnosis Date   • Abnormal gait    • Abscess of abdominal cavity (HCC)    • Aneurysm (HCC)     Brain with clips x 3 per pt   • Arm injury     Right arm- pt present with short FA/wrist  immoblizer present on ED arrival   • Cataract    • Cellulitis     B/L LE   • Chronic back pain    • Chronic pain    • Disease of thyroid gland     hypothyroid   • Edema     B/L LE   • Falls frequently     last fall 1 hour ago per pt   • Foot drop, left foot    • Fracture     Facial bones per pt- left orbit/cheek? pt unsure   • Fracture closed, nasal bone    • Gastric bypass status for obesity    • Hypoglycemia    • Memory loss, short term    • Migraine    • MRSA (methicillin resistant Staphylococcus aureus)    • Stomach ulcer        Past Surgical History:   Procedure Laterality Date   • BACK SURGERY      lumbar/sacral fusion per pt   • CEREBRAL ANEURYSM REPAIR      with clips x3 per pt   •  SECTION      x 4 per pt   • GASTRIC BYPASS     • MRSA CULTURE (HISTORICAL)      stomach abscess per pt       Family History   Problem Relation Age of Onset   • Diabetes Mother    • Heart failure Mother    • Breast cancer Mother    • Heart disease Father      I have reviewed and agree with the history as documented. E-Cigarette/Vaping   • E-Cigarette Use Never User      E-Cigarette/Vaping Substances   • Nicotine No    • THC No    • CBD No    • Flavoring No    • Other No    • Unknown No      Social History     Tobacco Use   • Smoking status: Every Day     Packs/day: 0.50     Types: Cigarettes, Cigars   • Smokeless tobacco: Never   Vaping Use   • Vaping Use: Never used   Substance Use Topics   • Alcohol use: Not Currently     Comment: social   • Drug use: No       Review of Systems   Constitutional: Negative for chills and fever. HENT: Negative for congestion, drooling, trouble swallowing and voice change. Respiratory: Positive for shortness of breath. Negative for chest tightness and wheezing. Cardiovascular: Positive for leg swelling. Negative for chest pain and palpitations. Gastrointestinal: Negative for abdominal pain, nausea and vomiting. Neurological: Negative for dizziness, weakness and light-headedness.        Physical Exam  Physical Exam  Vitals and nursing note reviewed. Constitutional:       General: She is not in acute distress. Appearance: Normal appearance. She is not ill-appearing, toxic-appearing or diaphoretic. HENT:      Head: Normocephalic and atraumatic. Mouth/Throat:      Mouth: Mucous membranes are moist.   Eyes:      Conjunctiva/sclera: Conjunctivae normal.      Pupils: Pupils are equal, round, and reactive to light. Cardiovascular:      Rate and Rhythm: Normal rate and regular rhythm. Pulses: Normal pulses. Heart sounds: Normal heart sounds. No murmur heard. Pulmonary:      Effort: Pulmonary effort is normal. No respiratory distress. Breath sounds: Normal breath sounds. No stridor. No wheezing, rhonchi or rales. Chest:      Chest wall: No tenderness. Abdominal:      General: Bowel sounds are normal. There is no distension. Palpations: Abdomen is soft. Tenderness: There is no abdominal tenderness. There is no guarding or rebound. Musculoskeletal:      Right lower leg: Edema (chronic BL LE edema - there is a bit more swelling above the left ankle where she got stung ) present. Left lower leg: Edema present. Skin:     General: Skin is warm and dry. Neurological:      General: No focal deficit present. Mental Status: She is alert and oriented to person, place, and time. Mental status is at baseline.          Vital Signs  ED Triage Vitals [08/27/23 2054]   Temperature Pulse Respirations Blood Pressure SpO2   98 °F (36.7 °C) 72 16 151/90 99 %      Temp Source Heart Rate Source Patient Position - Orthostatic VS BP Location FiO2 (%)   Oral Monitor Sitting Left arm --      Pain Score       --           Vitals:    08/27/23 2054   BP: 151/90   Pulse: 72   Patient Position - Orthostatic VS: Sitting         Visual Acuity      ED Medications  Medications   diphenhydrAMINE (BENADRYL) injection 25 mg (25 mg Intravenous Given 8/27/23 2108)   Famotidine (PF) (PEPCID) injection 20 mg (20 mg Intravenous Given 8/27/23 2108)   methylPREDNISolone sodium succinate (Solu-MEDROL) injection 125 mg (125 mg Intravenous Given 8/27/23 2108)       Diagnostic Studies  Results Reviewed     None                 No orders to display              Procedures  ECG 12 Lead Documentation Only    Date/Time: 8/27/2023 9:23 PM    Performed by: Jimi Motta DO  Authorized by: Jimi Motta DO    Indications / Diagnosis:  S/p epi   ECG reviewed by me, the ED Provider: yes    Patient location:  ED  Previous ECG:     Previous ECG:  Unavailable  Interpretation:     Interpretation: normal    Rate:     ECG rate:  75    ECG rate assessment: normal    Rhythm:     Rhythm: sinus rhythm    Ectopy:     Ectopy: none    QRS:     QRS axis:  Normal    QRS intervals:  Normal  Conduction:     Conduction: normal    ST segments:     ST segments:  Normal  T waves:     T waves: normal    Comments:      qtc [id number]             ED Course  ED Course as of 08/27/23 2333   Sun Aug 27, 2023   2304 Patient continues to deny any shortness of breath, wheezing, nausea, vomiting, abdominal pain, chest pain. She still has itching and throbbing at the site of the bee sting which is to be expected. As patient gave herself epinephrine at 830pm, will monitor until 1130pm and if continues to be ok, will discharge. 2333 Patient is ready for discharge. Discussed strict return precautions and discharge instructions with the patient and her daughter at bedside who both verbalized understanding. All questions were answered. SBIRT 22yo+    Flowsheet Row Most Recent Value   Initial Alcohol Screen: US AUDIT-C     1. How often do you have a drink containing alcohol? 0 Filed at: 08/27/2023 2057   2. How many drinks containing alcohol do you have on a typical day you are drinking? 0 Filed at: 08/27/2023 2057   3a. Male UNDER 65: How often do you have five or more drinks on one occasion? 0 Filed at: 08/27/2023 2057   3b.  FEMALE Any Age, or MALE 65+: How often do you have 4 or more drinks on one occassion? 0 Filed at: 08/27/2023 2057   Audit-C Score 0 Filed at: 08/27/2023 2057   KRISTEL: How many times in the past year have you. .. Used an illegal drug or used a prescription medication for non-medical reasons? Never Filed at: 08/27/2023 2057                    Medical Decision Making  Assessment and plan:  Bee sting. Status post epinephrine at home. Will add additional steroids, famotidine and Benadryl. Monitor x3 hours. If no rebound or worsening, can discharge with prescription for EpiPen and 5-day burst of prednisone. Risk  Prescription drug management. Disposition  Final diagnoses:   Bee sting   Anaphylaxis, initial encounter     Time reflects when diagnosis was documented in both MDM as applicable and the Disposition within this note     Time User Action Codes Description Comment    8/27/2023 11:06 PM Vergil Days Add [Y94.088W] Bee sting     8/27/2023 11:06 PM Vergil Days Add [T78. 2XXA] Anaphylaxis, initial encounter       ED Disposition     ED Disposition   Discharge    Condition   Stable    Date/Time   Sun Aug 27, 2023 11:06 PM    Comment   Jose Soliman discharge to home/self care.                Follow-up Information     Follow up With Specialties Details Why Contact Info Additional 2070 Kris Hancock MD Family Medicine Schedule an appointment as soon as possible for a visit in 3 days for re-evaluation [address] [facility] [address] Emergency Medicine Go to  As needed, If symptoms worsen, for re-evaluation [address] [facility] [id number]  [address] [facility] Emergency Department, [address] [facility], [address], located within [facility],[address]          Patient's Medications   Discharge Prescriptions    EPINEPHRINE (EPIPEN) 0.3 MG/0.3 ML SOAJ    Inject 0.3 mL (0.3 mg total) into a muscle once for 1 dose       Start Date: 8/27/2023 End Date: 8/27/2023       Order Dose: 0.3 mg       Quantity: 0.6 mL    Refills: 0    PREDNISONE 20 MG TABLET    Take 2 tablets (40 mg total) by mouth daily for 5 days       Start Date: 8/27/2023 End Date: 9/1/2023       Order Dose: 40 mg       Quantity: 10 tablet    Refills: 0       No discharge procedures on file.     PDMP Review       Value Time User    PDMP Reviewed  Yes 9/21/2020  2:39 PM Shubham Hunter MD          ED Provider  Electronically Signed by           Barbara Serrano DO  08/27/23 1672

## 2024-02-21 PROBLEM — R19.7 DIARRHEA WITH DEHYDRATION: Status: RESOLVED | Noted: 2020-09-15 | Resolved: 2024-02-21

## 2024-03-18 ENCOUNTER — OFFICE VISIT (OUTPATIENT)
Dept: URGENT CARE | Facility: CLINIC | Age: 59
End: 2024-03-18
Payer: COMMERCIAL

## 2024-03-18 VITALS
TEMPERATURE: 98.5 F | OXYGEN SATURATION: 97 % | WEIGHT: 170 LBS | SYSTOLIC BLOOD PRESSURE: 133 MMHG | DIASTOLIC BLOOD PRESSURE: 90 MMHG | BODY MASS INDEX: 27.44 KG/M2 | HEART RATE: 93 BPM

## 2024-03-18 DIAGNOSIS — H66.001 NON-RECURRENT ACUTE SUPPURATIVE OTITIS MEDIA OF RIGHT EAR WITHOUT SPONTANEOUS RUPTURE OF TYMPANIC MEMBRANE: Primary | ICD-10-CM

## 2024-03-18 DIAGNOSIS — B37.9 YEAST INFECTION: ICD-10-CM

## 2024-03-18 PROCEDURE — 99213 OFFICE O/P EST LOW 20 MIN: CPT

## 2024-03-18 PROCEDURE — S9083 URGENT CARE CENTER GLOBAL: HCPCS

## 2024-03-18 RX ORDER — FLUCONAZOLE 100 MG/1
100 TABLET ORAL DAILY
Qty: 1 TABLET | Refills: 0 | Status: SHIPPED | OUTPATIENT
Start: 2024-03-18 | End: 2024-03-19

## 2024-03-18 RX ORDER — AMOXICILLIN 500 MG/1
500 CAPSULE ORAL EVERY 12 HOURS SCHEDULED
Qty: 20 CAPSULE | Refills: 0 | Status: SHIPPED | OUTPATIENT
Start: 2024-03-18 | End: 2024-03-28

## 2024-03-18 NOTE — PROGRESS NOTES
[facility]        NAME: Aleshia Mauricio is a 58 y.o. female  : 1965    MRN: [id number]  DATE: 2024  TIME: 6:19 PM    Assessment and Plan   Non-recurrent acute suppurative otitis media of right ear without spontaneous rupture of tympanic membrane [H66.001]  1. Non-recurrent acute suppurative otitis media of right ear without spontaneous rupture of tympanic membrane  amoxicillin (AMOXIL) 500 mg capsule    fluconazole (DIFLUCAN) 100 mg tablet      2. Yeast infection              Patient Instructions       Follow up with PCP in 3-5 days.  Proceed to  ER if symptoms worsen.    If tests have been performed at [facility], our office will contact you with results if changes need to be made to the care plan discussed with you at the visit.  You can review your full results on [facility] MyChart.    Chief Complaint     Chief Complaint   Patient presents with    Ear Fullness     Patient presents with bilat ear fullness and drainage from eyes         History of Present Illness       This is a 58-year-old female who presents today with bilateral ear pain.  She states she has been sick for about 3 months on and off she did see her PCP she takes decongestants it gets better.  Today she felt pain in her left ear and felt a pop she states she has some drainage from the left ear  She is also having pain in her right ear she states her daughter pulled out some wax from her ear.  She does complain of postnasal drip and pressure in her ears she has a cough but is a smoker.    Ear Fullness   Associated symptoms include coughing and ear discharge. Pertinent negatives include no rhinorrhea or sore throat.       Review of Systems   Review of Systems   Constitutional:  Negative for chills, diaphoresis, fatigue and fever.   HENT:  Positive for congestion, ear discharge, ear pain and postnasal drip. Negative for rhinorrhea and sore throat.    Eyes: Negative.    Respiratory:  Positive for cough. Negative for shortness  of breath.    Cardiovascular:  Negative for chest pain.   Gastrointestinal: Negative.    Genitourinary: Negative.    Musculoskeletal:  Negative for myalgias.         Current Medications       Current Outpatient Medications:     acetaminophen (TYLENOL) 500 mg tablet, Take 2 tablets (1,000 mg total) by mouth every 6 (six) hours as needed for mild pain or moderate pain, Disp: 30 tablet, Rfl: 0    amoxicillin (AMOXIL) 500 mg capsule, Take 1 capsule (500 mg total) by mouth every 12 (twelve) hours for 10 days, Disp: 20 capsule, Rfl: 0    buPROPion (WELLBUTRIN SR) 150 mg 12 hr tablet, Take 150 mg by mouth 2 (two) times a day. 2 tabs in AM; one tab in afternoon, Disp: , Rfl:     citalopram (CeleXA) 40 mg tablet, Take 40 mg by mouth daily., Disp: , Rfl:     COMBIVENT RESPIMAT inhaler, Inhale 2 puffs every 4 (four) hours as needed, Disp: , Rfl: 5    CRANBERRY PO, Take 4,200 mg by mouth daily., Disp: , Rfl:     cyanocobalamin 1,000 mcg/mL, 1 INJECTION MONTHLY, Disp: , Rfl:     DiphenhydrAMINE HCl (ALLERGY MED PO), Take 25 mg by mouth as needed. [facility], Disp: , Rfl:     fluconazole (DIFLUCAN) 100 mg tablet, Take 1 tablet (100 mg total) by mouth daily for 1 day, Disp: 1 tablet, Rfl: 0    gabapentin (NEURONTIN) 300 mg capsule, Take 300 mg by mouth 4 (four) times a day, Disp: , Rfl:     levETIRAcetam (KEPPRA) 500 mg tablet, Take 1,500 mg by mouth 2 (two) times a day, Disp: , Rfl: 5    levothyroxine 100 mcg tablet, Take 100 mcg by mouth daily., Disp: , Rfl:     lubiprostone (AMITIZA) 24 mcg capsule, Take 24 mcg by mouth 2 (two) times a day with meals., Disp: , Rfl:     methocarbamol (ROBAXIN) 500 mg tablet, Take 1 tablet (500 mg total) by mouth 3 (three) times a day as needed for muscle spasms, Disp: 30 tablet, Rfl: 0    pantoprazole (PROTONIX) 40 mg tablet, Take 1 tablet (40 mg total) by mouth daily in the early morning, Disp: 60 tablet, Rfl: 0    senna-docusate sodium (SENOKOT S) 8.6-50 mg per tablet, Take 1 tablet by  mouth daily at bedtime, Disp: 30 tablet, Rfl: 0    SUMAtriptan (IMITREX) 100 mg tablet, Take 100 mg by mouth once as needed for migraine., Disp: , Rfl:     tamsulosin (FLOMAX) 0.4 mg, Take 1 capsule (0.4 mg total) by mouth daily with dinner, Disp: 30 capsule, Rfl: 0    temazepam (RESTORIL) 22.5 MG capsule, Take 22.5 mg by mouth daily at bedtime., Disp: , Rfl:     EPINEPHrine (EPIPEN) 0.3 mg/0.3 mL SOAJ, Inject 0.3 mL (0.3 mg total) into a muscle once for 1 dose, Disp: 0.6 mL, Rfl: 0    ferrous sulfate 325 (65 Fe) mg tablet, Take 1 tablet (325 mg total) by mouth every other day (Patient not taking: Reported on 3/18/2024), Disp: 60 tablet, Rfl: 0    lisinopril (ZESTRIL) 10 mg tablet, Take 1 tablet (10 mg total) by mouth daily after breakfast (Patient not taking: Reported on 3/18/2024), Disp: 30 tablet, Rfl: 0    LORazepam (ATIVAN) 1 mg tablet, Take 1 tablet (1 mg total) by mouth 4 (four) times a day for 10 days, Disp: 40 tablet, Rfl: 0    NIFEdipine ER (ADALAT CC) 60 MG 24 hr tablet, Take 1 tablet (60 mg total) by mouth daily (Patient not taking: Reported on 3/18/2024), Disp: 30 tablet, Rfl: 0    tiZANidine (ZANAFLEX) 2 mg tablet, Take 1 tablet (2 mg total) by mouth 2 (two) times a day as needed for muscle spasms for up to 3 days, Disp: 6 tablet, Rfl: 0    Current Allergies     Allergies as of 03/18/2024 - Reviewed 03/18/2024   Allergen Reaction Noted    Iodinated contrast media Swelling 03/22/2016    Bee venom  03/22/2016    Iodine - food allergy  08/14/2019            The following portions of the patient's history were reviewed and updated as appropriate: allergies, current medications, past family history, past medical history, past social history, past surgical history and problem list.     Past Medical History:   Diagnosis Date    Abnormal gait     Abscess of abdominal cavity (HCC)     Aneurysm (HCC)     Brain with clips x 3 per pt    Arm injury     Right arm- pt present with short FA/wrist  immoblizer present  on ED arrival    Cataract     Cellulitis     B/L LE    Chronic back pain     Chronic pain     COPD (chronic obstructive pulmonary disease) (HCC)     Disease of thyroid gland     hypothyroid    Edema     B/L LE    Falls frequently     last fall 1 hour ago per pt    Foot drop, left foot     Fracture     Facial bones per pt- left orbit/cheek? pt unsure    Fracture closed, nasal bone     Gastric bypass status for obesity     Hypoglycemia     Memory loss, short term     Migraine     MRSA (methicillin resistant Staphylococcus aureus)     Stomach ulcer        Past Surgical History:   Procedure Laterality Date    BACK SURGERY      lumbar/sacral fusion per pt    CEREBRAL ANEURYSM REPAIR      with clips x3 per pt     SECTION      x 4 per pt    GASTRIC BYPASS      MRSA CULTURE (HISTORICAL)      stomach abscess per pt       Family History   Problem Relation Age of Onset    Diabetes Mother     Heart failure Mother     Breast cancer Mother     Heart disease Father          Medications have been verified.        Objective   /90   Pulse 93   Temp 98.5 °F (36.9 °C) (Tympanic)   Wt 77.1 kg (170 lb)   LMP  (LMP Unknown)   SpO2 97%   BMI 27.44 kg/m²   No LMP recorded (lmp unknown). Patient is postmenopausal.       Physical Exam     Physical Exam  Constitutional:       Appearance: Normal appearance. She is normal weight.   HENT:      Head: Normocephalic and atraumatic.      Right Ear: External ear normal. There is no impacted cerumen.      Left Ear: External ear normal. There is no impacted cerumen.      Ears:      Comments: R tympanic membrane is eurythermic, L tympanic membrane ruptured no drainage       Nose: Congestion present.      Mouth/Throat:      Mouth: Mucous membranes are dry.   Eyes:      Conjunctiva/sclera: Conjunctivae normal.      Pupils: Pupils are equal, round, and reactive to light.   Cardiovascular:      Rate and Rhythm: Normal rate and regular rhythm.      Pulses: Normal pulses.      Heart sounds:  Normal heart sounds.   Pulmonary:      Effort: Pulmonary effort is normal.      Breath sounds: Normal breath sounds. No wheezing, rhonchi or rales.   Abdominal:      General: Abdomen is flat. Bowel sounds are normal.   Musculoskeletal:         General: Normal range of motion.      Cervical back: Normal range of motion and neck supple. No rigidity or tenderness.   Skin:     General: Skin is warm and dry.      Capillary Refill: Capillary refill takes less than 2 seconds.   Neurological:      General: No focal deficit present.      Mental Status: She is alert and oriented to person, place, and time.   Psychiatric:         Mood and Affect: Mood normal.         Thought Content: Thought content normal.         Judgment: Judgment normal.

## 2024-03-18 NOTE — PATIENT INSTRUCTIONS
Saline nasal spray as often as needed to nostril  Flonase nasal spray 1 spray to each nostril daily  Take the antibiotic as prescribed  Take the Diflucan in case she get it yeast infection   eat yogurt while on the antibiotic  Tylenol or Motrin for pain  Warm compresses to the ear  Do not stick anything in your ears

## 2024-05-01 PROBLEM — H66.001 NON-RECURRENT ACUTE SUPPURATIVE OTITIS MEDIA OF RIGHT EAR WITHOUT SPONTANEOUS RUPTURE OF TYMPANIC MEMBRANE: Status: RESOLVED | Noted: 2024-03-18 | Resolved: 2024-05-01

## 2024-07-04 ENCOUNTER — HOSPITAL ENCOUNTER (EMERGENCY)
Facility: HOSPITAL | Age: 59
Discharge: HOME | End: 2024-07-04
Attending: EMERGENCY MEDICINE | Admitting: EMERGENCY MEDICINE
Payer: COMMERCIAL

## 2024-07-04 VITALS
OXYGEN SATURATION: 95 % | HEART RATE: 81 BPM | HEIGHT: 66 IN | SYSTOLIC BLOOD PRESSURE: 151 MMHG | TEMPERATURE: 97.5 F | RESPIRATION RATE: 16 BRPM | WEIGHT: 169 LBS | BODY MASS INDEX: 27.16 KG/M2 | DIASTOLIC BLOOD PRESSURE: 70 MMHG

## 2024-07-04 DIAGNOSIS — L03.115 CELLULITIS OF BOTH LOWER EXTREMITIES: Primary | ICD-10-CM

## 2024-07-04 DIAGNOSIS — L03.116 CELLULITIS OF BOTH LOWER EXTREMITIES: Primary | ICD-10-CM

## 2024-07-04 DIAGNOSIS — R09.81 NASAL SINUS CONGESTION: ICD-10-CM

## 2024-07-04 PROCEDURE — 99283 EMERGENCY DEPT VISIT LOW MDM: CPT

## 2024-07-04 PROCEDURE — 99284 EMERGENCY DEPT VISIT MOD MDM: CPT | Performed by: EMERGENCY MEDICINE

## 2024-07-04 RX ORDER — DOXYCYCLINE HYCLATE 100 MG/1
100 CAPSULE ORAL 2 TIMES DAILY
Qty: 14 CAPSULE | Refills: 0 | Status: SHIPPED | OUTPATIENT
Start: 2024-07-04 | End: 2024-07-11

## 2024-07-04 RX ORDER — DOXYCYCLINE HYCLATE 100 MG/1
100 CAPSULE ORAL ONCE
Status: COMPLETED | OUTPATIENT
Start: 2024-07-04 | End: 2024-07-04

## 2024-07-04 RX ADMIN — DOXYCYCLINE 100 MG: 100 CAPSULE ORAL at 23:39

## 2024-07-05 NOTE — DISCHARGE INSTRUCTIONS
You have been seen for cellulitis and ear pressure. Please complete the course of doycycline as prescribed. Use debrox to clear right ear. Return to the emergency department if you develop worsening pain, fevers, weakness or any other symptoms of concern. Please follow up with your PCP by calling the number provided.

## 2024-07-05 NOTE — ED PROVIDER NOTES
History  Chief Complaint   Patient presents with    Earache     Comes to ed c/o ear pain, headache and cellulitis in legs.     Leg Swelling     Aleshia Mauricio is a 58 y.o. year old female with PMH of COPD, lower extremity cellulitis presenting to the [facility] ED for two complaints.  Patient main complaint is bilateral ear pressure and sinus congestion. She states this has been going on intermittently for the past 4 months. She is reporting green nasal discharge.  She has had no fevers or chills.  No sore throat.  She reports a chronic cough which she attributes to smoking. No chest pain or dyspnea. Patient states she previously completed course of Augmentin for symptoms without resolution.    Patient also reporting discomfort and redness in bilateral lower extremities. This has been ongoing for one week. No fall or trauma to the area. Patient denies weakness or numbness in the area. Patient has history of lower extremity cellulitis previously.      History provided by:  Medical records and patient   used: No    Earache  Associated symptoms: congestion, cough and rash    Associated symptoms: no abdominal pain, no diarrhea, no ear discharge, no fever, no sore throat and no vomiting        Prior to Admission Medications   Prescriptions Last Dose Informant Patient Reported? Taking?   COMBIVENT RESPIMAT inhaler   Yes No   Sig: Inhale 2 puffs every 4 (four) hours as needed   CRANBERRY PO  Child Yes No   Sig: Take 4,200 mg by mouth daily.   DiphenhydrAMINE HCl (ALLERGY MED PO)  Child Yes No   Sig: Take 25 mg by mouth as needed. [facility]   EPINEPHrine (EPIPEN) 0.3 mg/0.3 mL SOAJ   No No   Sig: Inject 0.3 mL (0.3 mg total) into a muscle once for 1 dose   LORazepam (ATIVAN) 1 mg tablet   No No   Sig: Take 1 tablet (1 mg total) by mouth 4 (four) times a day for 10 days   NIFEdipine ER (ADALAT CC) 60 MG 24 hr tablet   No No   Sig: Take 1 tablet (60 mg total) by mouth daily   Patient not taking: Reported on  3/18/2024   SUMAtriptan (IMITREX) 100 mg tablet  Child Yes No   Sig: Take 100 mg by mouth once as needed for migraine.   acetaminophen (TYLENOL) 500 mg tablet   No No   Sig: Take 2 tablets (1,000 mg total) by mouth every 6 (six) hours as needed for mild pain or moderate pain   buPROPion (WELLBUTRIN SR) 150 mg 12 hr tablet  Child Yes No   Sig: Take 150 mg by mouth 2 (two) times a day. 2 tabs in AM; one tab in afternoon   citalopram (CeleXA) 40 mg tablet  Child Yes No   Sig: Take 40 mg by mouth daily.   cyanocobalamin 1,000 mcg/mL   Yes No   Si INJECTION MONTHLY   ferrous sulfate 325 (65 Fe) mg tablet   No No   Sig: Take 1 tablet (325 mg total) by mouth every other day   Patient not taking: Reported on 3/18/2024   gabapentin (NEURONTIN) 300 mg capsule  Child Yes No   Sig: Take 300 mg by mouth 4 (four) times a day   levETIRAcetam (KEPPRA) 500 mg tablet   Yes No   Sig: Take 1,500 mg by mouth 2 (two) times a day   levothyroxine 100 mcg tablet  Child Yes No   Sig: Take 100 mcg by mouth daily.   lisinopril (ZESTRIL) 10 mg tablet   No No   Sig: Take 1 tablet (10 mg total) by mouth daily after breakfast   Patient not taking: Reported on 3/18/2024   lubiprostone (AMITIZA) 24 mcg capsule  Child Yes No   Sig: Take 24 mcg by mouth 2 (two) times a day with meals.   methocarbamol (ROBAXIN) 500 mg tablet   No No   Sig: Take 1 tablet (500 mg total) by mouth 3 (three) times a day as needed for muscle spasms   pantoprazole (PROTONIX) 40 mg tablet   No No   Sig: Take 1 tablet (40 mg total) by mouth daily in the early morning   senna-docusate sodium (SENOKOT S) 8.6-50 mg per tablet   No No   Sig: Take 1 tablet by mouth daily at bedtime   tamsulosin (FLOMAX) 0.4 mg   No No   Sig: Take 1 capsule (0.4 mg total) by mouth daily with dinner   temazepam (RESTORIL) 22.5 MG capsule  Child Yes No   Sig: Take 22.5 mg by mouth daily at bedtime.   tiZANidine (ZANAFLEX) 2 mg tablet   No No   Sig: Take 1 tablet (2 mg total) by mouth 2 (two) times  a day as needed for muscle spasms for up to 3 days      Facility-Administered Medications: None       Past Medical History:   Diagnosis Date    Abnormal gait     Abscess of abdominal cavity (HCC)     Aneurysm (HCC)     Brain with clips x 3 per pt    Arm injury     Right arm- pt present with short FA/wrist  immoblizer present on ED arrival    Cataract     Cellulitis     B/L LE    Chronic back pain     Chronic pain     COPD (chronic obstructive pulmonary disease) (HCC)     Disease of thyroid gland     hypothyroid    Edema     B/L LE    Falls frequently     last fall 1 hour ago per pt    Foot drop, left foot     Fracture     Facial bones per pt- left orbit/cheek? pt unsure    Fracture closed, nasal bone     Gastric bypass status for obesity     Hypoglycemia     Memory loss, short term     Migraine     MRSA (methicillin resistant Staphylococcus aureus)     Stomach ulcer        Past Surgical History:   Procedure Laterality Date    BACK SURGERY      lumbar/sacral fusion per pt    CEREBRAL ANEURYSM REPAIR      with clips x3 per pt     SECTION      x 4 per pt    GASTRIC BYPASS      MRSA CULTURE (HISTORICAL)      stomach abscess per pt       Family History   Problem Relation Age of Onset    Diabetes Mother     Heart failure Mother     Breast cancer Mother     Heart disease Father      I have reviewed and agree with the history as documented.    E-Cigarette/Vaping    E-Cigarette Use Never User      E-Cigarette/Vaping Substances    Nicotine No     THC No     CBD No     Flavoring No     Other No     Unknown No      Social History     Tobacco Use    Smoking status: Every Day     Current packs/day: 0.50     Types: Cigarettes, Cigars    Smokeless tobacco: Never   Vaping Use    Vaping status: Never Used   Substance Use Topics    Alcohol use: Not Currently     Comment: social    Drug use: No       Review of Systems   Constitutional:  Negative for chills and fever.   HENT:  Positive for congestion, ear pain, postnasal drip  and sinus pressure. Negative for ear discharge and sore throat.    Respiratory:  Positive for cough. Negative for shortness of breath.    Cardiovascular:  Negative for chest pain.   Gastrointestinal:  Negative for abdominal pain, diarrhea, nausea and vomiting.   Genitourinary:  Negative for dysuria and flank pain.   Musculoskeletal:  Positive for myalgias.   Skin:  Positive for color change and rash.   All other systems reviewed and are negative.      Physical Exam  Physical Exam  Vitals and nursing note reviewed.   Constitutional:       General: She is not in acute distress.     Appearance: Normal appearance. She is well-developed. She is obese. She is not ill-appearing, toxic-appearing or diaphoretic.   HENT:      Head: Normocephalic and atraumatic.      Right Ear: External ear normal. There is impacted cerumen. No foreign body.      Left Ear: Tympanic membrane and external ear normal. No swelling or tenderness.  No middle ear effusion. There is no impacted cerumen.      Nose: No congestion or rhinorrhea.   Eyes:      General:         Right eye: No discharge.         Left eye: No discharge.   Cardiovascular:      Rate and Rhythm: Normal rate and regular rhythm.   Pulmonary:      Effort: Pulmonary effort is normal. No accessory muscle usage or respiratory distress.      Breath sounds: Normal breath sounds. No stridor. No decreased breath sounds, wheezing, rhonchi or rales.   Abdominal:      General: There is no distension.      Palpations: Abdomen is soft.      Tenderness: There is no abdominal tenderness. There is no guarding or rebound.   Musculoskeletal:      Cervical back: Normal range of motion and neck supple. No rigidity.      Right lower leg: No tenderness. Edema present.      Left lower leg: No tenderness. Edema present.   Skin:     Capillary Refill: Capillary refill takes less than 2 seconds.      Findings: Erythema and rash present. No abscess. Rash is not scaling.   Neurological:      Mental Status: She  is alert and oriented to person, place, and time.   Psychiatric:         Mood and Affect: Mood normal.         Behavior: Behavior normal.         Vital Signs  ED Triage Vitals [07/04/24 2145]   Temperature Pulse Respirations Blood Pressure SpO2   97.5 °F (36.4 °C) 81 16 151/70 95 %      Temp Source Heart Rate Source Patient Position - Orthostatic VS BP Location FiO2 (%)   Oral Monitor Sitting Right arm --      Pain Score       7           Vitals:    07/04/24 2145   BP: 151/70   Pulse: 81   Patient Position - Orthostatic VS: Sitting         Visual Acuity      ED Medications  Medications   doxycycline hyclate (VIBRAMYCIN) capsule 100 mg (100 mg Oral Given 7/4/24 7376)       Diagnostic Studies  Results Reviewed       None                   No orders to display              Procedures  Procedures         ED Course                               SBIRT 22yo+      Flowsheet Row Most Recent Value   Initial Alcohol Screen: US AUDIT-C     1. How often do you have a drink containing alcohol? 0 Filed at: 07/04/2024 2147   2. How many drinks containing alcohol do you have on a typical day you are drinking?  0 Filed at: 07/04/2024 2147   3b. FEMALE Any Age, or MALE 65+: How often do you have 4 or more drinks on one occassion? 0 Filed at: 07/04/2024 2147   Audit-C Score 0 Filed at: 07/04/2024 2147   KRISTEL: How many times in the past year have you...    Used an illegal drug or used a prescription medication for non-medical reasons? Never Filed at: 07/04/2024 2147                      Medical Decision Making    58 y.o. female presenting for evaluation of multiple complaints.  VSS, nontoxic appearing.  Patient reporting chronic sinus congestion and ear pressure.  Impacted cerumen on right, left TM clear.  No evidence of AOM.  Given duration of symptoms less likely sinusitis.    Bilateral lower extremities erythematous and warm, will treat with course of doxycycline.  No crepitus or fluctuance to suggest NSTI or abscess.  Given  bilateral nature of symptoms do not suspect DVT.    Disposition: I have discussed with the patient our plan to discharge them from the ED and the patient is in agreement with this plan.     Discharge Plan: Rx for doxycycline to cover cellulitis, would also cover sinusitis if present. Continue home medication regimen. RTED precautions emphasized. The patient was provided a written after visit summary with strict RTED precautions.     Followup: I have discussed with the patient plan to follow up with their PCP. Contact information provided in AVS.    Risk  Prescription drug management.             Disposition  Final diagnoses:   Cellulitis of both lower extremities   Nasal sinus congestion     Time reflects when diagnosis was documented in both MDM as applicable and the Disposition within this note       Time User Action Codes Description Comment    7/4/2024 11:34 PM Ethan Hoff [L03.115,  L03.116] Cellulitis of both lower extremities     7/4/2024 11:34 PM Ethan Hoff [R09.81] Nasal sinus congestion           ED Disposition       ED Disposition   Discharge    Condition   Stable    Date/Time   Thu Jul 4, 2024 0503    Comment   Aleshia Mauricio discharge to home/self care.                   Follow-up Information       Follow up With Specialties Details Why Contact Info    Douglas C Shoenberger, MD Family Medicine Schedule an appointment as soon as possible for a visit  To make appointment for reevaluation in 3-5 days. [address] [place] [address]  [phone]              Discharge Medication List as of 7/4/2024 11:35 PM        START taking these medications    Details   doxycycline hyclate (VIBRAMYCIN) 100 mg capsule Take 1 capsule (100 mg total) by mouth 2 (two) times a day for 7 days, Starting u 7/4/2024, Until u 7/11/2024, Normal           CONTINUE these medications which have NOT CHANGED    Details   acetaminophen (TYLENOL) 500 mg tablet Take 2 tablets (1,000 mg total) by mouth  every 6 (six) hours as needed for mild pain or moderate pain, Starting Sat 9/12/2020, Normal      buPROPion (WELLBUTRIN SR) 150 mg 12 hr tablet Take 150 mg by mouth 2 (two) times a day. 2 tabs in AM; one tab in afternoon, Historical Med      citalopram (CeleXA) 40 mg tablet Take 40 mg by mouth daily., Historical Med      COMBIVENT RESPIMAT inhaler Inhale 2 puffs every 4 (four) hours as needed, Starting Sat 8/10/2019, Historical Med      CRANBERRY PO Take 4,200 mg by mouth daily., Historical Med      cyanocobalamin 1,000 mcg/mL 1 INJECTION MONTHLY, Historical Med      DiphenhydrAMINE HCl (ALLERGY MED PO) Take 25 mg by mouth as needed. [place] [facility], Historical Med      EPINEPHrine (EPIPEN) 0.3 mg/0.3 mL SOAJ Inject 0.3 mL (0.3 mg total) into a muscle once for 1 dose, Starting Sun 8/27/2023, Normal      ferrous sulfate 325 (65 Fe) mg tablet Take 1 tablet (325 mg total) by mouth every other day, Starting Fri 10/2/2020, Print      gabapentin (NEURONTIN) 300 mg capsule Take 300 mg by mouth 4 (four) times a day, Starting Thu 2/11/2016, Historical Med      levETIRAcetam (KEPPRA) 500 mg tablet Take 1,500 mg by mouth 2 (two) times a day, Starting Mon 7/29/2019, Historical Med      levothyroxine 100 mcg tablet Take 100 mcg by mouth daily., Historical Med      lisinopril (ZESTRIL) 10 mg tablet Take 1 tablet (10 mg total) by mouth daily after breakfast, Starting Sat 10/3/2020, Print      LORazepam (ATIVAN) 1 mg tablet Take 1 tablet (1 mg total) by mouth 4 (four) times a day for 10 days, Starting Fri 10/2/2020, Until Mon 10/12/2020, Sample      lubiprostone (AMITIZA) 24 mcg capsule Take 24 mcg by mouth 2 (two) times a day with meals., Historical Med      methocarbamol (ROBAXIN) 500 mg tablet Take 1 tablet (500 mg total) by mouth 3 (three) times a day as needed for muscle spasms, Starting Mon 8/1/2022, Normal      NIFEdipine ER (ADALAT CC) 60 MG 24 hr tablet Take 1 tablet (60 mg total) by mouth daily, Starting Sat 10/3/2020,  Print      pantoprazole (PROTONIX) 40 mg tablet Take 1 tablet (40 mg total) by mouth daily in the early morning, Starting Sat 10/3/2020, Print      senna-docusate sodium (SENOKOT S) 8.6-50 mg per tablet Take 1 tablet by mouth daily at bedtime, Starting Fri 10/2/2020, Print      SUMAtriptan (IMITREX) 100 mg tablet Take 100 mg by mouth once as needed for migraine., Historical Med      tamsulosin (FLOMAX) 0.4 mg Take 1 capsule (0.4 mg total) by mouth daily with dinner, Starting Fri 10/2/2020, Print      temazepam (RESTORIL) 22.5 MG capsule Take 22.5 mg by mouth daily at bedtime., Historical Med      tiZANidine (ZANAFLEX) 2 mg tablet Take 1 tablet (2 mg total) by mouth 2 (two) times a day as needed for muscle spasms for up to 3 days, Starting Fri 10/2/2020, Until Mon 10/5/2020, Print             No discharge procedures on file.    PDMP Review         Value Time User    PDMP Reviewed  Yes 9/21/2020  2:39 PM Da Truong MD            ED Provider  Electronically Signed by             Ethan Hoff DO  07/06/24 0042

## 2025-02-24 ENCOUNTER — OFFICE VISIT (OUTPATIENT)
Dept: WOUND CARE | Facility: CLINIC | Age: 60
End: 2025-02-24
Payer: COMMERCIAL

## 2025-02-24 VITALS
RESPIRATION RATE: 18 BRPM | BODY MASS INDEX: 20.89 KG/M2 | WEIGHT: 130 LBS | HEIGHT: 66 IN | HEART RATE: 76 BPM | SYSTOLIC BLOOD PRESSURE: 156 MMHG | TEMPERATURE: 96.3 F | DIASTOLIC BLOOD PRESSURE: 80 MMHG

## 2025-02-24 DIAGNOSIS — R60.0 BILATERAL LOWER EXTREMITY EDEMA: ICD-10-CM

## 2025-02-24 DIAGNOSIS — R26.2 AMBULATORY DYSFUNCTION: ICD-10-CM

## 2025-02-24 DIAGNOSIS — Z87.828 HEALED WOUND: Primary | ICD-10-CM

## 2025-02-24 PROCEDURE — 99212 OFFICE O/P EST SF 10 MIN: CPT | Performed by: NURSE PRACTITIONER

## 2025-02-24 PROCEDURE — 99203 OFFICE O/P NEW LOW 30 MIN: CPT | Performed by: NURSE PRACTITIONER

## 2025-02-24 NOTE — PROGRESS NOTES
Name: Aleshia Mauricio      : 1965      MRN: [id number]  Encounter Provider: AFSHIN Blunt  Encounter Date: 2025   Encounter department: [facility]  :  Assessment & Plan  Healed wound         Ambulatory dysfunction    Orders:    Wound cleansing and dressings; Future    Bilateral lower extremity edema         Plan:  1.  Initial visit.  No wound consult.  Patient has no open wounds present on her bilateral buttocks and her bilateral plantar feet.  Counseled patient and daughter to moisturize her skin 1-2 times per day with lotion to prevent cracking and reopening of skin.  Counseled patient to become established with a podiatrist for routine foot and nail care due to her decreased sensation in her feet secondary to her chronic back pain.  2.  Counseled patient on the importance of compression to help control her lower extremity edema.  Also counseled the importance of elevating her legs frequently throughout the day  3.  Patient was provided contact information for multiple podiatrists for foot and nail care  4.  Counseled patient on the importance of shifting her weight while sitting to prevent new pressure ulcers.  5.  Patient is discharged to the wound center today.  She may follow-up as needed      History of Present Illness   Chief Complaint   Patient presents with    New Patient Visit     Patient is a 59-year-old female who presents to  wound center for a no wound consult for healed wounds of her left buttock and bilateral plantar feet.  Patient reports her left buttock and feet wounds initially developed approximately 7 to 10 days ago.  She reports she noticed drainage from her left buttock which prompted her to be seen at the wound center.  Patient reports she has a prior history of a stage IV pressure ulcer with osteo which she was treated for several years ago.  Patient presents with her left buttock wound fully healed with no drainage present.  Her  "bilateral plantar feet wounds developed as a result of patient applying a chemical peel to her bilateral feet and accidentally fell asleep and kept the chemical peel on for 2 hours instead of the recommended 30 minutes.  She also admits she attempted to trim a callus on her right second toe with a Dremel at home which caused the callus to bleed.  Patient also presents today with no open wounds present on her bilateral feet.  Patient reports she has a history of chronic back pain for which she has a Dilaudid pump.  As a result of her chronic back pain she has decreased sensation in her bilateral feet.  Patient reports she was previously established with a podiatrist however she was unhappy with her care so as a result has not been seen by a podiatrist for some time for routine foot and nail care.  She denies any fevers or chills.      Objective   /80   Pulse 76   Temp (!) 96.3 °F (35.7 °C)   Resp 18   Ht 5' 6\" (1.676 m)   Wt 59 kg (130 lb)   LMP  (LMP Unknown)   BMI 20.98 kg/m²     Physical Exam  Vitals and nursing note reviewed.   Constitutional:       General: She is not in acute distress.     Appearance: Normal appearance. She is normal weight.   HENT:      Head: Normocephalic and atraumatic.   Eyes:      General:         Right eye: No discharge.         Left eye: No discharge.   Pulmonary:      Effort: Pulmonary effort is normal. No respiratory distress.   Musculoskeletal:         General: Normal range of motion.      Cervical back: Normal range of motion and neck supple. No rigidity.      Right lower leg: Edema present.      Left lower leg: Edema present.      Comments: Mild lower extremity edema bilaterally   Skin:     General: Skin is warm and dry.      Findings: No erythema or wound.   Neurological:      General: No focal deficit present.      Mental Status: She is alert and oriented to person, place, and time. Mental status is at baseline.   Psychiatric:         Mood and Affect: Mood normal.    "      Behavior: Behavior normal.         Thought Content: Thought content normal.         Judgment: Judgment normal.

## 2025-02-24 NOTE — PATIENT INSTRUCTIONS
Orders Placed This Encounter   Procedures    Wound cleansing and dressings     No open wounds noted today!      Apply moisturizer to feet daily   Consider wearing compression stockings     Off-loading Instructions:  Do Not Sit for Long Periods of Time.  Turn every 2 hours. Limit side lying to 30 degree tilt. Limit the head of bed elevation to 30 degrees to prevent further pressure injuries       Follow up with podiatrist for routine foot care     Standing Status:   Future     Expiration Date:   3/3/2025

## 2025-04-14 ENCOUNTER — APPOINTMENT (EMERGENCY)
Dept: CT IMAGING | Facility: HOSPITAL | Age: 60
DRG: 871 | End: 2025-04-14
Payer: MEDICARE

## 2025-04-14 ENCOUNTER — APPOINTMENT (EMERGENCY)
Dept: RADIOLOGY | Facility: HOSPITAL | Age: 60
DRG: 871 | End: 2025-04-14
Payer: MEDICARE

## 2025-04-14 ENCOUNTER — HOSPITAL ENCOUNTER (INPATIENT)
Facility: HOSPITAL | Age: 60
LOS: 4 days | Discharge: HOME HEALTH | DRG: 871 | End: 2025-04-19
Attending: EMERGENCY MEDICINE | Admitting: HOSPITALIST
Payer: MEDICARE

## 2025-04-14 DIAGNOSIS — G89.29 CHRONIC MIDLINE LOW BACK PAIN WITH BILATERAL SCIATICA: ICD-10-CM

## 2025-04-14 DIAGNOSIS — M54.59 INTRACTABLE LOW BACK PAIN: ICD-10-CM

## 2025-04-14 DIAGNOSIS — R79.89 ELEVATED TROPONIN: ICD-10-CM

## 2025-04-14 DIAGNOSIS — M54.42 CHRONIC MIDLINE LOW BACK PAIN WITH BILATERAL SCIATICA: ICD-10-CM

## 2025-04-14 DIAGNOSIS — R26.2 AMBULATORY DYSFUNCTION: ICD-10-CM

## 2025-04-14 DIAGNOSIS — J18.9 MULTIFOCAL PNEUMONIA: Primary | ICD-10-CM

## 2025-04-14 DIAGNOSIS — M54.41 CHRONIC MIDLINE LOW BACK PAIN WITH BILATERAL SCIATICA: ICD-10-CM

## 2025-04-14 LAB
2HR DELTA HS TROPONIN: 85 NG/L
4HR DELTA HS TROPONIN: -2 NG/L
ALBUMIN SERPL BCG-MCNC: 3.8 G/DL (ref 3.5–5)
ALP SERPL-CCNC: 128 U/L (ref 34–104)
ALT SERPL W P-5'-P-CCNC: 16 U/L (ref 7–52)
ANION GAP SERPL CALCULATED.3IONS-SCNC: 13 MMOL/L (ref 4–13)
APTT PPP: 41 SECONDS (ref 23–34)
AST SERPL W P-5'-P-CCNC: 19 U/L (ref 13–39)
ATRIAL RATE: 100 BPM
BASOPHILS # BLD AUTO: 0.08 THOUSANDS/ÂΜL (ref 0–0.1)
BASOPHILS NFR BLD AUTO: 1 % (ref 0–1)
BILIRUB SERPL-MCNC: 0.58 MG/DL (ref 0.2–1)
BUN SERPL-MCNC: 10 MG/DL (ref 5–25)
CALCIUM SERPL-MCNC: 8.8 MG/DL (ref 8.4–10.2)
CARDIAC TROPONIN I PNL SERPL HS: 5 NG/L (ref 8–18)
CARDIAC TROPONIN I PNL SERPL HS: 7 NG/L (ref ?–50)
CARDIAC TROPONIN I PNL SERPL HS: 9 NG/L (ref ?–50)
CARDIAC TROPONIN I PNL SERPL HS: 94 NG/L (ref ?–50)
CHLORIDE SERPL-SCNC: 94 MMOL/L (ref 96–108)
CO2 SERPL-SCNC: 26 MMOL/L (ref 21–32)
CREAT SERPL-MCNC: 0.71 MG/DL (ref 0.6–1.3)
D DIMER PPP FEU-MCNC: 4.21 UG/ML FEU
EOSINOPHIL # BLD AUTO: 0.03 THOUSAND/ÂΜL (ref 0–0.61)
EOSINOPHIL NFR BLD AUTO: 0 % (ref 0–6)
ERYTHROCYTE [DISTWIDTH] IN BLOOD BY AUTOMATED COUNT: 15.3 % (ref 11.6–15.1)
GFR SERPL CREATININE-BSD FRML MDRD: 92 ML/MIN/1.73SQ M
GLUCOSE SERPL-MCNC: 172 MG/DL (ref 65–140)
HCT VFR BLD AUTO: 34.8 % (ref 34.8–46.1)
HGB BLD-MCNC: 10.9 G/DL (ref 11.5–15.4)
IMM GRANULOCYTES # BLD AUTO: 0.27 THOUSAND/UL (ref 0–0.2)
IMM GRANULOCYTES NFR BLD AUTO: 2 % (ref 0–2)
INR PPP: 1.41 (ref 0.85–1.19)
LYMPHOCYTES # BLD AUTO: 0.82 THOUSANDS/ÂΜL (ref 0.6–4.47)
LYMPHOCYTES NFR BLD AUTO: 6 % (ref 14–44)
MCH RBC QN AUTO: 27.9 PG (ref 26.8–34.3)
MCHC RBC AUTO-ENTMCNC: 31.3 G/DL (ref 31.4–37.4)
MCV RBC AUTO: 89 FL (ref 82–98)
MONOCYTES # BLD AUTO: 0.87 THOUSAND/ÂΜL (ref 0.17–1.22)
MONOCYTES NFR BLD AUTO: 6 % (ref 4–12)
NEUTROPHILS # BLD AUTO: 12.92 THOUSANDS/ÂΜL (ref 1.85–7.62)
NEUTS SEG NFR BLD AUTO: 85 % (ref 43–75)
NRBC BLD AUTO-RTO: 0 /100 WBCS
P AXIS: 65 DEGREES
PLATELET # BLD AUTO: 357 THOUSANDS/UL (ref 149–390)
PMV BLD AUTO: 9.8 FL (ref 8.9–12.7)
POTASSIUM SERPL-SCNC: 3 MMOL/L (ref 3.5–5.3)
PR INTERVAL: 156 MS
PROT SERPL-MCNC: 8.3 G/DL (ref 6.4–8.4)
PROTHROMBIN TIME: 17.4 SECONDS (ref 12.3–15)
QRS AXIS: 78 DEGREES
QRSD INTERVAL: 94 MS
QT INTERVAL: 314 MS
QTC INTERVAL: 405 MS
RBC # BLD AUTO: 3.9 MILLION/UL (ref 3.81–5.12)
SODIUM SERPL-SCNC: 133 MMOL/L (ref 135–147)
T WAVE AXIS: -19 DEGREES
VENTRICULAR RATE: 100 BPM
WBC # BLD AUTO: 14.99 THOUSAND/UL (ref 4.31–10.16)

## 2025-04-14 PROCEDURE — 71275 CT ANGIOGRAPHY CHEST: CPT

## 2025-04-14 PROCEDURE — 85379 FIBRIN DEGRADATION QUANT: CPT

## 2025-04-14 PROCEDURE — 96372 THER/PROPH/DIAG INJ SC/IM: CPT

## 2025-04-14 PROCEDURE — 84484 ASSAY OF TROPONIN QUANT: CPT

## 2025-04-14 PROCEDURE — 93005 ELECTROCARDIOGRAM TRACING: CPT

## 2025-04-14 PROCEDURE — 99285 EMERGENCY DEPT VISIT HI MDM: CPT | Performed by: EMERGENCY MEDICINE

## 2025-04-14 PROCEDURE — 80053 COMPREHEN METABOLIC PANEL: CPT

## 2025-04-14 PROCEDURE — 96367 TX/PROPH/DG ADDL SEQ IV INF: CPT

## 2025-04-14 PROCEDURE — 85610 PROTHROMBIN TIME: CPT

## 2025-04-14 PROCEDURE — 36415 COLL VENOUS BLD VENIPUNCTURE: CPT

## 2025-04-14 PROCEDURE — 96366 THER/PROPH/DIAG IV INF ADDON: CPT

## 2025-04-14 PROCEDURE — 99285 EMERGENCY DEPT VISIT HI MDM: CPT

## 2025-04-14 PROCEDURE — 96375 TX/PRO/DX INJ NEW DRUG ADDON: CPT

## 2025-04-14 PROCEDURE — 70450 CT HEAD/BRAIN W/O DYE: CPT

## 2025-04-14 PROCEDURE — 85730 THROMBOPLASTIN TIME PARTIAL: CPT

## 2025-04-14 PROCEDURE — 71046 X-RAY EXAM CHEST 2 VIEWS: CPT

## 2025-04-14 PROCEDURE — 93010 ELECTROCARDIOGRAM REPORT: CPT | Performed by: INTERNAL MEDICINE

## 2025-04-14 PROCEDURE — 96365 THER/PROPH/DIAG IV INF INIT: CPT

## 2025-04-14 PROCEDURE — 85025 COMPLETE CBC W/AUTO DIFF WBC: CPT

## 2025-04-14 RX ORDER — MAGNESIUM SULFATE HEPTAHYDRATE 40 MG/ML
2 INJECTION, SOLUTION INTRAVENOUS ONCE
Status: COMPLETED | OUTPATIENT
Start: 2025-04-14 | End: 2025-04-14

## 2025-04-14 RX ORDER — METOCLOPRAMIDE HYDROCHLORIDE 5 MG/ML
10 INJECTION INTRAMUSCULAR; INTRAVENOUS ONCE
Status: COMPLETED | OUTPATIENT
Start: 2025-04-14 | End: 2025-04-14

## 2025-04-14 RX ORDER — SUMATRIPTAN 6 MG/.5ML
6 INJECTION, SOLUTION SUBCUTANEOUS ONCE
Status: COMPLETED | OUTPATIENT
Start: 2025-04-14 | End: 2025-04-14

## 2025-04-14 RX ORDER — HEPARIN SODIUM 1000 [USP'U]/ML
5600 INJECTION, SOLUTION INTRAVENOUS; SUBCUTANEOUS EVERY 6 HOURS PRN
Status: DISCONTINUED | OUTPATIENT
Start: 2025-04-14 | End: 2025-04-15

## 2025-04-14 RX ORDER — HEPARIN SODIUM 1000 [USP'U]/ML
5600 INJECTION, SOLUTION INTRAVENOUS; SUBCUTANEOUS ONCE
Status: COMPLETED | OUTPATIENT
Start: 2025-04-14 | End: 2025-04-14

## 2025-04-14 RX ORDER — POTASSIUM CHLORIDE 1500 MG/1
40 TABLET, EXTENDED RELEASE ORAL ONCE
Status: COMPLETED | OUTPATIENT
Start: 2025-04-14 | End: 2025-04-14

## 2025-04-14 RX ORDER — GABAPENTIN 300 MG/1
300 CAPSULE ORAL ONCE
Status: COMPLETED | OUTPATIENT
Start: 2025-04-14 | End: 2025-04-14

## 2025-04-14 RX ORDER — HEPARIN SODIUM 10000 [USP'U]/100ML
3-30 INJECTION, SOLUTION INTRAVENOUS
Status: DISCONTINUED | OUTPATIENT
Start: 2025-04-14 | End: 2025-04-15

## 2025-04-14 RX ORDER — ACETAMINOPHEN 325 MG/1
650 TABLET ORAL ONCE
Status: COMPLETED | OUTPATIENT
Start: 2025-04-14 | End: 2025-04-14

## 2025-04-14 RX ORDER — METHOCARBAMOL 500 MG/1
500 TABLET, FILM COATED ORAL ONCE
Status: COMPLETED | OUTPATIENT
Start: 2025-04-14 | End: 2025-04-14

## 2025-04-14 RX ORDER — HEPARIN SODIUM 1000 [USP'U]/ML
2800 INJECTION, SOLUTION INTRAVENOUS; SUBCUTANEOUS EVERY 6 HOURS PRN
Status: DISCONTINUED | OUTPATIENT
Start: 2025-04-14 | End: 2025-04-15

## 2025-04-14 RX ADMIN — GABAPENTIN 300 MG: 300 CAPSULE ORAL at 23:21

## 2025-04-14 RX ADMIN — METHOCARBAMOL 500 MG: 500 TABLET ORAL at 23:21

## 2025-04-14 RX ADMIN — MAGNESIUM SULFATE HEPTAHYDRATE 2 G: 40 INJECTION, SOLUTION INTRAVENOUS at 19:04

## 2025-04-14 RX ADMIN — POTASSIUM CHLORIDE 40 MEQ: 1500 TABLET, EXTENDED RELEASE ORAL at 18:56

## 2025-04-14 RX ADMIN — HYDROCORTISONE SODIUM SUCCINATE 200 MG: 250 INJECTION, POWDER, FOR SOLUTION INTRAMUSCULAR; INTRAVENOUS at 20:00

## 2025-04-14 RX ADMIN — HEPARIN SODIUM 18 UNITS/KG/HR: 10000 INJECTION, SOLUTION INTRAVENOUS at 21:27

## 2025-04-14 RX ADMIN — SUMATRIPTAN 6 MG: 6 INJECTION, SOLUTION SUBCUTANEOUS at 19:25

## 2025-04-14 RX ADMIN — HEPARIN SODIUM 5600 UNITS: 1000 INJECTION INTRAVENOUS; SUBCUTANEOUS at 21:24

## 2025-04-14 RX ADMIN — ACETAMINOPHEN 650 MG: 325 TABLET, FILM COATED ORAL at 18:56

## 2025-04-14 RX ADMIN — DIPHENHYDRAMINE HYDROCHLORIDE 50 MG: 50 INJECTION, SOLUTION INTRAMUSCULAR; INTRAVENOUS at 22:59

## 2025-04-14 RX ADMIN — METOCLOPRAMIDE 10 MG: 5 INJECTION, SOLUTION INTRAMUSCULAR; INTRAVENOUS at 19:00

## 2025-04-14 NOTE — ED PROVIDER NOTES
"Time reflects when diagnosis was documented in both MDM as applicable and the Disposition within this note       Time User Action Codes Description Comment    4/15/2025  3:12 AM Deena Luke [J18.9] Multifocal pneumonia     4/15/2025  3:12 AM Deena Luke [R79.89] Elevated troponin           ED Disposition       ED Disposition   Admit    Condition   Stable    Date/Time   Tue Apr 15, 2025  2:52 AM    Comment                  Assessment & Plan       Medical Decision Making  Patient is a 59 y.o. female with PMH of cerebral aneurysm, IVC filter, migraines who presents to the ED with syncope.    Vital signs initially hypoxic at 88% on room air and tachycardic in the 110s. On exam patient is laying in bed with eyes closed, normal neuroexam, normal cardiopulmonary exam.    differential diagnosis included but not limited to ACS, infection, intracranial pathology, pulmonary embolism.     Plan: CBC, CMP, troponin, D-dimer, CT head, EKG    View ED course above for further discussion on patient workup.     On review of previous records aneurysm clipping in distant past.    All labs reviewed and utilized in the medical decision making process  All radiology studies independently viewed by me and interpreted by the radiologist.  I reviewed all testing with the patient.     ED course contains vital information regarding today's emergency room visit.  Ultimately patient needs to be admitted for elevated troponins and multifocal pneumonia.  Patient agrees understands plan.  Blood cultures and lactate have been ordered.  Antibiotics started after blood cultures drawn.  Admission to [facility].    Disposition: Admission    Portions of the record may have been created with voice recognition software. Occasional wrong word or \"sound a like\" substitutions may have occurred due to the inherent limitations of voice recognition software. Read the chart carefully and recognize, using context, where substitutions have " occurred.      Amount and/or Complexity of Data Reviewed  Labs: ordered. Decision-making details documented in ED Course.  Radiology: ordered and independent interpretation performed. Decision-making details documented in ED Course.    Risk  OTC drugs.  Prescription drug management.  Decision regarding hospitalization.        ED Course as of 04/15/25 2335   Mon Apr 14, 2025   1814 EKG interpreted by me, sinus rhythm at a rate of 100 with PVC, normal axis, normal intervals, no ST elevations or depressions, no significant change compared to August 27, 2023 EKG   1934 D-Dimer, Quant(!): 4.21  Given history, will proceed with CT PE.  Patient has allergy to contrast however has had 4-hour prep in the past with good results.  Will begin this protocol   2008 hs TnI 2hr(!): 94  Ordering EKG   2019 Repeat EKG demonstrates normal sinus rhythm, normal axis, normal intervals, no significant change to prior EKG earlier today, rate of 81   2034 Given the EKGs and troponin need to wait 4 hours for CT PE, will begin VTE PE heparin.  Discussed risks and benefits with patient.  Patient agrees   2248 hs TnI 4hr: 7  Considering the significant decrease compared to 2-hour troponin, concern for lab error or diluted sample, will order repeat   Tue Apr 15, 2025   0135 No PE on CTA will dc heparin, evidence of multifocal pneumonia, blood cx, lactate, procalc ordered   0136 Will order abx after cultures drawn   0142 CTA chest pe study  1. Findings consistent with multifocal pneumonia.  2. No evidence of acute pulmonary embolus, thoracic.     0245 LACTIC ACID: 0.7   0253 Procalcitonin(!): 1.33       Medications   acetaminophen (TYLENOL) tablet 650 mg (has no administration in time range)   buPROPion (WELLBUTRIN XL) 24 hr tablet 150 mg (150 mg Oral Given 4/15/25 0912)   citalopram (CeleXA) tablet 40 mg (40 mg Oral Given 4/15/25 0912)   umeclidinium 62.5 mcg/actuation inhaler AEPB 1 puff (1 puff Inhalation Given 4/15/25 0919)     And    albuterol (PROVENTIL HFA,VENTOLIN HFA) inhaler 2 puff (2 puffs Inhalation Given 4/15/25 1248)   ferrous sulfate tablet 325 mg (0 mg Oral Hold 4/15/25 0428)   gabapentin (NEURONTIN) capsule 300 mg (300 mg Oral Given 4/15/25 1248)   lubiprostone (AMITIZA) capsule 24 mcg (24 mcg Oral Not Given 4/15/25 0740)   levETIRAcetam (KEPPRA) tablet 1,500 mg (1,500 mg Oral Given 4/15/25 0912)   levothyroxine tablet 100 mcg (100 mcg Oral Given 4/15/25 0603)   lisinopril (ZESTRIL) tablet 10 mg (10 mg Oral Given 4/15/25 0428)   LORazepam (ATIVAN) tablet 0.5 mg (has no administration in time range)   methocarbamol (ROBAXIN) tablet 500 mg (500 mg Oral Given 4/15/25 0751)   NIFEdipine (PROCARDIA XL) 24 hr tablet 60 mg (60 mg Oral Given 4/15/25 0912)   pantoprazole (PROTONIX) EC tablet 40 mg (40 mg Oral Given 4/15/25 0742)   senna-docusate sodium (SENOKOT S) 8.6-50 mg per tablet 1 tablet (has no administration in time range)   SUMAtriptan (IMITREX) tablet 100 mg (has no administration in time range)   tamsulosin (FLOMAX) capsule 0.4 mg (has no administration in time range)   temazepam (RESTORIL) capsule 22.5 mg (has no administration in time range)   tiZANidine (ZANAFLEX) tablet 2 mg (has no administration in time range)   lactated ringers infusion (75 mL/hr Intravenous New Bag 4/15/25 0910)   polyethylene glycol (MIRALAX) packet 17 g (has no administration in time range)   nicotine (NICODERM CQ) 7 mg/24hr TD 24 hr patch 1 patch (1 patch Transdermal Not Given 4/15/25 0911)   heparin (porcine) subcutaneous injection 5,000 Units (5,000 Units Subcutaneous Given 4/15/25 0605)   ceftriaxone (ROCEPHIN) 1 g/50 mL in dextrose IVPB (has no administration in time range)   aspirin chewable tablet 81 mg (has no administration in time range)   nitroglycerin (NITROSTAT) SL tablet 0.4 mg (has no administration in time range)   guaiFENesin (MUCINEX) 12 hr tablet 600 mg (600 mg Oral Given 4/15/25 0912)   dextromethorphan-guaiFENesin (ROBITUSSIN DM)  oral syrup 10 mL (has no administration in time range)   doxycycline (VIBRAMYCIN) 100 mg in sodium chloride 0.9 % 100 mL IVPB (has no administration in time range)   labetalol (NORMODYNE) injection 10 mg (has no administration in time range)   acetaminophen (TYLENOL) tablet 650 mg (650 mg Oral Given 4/14/25 1856)   metoclopramide (REGLAN) injection 10 mg (10 mg Intravenous Given 4/14/25 1900)   magnesium sulfate 2 g/50 mL IVPB (premix) 2 g (0 g Intravenous Stopped 4/14/25 2033)   potassium chloride (Klor-Con M20) CR tablet 40 mEq (40 mEq Oral Given 4/14/25 1856)   SUMAtriptan (IMITREX) subcutaneous injection 6 mg (6 mg Subcutaneous Given 4/14/25 1925)   hydrocortisone (Solu-CORTEF) injection 200 mg (200 mg Intravenous Given 4/14/25 2000)   heparin (porcine) injection 5,600 Units (5,600 Units Intravenous Given 4/14/25 2124)   diphenhydrAMINE (BENADRYL) 50 mg in sodium chloride 0.9 % 50 mL IVPB (0 mg Intravenous Stopped 4/15/25 0005)   gabapentin (NEURONTIN) capsule 300 mg (300 mg Oral Given 4/14/25 2321)   methocarbamol (ROBAXIN) tablet 500 mg (500 mg Oral Given 4/14/25 2321)   iohexol (OMNIPAQUE) 350 MG/ML injection (MULTI-DOSE) 100 mL (85 mL Intravenous Given 4/15/25 0004)   LORazepam (ATIVAN) tablet 0.5 mg (0.5 mg Oral Given 4/15/25 0028)   ceftriaxone (ROCEPHIN) 1 g/50 mL in dextrose IVPB (0 mg Intravenous Stopped 4/15/25 0409)   doxycycline hyclate (VIBRAMYCIN) capsule 100 mg (100 mg Oral Given 4/15/25 0231)   sodium chloride 0.9 % bolus 1,000 mL (0 mL Intravenous Stopped 4/15/25 0618)   potassium chloride (Klor-Con M20) CR tablet 40 mEq (40 mEq Oral Given 4/15/25 0742)   aspirin tablet 325 mg (325 mg Oral Given 4/15/25 0428)   sodium chloride 0.9 % bolus 1,000 mL (0 mL Intravenous Stopped 4/15/25 9884)   magnesium sulfate 2 g/50 mL IVPB (premix) 2 g (0 g Intravenous Stopped 4/15/25 1030)       ED Risk Strat Scores   HEART Risk Score      Flowsheet Row Most Recent Value   Heart Score Risk Calculator    History 1  Filed at: 04/15/2025 0301   ECG 0 Filed at: 04/15/2025 0301   Age 1 Filed at: 04/15/2025 0301   Risk Factors 2 Filed at: 04/15/2025 0301   Troponin 0 Filed at: 04/15/2025 0301   HEART Score 4 Filed at: 04/15/2025 0301          HEART Risk Score      Flowsheet Row Most Recent Value   Heart Score Risk Calculator    History 1 Filed at: 04/15/2025 0301   ECG 0 Filed at: 04/15/2025 0301   Age 1 Filed at: 04/15/2025 0301   Risk Factors 2 Filed at: 04/15/2025 0301   Troponin 0 Filed at: 04/15/2025 0301   HEART Score 4 Filed at: 04/15/2025 0301                      No data recorded        SBIRT 20yo+      Flowsheet Row Most Recent Value   Initial Alcohol Screen: US AUDIT-C     1. How often do you have a drink containing alcohol? 0 Filed at: 04/14/2025 1724   2. How many drinks containing alcohol do you have on a typical day you are drinking?  0 Filed at: 04/14/2025 1724   3b. FEMALE Any Age, or MALE 65+: How often do you have 4 or more drinks on one occassion? 0 Filed at: 04/14/2025 1724   Audit-C Score 0 Filed at: 04/14/2025 1724   KRISTEL: How many times in the past year have you...    Used an illegal drug or used a prescription medication for non-medical reasons? Never Filed at: 04/14/2025 1724                            History of Present Illness       Chief Complaint   Patient presents with    Chest Pain     Per pt's daughter, pt has been having chest pain and sob since yesterday.        Past Medical History:   Diagnosis Date    Abnormal gait     Abscess of abdominal cavity (HCC)     Aneurysm (HCC)     Brain with clips x 3 per pt    Arm injury     Right arm- pt present with short FA/wrist  immoblizer present on ED arrival    Cataract     Cellulitis     B/L LE    Chronic back pain     Chronic pain     COPD (chronic obstructive pulmonary disease) (HCC)     Disease of thyroid gland     hypothyroid    Edema     B/L LE    Falls frequently     last fall 1 hour ago per pt    Foot drop, left foot     Fracture     Facial bones per  pt- left orbit/cheek? pt unsure    Fracture closed, nasal bone     Gastric bypass status for obesity     Hypoglycemia     Memory loss, short term     Migraine     MRSA (methicillin resistant Staphylococcus aureus)     Stomach ulcer       Past Surgical History:   Procedure Laterality Date    BACK SURGERY      lumbar/sacral fusion per pt    CEREBRAL ANEURYSM REPAIR      with clips x3 per pt     SECTION      x 4 per pt    GASTRIC BYPASS      MRSA CULTURE (HISTORICAL)      stomach abscess per pt      Family History   Problem Relation Age of Onset    Diabetes Mother     Heart failure Mother     Breast cancer Mother     Heart disease Father       Social History     Tobacco Use    Smoking status: Every Day     Current packs/day: 0.50     Types: Cigarettes, Cigars    Smokeless tobacco: Never   Vaping Use    Vaping status: Never Used   Substance Use Topics    Alcohol use: Not Currently     Comment: social    Drug use: No      E-Cigarette/Vaping    E-Cigarette Use Never User       E-Cigarette/Vaping Substances    Nicotine No     THC No     CBD No     Flavoring No     Other No     Unknown No       I have reviewed and agree with the history as documented.     59-year-old female with Krzysztof history of COPD, cerebral aneurysm clipping, IVC filter, Dilaudid pain pump presents for an episode of syncope.  She states that she has been having headache which she does have a history of however she now has fogginess.  This morning she had onset of right-sided chest pain, and then had an episode of syncope and hit her head.  She continues to have chest pain and shortness of breath.  She has a cough that started this morning.  Denies fever vomiting, diarrhea, abdominal pain, history of blood clots, recent travel, surgery, hospitalization or hormone use.      Chest Pain  Associated symptoms: cough, headache and shortness of breath    Associated symptoms: no abdominal pain, no back pain, no fever, no nausea, no numbness, no  palpitations, not vomiting and no weakness        Review of Systems   Constitutional:  Negative for chills and fever.   HENT:  Negative for ear pain and sore throat.    Eyes:  Positive for photophobia. Negative for pain and visual disturbance.   Respiratory:  Positive for cough and shortness of breath. Negative for wheezing.    Cardiovascular:  Positive for chest pain. Negative for palpitations and leg swelling.   Gastrointestinal:  Negative for abdominal pain, constipation, diarrhea, nausea and vomiting.   Genitourinary:  Negative for dysuria and hematuria.   Musculoskeletal:  Negative for arthralgias and back pain.   Skin:  Negative for color change and rash.   Neurological:  Positive for syncope and headaches. Negative for seizures, weakness and numbness.   All other systems reviewed and are negative.          Objective       ED Triage Vitals   Temperature Pulse Blood Pressure Respirations SpO2 Patient Position - Orthostatic VS   04/14/25 1708 04/14/25 1706 04/14/25 1706 04/14/25 1706 04/14/25 1706 04/14/25 1827   98.2 °F (36.8 °C) 105 (!) 182/100 20 (S) (!) 88 % Lying      Temp Source Heart Rate Source BP Location FiO2 (%) Pain Score    04/14/25 1708 04/14/25 1706 04/14/25 1706 -- 04/14/25 1856    Oral Monitor Left arm  4      Vitals      Date and Time Temp Pulse SpO2 Resp BP Pain Score FACES Pain Rating User   04/15/25 2308 -- 87 91 % -- 122/75 -- -- DII   04/15/25 1921 -- 84 94 % 18 152/79 -- -- DII   04/15/25 1826 -- -- -- -- -- 9 -- AL   04/15/25 1822 -- 89 93 % -- 138/72 -- -- DII   04/15/25 1419 98.7 °F (37.1 °C) 63 95 % 18 143/81 -- -- WM   04/15/25 1405 -- -- -- -- -- 8 -- WM   04/15/25 1334 -- 63 -- -- 143/81 -- -- JG   04/15/25 1245 -- 60 100 % 18 152/74 -- -- MA   04/15/25 1045 -- 78 100 % 14 144/67 -- -- ALN   04/15/25 0915 -- 76 98 % 14 143/83 -- -- ALN   04/15/25 0912 -- -- -- -- 135/97 -- -- ALN   04/15/25 0730 97.8 °F (36.6 °C) 76 98 % 17 156/80 5 -- ALN   04/15/25 0700 -- 86 97 % 15 167/87 -- --     04/15/25 0645 -- 80 88 % 14 136/65 -- --    04/15/25 0631 -- 80 88 % 13 148/70 -- --    04/15/25 0600 -- 84 95 % 14 138/68 -- --    04/15/25 0545 -- 84 -- 12 145/70 -- --    04/15/25 0530 -- 88 -- 19 158/81 -- --    04/15/25 0515 -- 88 -- 13 161/74 -- --    04/15/25 0500 -- 96 94 % 18 148/81 -- --    04/15/25 0445 -- 92 99 % 12 175/96 -- --    04/15/25 0428 -- -- -- -- 176/85 -- --    04/15/25 0400 -- 98 100 % 14 176/85 -- --    04/15/25 0330 -- 90 100 % 15 183/98 -- --    04/15/25 0300 -- 78 100 % 13 171/83 -- --    04/15/25 0200 -- 82 92 % 14 181/84 -- --    04/15/25 0100 -- 82 90 % 16 -- -- --    04/15/25 0030 -- 82 91 % 13 169/79 -- --    04/14/25 2315 -- 80 100 % 12 190/88 -- --    04/14/25 2245 -- 78 98 % 16 174/79 -- -- JT   04/14/25 2215 -- 84 100 % 16 177/85 -- -- JT   04/14/25 2145 -- 80 97 % 16 180/86 -- -- JT   04/14/25 2115 -- 86 95 % 18 160/77 -- -- JT   04/14/25 2045 -- 82 100 % 16 176/76 -- -- JT   04/14/25 2015 -- 82 97 % 16 162/72 -- -- JT   04/14/25 1945 -- 84 92 % 16 178/86 -- -- JT   04/14/25 1915 -- 92 94 % 15 174/87 -- -- JT   04/14/25 1856 -- -- -- -- -- 4 -- JT   04/14/25 1827 -- 98 99 % 16 185/93 -- -- JT   04/14/25 1708 98.2 °F (36.8 °C) -- -- -- -- -- -- LP   04/14/25 1707 -- -- 92 % -- -- -- -- LP   04/14/25 1706 -- 105  88 % 20 182/100 -- -- LP            Physical Exam  Vitals and nursing note reviewed.   Constitutional:       General: She is not in acute distress.     Appearance: She is well-developed.   HENT:      Head: Normocephalic and atraumatic.   Eyes:      Conjunctiva/sclera: Conjunctivae normal.   Cardiovascular:      Rate and Rhythm: Regular rhythm. Tachycardia present.      Heart sounds: No murmur heard.  Pulmonary:      Effort: Pulmonary effort is normal. No respiratory distress.      Breath sounds: Normal breath sounds. No stridor, decreased air movement or transmitted upper airway sounds.   Abdominal:      Palpations: Abdomen is soft.       Tenderness: There is no abdominal tenderness. There is no guarding or rebound.   Musculoskeletal:         General: No swelling.      Cervical back: Neck supple.   Skin:     General: Skin is warm and dry.      Capillary Refill: Capillary refill takes less than 2 seconds.   Neurological:      General: No focal deficit present.      Mental Status: She is alert and oriented to person, place, and time.      Cranial Nerves: Cranial nerves 2-12 are intact.      Sensory: Sensation is intact.      Motor: Motor function is intact.      Coordination: Coordination is intact.   Psychiatric:         Mood and Affect: Mood normal.         Results Reviewed       Procedure Component Value Units Date/Time    Urine Microscopic [[id number]]  (Abnormal) Collected: 04/15/25 1227    Lab Status: Final result Specimen: Urine, Clean Catch Updated: 04/15/25 1432     RBC, UA 1-2 /hpf      WBC, UA 1-2 /hpf      Epithelial Cells Occasional /hpf      Bacteria, UA None Seen /hpf      Hyaline Casts, UA 0-3 /lpf     UA w Reflex to Microscopic w Reflex to Culture [[id number]]  (Abnormal) Collected: 04/15/25 1227    Lab Status: Final result Specimen: Urine, Clean Catch Updated: 04/15/25 1259     Color, UA Yellow     Clarity, UA Clear     Specific Gravity, UA >=1.050     pH, UA 6.5     Leukocytes, UA Negative     Nitrite, UA Negative     Protein, UA 50 (1+) mg/dl      Glucose,  (1/10%) mg/dl      Ketones, UA 40 (2+) mg/dl      Urobilinogen, UA 2.0 mg/dl      Bilirubin, UA Negative     Occult Blood, UA Negative    Hemoglobin A1C w/ EAG Estimation [[id number]]  (Abnormal) Collected: 04/15/25 0615    Lab Status: Final result Specimen: Blood from Arm, Right Updated: 04/15/25 1057     Hemoglobin A1C 6.8 %       mg/dl     Potassium [[id number]]  (Normal) Collected: 04/15/25 0926    Lab Status: Final result Specimen: Blood from Arm, Right Updated: 04/15/25 0950     Potassium 3.9 mmol/L     Blood culture #1 [[id number]] Collected: 04/15/25 0217     Lab Status: Preliminary result Specimen: Blood from Arm, Right Updated: 04/15/25 0801     Blood Culture Received in Microbiology Lab. Culture in Progress.    Blood culture #2 [[id number]] Collected: 04/15/25 0217    Lab Status: Preliminary result Specimen: Blood from Hand, Left Updated: 04/15/25 0801     Blood Culture Received in Microbiology Lab. Culture in Progress.    Comprehensive metabolic panel [[id number]]  (Abnormal) Collected: 04/15/25 0615    Lab Status: Final result Specimen: Blood from Arm, Right Updated: 04/15/25 0637     Sodium 136 mmol/L      Potassium 3.7 mmol/L      Chloride 101 mmol/L      CO2 25 mmol/L      ANION GAP 10 mmol/L      BUN 10 mg/dL      Creatinine 0.56 mg/dL      Glucose 158 mg/dL      Calcium 8.0 mg/dL      Corrected Calcium 8.8 mg/dL      AST 11 U/L      ALT 12 U/L      Alkaline Phosphatase 103 U/L      Total Protein 6.6 g/dL      Albumin 3.0 g/dL      Total Bilirubin 0.38 mg/dL      eGFR 101 ml/min/1.73sq m     Narrative:      National Kid[employer] Foundation guidelines for Chronic Kidney Disease (CKD):     Stage 1 with normal or high GFR (GFR > 90 mL/min/1.73 square meters)    Stage 2 Mild CKD (GFR = 60-89 mL/min/1.73 square meters)    Stage 3A Moderate CKD (GFR = 45-59 mL/min/1.73 square meters)    Stage 3B Moderate CKD (GFR = 30-44 mL/min/1.73 square meters)    Stage 4 Severe CKD (GFR = 15-29 mL/min/1.73 square meters)    Stage 5 End Stage CKD (GFR <15 mL/min/1.73 square meters)  Note: GFR calculation is accurate only with a steady state creatinine    Magnesium [[id number]]  (Abnormal) Collected: 04/15/25 0615    Lab Status: Final result Specimen: Blood from Arm, Right Updated: 04/15/25 0637     Magnesium 1.3 mg/dL     Phosphorus [[id number]]  (Normal) Collected: 04/15/25 0615    Lab Status: Final result Specimen: Blood from Arm, Right Updated: 04/15/25 0637     Phosphorus 3.7 mg/dL     APTT [[id number]]  (Abnormal) Collected: 04/15/25 0615    Lab Status: Final result Specimen:  Blood from Arm, Right Updated: 04/15/25 0636     PTT 37 seconds     Lipid Panel with Direct LDL reflex [[id number]]  (Abnormal) Collected: 04/15/25 0615    Lab Status: Final result Specimen: Blood from Arm, Left Updated: 04/15/25 0635     Cholesterol 113 mg/dL      Triglycerides 142 mg/dL      HDL, Direct 18 mg/dL      LDL Calculated 67 mg/dL     CBC (With Platelets) [[id number]]  (Abnormal) Collected: 04/15/25 0615    Lab Status: Final result Specimen: Blood from Arm, Right Updated: 04/15/25 0623     WBC 13.85 Thousand/uL      RBC 3.22 Million/uL      Hemoglobin 8.8 g/dL      Hematocrit 28.2 %      MCV 88 fL      MCH 27.3 pg      MCHC 31.2 g/dL      RDW 15.2 %      Platelets 380 Thousands/uL      MPV 9.7 fL     Sputum culture and Gram stain [[id number]]     Lab Status: No result Specimen: Sputum     Procalcitonin [[id number]]  (Abnormal) Collected: 04/15/25 0217    Lab Status: Final result Specimen: Blood from Arm, Right Updated: 04/15/25 0252     Procalcitonin 1.33 ng/ml     Lactic acid, plasma (w/reflex if result > 2.0) [[id number]]  (Normal) Collected: 04/15/25 0217    Lab Status: Final result Specimen: Blood from Arm, Right Updated: 04/15/25 0243     LACTIC ACID 0.7 mmol/L     Narrative:      Result may be elevated if tourniquet was used during collection.    FLU/COVID Rapid Antigen (30 min. TAT) - Preferred screening test in ED [[id number]]  (Normal) Collected: 04/15/25 0217    Lab Status: Final result Specimen: Nares from Nose Updated: 04/15/25 0243     SARS COV Rapid Antigen Negative     Influenza A Rapid Antigen Negative     Influenza B Rapid Antigen Negative    Narrative:      This test has been performed using the [employer] Ca 2 FLU+SARS Antigen test under the Emergency Use Authorization (EUA). This test has been validated by the  and verified by the performing laboratory. The Ca uses lateral flow immunofluorescent sandwich assay to detect SARS-COV, Influenza A and Influenza B Antigen.     The  [employer] Ca 2 SARS Antigen test does not differentiate between SARS-CoV and SARS-CoV-2.     Negative results are presumptive and may be confirmed with a molecular assay, if necessary, for patient management. Negative results do not rule out SARS-CoV-2 or influenza infection and should not be used as the sole basis for treatment or patient management decisions. A negative test result may occur if the level of antigen in a sample is below the limit of detection of this test.     Positive results are indicative of the presence of viral antigens, but do not rule out bacterial infection or co-infection with other viruses.     All test results should be used as an adjunct to clinical observations and other information available to the provider.    FOR PEDIATRIC PATIENTS - copy/paste COVID Guidelines URL to browser: [email or web address]    High Sensitivity Troponin I Random [[id number]]  (Abnormal) Collected: 04/14/25 2300    Lab Status: Final result Specimen: Blood from Arm, Right Updated: 04/14/25 2331     HS TnI random 5 ng/L     HS Troponin I 4hr [[id number]]  (Normal) Collected: 04/14/25 2134    Lab Status: Final result Specimen: Blood from Arm, Right Updated: 04/14/25 2237     hs TnI 4hr 7 ng/L      Delta 4hr hsTnI -2 ng/L     APTT [[id number]]  (Abnormal) Collected: 04/14/25 2058    Lab Status: Final result Specimen: Blood from Arm, Right Updated: 04/14/25 2119     PTT 41 seconds     Protime-INR [[id number]]  (Abnormal) Collected: 04/14/25 2058    Lab Status: Final result Specimen: Blood from Arm, Right Updated: 04/14/25 2119     Protime 17.4 seconds      INR 1.41    Narrative:      INR Therapeutic Range    Indication                                             INR Range      Atrial Fibrillation                                               2.0-3.0  Hypercoagulable State                                    2.0.2.3  Left Ventricular Asist Device                             2.0-3.0  Mechanical Heart Valve                                  -    Aortic(with afib, MI, embolism, HF, LA enlargement,    and/or coagulopathy)                                     2.0-3.0 (2.5-3.5)     Mitral                                                             2.5-3.5  Prosthetic/Bioprosthetic Heart Valve               2.0-3.0  Venous thromboembolism (VTE: VT, PE        2.0-3.0    HS Troponin I 2hr [[id number]]  (Abnormal) Collected: 04/14/25 1930    Lab Status: Final result Specimen: Blood from Arm, Right Updated: 04/14/25 2001     hs TnI 2hr 94 ng/L      Delta 2hr hsTnI 85 ng/L     D-Dimer [[id number]]  (Abnormal) Collected: 04/14/25 1843    Lab Status: Final result Specimen: Blood from Arm, Right Updated: 04/14/25 1920     D-Dimer, Quant 4.21 ug/ml FEU     Narrative:      In the evaluation for possible pulmonary embolism, in the appropriate (Well's Score of 4 or less) patient, the age adjusted d-dimer cutoff for this patient can be calculated as:    Age x 0.01 (in ug/mL) for Age-adjusted D-dimer exclusion threshold for a patient over 50 years.    HS Troponin 0hr (reflex protocol) [[id number]]  (Normal) Collected: 04/14/25 1732    Lab Status: Final result Specimen: Blood from Arm, Right Updated: 04/14/25 1808     hs TnI 0hr 9 ng/L     Comprehensive metabolic panel [[id number]]  (Abnormal) Collected: 04/14/25 1732    Lab Status: Final result Specimen: Blood from Arm, Right Updated: 04/14/25 1802     Sodium 133 mmol/L      Potassium 3.0 mmol/L      Chloride 94 mmol/L      CO2 26 mmol/L      ANION GAP 13 mmol/L      BUN 10 mg/dL      Creatinine 0.71 mg/dL      Glucose 172 mg/dL      Calcium 8.8 mg/dL      AST 19 U/L      ALT 16 U/L      Alkaline Phosphatase 128 U/L      Total Protein 8.3 g/dL      Albumin 3.8 g/dL      Total Bilirubin 0.58 mg/dL      eGFR 92 ml/min/1.73sq m     Narrative:      [employer] guidelines for Chronic Kidney Disease (CKD):     Stage 1 with normal or high GFR  (GFR > 90 mL/min/1.73 square meters)    Stage 2 Mild CKD (GFR = 60-89 mL/min/1.73 square meters)    Stage 3A Moderate CKD (GFR = 45-59 mL/min/1.73 square meters)    Stage 3B Moderate CKD (GFR = 30-44 mL/min/1.73 square meters)    Stage 4 Severe CKD (GFR = 15-29 mL/min/1.73 square meters)    Stage 5 End Stage CKD (GFR <15 mL/min/1.73 square meters)  Note: GFR calculation is accurate only with a steady state creatinine    CBC and differential [[id number]]  (Abnormal) Collected: 04/14/25 1732    Lab Status: Final result Specimen: Blood from Arm, Right Updated: 04/14/25 1742     WBC 14.99 Thousand/uL      RBC 3.90 Million/uL      Hemoglobin 10.9 g/dL      Hematocrit 34.8 %      MCV 89 fL      MCH 27.9 pg      MCHC 31.3 g/dL      RDW 15.3 %      MPV 9.8 fL      Platelets 357 Thousands/uL      nRBC 0 /100 WBCs      Segmented % 85 %      Immature Grans % 2 %      Lymphocytes % 6 %      Monocytes % 6 %      Eosinophils Relative 0 %      Basophils Relative 1 %      Absolute Neutrophils 12.92 Thousands/µL      Absolute Immature Grans 0.27 Thousand/uL      Absolute Lymphocytes 0.82 Thousands/µL      Absolute Monocytes 0.87 Thousand/µL      Eosinophils Absolute 0.03 Thousand/µL      Basophils Absolute 0.08 Thousands/µL             CTA chest pe study   Final Interpretation by Ish Quiroz MD (04/15 0126)         1. Findings consistent with multifocal pneumonia.   2. No evidence of acute pulmonary embolus, thoracic.                  Workstation performed: [id number]         CT head without contrast   Final Interpretation by Ish Quiroz MD (04/15 0101)         1. No evidence of acute intracranial hemorrhage or mass effect.   2. Extensive paranasal sinus disease.                  Workstation performed: [id number]         XR chest 2 views   ED Interpretation by Deena Luke DO (04/14 2155)   No acute cardiopulmonary disease      Final Interpretation by Kamilah Ziegler MD (04/15 7360)      Patchy opacities in  the right mid lung and left lower lobe, likely reflecting atelectasis versus pneumonia.            Workstation performed: [id number]             Procedures    ED Medication and Procedure Management   Prior to Admission Medications   Prescriptions Last Dose Informant Patient Reported? Taking?   COMBIVENT RESPIMAT inhaler   Yes No   Sig: Inhale 2 puffs every 4 (four) hours as needed   CRANBERRY PO  Child Yes No   Sig: Take 4,200 mg by mouth daily.   DiphenhydrAMINE HCl (ALLERGY MED PO)  Child Yes No   Sig: Take 25 mg by mouth as needed. [facility]   EPINEPHrine (EPIPEN) 0.3 mg/0.3 mL SOAJ   No No   Sig: Inject 0.3 mL (0.3 mg total) into a muscle once for 1 dose   LORazepam (ATIVAN) 1 mg tablet   No No   Sig: Take 1 tablet (1 mg total) by mouth 4 (four) times a day for 10 days   NIFEdipine ER (ADALAT CC) 60 MG 24 hr tablet   No No   Sig: Take 1 tablet (60 mg total) by mouth daily   Patient not taking: Reported on 3/18/2024   acetaminophen (TYLENOL) 500 mg tablet   No No   Sig: Take 2 tablets (1,000 mg total) by mouth every 6 (six) hours as needed for mild pain or moderate pain   buPROPion (WELLBUTRIN SR) 150 mg 12 hr tablet  Child Yes No   Sig: Take 150 mg by mouth 2 (two) times a day. 2 tabs in AM; one tab in afternoon   citalopram (CeleXA) 40 mg tablet  Child Yes No   Sig: Take 40 mg by mouth daily.   cyanocobalamin 1,000 mcg/mL   Yes No   Si INJECTION MONTHLY   ferrous sulfate 325 (65 Fe) mg tablet   No No   Sig: Take 1 tablet (325 mg total) by mouth every other day   Patient not taking: Reported on 3/18/2024   gabapentin (NEURONTIN) 300 mg capsule  Child Yes No   Sig: Take 300 mg by mouth 4 (four) times a day   levETIRAcetam (KEPPRA) 500 mg tablet   Yes No   Sig: Take 1,500 mg by mouth 2 (two) times a day   levothyroxine 100 mcg tablet  Child Yes No   Sig: Take 100 mcg by mouth daily.   lisinopril (ZESTRIL) 10 mg tablet   No No   Sig: Take 1 tablet (10 mg total) by mouth daily after breakfast   Patient not  taking: Reported on 3/18/2024   lubiprostone (AMITIZA) 24 mcg capsule  Child Yes No   Sig: Take 24 mcg by mouth 2 (two) times a day with meals.   methocarbamol (ROBAXIN) 500 mg tablet   No No   Sig: Take 1 tablet (500 mg total) by mouth 3 (three) times a day as needed for muscle spasms   pantoprazole (PROTONIX) 40 mg tablet   No No   Sig: Take 1 tablet (40 mg total) by mouth daily in the early morning   senna-docusate sodium (SENOKOT S) 8.6-50 mg per tablet   No No   Sig: Take 1 tablet by mouth daily at bedtime   tamsulosin (FLOMAX) 0.4 mg   No No   Sig: Take 1 capsule (0.4 mg total) by mouth daily with dinner   temazepam (RESTORIL) 22.5 MG capsule  Child Yes No   Sig: Take 22.5 mg by mouth daily at bedtime.   tiZANidine (ZANAFLEX) 2 mg tablet   No No   Sig: Take 1 tablet (2 mg total) by mouth 2 (two) times a day as needed for muscle spasms for up to 3 days      Facility-Administered Medications: None     Current Discharge Medication List        CONTINUE these medications which have NOT CHANGED    Details   acetaminophen (TYLENOL) 500 mg tablet Take 2 tablets (1,000 mg total) by mouth every 6 (six) hours as needed for mild pain or moderate pain  Qty: 30 tablet, Refills: 0    Associated Diagnoses: Chronic back pain      buPROPion (WELLBUTRIN SR) 150 mg 12 hr tablet Take 150 mg by mouth 2 (two) times a day. 2 tabs in AM; one tab in afternoon      citalopram (CeleXA) 40 mg tablet Take 40 mg by mouth daily.      COMBIVENT RESPIMAT inhaler Inhale 2 puffs every 4 (four) hours as needed  Refills: 5      CRANBERRY PO Take 4,200 mg by mouth daily.      cyanocobalamin 1,000 mcg/mL 1 INJECTION MONTHLY      DiphenhydrAMINE HCl (ALLERGY MED PO) Take 25 mg by mouth as needed. [facility]      EPINEPHrine (EPIPEN) 0.3 mg/0.3 mL SOAJ Inject 0.3 mL (0.3 mg total) into a muscle once for 1 dose  Qty: 0.6 mL, Refills: 0    Associated Diagnoses: Anaphylaxis, initial encounter      ferrous sulfate 325 (65 Fe) mg tablet Take 1 tablet  (325 mg total) by mouth every other day  Qty: 60 tablet, Refills: 0    Associated Diagnoses: Intractable low back pain      gabapentin (NEURONTIN) 300 mg capsule Take 300 mg by mouth 4 (four) times a day      levETIRAcetam (KEPPRA) 500 mg tablet Take 1,500 mg by mouth 2 (two) times a day  Refills: 5      levothyroxine 100 mcg tablet Take 100 mcg by mouth daily.      lisinopril (ZESTRIL) 10 mg tablet Take 1 tablet (10 mg total) by mouth daily after breakfast  Qty: 30 tablet, Refills: 0    Associated Diagnoses: Hypertensive urgency; Delusion (HCC)      LORazepam (ATIVAN) 1 mg tablet Take 1 tablet (1 mg total) by mouth 4 (four) times a day for 10 days  Qty: 40 tablet, Refills: 0    Associated Diagnoses: Intractable low back pain      lubiprostone (AMITIZA) 24 mcg capsule Take 24 mcg by mouth 2 (two) times a day with meals.      methocarbamol (ROBAXIN) 500 mg tablet Take 1 tablet (500 mg total) by mouth 3 (three) times a day as needed for muscle spasms  Qty: 30 tablet, Refills: 0    Associated Diagnoses: Acute exacerbation of chronic low back pain      NIFEdipine ER (ADALAT CC) 60 MG 24 hr tablet Take 1 tablet (60 mg total) by mouth daily  Qty: 30 tablet, Refills: 0    Associated Diagnoses: Hypertensive urgency; Delusion (HCC)      pantoprazole (PROTONIX) 40 mg tablet Take 1 tablet (40 mg total) by mouth daily in the early morning  Qty: 60 tablet, Refills: 0    Associated Diagnoses: Chronic midline low back pain with sciatica      senna-docusate sodium (SENOKOT S) 8.6-50 mg per tablet Take 1 tablet by mouth daily at bedtime  Qty: 30 tablet, Refills: 0    Associated Diagnoses: Chronic midline low back pain with sciatica      tamsulosin (FLOMAX) 0.4 mg Take 1 capsule (0.4 mg total) by mouth daily with dinner  Qty: 30 capsule, Refills: 0    Associated Diagnoses: Chronic midline low back pain with sciatica      temazepam (RESTORIL) 22.5 MG capsule Take 22.5 mg by mouth daily at bedtime.      tiZANidine (ZANAFLEX) 2 mg  tablet Take 1 tablet (2 mg total) by mouth 2 (two) times a day as needed for muscle spasms for up to 3 days  Qty: 6 tablet, Refills: 0    Associated Diagnoses: Chronic midline low back pain with sciatica           STOP taking these medications       SUMAtriptan (IMITREX) 100 mg tablet Comments:   Reason for Stopping:             No discharge procedures on file.  ED SEPSIS DOCUMENTATION   Time reflects when diagnosis was documented in both MDM as applicable and the Disposition within this note       Time User Action Codes Description Comment    4/15/2025  3:12 AM Deena Luke [J18.9] Multifocal pneumonia     4/15/2025  3:12 AM Deena Luke [R79.89] Elevated troponin                  Deena Luke DO  04/15/25 0352

## 2025-04-14 NOTE — ED ATTENDING ATTESTATION
4/14/2025  I, Magalis Jimenez DO, saw and evaluated the patient. I have discussed the patient with the resident/non-physician practitioner and agree with the resident's/non-physician practitioner's findings, Plan of Care, and MDM as documented in the resident's/non-physician practitioner's note, except where noted. All available labs and Radiology studies were reviewed.  I was present for key portions of any procedure(s) performed by the resident/non-physician practitioner and I was immediately available to provide assistance.       At this point I agree with the current assessment done in the Emergency Department.  I have conducted an independent evaluation of this patient a history and physical is as follows: 59y F here for evaluation of chest pain. Hx of copd, htn.  Had an episode of unwitnessed syncope this am.  Reports sudden onset right sided chest pain prior to the syncope.  Since then cp/sob. Also c/o persistent ha since yesterday, some cough/congestion for a few days.  No reported f/c/s, no v/d.  Exam WNWD mildly ill appearing, mmm, neck supple, resp coarse bs, cv regular rate, abd nd, ext no cce, skin dry, neuro non-focal, normal mood.  Syncope, chest pain, cough, headache.  Ddx broad. W/ pain then syncope will need to r/o PE - pt w/ contrast allergy so will start w/ d-dimer.  Will get EKG to r/o arrhythmia, ischemic changes.  Will get labs to r/o acute life threatening metabolic abnl, cardiac ischemia, significant anemia.     ED Course     Labs Reviewed   CBC AND DIFFERENTIAL - Abnormal       Result Value Ref Range Status    WBC 14.99 (*) 4.31 - 10.16 Thousand/uL Final    RBC 3.90  3.81 - 5.12 Million/uL Final    Hemoglobin 10.9 (*) 11.5 - 15.4 g/dL Final    Hematocrit 34.8  34.8 - 46.1 % Final    MCV 89  82 - 98 fL Final    MCH 27.9  26.8 - 34.3 pg Final    MCHC 31.3 (*) 31.4 - 37.4 g/dL Final    RDW 15.3 (*) 11.6 - 15.1 % Final    MPV 9.8  8.9 - 12.7 fL Final    Platelets 357  149 - 390 Thousands/uL  Final    nRBC 0  /100 WBCs Final    Segmented % 85 (*) 43 - 75 % Final    Immature Grans % 2  0 - 2 % Final    Lymphocytes % 6 (*) 14 - 44 % Final    Monocytes % 6  4 - 12 % Final    Eosinophils Relative 0  0 - 6 % Final    Basophils Relative 1  0 - 1 % Final    Absolute Neutrophils 12.92 (*) 1.85 - 7.62 Thousands/µL Final    Absolute Immature Grans 0.27 (*) 0.00 - 0.20 Thousand/uL Final    Absolute Lymphocytes 0.82  0.60 - 4.47 Thousands/µL Final    Absolute Monocytes 0.87  0.17 - 1.22 Thousand/µL Final    Eosinophils Absolute 0.03  0.00 - 0.61 Thousand/µL Final    Basophils Absolute 0.08  0.00 - 0.10 Thousands/µL Final   COMPREHENSIVE METABOLIC PANEL - Abnormal    Sodium 133 (*) 135 - 147 mmol/L Final    Potassium 3.0 (*) 3.5 - 5.3 mmol/L Final    Chloride 94 (*) 96 - 108 mmol/L Final    CO2 26  21 - 32 mmol/L Final    ANION GAP 13  4 - 13 mmol/L Final    BUN 10  5 - 25 mg/dL Final    Creatinine 0.71  0.60 - 1.30 mg/dL Final    Comment: Standardized to IDMS reference method    Glucose 172 (*) 65 - 140 mg/dL Final    Comment: If the patient is fasting, the ADA then defines impaired fasting glucose as > 100 mg/dL and diabetes as > or equal to 123 mg/dL.    Calcium 8.8  8.4 - 10.2 mg/dL Final    AST 19  13 - 39 U/L Final    ALT 16  7 - 52 U/L Final    Comment: Specimen collection should occur prior to Sulfasalazine administration due to the potential for falsely depressed results.     Alkaline Phosphatase 128 (*) 34 - 104 U/L Final    Total Protein 8.3  6.4 - 8.4 g/dL Final    Albumin 3.8  3.5 - 5.0 g/dL Final    Total Bilirubin 0.58  0.20 - 1.00 mg/dL Final    Comment: Use of this assay is not recommended for patients undergoing treatment with eltrombopag due to the potential for falsely elevated results.  N-acetyl-p-benzoquinone imine (metabolite of Acetaminophen) will generate erroneously low results in samples for patients that have taken an overdose of Acetaminophen.    eGFR 92  ml/min/1.73sq m Final     "Narrative:     National Kid[employer] Disease Foundation guidelines for Chronic Kidney Disease (CKD):     Stage 1 with normal or high GFR (GFR > 90 mL/min/1.73 square meters)    Stage 2 Mild CKD (GFR = 60-89 mL/min/1.73 square meters)    Stage 3A Moderate CKD (GFR = 45-59 mL/min/1.73 square meters)    Stage 3B Moderate CKD (GFR = 30-44 mL/min/1.73 square meters)    Stage 4 Severe CKD (GFR = 15-29 mL/min/1.73 square meters)    Stage 5 End Stage CKD (GFR <15 mL/min/1.73 square meters)  Note: GFR calculation is accurate only with a steady state creatinine   HS TROPONIN I 2HR - Abnormal    hs TnI 2hr 94 (*) \"Refer to ACS Flowchart\"- see link ng/L Final    Comment:                                              Initial (time 0) result  If >=50 ng/L, Myocardial injury suggested ;  Type of myocardial injury and treatment strategy  to be determined.  If 5-49 ng/L, a delta result at 2 hours and or 4 hours will be needed to further evaluate.  If <4 ng/L, and chest pain has been >3 hours since onset, patient may qualify for discharge based on the HEART score in the ED.  If <5 ng/L and <3hours since onset of chest pain, a delta result at 2 hours will be needed to further evaluate.    HS Troponin 99th Percentile URL of a Health Population=12 ng/L with a 95% Confidence Interval of 8-18 ng/L.    Second Troponin (time 2 hours)  If calculated delta >= 20 ng/L,  Myocardial injury suggested ; Type of myocardial injury and treatment strategy to be determined.  If 5-49 ng/L and the calculated delta is 5-19 ng/L, consult medical service for evaluation.  Continue evaluation for ischemia on ecg and other possible etiology and repeat hs troponin at 4 hours.  If delta is <5 ng/L at 2 hours, consider discharge based on risk stratification via the HEART score (if in ED), or SALVADOR risk score in IP/Observation.    HS Troponin 99th Percentile URL of a Health Population=12 ng/L with a 95% Confidence Interval of 8-18 ng/L.    Delta 2hr hsTnI 85 (*) <20 ng/L " Final   D-DIMER, QUANTITATIVE - Abnormal    D-Dimer, Quant 4.21 (*) <0.50 ug/ml FEU Final    Comment: Reference and upper limits to exclude DVT and PE are the same.  Do not use to exclude if clinical symptoms are present.  Pregnant women:  1st trimester:  <0.22 - 1.06 ug/ml FEU  2nd trimester:  <0.22 - 1.88 ug/ml FEU  3rd trimester:   0.24 - 3.28 ug/ml FEU    Note: Normal ranges may not apply to patients who are transgender, non-binary, or whose legal sex, sex at birth, and gender identity differ.      Narrative:     In the evaluation for possible pulmonary embolism, in the appropriate (Well's Score of 4 or less) patient, the age adjusted d-dimer cutoff for this patient can be calculated as:    Age x 0.01 (in ug/mL) for Age-adjusted D-dimer exclusion threshold for a patient over 50 years.   UA W REFLEX TO MICROSCOPIC WITH REFLEX TO CULTURE - Abnormal    Color, UA Yellow   Final    Clarity, UA Clear   Final    Specific Gravity, UA >=1.050 (*) 1.003 - 1.030 Final    pH, UA 6.5  4.5, 5.0, 5.5, 6.0, 6.5, 7.0, 7.5, 8.0 Final    Leukocytes, UA Negative  Negative Final    Nitrite, UA Negative  Negative Final    Protein, UA 50 (1+) (*) Negative mg/dl Final    Glucose,  (1/10%) (*) Negative mg/dl Final    Ketones, UA 40 (2+) (*) Negative mg/dl Final    Urobilinogen, UA 2.0 (*) <2.0 mg/dl mg/dl Final    Bilirubin, UA Negative  Negative Final    Occult Blood, UA Negative  Negative Final   APTT - Abnormal    PTT 41 (*) 23 - 34 seconds Final    Comment: Therapeutic Heparin Range =  60-90 seconds   PROTIME-INR - Abnormal    Protime 17.4 (*) 12.3 - 15.0 seconds Final    INR 1.41 (*) 0.85 - 1.19 Final    Narrative:     INR Therapeutic Range    Indication                                             INR Range      Atrial Fibrillation                                               2.0-3.0  Hypercoagulable State                                    2.0.2.3  Left Ventricular Asist Device                            2.0-3.0  Mechanical  Heart Valve                                  -    Aortic(with afib, MI, embolism, HF, LA enlargement,    and/or coagulopathy)                                     2.0-3.0 (2.5-3.5)     Mitral                                                             2.5-3.5  Prosthetic/Bioprosthetic Heart Valve               2.0-3.0  Venous thromboembolism (VTE: VT, PE        2.0-3.0   HIGH SENSITIVITY TROPONIN I RANDOM - Abnormal    HS TnI random 5 (*) 8 - 18 ng/L Final    Comment:                                              Initial (time 0) result  If >=50 ng/L, Myocardial injury suggested ;  Type of myocardial injury and treatment strategy  to be determined.  If 5-49 ng/L, a delta result at 2 hours and or 4 hours will be needed to further evaluate.  If <4 ng/L, and chest pain has been >3 hours since onset, patient may qualify for discharge based on the HEART score in the ED.  If <5 ng/L and <3hours since onset of chest pain, a delta result at 2 hours will be needed to further evaluate.    HS Troponin 99th Percentile URL of a Health Population=12 ng/L with a 95% Confidence Interval of 8-18 ng/L.    Second Troponin (time 2 hours)  If calculated delta >= 20 ng/L,  Myocardial injury suggested ; Type of myocardial injury and treatment strategy to be determined.  If 5-49 ng/L and the calculated delta is 5-19 ng/L, consult medical service for evaluation.  Continue evaluation for ischemia on ecg and other possible etiology and repeat hs troponin at 4 hours.  If delta is <5 ng/L at 2 hours, consider discharge based on risk stratification via the HEART score (if in ED), or SALVADOR risk score in IP/Observation.    HS Troponin 99th Percentile URL of a Health Population=12 ng/L with a 95% Confidence Interval of 8-18 ng/L.   APTT - Abnormal    PTT 37 (*) 23 - 34 seconds Final    Comment: Therapeutic Heparin Range =  60-90 seconds   PROCALCITONIN TEST - Abnormal    Procalcitonin 1.33 (*) <=0.25 ng/ml Final    Comment: Suspected Lower Respiratory  Tract Infection (LRTI):  - LESS than or EQUAL to 0.25 ng/mL:   low likelihood for bacterial LRTI; antibiotics DISCOURAGED.  - GREATER than 0.25 ng/mL:   increased likelihood for bacterial LRTI; antibiotics ENCOURAGED.    Suspected Sepsis:  - Strongly consider initiating antibiotics in ALL UNSTABLE patients.  - LESS than or EQUAL to 0.5 ng/mL:   low likelihood for bacterial sepsis; antibiotics DISCOURAGED.  - GREATER than 0.5 ng/mL:   increased likelihood for bacterial sepsis; antibiotics ENCOURAGED.  - GREATER than 2 ng/mL:   high risk for severe sepsis / septic shock; antibiotics strongly ENCOURAGED.    Decisions on antibiotic use should not be based solely on Procalcitonin (PCT) levels. If PCT is low but uncertainty exists with stopping antibiotics, repeat PCT in 6-24 hours to confirm the low level. If antibiotics are administered (regardless if initial PCT was high or low), repeat PCT every 1-2 days to consider early antibiotic cessation (when GREATER than 80% decrease from the peak OR when PCT drops below designated cutoffs, whichever comes first), so long as the infection is NOT one that typically requires prolonged treatment durations (e.g., bone/joint infections, endocarditis, Staph. aureus bacteremia).    Situations of FALSE-POSITIVE Procalcitonin values:  1) Newborns < 72 hours old  2) Massive stress from severe trauma / burns, major surgery, acute pancreatitis, cardiogenic / hemorrhagic shock, sickle cell crisis, or other organ perfusion abnormalities  3) Malaria and some Candidal infections  4) Treatment with agents that stimulate cytokines (e.g., OKT3, anti-lymphocyte globulins, alemtuzumab, IL-2, granulocyte transfusion [NOT GCSFs])  5) Chronic renal disease causes elevated baseline levels (consider GREATER than 0.75 ng/mL as an abnormal cut-off); initiating HD/CRRT may cause transient decreases  6) Paraneoplastic syndromes from medullary thyroid or SCLC, some forms of vasculitis, and acute  [id number] disease    Situations of FALSE-NEGATIVE Procalcitonin values:  1) Too early in clinical course for PCT to have reached its peak (may repeat in 6-24 hours to confirm low level)  2) Localized infection WITHOUT systemic (SIRS / sepsis) response (e.g., an abscess, osteomyelitis, cystitis)  3) Mycobacteria (e.g., Tuberculosis, MAC)  4) Cystic fibrosis exacerbations     LIPID PANEL WITH DIRECT LDL REFLEX - Abnormal    Cholesterol 113  See Comment mg/dL Final    Comment: Cholesterol:         Pediatric <18 Years        Desirable          <170 mg/dL      Borderline High    170-199 mg/dL      High               >=200 mg/dL        Adult >=18 Years            Desirable         <200 mg/dL      Borderline High   200-239 mg/dL      High              >239 mg/dL      Triglycerides 142  See Comment mg/dL Final    Comment: Triglyceride:     0-9Y            <75mg/dL     10Y-17Y         <90 mg/dL       >=18Y     Normal          <150 mg/dL     Borderline High 150-199 mg/dL     High            200-499 mg/dL        Very High       >499 mg/dL    Specimen collection should occur prior to Metamizole administration due to the potential for falsely depressed results.    HDL, Direct 18 (*) >=50 mg/dL Final    LDL Calculated 67  0 - 100 mg/dL Final    Comment: LDL Cholesterol:     Optimal           <100 mg/dl     Near Optimal      100-129 mg/dl     Above Optimal       Borderline High 130-159 mg/dl       High            160-189 mg/dl       Very High       >189 mg/dl         This screening LDL is a calculated result.   It does not have the accuracy of the Direct Measured LDL in the monitoring of patients with hyperlipidemia and/or statin therapy.   Direct Measure LDL ([id number]) must be ordered separately in these patients.   HEMOGLOBIN A1C - Abnormal    Hemoglobin A1C 6.8 (*) Normal 4.0-5.6%; PreDiabetic 5.7-6.4%; Diabetic >=6.5%; Glycemic control for adults with diabetes <7.0% % Final      mg/dl Final   COMPREHENSIVE METABOLIC  PANEL - Abnormal    Sodium 136  135 - 147 mmol/L Final    Potassium 3.7  3.5 - 5.3 mmol/L Final    Chloride 101  96 - 108 mmol/L Final    CO2 25  21 - 32 mmol/L Final    ANION GAP 10  4 - 13 mmol/L Final    BUN 10  5 - 25 mg/dL Final    Creatinine 0.56 (*) 0.60 - 1.30 mg/dL Final    Comment: Standardized to IDMS reference method    Glucose 158 (*) 65 - 140 mg/dL Final    Comment: If the patient is fasting, the ADA then defines impaired fasting glucose as > 100 mg/dL and diabetes as > or equal to 123 mg/dL.    Calcium 8.0 (*) 8.4 - 10.2 mg/dL Final    Corrected Calcium 8.8  8.3 - 10.1 mg/dL Final    AST 11 (*) 13 - 39 U/L Final    ALT 12  7 - 52 U/L Final    Comment: Specimen collection should occur prior to Sulfasalazine administration due to the potential for falsely depressed results.     Alkaline Phosphatase 103  34 - 104 U/L Final    Total Protein 6.6  6.4 - 8.4 g/dL Final    Albumin 3.0 (*) 3.5 - 5.0 g/dL Final    Total Bilirubin 0.38  0.20 - 1.00 mg/dL Final    Comment: Use of this assay is not recommended for patients undergoing treatment with eltrombopag due to the potential for falsely elevated results.  N-acetyl-p-benzoquinone imine (metabolite of Acetaminophen) will generate erroneously low results in samples for patients that have taken an overdose of Acetaminophen.    eGFR 101  ml/min/1.73sq m Final    Narrative:     National Kidney Disease Foundation guidelines for Chronic Kidney Disease (CKD):     Stage 1 with normal or high GFR (GFR > 90 mL/min/1.73 square meters)    Stage 2 Mild CKD (GFR = 60-89 mL/min/1.73 square meters)    Stage 3A Moderate CKD (GFR = 45-59 mL/min/1.73 square meters)    Stage 3B Moderate CKD (GFR = 30-44 mL/min/1.73 square meters)    Stage 4 Severe CKD (GFR = 15-29 mL/min/1.73 square meters)    Stage 5 End Stage CKD (GFR <15 mL/min/1.73 square meters)  Note: GFR calculation is accurate only with a steady state creatinine   CBC AND PLATELET - Abnormal    WBC 13.85 (*) 4.31 - 10.16  Thousand/uL Final    RBC 3.22 (*) 3.81 - 5.12 Million/uL Final    Hemoglobin 8.8 (*) 11.5 - 15.4 g/dL Final    Hematocrit 28.2 (*) 34.8 - 46.1 % Final    MCV 88  82 - 98 fL Final    MCH 27.3  26.8 - 34.3 pg Final    MCHC 31.2 (*) 31.4 - 37.4 g/dL Final    RDW 15.2 (*) 11.6 - 15.1 % Final    Platelets 380  149 - 390 Thousands/uL Final    MPV 9.7  8.9 - 12.7 fL Final   MAGNESIUM - Abnormal    Magnesium 1.3 (*) 1.9 - 2.7 mg/dL Final   COVID-19/INFLUENZA A/B RAPID ANTIGEN (30 MIN.TAT) - Normal    SARS COV Rapid Antigen Negative  Negative Final    Influenza A Rapid Antigen Negative  Negative Final    Influenza B Rapid Antigen Negative  Negative Final    Narrative:     This test has been performed using the Cogn[employer] Ca 2 FLU+SARS Antigen test under the Emergency Use Authorization (EUA). This test has been validated by the  and verified by the performing laboratory. The Ca uses lateral flow immunofluorescent sandwich assay to detect SARS-COV, Influenza A and Influenza B Antigen.     The Quidel Ca 2 SARS Antigen test does not differentiate between SARS-CoV and SARS-CoV-2.     Negative results are presumptive and may be confirmed with a molecular assay, if necessary, for patient management. Negative results do not rule out SARS-CoV-2 or influenza infection and should not be used as the sole basis for treatment or patient management decisions. A negative test result may occur if the level of antigen in a sample is below the limit of detection of this test.     Positive results are indicative of the presence of viral antigens, but do not rule out bacterial infection or co-infection with other viruses.     All test results should be used as an adjunct to clinical observations and other information available to the provider.    FOR PEDIATRIC PATIENTS - copy/paste COVID Guidelines URL to browser: [email or web address]   HS TROPONIN I 0HR - Normal    hs TnI  "0hr 9  \"Refer to ACS Flowchart\"- see link ng/L Final    Comment:                                              Initial (time 0) result  If >=50 ng/L, Myocardial injury suggested ;  Type of myocardial injury and treatment strategy  to be determined.  If 5-49 ng/L, a delta result at 2 hours and or 4 hours will be needed to further evaluate.  If <4 ng/L, and chest pain has been >3 hours since onset, patient may qualify for discharge based on the HEART score in the ED.  If <5 ng/L and <3hours since onset of chest pain, a delta result at 2 hours will be needed to further evaluate.    HS Troponin 99th Percentile URL of a Health Population=12 ng/L with a 95% Confidence Interval of 8-18 ng/L.    Second Troponin (time 2 hours)  If calculated delta >= 20 ng/L,  Myocardial injury suggested ; Type of myocardial injury and treatment strategy to be determined.  If 5-49 ng/L and the calculated delta is 5-19 ng/L, consult medical service for evaluation.  Continue evaluation for ischemia on ecg and other possible etiology and repeat hs troponin at 4 hours.  If delta is <5 ng/L at 2 hours, consider discharge based on risk stratification via the HEART score (if in ED), or SALVADOR risk score in IP/Observation.    HS Troponin 99th Percentile URL of a Health Population=12 ng/L with a 95% Confidence Interval of 8-18 ng/L.   HS TROPONIN I 4HR - Normal    hs TnI 4hr 7  \"Refer to ACS Flowchart\"- see link ng/L Final    Comment:                                              Initial (time 0) result  If >=50 ng/L, Myocardial injury suggested ;  Type of myocardial injury and treatment strategy  to be determined.  If 5-49 ng/L, a delta result at 2 hours and or 4 hours will be needed to further evaluate.  If <4 ng/L, and chest pain has been >3 hours since onset, patient may qualify for discharge based on the HEART score in the ED.  If <5 ng/L and <3hours since onset of chest pain, a delta result at 2 hours will be needed to further evaluate.    HS " Troponin 99th Percentile URL of a Health Population=12 ng/L with a 95% Confidence Interval of 8-18 ng/L.    Second Troponin (time 2 hours)  If calculated delta >= 20 ng/L,  Myocardial injury suggested ; Type of myocardial injury and treatment strategy to be determined.  If 5-49 ng/L and the calculated delta is 5-19 ng/L, consult medical service for evaluation.  Continue evaluation for ischemia on ecg and other possible etiology and repeat hs troponin at 4 hours.  If delta is <5 ng/L at 2 hours, consider discharge based on risk stratification via the HEART score (if in ED), or SALVADOR risk score in IP/Observation.    HS Troponin 99th Percentile URL of a Health Population=12 ng/L with a 95% Confidence Interval of 8-18 ng/L.    Delta 4hr hsTnI -2  <20 ng/L Final   LACTIC ACID, PLASMA (W/REFLEX IF RESULT > 2.0) - Normal    LACTIC ACID 0.7  0.5 - 2.0 mmol/L Final    Narrative:     Result may be elevated if tourniquet was used during collection.   PHOSPHORUS - Normal    Phosphorus 3.7  2.3 - 4.1 mg/dL Final   POTASSIUM - Normal    Potassium 3.9  3.5 - 5.3 mmol/L Final   SPUTUM CULTURE AND GRAM STAIN     CTA chest pe study   Final Result         1. Findings consistent with multifocal pneumonia.   2. No evidence of acute pulmonary embolus, thoracic.                  Workstation performed: [id number]         CT head without contrast   Final Result         1. No evidence of acute intracranial hemorrhage or mass effect.   2. Extensive paranasal sinus disease.                  Workstation performed: [id number]         XR chest 2 views   ED Interpretation   No acute cardiopulmonary disease      Final Result      Patchy opacities in the right mid lung and left lower lobe, likely reflecting atelectasis versus pneumonia.            Workstation performed: [id number]               Critical Care Time  Procedures  1. Multifocal pneumonia        2. Elevated troponin          Time reflects when diagnosis was documented in both MDM as  applicable and the Disposition within this note       Time User Action Codes Description Comment    4/15/2025  3:12 AM Deena Luke [J18.9] Multifocal pneumonia     4/15/2025  3:12 AM Deena Luke [R79.89] Elevated troponin           ED Disposition       ED Disposition   Admit    Condition   Stable    Date/Time   Tue Apr 15, 2025  2:52 AM    Comment                  Follow-up Information    None

## 2025-04-15 ENCOUNTER — APPOINTMENT (INPATIENT)
Dept: NON INVASIVE DIAGNOSTICS | Facility: HOSPITAL | Age: 60
DRG: 871 | End: 2025-04-15
Payer: MEDICARE

## 2025-04-15 PROBLEM — F41.9 ANXIETY AND DEPRESSION: Status: ACTIVE | Noted: 2025-04-15

## 2025-04-15 PROBLEM — A41.9 SEPSIS (HCC): Status: ACTIVE | Noted: 2025-04-15

## 2025-04-15 PROBLEM — I21.4 NON-STEMI (NON-ST ELEVATED MYOCARDIAL INFARCTION) (HCC): Status: ACTIVE | Noted: 2025-04-15

## 2025-04-15 PROBLEM — G62.9 NEUROPATHY: Status: ACTIVE | Noted: 2025-04-15

## 2025-04-15 PROBLEM — F32.A ANXIETY AND DEPRESSION: Status: ACTIVE | Noted: 2025-04-15

## 2025-04-15 PROBLEM — J18.9 PNEUMONIA: Status: ACTIVE | Noted: 2025-04-15

## 2025-04-15 LAB
ALBUMIN SERPL BCG-MCNC: 3 G/DL (ref 3.5–5)
ALP SERPL-CCNC: 103 U/L (ref 34–104)
ALT SERPL W P-5'-P-CCNC: 12 U/L (ref 7–52)
ANION GAP SERPL CALCULATED.3IONS-SCNC: 10 MMOL/L (ref 4–13)
AORTIC ROOT: 3.3 CM
AORTIC VALVE MEAN VELOCITY: 10.2 M/S
APTT PPP: 37 SECONDS (ref 23–34)
ASCENDING AORTA: 3.9 CM
AST SERPL W P-5'-P-CCNC: 11 U/L (ref 13–39)
ATRIAL RATE: 61 BPM
ATRIAL RATE: 81 BPM
ATRIAL RATE: 82 BPM
ATRIAL RATE: 87 BPM
AV AREA BY CONTINUOUS VTI: 2.9 CM2
AV AREA PEAK VELOCITY: 3.1 CM2
AV LVOT MEAN GRADIENT: 4 MMHG
AV LVOT PEAK GRADIENT: 8 MMHG
AV MEAN PRESS GRAD SYS DOP V1V2: 5 MMHG
AV ORIFICE AREA US: 2.92 CM2
AV PEAK GRADIENT: 10 MMHG
AV VELOCITY RATIO: 0.84
AV VMAX SYS DOP: 1.57 M/S
BACTERIA UR QL AUTO: ABNORMAL /HPF
BILIRUB SERPL-MCNC: 0.38 MG/DL (ref 0.2–1)
BILIRUB UR QL STRIP: NEGATIVE
BSA FOR ECHO PROCEDURE: 1.84 M2
BUN SERPL-MCNC: 10 MG/DL (ref 5–25)
CALCIUM ALBUM COR SERPL-MCNC: 8.8 MG/DL (ref 8.3–10.1)
CALCIUM SERPL-MCNC: 8 MG/DL (ref 8.4–10.2)
CHLORIDE SERPL-SCNC: 101 MMOL/L (ref 96–108)
CHOLEST SERPL-MCNC: 113 MG/DL (ref ?–200)
CLARITY UR: CLEAR
CO2 SERPL-SCNC: 25 MMOL/L (ref 21–32)
COLOR UR: YELLOW
CREAT SERPL-MCNC: 0.56 MG/DL (ref 0.6–1.3)
DOP CALC AO VTI: 37.83 CM
DOP CALC LVOT AREA: 3.46 CM2
DOP CALC LVOT CARDIAC INDEX: 4.51 L/MIN/M2
DOP CALC LVOT CARDIAC OUTPUT: 8.29 L/MIN
DOP CALC LVOT DIAMETER: 2.1 CM
DOP CALC LVOT PEAK VEL VTI: 31.9 CM
DOP CALC LVOT PEAK VEL: 1.39 M/S
DOP CALC LVOT STROKE INDEX: 61.4 ML/M2
DOP CALC LVOT STROKE VOLUME: 110.43
E WAVE DECELERATION TIME: 207 MS
E/A RATIO: 1.46
ERYTHROCYTE [DISTWIDTH] IN BLOOD BY AUTOMATED COUNT: 15.2 % (ref 11.6–15.1)
EST. AVERAGE GLUCOSE BLD GHB EST-MCNC: 148 MG/DL
FLUAV AG UPPER RESP QL IA.RAPID: NEGATIVE
FLUBV AG UPPER RESP QL IA.RAPID: NEGATIVE
FRACTIONAL SHORTENING: 33 (ref 28–44)
GFR SERPL CREATININE-BSD FRML MDRD: 101 ML/MIN/1.73SQ M
GLUCOSE SERPL-MCNC: 122 MG/DL (ref 65–140)
GLUCOSE SERPL-MCNC: 124 MG/DL (ref 65–140)
GLUCOSE SERPL-MCNC: 158 MG/DL (ref 65–140)
GLUCOSE UR STRIP-MCNC: ABNORMAL MG/DL
HBA1C MFR BLD: 6.8 %
HCT VFR BLD AUTO: 28.2 % (ref 34.8–46.1)
HDLC SERPL-MCNC: 18 MG/DL
HGB BLD-MCNC: 8.8 G/DL (ref 11.5–15.4)
HGB UR QL STRIP.AUTO: NEGATIVE
HYALINE CASTS #/AREA URNS LPF: ABNORMAL /LPF
INTERVENTRICULAR SEPTUM IN DIASTOLE (PARASTERNAL SHORT AXIS VIEW): 1.1 CM
INTERVENTRICULAR SEPTUM: 1.1 CM (ref 0.6–1.1)
KETONES UR STRIP-MCNC: ABNORMAL MG/DL
LAAS-AP2: 26.7 CM2
LAAS-AP4: 25 CM2
LACTATE SERPL-SCNC: 0.7 MMOL/L (ref 0.5–2)
LDLC SERPL CALC-MCNC: 67 MG/DL (ref 0–100)
LEFT ATRIUM SIZE: 5.8 CM
LEFT ATRIUM VOLUME (MOD BIPLANE): 91 ML
LEFT ATRIUM VOLUME INDEX (MOD BIPLANE): 49.5 ML/M2
LEFT INTERNAL DIMENSION IN SYSTOLE: 2.8 CM (ref 2.1–4)
LEFT VENTRICULAR INTERNAL DIMENSION IN DIASTOLE: 4.2 CM (ref 3.5–6)
LEFT VENTRICULAR POSTERIOR WALL IN END DIASTOLE: 1 CM
LEFT VENTRICULAR STROKE VOLUME: 51 ML
LEUKOCYTE ESTERASE UR QL STRIP: NEGATIVE
LV EF US.2D.A4C+ESTIMATED: 75 %
LVSV (TEICH): 51 ML
MAGNESIUM SERPL-MCNC: 1.3 MG/DL (ref 1.9–2.7)
MCH RBC QN AUTO: 27.3 PG (ref 26.8–34.3)
MCHC RBC AUTO-ENTMCNC: 31.2 G/DL (ref 31.4–37.4)
MCV RBC AUTO: 88 FL (ref 82–98)
MV E'TISSUE VEL-SEP: 9 CM/S
MV PEAK A VEL: 0.93 M/S
MV PEAK E VEL: 136 CM/S
MV STENOSIS PRESSURE HALF TIME: 60 MS
MV VALVE AREA P 1/2 METHOD: 3.67
NITRITE UR QL STRIP: NEGATIVE
NON-SQ EPI CELLS URNS QL MICRO: ABNORMAL /HPF
P AXIS: 29 DEGREES
P AXIS: 65 DEGREES
P AXIS: 70 DEGREES
P AXIS: 75 DEGREES
PH UR STRIP.AUTO: 6.5 [PH]
PHOSPHATE SERPL-MCNC: 3.7 MG/DL (ref 2.3–4.1)
PLATELET # BLD AUTO: 380 THOUSANDS/UL (ref 149–390)
PMV BLD AUTO: 9.7 FL (ref 8.9–12.7)
POTASSIUM SERPL-SCNC: 3.7 MMOL/L (ref 3.5–5.3)
POTASSIUM SERPL-SCNC: 3.9 MMOL/L (ref 3.5–5.3)
PR INTERVAL: 122 MS
PR INTERVAL: 158 MS
PR INTERVAL: 164 MS
PR INTERVAL: 174 MS
PROCALCITONIN SERPL-MCNC: 1.33 NG/ML
PROT SERPL-MCNC: 6.6 G/DL (ref 6.4–8.4)
PROT UR STRIP-MCNC: ABNORMAL MG/DL
QRS AXIS: 26 DEGREES
QRS AXIS: 70 DEGREES
QRS AXIS: 75 DEGREES
QRS AXIS: 88 DEGREES
QRSD INTERVAL: 100 MS
QRSD INTERVAL: 94 MS
QRSD INTERVAL: 94 MS
QRSD INTERVAL: 96 MS
QT INTERVAL: 360 MS
QT INTERVAL: 362 MS
QT INTERVAL: 372 MS
QT INTERVAL: 460 MS
QTC INTERVAL: 420 MS
QTC INTERVAL: 432 MS
QTC INTERVAL: 435 MS
QTC INTERVAL: 463 MS
RA PRESSURE ESTIMATED: 10 MMHG
RBC # BLD AUTO: 3.22 MILLION/UL (ref 3.81–5.12)
RBC #/AREA URNS AUTO: ABNORMAL /HPF
RIGHT ATRIAL 2D VOLUME: 43 ML
RIGHT ATRIUM AREA SYSTOLE A4C: 16.2 CM2
RIGHT VENTRICLE ID DIMENSION: 4 CM
RV PSP: 40 MMHG
SARS-COV+SARS-COV-2 AG RESP QL IA.RAPID: NEGATIVE
SINOTUBULAR JUNCTION: 2.9 CM
SL CV LEFT ATRIUM LENGTH A2C: 6 CM
SL CV LV EF: 75
SL CV PED ECHO LEFT VENTRICLE DIASTOLIC VOLUME (MOD BIPLANE) 2D: 80 ML
SL CV PED ECHO LEFT VENTRICLE SYSTOLIC VOLUME (MOD BIPLANE) 2D: 30 ML
SL CV SINUS OF VALSALVA 2D: 3.3 CM
SODIUM SERPL-SCNC: 136 MMOL/L (ref 135–147)
SP GR UR STRIP.AUTO: >=1.05 (ref 1–1.03)
STJ: 2.9 CM
T WAVE AXIS: 0 DEGREES
T WAVE AXIS: 18 DEGREES
T WAVE AXIS: 30 DEGREES
T WAVE AXIS: 84 DEGREES
TR MAX PG: 30 MMHG
TR PEAK VELOCITY: 2.7 M/S
TRICUSPID ANNULAR PLANE SYSTOLIC EXCURSION: 3 CM
TRICUSPID VALVE PEAK REGURGITATION VELOCITY: 2.73 M/S
TRIGL SERPL-MCNC: 142 MG/DL (ref ?–150)
UROBILINOGEN UR STRIP-ACNC: 2 MG/DL
VENTRICULAR RATE: 61 BPM
VENTRICULAR RATE: 81 BPM
VENTRICULAR RATE: 82 BPM
VENTRICULAR RATE: 87 BPM
WBC # BLD AUTO: 13.85 THOUSAND/UL (ref 4.31–10.16)
WBC #/AREA URNS AUTO: ABNORMAL /HPF

## 2025-04-15 PROCEDURE — 84145 PROCALCITONIN (PCT): CPT | Performed by: EMERGENCY MEDICINE

## 2025-04-15 PROCEDURE — 85027 COMPLETE CBC AUTOMATED: CPT | Performed by: HOSPITALIST

## 2025-04-15 PROCEDURE — 82948 REAGENT STRIP/BLOOD GLUCOSE: CPT

## 2025-04-15 PROCEDURE — 93306 TTE W/DOPPLER COMPLETE: CPT

## 2025-04-15 PROCEDURE — 99223 1ST HOSP IP/OBS HIGH 75: CPT | Performed by: HOSPITALIST

## 2025-04-15 PROCEDURE — 83605 ASSAY OF LACTIC ACID: CPT | Performed by: EMERGENCY MEDICINE

## 2025-04-15 PROCEDURE — 93306 TTE W/DOPPLER COMPLETE: CPT | Performed by: STUDENT IN AN ORGANIZED HEALTH CARE EDUCATION/TRAINING PROGRAM

## 2025-04-15 PROCEDURE — 83036 HEMOGLOBIN GLYCOSYLATED A1C: CPT | Performed by: HOSPITALIST

## 2025-04-15 PROCEDURE — 87811 SARS-COV-2 COVID19 W/OPTIC: CPT | Performed by: EMERGENCY MEDICINE

## 2025-04-15 PROCEDURE — 96366 THER/PROPH/DIAG IV INF ADDON: CPT

## 2025-04-15 PROCEDURE — 85730 THROMBOPLASTIN TIME PARTIAL: CPT | Performed by: HOSPITALIST

## 2025-04-15 PROCEDURE — 93010 ELECTROCARDIOGRAM REPORT: CPT

## 2025-04-15 PROCEDURE — 84100 ASSAY OF PHOSPHORUS: CPT | Performed by: HOSPITALIST

## 2025-04-15 PROCEDURE — 96367 TX/PROPH/DG ADDL SEQ IV INF: CPT

## 2025-04-15 PROCEDURE — 80061 LIPID PANEL: CPT | Performed by: HOSPITALIST

## 2025-04-15 PROCEDURE — 81001 URINALYSIS AUTO W/SCOPE: CPT | Performed by: HOSPITALIST

## 2025-04-15 PROCEDURE — 80053 COMPREHEN METABOLIC PANEL: CPT | Performed by: HOSPITALIST

## 2025-04-15 PROCEDURE — 84132 ASSAY OF SERUM POTASSIUM: CPT | Performed by: HOSPITALIST

## 2025-04-15 PROCEDURE — 87804 INFLUENZA ASSAY W/OPTIC: CPT | Performed by: EMERGENCY MEDICINE

## 2025-04-15 PROCEDURE — 36415 COLL VENOUS BLD VENIPUNCTURE: CPT | Performed by: EMERGENCY MEDICINE

## 2025-04-15 PROCEDURE — 83735 ASSAY OF MAGNESIUM: CPT | Performed by: HOSPITALIST

## 2025-04-15 PROCEDURE — 93005 ELECTROCARDIOGRAM TRACING: CPT

## 2025-04-15 PROCEDURE — 87040 BLOOD CULTURE FOR BACTERIA: CPT | Performed by: EMERGENCY MEDICINE

## 2025-04-15 RX ORDER — METHOCARBAMOL 500 MG/1
500 TABLET, FILM COATED ORAL 3 TIMES DAILY PRN
Status: DISCONTINUED | OUTPATIENT
Start: 2025-04-15 | End: 2025-04-19 | Stop reason: HOSPADM

## 2025-04-15 RX ORDER — NITROGLYCERIN 0.4 MG/1
0.4 TABLET SUBLINGUAL
Status: DISCONTINUED | OUTPATIENT
Start: 2025-04-15 | End: 2025-04-19 | Stop reason: HOSPADM

## 2025-04-15 RX ORDER — SUMATRIPTAN 50 MG/1
100 TABLET, FILM COATED ORAL ONCE AS NEEDED
Status: DISCONTINUED | OUTPATIENT
Start: 2025-04-15 | End: 2025-04-19 | Stop reason: HOSPADM

## 2025-04-15 RX ORDER — HEPARIN SODIUM 5000 [USP'U]/ML
5000 INJECTION, SOLUTION INTRAVENOUS; SUBCUTANEOUS EVERY 8 HOURS SCHEDULED
Status: DISCONTINUED | OUTPATIENT
Start: 2025-04-15 | End: 2025-04-19 | Stop reason: HOSPADM

## 2025-04-15 RX ORDER — GABAPENTIN 300 MG/1
300 CAPSULE ORAL 4 TIMES DAILY
Status: DISCONTINUED | OUTPATIENT
Start: 2025-04-15 | End: 2025-04-19 | Stop reason: HOSPADM

## 2025-04-15 RX ORDER — CITALOPRAM HYDROBROMIDE 20 MG/1
40 TABLET ORAL DAILY
Status: DISCONTINUED | OUTPATIENT
Start: 2025-04-15 | End: 2025-04-19 | Stop reason: HOSPADM

## 2025-04-15 RX ORDER — BUPROPION HYDROCHLORIDE 150 MG/1
150 TABLET ORAL DAILY
Status: DISCONTINUED | OUTPATIENT
Start: 2025-04-15 | End: 2025-04-19 | Stop reason: HOSPADM

## 2025-04-15 RX ORDER — AMOXICILLIN 250 MG
1 CAPSULE ORAL
Status: DISCONTINUED | OUTPATIENT
Start: 2025-04-15 | End: 2025-04-19 | Stop reason: HOSPADM

## 2025-04-15 RX ORDER — TEMAZEPAM 7.5 MG/1
22.5 CAPSULE ORAL
Status: DISCONTINUED | OUTPATIENT
Start: 2025-04-15 | End: 2025-04-19 | Stop reason: HOSPADM

## 2025-04-15 RX ORDER — LABETALOL HYDROCHLORIDE 5 MG/ML
10 INJECTION, SOLUTION INTRAVENOUS EVERY 6 HOURS PRN
Status: DISCONTINUED | OUTPATIENT
Start: 2025-04-15 | End: 2025-04-19 | Stop reason: HOSPADM

## 2025-04-15 RX ORDER — LORAZEPAM 0.5 MG/1
0.5 TABLET ORAL EVERY 6 HOURS PRN
Status: DISCONTINUED | OUTPATIENT
Start: 2025-04-15 | End: 2025-04-19 | Stop reason: HOSPADM

## 2025-04-15 RX ORDER — DOXYCYCLINE 100 MG/1
100 CAPSULE ORAL ONCE
Status: COMPLETED | OUTPATIENT
Start: 2025-04-15 | End: 2025-04-15

## 2025-04-15 RX ORDER — SODIUM CHLORIDE, SODIUM LACTATE, POTASSIUM CHLORIDE, CALCIUM CHLORIDE 600; 310; 30; 20 MG/100ML; MG/100ML; MG/100ML; MG/100ML
75 INJECTION, SOLUTION INTRAVENOUS CONTINUOUS
Status: DISPENSED | OUTPATIENT
Start: 2025-04-15 | End: 2025-04-15

## 2025-04-15 RX ORDER — POTASSIUM CHLORIDE 1500 MG/1
40 TABLET, EXTENDED RELEASE ORAL
Status: COMPLETED | OUTPATIENT
Start: 2025-04-15 | End: 2025-04-15

## 2025-04-15 RX ORDER — INSULIN LISPRO 100 [IU]/ML
1-5 INJECTION, SOLUTION INTRAVENOUS; SUBCUTANEOUS
Status: DISCONTINUED | OUTPATIENT
Start: 2025-04-15 | End: 2025-04-19 | Stop reason: HOSPADM

## 2025-04-15 RX ORDER — LORAZEPAM 0.5 MG/1
0.5 TABLET ORAL ONCE
Status: COMPLETED | OUTPATIENT
Start: 2025-04-15 | End: 2025-04-15

## 2025-04-15 RX ORDER — ACETAMINOPHEN 325 MG/1
650 TABLET ORAL EVERY 6 HOURS PRN
Status: DISCONTINUED | OUTPATIENT
Start: 2025-04-15 | End: 2025-04-19 | Stop reason: HOSPADM

## 2025-04-15 RX ORDER — ASPIRIN 81 MG/1
81 TABLET, CHEWABLE ORAL DAILY
Status: DISCONTINUED | OUTPATIENT
Start: 2025-04-16 | End: 2025-04-19 | Stop reason: HOSPADM

## 2025-04-15 RX ORDER — ASPIRIN 325 MG
325 TABLET ORAL ONCE
Status: COMPLETED | OUTPATIENT
Start: 2025-04-15 | End: 2025-04-15

## 2025-04-15 RX ORDER — PANTOPRAZOLE SODIUM 40 MG/1
40 TABLET, DELAYED RELEASE ORAL
Status: DISCONTINUED | OUTPATIENT
Start: 2025-04-15 | End: 2025-04-19 | Stop reason: HOSPADM

## 2025-04-15 RX ORDER — LEVOTHYROXINE SODIUM 100 UG/1
100 TABLET ORAL
Status: DISCONTINUED | OUTPATIENT
Start: 2025-04-15 | End: 2025-04-19 | Stop reason: HOSPADM

## 2025-04-15 RX ORDER — GUAIFENESIN 600 MG/1
600 TABLET, EXTENDED RELEASE ORAL EVERY 12 HOURS SCHEDULED
Status: DISCONTINUED | OUTPATIENT
Start: 2025-04-15 | End: 2025-04-19 | Stop reason: HOSPADM

## 2025-04-15 RX ORDER — LEVETIRACETAM 750 MG/1
1500 TABLET ORAL 2 TIMES DAILY
Status: DISCONTINUED | OUTPATIENT
Start: 2025-04-15 | End: 2025-04-19 | Stop reason: HOSPADM

## 2025-04-15 RX ORDER — POLYETHYLENE GLYCOL 3350 17 G/17G
17 POWDER, FOR SOLUTION ORAL DAILY PRN
Status: DISCONTINUED | OUTPATIENT
Start: 2025-04-15 | End: 2025-04-19 | Stop reason: HOSPADM

## 2025-04-15 RX ORDER — ALBUTEROL SULFATE 90 UG/1
2 INHALANT RESPIRATORY (INHALATION) 4 TIMES DAILY
Status: DISCONTINUED | OUTPATIENT
Start: 2025-04-15 | End: 2025-04-19 | Stop reason: HOSPADM

## 2025-04-15 RX ORDER — TIZANIDINE 2 MG/1
2 TABLET ORAL 2 TIMES DAILY PRN
Status: DISCONTINUED | OUTPATIENT
Start: 2025-04-15 | End: 2025-04-19 | Stop reason: HOSPADM

## 2025-04-15 RX ORDER — LUBIPROSTONE 24 UG/1
24 CAPSULE ORAL 2 TIMES DAILY WITH MEALS
Status: DISCONTINUED | OUTPATIENT
Start: 2025-04-15 | End: 2025-04-19 | Stop reason: HOSPADM

## 2025-04-15 RX ORDER — TAMSULOSIN HYDROCHLORIDE 0.4 MG/1
0.4 CAPSULE ORAL
Status: DISCONTINUED | OUTPATIENT
Start: 2025-04-15 | End: 2025-04-19 | Stop reason: HOSPADM

## 2025-04-15 RX ORDER — LISINOPRIL 10 MG/1
10 TABLET ORAL
Status: DISCONTINUED | OUTPATIENT
Start: 2025-04-15 | End: 2025-04-19 | Stop reason: HOSPADM

## 2025-04-15 RX ORDER — MAGNESIUM SULFATE HEPTAHYDRATE 40 MG/ML
2 INJECTION, SOLUTION INTRAVENOUS ONCE
Status: COMPLETED | OUTPATIENT
Start: 2025-04-15 | End: 2025-04-15

## 2025-04-15 RX ORDER — GUAIFENESIN/DEXTROMETHORPHAN 100-10MG/5
10 SYRUP ORAL EVERY 6 HOURS PRN
Status: DISCONTINUED | OUTPATIENT
Start: 2025-04-15 | End: 2025-04-19 | Stop reason: HOSPADM

## 2025-04-15 RX ORDER — FERROUS SULFATE 325(65) MG
325 TABLET ORAL
Status: DISCONTINUED | OUTPATIENT
Start: 2025-04-15 | End: 2025-04-19 | Stop reason: HOSPADM

## 2025-04-15 RX ORDER — NIFEDIPINE 60 MG/1
60 TABLET, EXTENDED RELEASE ORAL DAILY
Status: DISCONTINUED | OUTPATIENT
Start: 2025-04-15 | End: 2025-04-19 | Stop reason: HOSPADM

## 2025-04-15 RX ADMIN — MORPHINE SULFATE 2 MG: 2 INJECTION, SOLUTION INTRAMUSCULAR; INTRAVENOUS at 14:05

## 2025-04-15 RX ADMIN — HEPARIN SODIUM 5000 UNITS: 5000 INJECTION INTRAVENOUS; SUBCUTANEOUS at 06:05

## 2025-04-15 RX ADMIN — ASPIRIN 325 MG ORAL TABLET 325 MG: 325 PILL ORAL at 04:28

## 2025-04-15 RX ADMIN — LEVETIRACETAM 1500 MG: 750 TABLET, FILM COATED ORAL at 09:12

## 2025-04-15 RX ADMIN — LEVETIRACETAM 1500 MG: 750 TABLET, FILM COATED ORAL at 17:07

## 2025-04-15 RX ADMIN — METHOCARBAMOL 500 MG: 500 TABLET ORAL at 07:51

## 2025-04-15 RX ADMIN — NIFEDIPINE 60 MG: 60 TABLET, FILM COATED, EXTENDED RELEASE ORAL at 09:12

## 2025-04-15 RX ADMIN — BUPROPION HYDROCHLORIDE 150 MG: 150 TABLET, EXTENDED RELEASE ORAL at 09:12

## 2025-04-15 RX ADMIN — POTASSIUM CHLORIDE 40 MEQ: 1500 TABLET, EXTENDED RELEASE ORAL at 04:29

## 2025-04-15 RX ADMIN — CITALOPRAM HYDROBROMIDE 40 MG: 20 TABLET ORAL at 09:12

## 2025-04-15 RX ADMIN — METHOCARBAMOL 500 MG: 500 TABLET ORAL at 16:13

## 2025-04-15 RX ADMIN — GABAPENTIN 300 MG: 300 CAPSULE ORAL at 21:42

## 2025-04-15 RX ADMIN — HEPARIN SODIUM 5000 UNITS: 5000 INJECTION INTRAVENOUS; SUBCUTANEOUS at 21:42

## 2025-04-15 RX ADMIN — IOHEXOL 85 ML: 350 INJECTION, SOLUTION INTRAVENOUS at 00:04

## 2025-04-15 RX ADMIN — GUAIFENESIN 600 MG: 600 TABLET ORAL at 09:12

## 2025-04-15 RX ADMIN — MORPHINE SULFATE 2 MG: 2 INJECTION, SOLUTION INTRAMUSCULAR; INTRAVENOUS at 18:26

## 2025-04-15 RX ADMIN — MAGNESIUM SULFATE HEPTAHYDRATE 2 G: 40 INJECTION, SOLUTION INTRAVENOUS at 08:02

## 2025-04-15 RX ADMIN — DOXYCYCLINE 100 MG: 100 INJECTION, POWDER, LYOPHILIZED, FOR SOLUTION INTRAVENOUS at 14:25

## 2025-04-15 RX ADMIN — LORAZEPAM 0.5 MG: 0.5 TABLET ORAL at 00:28

## 2025-04-15 RX ADMIN — GABAPENTIN 300 MG: 300 CAPSULE ORAL at 12:48

## 2025-04-15 RX ADMIN — PANTOPRAZOLE SODIUM 40 MG: 40 TABLET, DELAYED RELEASE ORAL at 07:42

## 2025-04-15 RX ADMIN — GABAPENTIN 300 MG: 300 CAPSULE ORAL at 09:12

## 2025-04-15 RX ADMIN — HEPARIN SODIUM 5000 UNITS: 5000 INJECTION INTRAVENOUS; SUBCUTANEOUS at 14:05

## 2025-04-15 RX ADMIN — GABAPENTIN 300 MG: 300 CAPSULE ORAL at 17:07

## 2025-04-15 RX ADMIN — ACETAMINOPHEN 650 MG: 325 TABLET, FILM COATED ORAL at 21:42

## 2025-04-15 RX ADMIN — UMECLIDINIUM 1 PUFF: 62.5 AEROSOL, POWDER ORAL at 09:19

## 2025-04-15 RX ADMIN — TEMAZEPAM 22.5 MG: 7.5 CAPSULE ORAL at 21:42

## 2025-04-15 RX ADMIN — DEXTROSE 1000 MG: 50 INJECTION, SOLUTION INTRAVENOUS at 02:31

## 2025-04-15 RX ADMIN — TAMSULOSIN HYDROCHLORIDE 0.4 MG: 0.4 CAPSULE ORAL at 16:13

## 2025-04-15 RX ADMIN — ALBUTEROL SULFATE 2 PUFF: 90 AEROSOL, METERED RESPIRATORY (INHALATION) at 12:48

## 2025-04-15 RX ADMIN — POTASSIUM CHLORIDE 40 MEQ: 1500 TABLET, EXTENDED RELEASE ORAL at 07:42

## 2025-04-15 RX ADMIN — LEVOTHYROXINE SODIUM 100 MCG: 100 TABLET ORAL at 06:03

## 2025-04-15 RX ADMIN — SODIUM CHLORIDE, SODIUM LACTATE, POTASSIUM CHLORIDE, AND CALCIUM CHLORIDE 75 ML/HR: .6; .31; .03; .02 INJECTION, SOLUTION INTRAVENOUS at 09:10

## 2025-04-15 RX ADMIN — LORAZEPAM 0.5 MG: 0.5 TABLET ORAL at 16:13

## 2025-04-15 RX ADMIN — SODIUM CHLORIDE 1000 ML: 0.9 INJECTION, SOLUTION INTRAVENOUS at 04:32

## 2025-04-15 RX ADMIN — ALBUTEROL SULFATE 2 PUFF: 90 AEROSOL, METERED RESPIRATORY (INHALATION) at 17:08

## 2025-04-15 RX ADMIN — GUAIFENESIN AND DEXTROMETHORPHAN 10 ML: 100; 10 SYRUP ORAL at 15:07

## 2025-04-15 RX ADMIN — LISINOPRIL 10 MG: 10 TABLET ORAL at 04:28

## 2025-04-15 RX ADMIN — SODIUM CHLORIDE 1000 ML: 0.9 INJECTION, SOLUTION INTRAVENOUS at 06:12

## 2025-04-15 RX ADMIN — ALBUTEROL SULFATE 2 PUFF: 90 AEROSOL, METERED RESPIRATORY (INHALATION) at 09:18

## 2025-04-15 RX ADMIN — GUAIFENESIN 600 MG: 600 TABLET ORAL at 21:42

## 2025-04-15 RX ADMIN — DOXYCYCLINE HYCLATE 100 MG: 100 CAPSULE ORAL at 02:31

## 2025-04-15 RX ADMIN — SENNOSIDES AND DOCUSATE SODIUM 1 TABLET: 50; 8.6 TABLET ORAL at 21:42

## 2025-04-15 NOTE — ED NOTES
Spoke with Dr. Ellis.  Pt to receive 2nd L NS over 2 hours for sepsis protocol     Kamilla Pettit RN  04/15/25 0620

## 2025-04-15 NOTE — ED NOTES
Assisted patient to use the bedside commode. Patient unsteady and required standby assist. No distress noted. Patient assisted back into bed. Bed low and locked, side rails up x2, call bell with in reach, belongings within reach. Patient provided with ice and diet cola per request.      Chelle Tang RN  04/15/25 [id number]

## 2025-04-15 NOTE — H&P
H&P - Hospitalist   Name: Aleshia Mauricio 59 y.o. female I MRN: [id number]  Unit/Bed#: ED-18 I Date of Admission: 4/14/2025   Date of Service: 4/15/2025 I Hospital Day: 0     Assessment & Plan  Sepsis (HCC)  Guarded admit with leukocytosis, tachycardia and multifocal pneumonia  Lactic acid normal.  Procalcitonin positive.  Flu, COVID and SARS are negative  Sepsis fluid given in the ED therefore will give a liter.  Patient with 27 kg  30 cc/kg sepsis bolus is 810 cc total  Continuous infusion of IVF at 75 cc an hour for an extra 10 hours  S/p a dose of Rocephin and doxycycline in the ED, will continue both while awaiting sputum Cx/GS, Legionella and strep urine antigens results  For the monitor  Non-STEMI (non-ST elevated myocardial infarction) (HCC)  Complaint of chest pain in the face of elevated troponin from 9 to 94 even though it trended down to 7 and then 5  EKG with normal sinus rhythm and rate of 82 bpm and QTc of 420 ms  Heparin GTT given initially for possible ED in the face of elevated D-dimer at 4.12  Chest pain most likely from multifocal pneumonia rather than cardiac which resulted in normalization of troponin  Echo, fasting lipid, hemoglobin A1c ordered as well as aspirin started ordered  Aspirin 325 mg given once.  81 mg ordered for daily starting tomorrow.  Lisinopril (ACEI) resumed as well as nifedipine ([facility])  Pneumonia  Multifocal as seen on CTA chest PE protocol  Clinically presented with shortness of breath, cough and chest pain which is most likely pleuritic  S/p a dose of Rocephin and doxycycline, will continue both while awaiting sputum Cx/GS, Legionella and strep urine antigen for antibiotics  Mucinex, incentive spirometer, Robitussin all ordered  Monitor clinically  Hypokalemia  Potassium of 3.0  Will replete with potassium chloride 40 mEq every 3 hours x 2  Repeat value pending  Telemetry monitor  Hypertensive urgency  Blood pressure currently of 181/84  Will resume blood pressure medications  at home including lisinopril and nifedipine  Monitoring for hypertensive urgency    Hyponatremia  Sodium 133 which is at his baseline  Secondary to SSRI (Celexa) that he takes  Monitor  Hypothyroidism  Continue Synthroid  Iron deficiency anemia  H&H of 11/35  On ferrous sulfate which is resumed  Brain aneurysm  S/p clipping  On Keppra  Continue to exercise seizure precautions  Monitor  Chronic midline low back pain with sciatica  Continue home dose of temazepam and tizanidine  Exercise fall precaution  Neuropathy  Continue gabapentin  Anxiety and depression  On bupropion and Celexa      VTE Pharmacologic Prophylaxis:   Moderate Risk (Score 3-4) - Pharmacological DVT Prophylaxis Ordered: heparin.  Code Status: Level 1 - Full Code   Discussion with family:  AM team to update family in the morning as it is late to call them at this time of the night  Anticipated Length of Stay: More than 2 days secondary to sepsis from multifocal pneumonia/non-STEMI    History of Present Illness   Chief Complaint:     59-year-old female with a history of COPD, cerebral aneurysm s/p clipping, IVC filter, Dilaudid pain pump, anemia, depression, neuropathy, constipation, seizure order and hypothyroidism.  Came with a complaint of syncope which occurred while she was at home.  Says she has chronic headache but this time was associated with fogginess before she lost consciousness.  Says she hit her head when she fell and the syncope was unwitnessed.  Endorsed having some right-sided chest pain and shortness of breath.  Positive cough since this morning.  With the constellation of symptoms above, she came in to be evaluated.  In the ED, she was found to be hypertensive with a blood pressure of 181/84 and heart rate of 105 bpm.  Vitals otherwise stable.  Labs show leukocytosis of 15,000 otherwise mild anemia with H&H of 11/35.  CMP with a sodium of 133 (chronic and at baseline), potassium of 3.0, chloride 93, glucose of 172.  Troponin  initially was normal at 9 and increased to 94 with subsequent repeat and normal at 7 and 5.  With initial elevation of D-dimer at 4.12 and second troponin of 94, heparin GTT was initiated and CTA chest was ordered.  Shows no PE but rather multifocal pneumonia therefore heparin GTT was discontinued.  Other labs include elevated  alk phos of 128, procalcitonin of 1.13, INR of 1.41 otherwise flu, COVID, SARS and lactic acid are all within normal limits.  EKG is normal sinus rhythm at a rate of 82 bpm and QTc of 420 ms.  Blood cultures were drawn x 2.  Rocephin and doxycycline were given as well as a dose of Tylenol and Benadryl.  Hospitalist team was called to admit and manage further      Review of Systems   Constitutional: Negative.    HENT: Negative.     Respiratory:  Positive for cough and shortness of breath.    Cardiovascular: Negative.    Gastrointestinal: Negative.    Neurological:  Positive for syncope and headaches.   All other systems reviewed and are negative.      Historical Information   Past Medical History:   Diagnosis Date    Abnormal gait     Abscess of abdominal cavity (HCC)     Aneurysm (HCC)     Brain with clips x 3 per pt    Arm injury     Right arm- pt present with short FA/wrist  immoblizer present on ED arrival    Cataract     Cellulitis     B/L LE    Chronic back pain     Chronic pain     COPD (chronic obstructive pulmonary disease) (HCC)     Disease of thyroid gland     hypothyroid    Edema     B/L LE    Falls frequently     last fall 1 hour ago per pt    Foot drop, left foot     Fracture     Facial bones per pt- left orbit/cheek? pt unsure    Fracture closed, nasal bone     Gastric bypass status for obesity     Hypoglycemia     Memory loss, short term     Migraine     MRSA (methicillin resistant Staphylococcus aureus)     Stomach ulcer      Past Surgical History:   Procedure Laterality Date    BACK SURGERY      lumbar/sacral fusion per pt    CEREBRAL ANEURYSM REPAIR      with clips x3 per pt      SECTION      x 4 per pt    GASTRIC BYPASS      MRSA CULTURE (HISTORICAL)      stomach abscess per pt     Social History     Tobacco Use    Smoking status: Every Day     Current packs/day: 0.50     Types: Cigarettes, Cigars    Smokeless tobacco: Never   Vaping Use    Vaping status: Never Used   Substance and Sexual Activity    Alcohol use: Not Currently     Comment: social    Drug use: No    Sexual activity: Not on file     E-Cigarette/Vaping    E-Cigarette Use Never User      E-Cigarette/Vaping Substances    Nicotine No     THC No     CBD No     Flavoring No     Other No     Unknown No      Family history non-contributory  Social History:  Marital Status:    Occupation: Unknown  Patient Pre-hospital Living Situation: Home  Patient Pre-hospital Level of Mobility: walks with cane  Patient Pre-hospital Diet Restrictions: Cardiac    Meds/Allergies   I have reviewed home medications using recent Epic encounter.  Prior to Admission medications    Medication Sig Start Date End Date Taking? Authorizing Provider   acetaminophen (TYLENOL) 500 mg tablet Take 2 tablets (1,000 mg total) by mouth every 6 (six) hours as needed for mild pain or moderate pain 20   Julio Cesar Guzman PA-C   buPROPion (WELLBUTRIN SR) 150 mg 12 hr tablet Take 150 mg by mouth 2 (two) times a day. 2 tabs in AM; one tab in afternoon    Historical Provider, MD   citalopram (CeleXA) 40 mg tablet Take 40 mg by mouth daily.    Historical Provider, MD   COMBIVENT RESPIMAT inhaler Inhale 2 puffs every 4 (four) hours as needed 8/10/19   Historical Provider, MD   CRANBERRY PO Take 4,200 mg by mouth daily.    Historical Provider, MD   cyanocobalamin 1,000 mcg/mL 1 INJECTION MONTHLY    Historical Provider, MD   DiphenhydrAMINE HCl (ALLERGY MED PO) Take 25 mg by mouth as needed. [facility]    Historical Provider, MD   EPINEPHrine (EPIPEN) 0.3 mg/0.3 mL SOAJ Inject 0.3 mL (0.3 mg total) into a muscle once for 1 dose 23  Yana  DO Martha   ferrous sulfate 325 (65 Fe) mg tablet Take 1 tablet (325 mg total) by mouth every other day  Patient not taking: Reported on 3/18/2024 10/2/20   Clare Xiao MD   gabapentin (NEURONTIN) 300 mg capsule Take 300 mg by mouth 4 (four) times a day 2/11/16   Historical Provider, MD   levETIRAcetam (KEPPRA) 500 mg tablet Take 1,500 mg by mouth 2 (two) times a day 7/29/19   Historical Provider, MD   levothyroxine 100 mcg tablet Take 100 mcg by mouth daily.    Historical Provider, MD   lisinopril (ZESTRIL) 10 mg tablet Take 1 tablet (10 mg total) by mouth daily after breakfast  Patient not taking: Reported on 3/18/2024 10/3/20   Clare Xiao MD   LORazepam (ATIVAN) 1 mg tablet Take 1 tablet (1 mg total) by mouth 4 (four) times a day for 10 days 10/2/20 10/12/20  Clare Xiao MD   lubiprostone (AMITIZA) 24 mcg capsule Take 24 mcg by mouth 2 (two) times a day with meals.    Historical Provider, MD   methocarbamol (ROBAXIN) 500 mg tablet Take 1 tablet (500 mg total) by mouth 3 (three) times a day as needed for muscle spasms 8/1/22   Stewart Amaro MD   NIFEdipine ER (ADALAT CC) 60 MG 24 hr tablet Take 1 tablet (60 mg total) by mouth daily  Patient not taking: Reported on 3/18/2024 10/3/20   Clare Xiao MD   pantoprazole (PROTONIX) 40 mg tablet Take 1 tablet (40 mg total) by mouth daily in the early morning 10/3/20   Clare Xiao MD   senna-docusate sodium (SENOKOT S) 8.6-50 mg per tablet Take 1 tablet by mouth daily at bedtime 10/2/20   Clare Xiao MD   SUMAtriptan (IMITREX) 100 mg tablet Take 100 mg by mouth once as needed for migraine.    Historical Provider, MD   tamsulosin (FLOMAX) 0.4 mg Take 1 capsule (0.4 mg total) by mouth daily with dinner 10/2/20   Clare Xiao MD   temazepam (RESTORIL) 22.5 MG capsule Take 22.5 mg by mouth daily at bedtime.    Historical Provider, MD   tiZANidine (ZANAFLEX) 2 mg tablet Take 1 tablet (2 mg total) by mouth 2 (two) times a day as needed for  muscle spasms for up to 3 days 10/2/20 10/5/20  Clare Xiao MD     Allergies   Allergen Reactions    Iodinated Contrast Media Swelling    Bee Venom     Iodine - Food Allergy        Objective :  Temp:  [98.2 °F (36.8 °C)] 98.2 °F (36.8 °C)  HR:  [] 90  BP: (160-190)/() 183/98  Resp:  [12-20] 15  SpO2:  [88 %-100 %] 100 %  O2 Device: None (Room air)    Physical Exam  Vitals and nursing note reviewed.   Constitutional:       Appearance: Normal appearance.   HENT:      Head: Normocephalic and atraumatic.   Cardiovascular:      Rate and Rhythm: Normal rate and regular rhythm.      Pulses: Normal pulses.      Heart sounds: Normal heart sounds.   Pulmonary:      Effort: Pulmonary effort is normal.      Comments: Diffuse bilateral rales on auscultation  Abdominal:      General: Bowel sounds are normal.      Palpations: Abdomen is soft.   Skin:     General: Skin is warm and dry.      Capillary Refill: Capillary refill takes less than 2 seconds.   Neurological:      General: No focal deficit present.      Mental Status: She is alert.   Psychiatric:         Mood and Affect: Mood normal.         Lines/Drains:            Lab Results: I have reviewed the following results:  Results from last 7 days   Lab Units 04/14/25  1732   WBC Thousand/uL 14.99*   HEMOGLOBIN g/dL 10.9*   HEMATOCRIT % 34.8   PLATELETS Thousands/uL 357   SEGS PCT % 85*   LYMPHO PCT % 6*   MONO PCT % 6   EOS PCT % 0     Results from last 7 days   Lab Units 04/14/25  1732   SODIUM mmol/L 133*   POTASSIUM mmol/L 3.0*   CHLORIDE mmol/L 94*   CO2 mmol/L 26   BUN mg/dL 10   CREATININE mg/dL 0.71   ANION GAP mmol/L 13   CALCIUM mg/dL 8.8   ALBUMIN g/dL 3.8   TOTAL BILIRUBIN mg/dL 0.58   ALK PHOS U/L 128*   ALT U/L 16   AST U/L 19   GLUCOSE RANDOM mg/dL 172*     Results from last 7 days   Lab Units 04/14/25 2058   INR  1.41*         Lab Results   Component Value Date    HGBA1C 6.1 (H) 09/17/2020     Results from last 7 days   Lab Units 04/15/25  0215    LACTIC ACID mmol/L 0.7   PROCALCITONIN ng/ml 1.33*       Imaging Results Review: I reviewed radiology reports from this admission including: CT chest.  Other Study Results Review: EKG was reviewed.     Administrative Statements   I have spent a total time of 60 minutes in caring for this patient on the day of the visit/encounter including Diagnostic results, Impressions, Documenting in the medical record, Reviewing/placing orders in the medical record (including tests, medications, and/or procedures), Obtaining or reviewing history  , and Communicating with other healthcare professionals .    ** Please Note: This note has been constructed using a voice recognition system. **

## 2025-04-15 NOTE — ASSESSMENT & PLAN NOTE
Multifocal as seen on CTA chest PE protocol  Clinically presented with shortness of breath, cough and chest pain which is most likely pleuritic  S/p a dose of Rocephin and doxycycline, will continue both while awaiting sputum Cx/GS, Legionella and strep urine antigen for antibiotics  Mucinex, incentive spirometer, Robitussin all ordered  Monitor clinically

## 2025-04-15 NOTE — PLAN OF CARE
Problem: Potential for Falls  Goal: Patient will remain free of falls  Description: INTERVENTIONS:- Educate patient/family on patient safety including physical limitations- Instruct patient to call for assistance with activity - Consult OT/PT to assist with strengthening/mobility - Keep Call bell within reach- Keep bed low and locked with side rails adjusted as appropriate- Keep care items and personal belongings within reach- Initiate and maintain comfort rounds- Make Fall Risk Sign visible to staff- Offer Toileting every 2 Hours, in advance of need. Apply yellow socks and bracelet for high fall risk patients- Consider moving patient to room near nurses station  Outcome: Progressing     Problem: Prexisting or High Potential for Compromised Skin Integrity  Goal: Skin integrity is maintained or improved  Description: INTERVENTIONS:- Identify patients at risk for skin breakdown- Assess and monitor skin integrity- Assess and monitor nutrition and hydration status- Monitor labs - Assess for incontinence - Turn and reposition patient- Assist with mobility/ambulation- Relieve pressure over bony prominences- Avoid friction and shearing- Provide appropriate hygiene as needed including keeping skin clean and dry- Evaluate need for skin moisturizer/barrier cream- Collaborate with interdisciplinary team - Patient/family teaching- Consider wound care consult   Outcome: Progressing     Problem: NEUROSENSORY - ADULT  Goal: Achieves stable or improved neurological status  Description: INTERVENTIONS- Monitor and report changes in neurological status- Monitor vital signs such as temperature, blood pressure, glucose, and any other labs ordered - Initiate measures to prevent increased intracranial pressure- Monitor for seizure activity and implement precautions if appropriate    Outcome: Progressing  Goal: Remains free of injury related to seizures activity  Description: INTERVENTIONS- Maintain airway, patient safety  and administer  oxygen as ordered- Monitor patient for seizure activity, document and report duration and description of seizure to physician/advanced practitioner- If seizure occurs,  ensure patient safety during seizure- Reorient patient post seizure- Seizure pads on all 4 side rails- Instruct patient/family to notify RN of any seizure activity including if an aura is experienced- Instruct patient/family to call for assistance with activity based on nursing assessment- Administer anti-seizure medications if ordered  Outcome: Progressing  Goal: Achieves maximal functionality and self care  Description: INTERVENTIONS- Monitor swallowing and airway patency with patient fatigue and changes in neurological status- Encourage and assist patient to increase activity and self care. - Encourage visually impaired, hearing impaired and aphasic patients to use assistive/communication devices  Outcome: Progressing     Problem: CARDIOVASCULAR - ADULT  Goal: Maintains optimal cardiac output and hemodynamic stability  Description: INTERVENTIONS:- Monitor I/O, vital signs and rhythm- Monitor for S/S and trends of decreased cardiac output- Administer and titrate ordered vasoactive medications to optimize hemodynamic stability- Assess quality of pulses, skin color and temperature- Assess for signs of decreased coronary artery perfusion- Instruct patient to report change in severity of symptoms  Outcome: Progressing  Goal: Absence of cardiac dysrhythmias or at baseline rhythm  Description: INTERVENTIONS:- Continuous cardiac monitoring, vital signs, obtain 12 lead EKG if ordered- Administer antiarrhythmic and heart rate control medications as ordered- Monitor electrolytes and administer replacement therapy as ordered  Outcome: Progressing     Problem: RESPIRATORY - ADULT  Goal: Achieves optimal ventilation and oxygenation  Description: INTERVENTIONS:- Assess for changes in respiratory status- Assess for changes in mentation and behavior- Position to  facilitate oxygenation and minimize respiratory effort- Oxygen administered by appropriate delivery if ordered- Initiate smoking cessation education as indicated- Encourage broncho-pulmonary hygiene including cough, deep breathe, Incentive Spirometry- Assess the need for suctioning and aspirate as needed- Assess and instruct to report SOB or any respiratory difficulty- Respiratory Therapy support as indicated  Outcome: Progressing     Problem: METABOLIC, FLUID AND ELECTROLYTES - ADULT  Goal: Electrolytes maintained within normal limits  Description: INTERVENTIONS:- Monitor labs and assess patient for signs and symptoms of electrolyte imbalances- Administer electrolyte replacement as ordered- Monitor response to electrolyte replacements, including repeat lab results as appropriate- Instruct patient on fluid and nutrition as appropriate  Outcome: Progressing  Goal: Fluid balance maintained  Description: INTERVENTIONS:- Monitor labs - Monitor I/O and WT- Instruct patient on fluid and nutrition as appropriate- Assess for signs & symptoms of volume excess or deficit  Outcome: Progressing  Goal: Glucose maintained within target range  Description: INTERVENTIONS:- Monitor Blood Glucose as ordered- Assess for signs and symptoms of hyperglycemia and hypoglycemia- Administer ordered medications to maintain glucose within target range- Assess nutritional intake and initiate nutrition service referral as needed  Outcome: Progressing

## 2025-04-15 NOTE — ASSESSMENT & PLAN NOTE
Potassium of 3.0  Will replete with potassium chloride 40 mEq every 3 hours x 2  Repeat value pending  Telemetry monitor

## 2025-04-15 NOTE — ASSESSMENT & PLAN NOTE
Complaint of chest pain in the face of elevated troponin from 9 to 94 even though it trended down to 7 and then 5  EKG with normal sinus rhythm and rate of 82 bpm and QTc of 420 ms  Heparin GTT given initially for possible ED in the face of elevated D-dimer at 4.12  Chest pain most likely from multifocal pneumonia rather than cardiac which resulted in normalization of troponin  Echo, fasting lipid, hemoglobin A1c ordered as well as aspirin started ordered  Aspirin 325 mg given once.  81 mg ordered for daily starting tomorrow.  Lisinopril (ACEI) resumed as well as nifedipine ([facility])

## 2025-04-15 NOTE — NURSING NOTE
"Pt has \"pain pump\" implanted in R lower abdomen. SLIM made aware. Pt states she does not control pump but that it is programed by her doctor and it is currently functional.     Bowen Lees 4/15/2025  4:21 PM   "

## 2025-04-15 NOTE — TREATMENT PLAN
S: Seen and examined. No acute events overnight. Reports feeling tired.   O:  General: no acute distress  CV: RRR, no murmurs, rubs or gallops   Pulm: CTA bilaterally, no wheezes, rales, rhonchi   Neuro: no focal deficit. Strength decreased bilaterally  Skin: warm, dry.  Mood: appears at baseline  A/P  Syncope   ECHO pending   Sepsis   2/2 multifocal PNA. Continue ceftriaxone and doxy. Adjust abx pending further cultures   F/u blood cultures   Elevated troponin   Likely non MI elevated troponin in the setting of pneumonia and acute infection   Follow up ECHO  Elevated glucose   158 on BMP   Will order updated hgb A1c, pending results may start sliding scale insulin  Hypomagnesemia   1.3 on am labs   Repleted with Mg 2g  Continue to monitor electrolytes and replete as indicated    Please see full H&P done earlier this am for full plan

## 2025-04-15 NOTE — ASSESSMENT & PLAN NOTE
Guarded admit with leukocytosis, tachycardia and multifocal pneumonia  Lactic acid normal.  Procalcitonin positive.  Flu, COVID and SARS are negative  Sepsis fluid given in the ED therefore will give a liter.  Patient with 27 kg  30 cc/kg sepsis bolus is 810 cc total  Continuous infusion of IVF at 75 cc an hour for an extra 10 hours  S/p a dose of Rocephin and doxycycline in the ED, will continue both while awaiting sputum Cx/GS, Legionella and strep urine antigens results  For the monitor

## 2025-04-15 NOTE — ASSESSMENT & PLAN NOTE
Blood pressure currently of 181/84  Will resume blood pressure medications at home including lisinopril and nifedipine  Monitoring for hypertensive urgency

## 2025-04-16 PROBLEM — I5A MYOCARDIAL INJURY: Status: ACTIVE | Noted: 2025-04-15

## 2025-04-16 LAB
ANION GAP SERPL CALCULATED.3IONS-SCNC: 8 MMOL/L (ref 4–13)
ANISOCYTOSIS BLD QL SMEAR: PRESENT
BASOPHILS # BLD MANUAL: 0 THOUSAND/UL (ref 0–0.1)
BASOPHILS NFR MAR MANUAL: 0 % (ref 0–1)
BUN SERPL-MCNC: 7 MG/DL (ref 5–25)
CALCIUM SERPL-MCNC: 8.4 MG/DL (ref 8.4–10.2)
CHLORIDE SERPL-SCNC: 101 MMOL/L (ref 96–108)
CO2 SERPL-SCNC: 27 MMOL/L (ref 21–32)
CREAT SERPL-MCNC: 0.55 MG/DL (ref 0.6–1.3)
EOSINOPHIL # BLD MANUAL: 0 THOUSAND/UL (ref 0–0.4)
EOSINOPHIL NFR BLD MANUAL: 0 % (ref 0–6)
ERYTHROCYTE [DISTWIDTH] IN BLOOD BY AUTOMATED COUNT: 15.7 % (ref 11.6–15.1)
FERRITIN SERPL-MCNC: 72 NG/ML (ref 30–307)
GFR SERPL CREATININE-BSD FRML MDRD: 102 ML/MIN/1.73SQ M
GLUCOSE SERPL-MCNC: 105 MG/DL (ref 65–140)
GLUCOSE SERPL-MCNC: 109 MG/DL (ref 65–140)
GLUCOSE SERPL-MCNC: 116 MG/DL (ref 65–140)
GLUCOSE SERPL-MCNC: 117 MG/DL (ref 65–140)
GLUCOSE SERPL-MCNC: 134 MG/DL (ref 65–140)
HCT VFR BLD AUTO: 30 % (ref 34.8–46.1)
HGB BLD-MCNC: 9.4 G/DL (ref 11.5–15.4)
IRON SATN MFR SERPL: 12 % (ref 15–50)
IRON SERPL-MCNC: 33 UG/DL (ref 50–212)
L PNEUMO1 AG UR QL IA.RAPID: NEGATIVE
LEVETIRACETAM SERPL-MCNC: 29.1 UG/ML (ref 12–46)
LYMPHOCYTES # BLD AUTO: 0.94 THOUSAND/UL (ref 0.6–4.47)
LYMPHOCYTES # BLD AUTO: 7 % (ref 14–44)
MAGNESIUM SERPL-MCNC: 1.2 MG/DL (ref 1.9–2.7)
MCH RBC QN AUTO: 28.2 PG (ref 26.8–34.3)
MCHC RBC AUTO-ENTMCNC: 31.3 G/DL (ref 31.4–37.4)
MCV RBC AUTO: 90 FL (ref 82–98)
MONOCYTES # BLD AUTO: 0.81 THOUSAND/UL (ref 0–1.22)
MONOCYTES NFR BLD: 6 % (ref 4–12)
NEUTROPHILS # BLD MANUAL: 11.68 THOUSAND/UL (ref 1.85–7.62)
NEUTS SEG NFR BLD AUTO: 87 % (ref 43–75)
PLATELET # BLD AUTO: 420 THOUSANDS/UL (ref 149–390)
PLATELET BLD QL SMEAR: ABNORMAL
PMV BLD AUTO: 9.2 FL (ref 8.9–12.7)
POTASSIUM SERPL-SCNC: 3.2 MMOL/L (ref 3.5–5.3)
PROCALCITONIN SERPL-MCNC: 0.86 NG/ML
RBC # BLD AUTO: 3.33 MILLION/UL (ref 3.81–5.12)
S PNEUM AG UR QL: POSITIVE
SODIUM SERPL-SCNC: 136 MMOL/L (ref 135–147)
TIBC SERPL-MCNC: 284.2 UG/DL (ref 250–450)
TRANSFERRIN SERPL-MCNC: 203 MG/DL (ref 203–362)
UIBC SERPL-MCNC: 251 UG/DL (ref 155–355)
WBC # BLD AUTO: 13.43 THOUSAND/UL (ref 4.31–10.16)

## 2025-04-16 PROCEDURE — 87449 NOS EACH ORGANISM AG IA: CPT | Performed by: PHYSICIAN ASSISTANT

## 2025-04-16 PROCEDURE — 83520 IMMUNOASSAY QUANT NOS NONAB: CPT

## 2025-04-16 PROCEDURE — 85027 COMPLETE CBC AUTOMATED: CPT | Performed by: PHYSICIAN ASSISTANT

## 2025-04-16 PROCEDURE — 99232 SBSQ HOSP IP/OBS MODERATE 35: CPT | Performed by: PHYSICIAN ASSISTANT

## 2025-04-16 PROCEDURE — 83550 IRON BINDING TEST: CPT | Performed by: PHYSICIAN ASSISTANT

## 2025-04-16 PROCEDURE — 82948 REAGENT STRIP/BLOOD GLUCOSE: CPT

## 2025-04-16 PROCEDURE — 83735 ASSAY OF MAGNESIUM: CPT | Performed by: PHYSICIAN ASSISTANT

## 2025-04-16 PROCEDURE — 87205 SMEAR GRAM STAIN: CPT | Performed by: HOSPITALIST

## 2025-04-16 PROCEDURE — 85007 BL SMEAR W/DIFF WBC COUNT: CPT | Performed by: PHYSICIAN ASSISTANT

## 2025-04-16 PROCEDURE — 82728 ASSAY OF FERRITIN: CPT | Performed by: PHYSICIAN ASSISTANT

## 2025-04-16 PROCEDURE — 97163 PT EVAL HIGH COMPLEX 45 MIN: CPT

## 2025-04-16 PROCEDURE — 80048 BASIC METABOLIC PNL TOTAL CA: CPT | Performed by: PHYSICIAN ASSISTANT

## 2025-04-16 PROCEDURE — 83540 ASSAY OF IRON: CPT | Performed by: PHYSICIAN ASSISTANT

## 2025-04-16 PROCEDURE — 97167 OT EVAL HIGH COMPLEX 60 MIN: CPT

## 2025-04-16 PROCEDURE — 87070 CULTURE OTHR SPECIMN AEROBIC: CPT | Performed by: HOSPITALIST

## 2025-04-16 PROCEDURE — 84145 PROCALCITONIN (PCT): CPT | Performed by: HOSPITALIST

## 2025-04-16 RX ORDER — KETOROLAC TROMETHAMINE 30 MG/ML
15 INJECTION, SOLUTION INTRAMUSCULAR; INTRAVENOUS EVERY 6 HOURS SCHEDULED
Status: COMPLETED | OUTPATIENT
Start: 2025-04-16 | End: 2025-04-18

## 2025-04-16 RX ORDER — POTASSIUM CHLORIDE 1500 MG/1
40 TABLET, EXTENDED RELEASE ORAL ONCE
Status: COMPLETED | OUTPATIENT
Start: 2025-04-16 | End: 2025-04-16

## 2025-04-16 RX ORDER — OXYCODONE HYDROCHLORIDE 5 MG/1
5 TABLET ORAL EVERY 6 HOURS PRN
Refills: 0 | Status: DISCONTINUED | OUTPATIENT
Start: 2025-04-16 | End: 2025-04-19 | Stop reason: HOSPADM

## 2025-04-16 RX ORDER — MAGNESIUM SULFATE HEPTAHYDRATE 40 MG/ML
4 INJECTION, SOLUTION INTRAVENOUS ONCE
Status: COMPLETED | OUTPATIENT
Start: 2025-04-16 | End: 2025-04-17

## 2025-04-16 RX ADMIN — DOXYCYCLINE 100 MG: 100 INJECTION, POWDER, LYOPHILIZED, FOR SOLUTION INTRAVENOUS at 14:51

## 2025-04-16 RX ADMIN — BUPROPION HYDROCHLORIDE 150 MG: 150 TABLET, EXTENDED RELEASE ORAL at 09:35

## 2025-04-16 RX ADMIN — LUBIPROSTONE 24 MCG: 24 CAPSULE, GELATIN COATED ORAL at 07:52

## 2025-04-16 RX ADMIN — HEPARIN SODIUM 5000 UNITS: 5000 INJECTION INTRAVENOUS; SUBCUTANEOUS at 06:13

## 2025-04-16 RX ADMIN — ALBUTEROL SULFATE 2 PUFF: 90 AEROSOL, METERED RESPIRATORY (INHALATION) at 11:24

## 2025-04-16 RX ADMIN — LEVETIRACETAM 1500 MG: 750 TABLET, FILM COATED ORAL at 09:35

## 2025-04-16 RX ADMIN — MORPHINE SULFATE 2 MG: 2 INJECTION, SOLUTION INTRAMUSCULAR; INTRAVENOUS at 01:58

## 2025-04-16 RX ADMIN — LEVOTHYROXINE SODIUM 100 MCG: 100 TABLET ORAL at 06:13

## 2025-04-16 RX ADMIN — HEPARIN SODIUM 5000 UNITS: 5000 INJECTION INTRAVENOUS; SUBCUTANEOUS at 22:53

## 2025-04-16 RX ADMIN — GUAIFENESIN 600 MG: 600 TABLET ORAL at 09:35

## 2025-04-16 RX ADMIN — PANTOPRAZOLE SODIUM 40 MG: 40 TABLET, DELAYED RELEASE ORAL at 06:13

## 2025-04-16 RX ADMIN — GABAPENTIN 300 MG: 300 CAPSULE ORAL at 22:53

## 2025-04-16 RX ADMIN — NIFEDIPINE 60 MG: 60 TABLET, FILM COATED, EXTENDED RELEASE ORAL at 09:35

## 2025-04-16 RX ADMIN — LEVETIRACETAM 1500 MG: 750 TABLET, FILM COATED ORAL at 17:23

## 2025-04-16 RX ADMIN — LORAZEPAM 0.5 MG: 0.5 TABLET ORAL at 06:24

## 2025-04-16 RX ADMIN — GUAIFENESIN AND DEXTROMETHORPHAN 10 ML: 100; 10 SYRUP ORAL at 23:19

## 2025-04-16 RX ADMIN — Medication 1000 MG: at 01:54

## 2025-04-16 RX ADMIN — ALBUTEROL SULFATE 2 PUFF: 90 AEROSOL, METERED RESPIRATORY (INHALATION) at 17:32

## 2025-04-16 RX ADMIN — ASPIRIN 81 MG: 81 TABLET, CHEWABLE ORAL at 09:35

## 2025-04-16 RX ADMIN — ALBUTEROL SULFATE 2 PUFF: 90 AEROSOL, METERED RESPIRATORY (INHALATION) at 09:35

## 2025-04-16 RX ADMIN — KETOROLAC TROMETHAMINE 15 MG: 30 INJECTION, SOLUTION INTRAMUSCULAR; INTRAVENOUS at 17:23

## 2025-04-16 RX ADMIN — TIZANIDINE 2 MG: 2 TABLET ORAL at 23:19

## 2025-04-16 RX ADMIN — HEPARIN SODIUM 5000 UNITS: 5000 INJECTION INTRAVENOUS; SUBCUTANEOUS at 14:51

## 2025-04-16 RX ADMIN — NICOTINE 7 MG/24 HR DAILY TRANSDERMAL PATCH 1 PATCH: at 09:35

## 2025-04-16 RX ADMIN — TIZANIDINE 2 MG: 2 TABLET ORAL at 11:28

## 2025-04-16 RX ADMIN — LISINOPRIL 10 MG: 10 TABLET ORAL at 07:52

## 2025-04-16 RX ADMIN — UMECLIDINIUM 1 PUFF: 62.5 AEROSOL, POWDER ORAL at 09:34

## 2025-04-16 RX ADMIN — ALBUTEROL SULFATE 2 PUFF: 90 AEROSOL, METERED RESPIRATORY (INHALATION) at 22:54

## 2025-04-16 RX ADMIN — OXYCODONE 5 MG: 5 TABLET ORAL at 09:35

## 2025-04-16 RX ADMIN — GABAPENTIN 300 MG: 300 CAPSULE ORAL at 09:35

## 2025-04-16 RX ADMIN — LORAZEPAM 0.5 MG: 0.5 TABLET ORAL at 17:34

## 2025-04-16 RX ADMIN — KETOROLAC TROMETHAMINE 15 MG: 30 INJECTION, SOLUTION INTRAMUSCULAR; INTRAVENOUS at 11:25

## 2025-04-16 RX ADMIN — TAMSULOSIN HYDROCHLORIDE 0.4 MG: 0.4 CAPSULE ORAL at 17:23

## 2025-04-16 RX ADMIN — DOXYCYCLINE 100 MG: 100 INJECTION, POWDER, LYOPHILIZED, FOR SOLUTION INTRAVENOUS at 02:48

## 2025-04-16 RX ADMIN — POTASSIUM CHLORIDE 40 MEQ: 1500 TABLET, EXTENDED RELEASE ORAL at 12:36

## 2025-04-16 RX ADMIN — CITALOPRAM HYDROBROMIDE 40 MG: 20 TABLET ORAL at 09:35

## 2025-04-16 RX ADMIN — GABAPENTIN 300 MG: 300 CAPSULE ORAL at 17:22

## 2025-04-16 RX ADMIN — ALBUTEROL SULFATE 2 PUFF: 90 AEROSOL, METERED RESPIRATORY (INHALATION) at 01:04

## 2025-04-16 RX ADMIN — GABAPENTIN 300 MG: 300 CAPSULE ORAL at 11:24

## 2025-04-16 RX ADMIN — SENNOSIDES AND DOCUSATE SODIUM 1 TABLET: 50; 8.6 TABLET ORAL at 22:53

## 2025-04-16 RX ADMIN — ACETAMINOPHEN 650 MG: 325 TABLET, FILM COATED ORAL at 06:21

## 2025-04-16 RX ADMIN — TEMAZEPAM 22.5 MG: 7.5 CAPSULE ORAL at 22:53

## 2025-04-16 RX ADMIN — GUAIFENESIN 600 MG: 600 TABLET ORAL at 22:53

## 2025-04-16 RX ADMIN — LUBIPROSTONE 24 MCG: 24 CAPSULE, GELATIN COATED ORAL at 17:22

## 2025-04-16 RX ADMIN — MAGNESIUM SULFATE HEPTAHYDRATE 4 G: 40 INJECTION, SOLUTION INTRAVENOUS at 12:36

## 2025-04-16 NOTE — ASSESSMENT & PLAN NOTE
Multifocal as seen on CTA chest PE protocol  Clinically presented with shortness of breath, cough and chest pain which is most likely pleuritic  Continue ceftriaxone/doxycycline  Await urine strep/Legionella  Follow sputum culture  Mucinex, incentive spirometer, Robitussin all ordered

## 2025-04-16 NOTE — PLAN OF CARE
Problem: Potential for Falls  Goal: Patient will remain free of falls  Description: INTERVENTIONS:- Educate patient/family on patient safety including physical limitations- Instruct patient to call for assistance with activity - Consult OT/PT to assist with strengthening/mobility - Keep Call bell within reach- Keep bed low and locked with side rails adjusted as appropriate- Keep care items and personal belongings within reach- Initiate and maintain comfort rounds- Make Fall Risk Sign visible to staff- Offer Toileting every 2 Hours, in advance of need- Initiate/Maintain bed alarm- Obtain necessary fall risk management equipment: bed alarm- Apply yellow socks and bracelet for high fall risk patients- Consider moving patient to room near nurses station  Outcome: Progressing     Problem: Prexisting or High Potential for Compromised Skin Integrity  Goal: Skin integrity is maintained or improved  Description: INTERVENTIONS:- Identify patients at risk for skin breakdown- Assess and monitor skin integrity- Assess and monitor nutrition and hydration status- Monitor labs - Assess for incontinence - Turn and reposition patient- Assist with mobility/ambulation- Relieve pressure over bony prominences- Avoid friction and shearing- Provide appropriate hygiene as needed including keeping skin clean and dry- Evaluate need for skin moisturizer/barrier cream- Collaborate with interdisciplinary team - Patient/family teaching- Consider wound care consult   Outcome: Progressing     Problem: NEUROSENSORY - ADULT  Goal: Achieves stable or improved neurological status  Description: INTERVENTIONS- Monitor and report changes in neurological status- Monitor vital signs such as temperature, blood pressure, glucose, and any other labs ordered - Initiate measures to prevent increased intracranial pressure- Monitor for seizure activity and implement precautions if appropriate    Outcome: Progressing  Goal: Remains free of injury related to  seizures activity  Description: INTERVENTIONS- Maintain airway, patient safety  and administer oxygen as ordered- Monitor patient for seizure activity, document and report duration and description of seizure to physician/advanced practitioner- If seizure occurs,  ensure patient safety during seizure- Reorient patient post seizure- Seizure pads on all 4 side rails- Instruct patient/family to notify RN of any seizure activity including if an aura is experienced- Instruct patient/family to call for assistance with activity based on nursing assessment- Administer anti-seizure medications if ordered  Outcome: Progressing  Goal: Achieves maximal functionality and self care  Description: INTERVENTIONS- Monitor swallowing and airway patency with patient fatigue and changes in neurological status- Encourage and assist patient to increase activity and self care. - Encourage visually impaired, hearing impaired and aphasic patients to use assistive/communication devices  Outcome: Progressing     Problem: CARDIOVASCULAR - ADULT  Goal: Maintains optimal cardiac output and hemodynamic stability  Description: INTERVENTIONS:- Monitor I/O, vital signs and rhythm- Monitor for S/S and trends of decreased cardiac output- Administer and titrate ordered vasoactive medications to optimize hemodynamic stability- Assess quality of pulses, skin color and temperature- Assess for signs of decreased coronary artery perfusion- Instruct patient to report change in severity of symptoms  Outcome: Progressing  Goal: Absence of cardiac dysrhythmias or at baseline rhythm  Description: INTERVENTIONS:- Continuous cardiac monitoring, vital signs, obtain 12 lead EKG if ordered- Administer antiarrhythmic and heart rate control medications as ordered- Monitor electrolytes and administer replacement therapy as ordered  Outcome: Progressing     Problem: RESPIRATORY - ADULT  Goal: Achieves optimal ventilation and oxygenation  Description: INTERVENTIONS:- Assess  for changes in respiratory status- Assess for changes in mentation and behavior- Position to facilitate oxygenation and minimize respiratory effort- Oxygen administered by appropriate delivery if ordered- Initiate smoking cessation education as indicated- Encourage broncho-pulmonary hygiene including cough, deep breathe, Incentive Spirometry- Assess the need for suctioning and aspirate as needed- Assess and instruct to report SOB or any respiratory difficulty- Respiratory Therapy support as indicated  Outcome: Progressing     Problem: METABOLIC, FLUID AND ELECTROLYTES - ADULT  Goal: Electrolytes maintained within normal limits  Description: INTERVENTIONS:- Monitor labs and assess patient for signs and symptoms of electrolyte imbalances- Administer electrolyte replacement as ordered- Monitor response to electrolyte replacements, including repeat lab results as appropriate- Instruct patient on fluid and nutrition as appropriate  Outcome: Progressing  Goal: Fluid balance maintained  Description: INTERVENTIONS:- Monitor labs - Monitor I/O and WT- Instruct patient on fluid and nutrition as appropriate- Assess for signs & symptoms of volume excess or deficit  Outcome: Progressing  Goal: Glucose maintained within target range  Description: INTERVENTIONS:- Monitor Blood Glucose as ordered- Assess for signs and symptoms of hyperglycemia and hypoglycemia- Administer ordered medications to maintain glucose within target range- Assess nutritional intake and initiate nutrition service referral as needed  Outcome: Progressing

## 2025-04-16 NOTE — ASSESSMENT & PLAN NOTE
Reported Right sided chest pain  Troponin 9 --> 94 --> 7 --> 5  EKG with normal sinus rhythm and rate of 82 bpm and QTc of 420 ms  Chest pain most likely from multifocal pneumonia rather than cardiac which resulted in normalization of troponin  Aspirin 325 mg given once.Continue 81mg for now.  Lisinopril ([facility]) resumed as well as nifedipine ([facility])  Echo EF 75%, normal diastolic function, no regional wall motion abnormality

## 2025-04-16 NOTE — ASSESSMENT & PLAN NOTE
SIRS: leukocytosis, tachycardia  Source: multifocal pneumonia  Flu, COVID and SARS are negative  Lactic acid normal.  Procalcitonin positive.  S/p 30cc/kg IVF  Continue IV ceftriaxone/doxy  Follow urine strep/legionella  Follow urine culture  Blood cultures negative at 24hrs

## 2025-04-16 NOTE — OCCUPATIONAL THERAPY NOTE
Occupational Therapy Evaluation     Patient Name: Aleshia Mauricio  Today's Date: 2025  Problem List  Principal Problem:    Pneumonia  Active Problems:    Chronic midline low back pain with sciatica    Hypokalemia    Hyponatremia    Hypertensive urgency    Hypothyroidism    Iron deficiency anemia    Brain aneurysm    Sepsis (HCC)    Myocardial injury    Neuropathy    Anxiety and depression    Past Medical History  Past Medical History:   Diagnosis Date    Abnormal gait     Abscess of abdominal cavity (HCC)     Aneurysm (HCC)     Brain with clips x 3 per pt    Arm injury     Right arm- pt present with short FA/wrist  immoblizer present on ED arrival    Cataract     Cellulitis     B/L LE    Chronic back pain     Chronic pain     COPD (chronic obstructive pulmonary disease) (HCC)     Disease of thyroid gland     hypothyroid    Edema     B/L LE    Falls frequently     last fall 1 hour ago per pt    Foot drop, left foot     Fracture     Facial bones per pt- left orbit/cheek? pt unsure    Fracture closed, nasal bone     Gastric bypass status for obesity     Hypoglycemia     Memory loss, short term     Migraine     MRSA (methicillin resistant Staphylococcus aureus)     Stomach ulcer      Past Surgical History  Past Surgical History:   Procedure Laterality Date    BACK SURGERY      lumbar/sacral fusion per pt    CEREBRAL ANEURYSM REPAIR      with clips x3 per pt     SECTION      x 4 per pt    GASTRIC BYPASS      MRSA CULTURE (HISTORICAL)      stomach abscess per pt           25 1022   Note Type   Note type Evaluation   Pain Assessment   Pain Assessment Tool 0-10   Pain Score 10 - Worst Possible Pain   Pain Location/Orientation Location: Generalized   Pain Rating: FLACC (Rest) - Face 0   Pain Rating: FLACC (Rest) - Legs 0   Pain Rating: FLACC (Rest) - Activity 0   Pain Rating: FLACC (Rest) - Cry 0   Pain Rating: FLACC (Rest) - Consolability 0   Score: FLACC (Rest) 0   Restrictions/Precautions   Weight  "Bearing Precautions Per Order No   Other Precautions Chair Alarm;Bed Alarm;Fall Risk;Pain;O2   Home Living   Type of Home House   Home Layout Two level;Bed/bath upstairs;Ramped entrance   Bathroom Shower/Tub Walk-in shower   Bathroom Toilet Standard   Bathroom Equipment Commode   Home Equipment Walker;Cane  (lofstrand crutch)   Prior Function   Level of [place] Needs assistance with ADLs;Needs assistance with IADLS;Independent with functional mobility   Lives With Daughter  (3 dtrs)   Falls in the last 6 months >10  (15+)   Lifestyle   Autonomy PTA pt states that she had assistance with her ADLs; states independence with her transfers/ambulation--\"furniture walks\" in house; mostly housebound; +falls=15+, neg home alone, +   Reciprocal Relationships 3 dtrs   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   Intrinsic Gratification owns a dog   Subjective   Subjective \"Please don't make me sit in the chair.\"   ADL   Where Assessed Edge of bed   Eating Assistance 5  Supervision/Setup   Grooming Assistance 5  Supervision/Setup   UB Bathing Assistance 4  Minimal Assistance   LB Bathing Assistance 2  Maximal Assistance   UB Dressing Assistance 4  Minimal Assistance   LB Dressing Assistance 2  Maximal Assistance   Toileting Assistance  1  Total Assistance   Bed Mobility   Rolling R 3  Moderate assistance   Additional items Assist x 1;Increased time required;Verbal cues;LE management   Rolling L 3  Moderate assistance   Additional items Assist x 1;Increased time required;LE management;Verbal cues   Supine to Sit 2  Maximal assistance   Additional items Assist x 1;Increased time required;Verbal cues;LE management   Sit to Supine 3  Moderate assistance   Additional items Assist x 1;Increased time required;Verbal cues;LE management   Transfers   Sit to Stand 4  Minimal assistance   Additional items Assist x 2   Stand to Sit 4  Minimal assistance   Additional items Assist x 2   Additional Comments bp's=165/92(EOB)   Functional " Mobility   Functional Mobility 4  Minimal assistance   Additional Comments x2   Additional items Rolling walker   Balance   Static Sitting Fair +   Dynamic Sitting Fair   Static Standing Poor +   Dynamic Standing Poor   Activity Tolerance   Activity Tolerance Patient limited by fatigue;Patient limited by pain   Medical Staff Made Aware nsg, P.T..   RUE Assessment   RUE Assessment WFL   RUE Strength   RUE Overall Strength Within Functional Limits - able to perform ADL tasks with strength  (3+/5 throughout)   LUE Assessment   LUE Assessment WFL   LUE Strength   LUE Overall Strength Within Functional Limits - able to perform ADL tasks with strength  (3+/5 throughout)   Hand Function   Gross Motor Coordination Functional   Fine Motor Coordination Functional   Sensation   Light Touch No apparent deficits   Proprioception   Proprioception No apparent deficits   Vision-Basic Assessment   Current Vision   (glasses)   Vision - Complex Assessment   Acuity Able to read clock/calendar on wall without difficulty   Psychosocial   Psychosocial (WDL) X   Patient Behaviors/Mood Flat affect;Cooperative   Perception   Inattention/Neglect Appears intact   Cognition   Overall Cognitive Status WFL   Arousal/Participation Alert   Attention Within functional limits   Orientation Level Oriented X4   Memory Within functional limits   Following Commands Follows one step commands without difficulty   Assessment   Limitation Decreased ADL status;Decreased UE strength;Decreased Safe judgement during ADL;Decreased endurance;Decreased cognition;Decreased high-level ADLs   Prognosis Fair   Assessment Pt is a 58y/o female admitted to the hospital after having a syncopal episode; CT(brain)=neg acute findings. Pt noted with PNA, HTN, sepsis. Pt with PMH brain aneurysm with clipping, chronic pain, COPD, facial fx's, gastric bypass, and back sx. PTA pt states that she had assistance with her ADLs; states independence with her  "transfers/ambulation--\"furniture walks\" in house; mostly housebound; +falls=15+, neg home alone, +. During inital eval, pt demonstrated deficits with her functional balance, functional mobility, ADL status, transfer safety, b/l UE strength, and activity tolerance(currently fair=15-20mins). Pt would benefit from continued OT tx for the above deficits.2-5xwk/1-2wks. The patient's raw score on the -PAC Daily Activity Inpatient Short Form is 15. A raw score of less than 19 suggests the patient may benefit from discharge to post-acute rehabilitation services. Please refer to the recommendation of the Occupational Therapist for safe discharge planning.   Goals   Patient Goals \"to rest\"   STG Time Frame   (1-7 days)   Short Term Goal #1 Pt will demonstrate improved activity tolerance to good(20-30mins) and standing tolerance to 3-5mins to assist with ADLs.   Short Term Goal #2 Pt will demonstrate supervision with their sit-stand transfers to assist with completion of their LE dressing.   Short Term Goal  Pt will demonstrate proper walker/transfer safety 100% of the time.   LTG Time Frame   (7-14 days)   Long Term Goal #1 Pt will demonstrate improved functional balance by 1 grade to assist with ADLs/transfers.   Long Term Goal #2 Pt will demonstrate supervision with her UE and [place] with LE bathing/dressing.   Long Term Goal Pt will demonstrate improved b/l UE strength by 1/2 MM grade to assist with ADLs/transfers.   Plan   Treatment Interventions ADL retraining;Functional transfer training;UE strengthening/ROM;Endurance training;Cognitive reorientation;Patient/family training;Compensatory technique education;Continued evaluation   Goal Expiration Date 04/30/25   OT Treatment Day 0   OT Frequency   (2-5xwk/1-2wks)   Discharge Recommendation   Rehab Resource Intensity Level, OT II (Moderate Resource Intensity)   AM-PAC Daily Activity Inpatient   Lower Body Dressing 2   Bathing 2   Toileting 1   Upper Body Dressing 3 "   Grooming 3   Eating 4   Daily Activity Raw Score 15   Daily Activity Standardized Score (Calc for Raw Score >=11) 34.69   AM-PAC Applied Cognition Inpatient   Following a Speech/Presentation 4   Understanding Ordinary Conversation 4   Taking Medications 3   Remembering Where Things Are Placed or Put Away 3   Remembering List of 4-5 Errands 3   Taking Care of Complicated Tasks 3   Applied Cognition Raw Score 20   Applied Cognition Standardized Score 41.76   Matthias Perez

## 2025-04-16 NOTE — PHYSICAL THERAPY NOTE
PT EVALUATION    Pt. Name: Aleshia Mauricio  Pt. Age: 59 y.o.  MRN: [id number]  LENGTH OF STAY: 1      Admitting Diagnoses:   Chest pain [R07.9]  Elevated troponin [R79.89]  Multifocal pneumonia [J18.9]    Past Medical History:   Diagnosis Date    Abnormal gait     Abscess of abdominal cavity (HCC)     Aneurysm (HCC)     Brain with clips x 3 per pt    Arm injury     Right arm- pt present with short FA/wrist  immoblizer present on ED arrival    Cataract     Cellulitis     B/L LE    Chronic back pain     Chronic pain     COPD (chronic obstructive pulmonary disease) (HCC)     Disease of thyroid gland     hypothyroid    Edema     B/L LE    Falls frequently     last fall 1 hour ago per pt    Foot drop, left foot     Fracture     Facial bones per pt- left orbit/cheek? pt unsure    Fracture closed, nasal bone     Gastric bypass status for obesity     Hypoglycemia     Memory loss, short term     Migraine     MRSA (methicillin resistant Staphylococcus aureus)     Stomach ulcer        Past Surgical History:   Procedure Laterality Date    BACK SURGERY      lumbar/sacral fusion per pt    CEREBRAL ANEURYSM REPAIR      with clips x3 per pt     SECTION      x 4 per pt    GASTRIC BYPASS      MRSA CULTURE (HISTORICAL)      stomach abscess per pt       Imaging Studies:  CTA chest pe study   Final Result by Ish Quiroz MD (04/15 0126)         1. Findings consistent with multifocal pneumonia.   2. No evidence of acute pulmonary embolus, thoracic.                  Workstation performed: [id number]         CT head without contrast   Final Result by Ihs Quiroz MD (04/15 0101)         1. No evidence of acute intracranial hemorrhage or mass effect.   2. Extensive paranasal sinus disease.                  Workstation performed: [id number]         XR chest 2 views   ED Interpretation by Deena Luke DO ( [id number])   No acute cardiopulmonary disease      Final Result by Kamilah Ziegler MD (04/15 7518)       Patchy opacities in the right mid lung and left lower lobe, likely reflecting atelectasis versus pneumonia.            Workstation performed: [id number]               04/16/25 1058   PT Last Visit   PT Visit Date 04/16/25   Note Type   Note type Evaluation   Pain Assessment   Pain Score 10 - Worst Possible Pain   Pain Location/Orientation Location: Generalized   Hospital Pain Intervention(s) Repositioned;Ambulation/increased activity;Emotional support;Rest   Restrictions/Precautions   Weight Bearing Precautions Per Order No   Other Precautions Chair Alarm;Bed Alarm;Fall Risk;Pain;O2  (2L O2 NC)   Home Living   Type of Home House   Home Layout Two level;Ramped entrance;Other (Comment);Bed/bath upstairs  (stair glide to 2nd floor bed/bath)   Bathroom Shower/Tub Walk-in shower   Bathroom Toilet Standard   Bathroom Equipment Commode   Home Equipment Walker;Stair glide;Other (Comment)  (lofstrand crutch)   Prior Function   Level of [place] Independent with functional mobility;Needs assistance with ADLs;Needs assistance with IADLS  (pt declines use of AD for amb but admits to holding onto furniture for support when ambulating; pt also reports that she crawls up the stairs)   Lives With Family  (3 daughters (2 works & 1 stays at home))   Receives Help From Family   Falls in the last 6 months >10  (pt reports 15 falls)   Comments (-) ; require assistance w/ ADL's; occasional home alone; homebound   General   Additional Pertinent History h/o brain aneurysm s/p clipping   Family/Caregiver Present No   Cognition   Overall Cognitive Status WFL   Arousal/Participation Alert   Orientation Level Oriented X4   Following Commands Follows one step commands without difficulty   Comments cooperative   Subjective   Subjective Pt agreeable to PT/OT evals.   RUE Assessment   RUE Assessment   (refer to OT)   LUE Assessment   LUE Assessment   (refer to OT)   RLE Assessment   RLE Assessment WFL  (3+/5 grossly)   LLE  Assessment   LLE Assessment WFL  (3+/5 grossly)   Bed Mobility   Supine to Sit   (pt seated EOB upon arrival)   Sit to Supine 3  Moderate assistance   Additional items Assist x 1;Increased time required;Verbal cues;LE management   Additional Comments cues for techniques & safety   Transfers   Sit to Stand 4  Minimal assistance   Additional items Assist x 2;Increased time required;Verbal cues   Stand to Sit 4  Minimal assistance   Additional items Assist x 2;Increased time required;Verbal cues   Additional Comments w/ RW; cues for techniques & safety   Ambulation/Elevation   Gait pattern Wide DALIA;Decreased foot clearance;Shuffling;Short stride;Step to;Excessively slow   Gait Assistance 4  Minimal assist   Additional items Assist x 2;Verbal cues;Tactile cues   Assistive Device Rolling walker   Distance 2'x1 lateral steps to HOB   Ambulation/Elevation Additional Comments unsteady gait but no gross LOB noted; dec amb tolerance 2* to pain   Balance   Static Sitting Fair +   Dynamic Sitting Fair   Static Standing Poor +  (w/ RW)   Dynamic Standing Poor  (w/ RW)   Ambulatory Poor  (w/ RW)   Endurance Deficit   Endurance Deficit Yes   Endurance Deficit Description weakness; pain   Activity Tolerance   Activity Tolerance Patient limited by pain;Treatment limited secondary to medical complications (Comment);Patient limited by fatigue   Medical Staff Made Aware OTR Matthias   Nurse Made Aware yes   Assessment   Prognosis Good   Problem List Decreased strength;Decreased endurance;Impaired balance;Decreased mobility;Impaired judgement;Pain   Assessment Pt. 59 y.o.female presented after unwitnessed syncopal episode at home. Pt also c/o SOB & chest pain. Pt admitted for syncope w/ Multifocal pneumonia, sepsis, NSTEMI, hypokalemia, hyponatremia, hypertension urgency & iron deficiency anemia. Pt referred to PT for mobility assessment & D/C planning. Please see above for information re: home set-up & PLOF as well as objective findings  "during PT assessment. PTA, pt reports being fairly I w/o AD but admits to holding onto furniture for support when ambulating. Pt also reports that she crawls up the stairs at baseline. On eval, pt functioning below baseline hence will continue skilled PT to improve function & safety. Pt require modAx1 for bed mobility & minAx2 for transfers & amb trial w/ RW + cues for techniques & safety. Gait deviations as above but no gross LOB noted.  Dec overall activity tolerance 2* to generalized pain. Pt on 2L O2 NC w/ SpO2 at 96-99% during session. The patient's AM-PAC Basic Mobility Inpatient Short Form Raw Score is 12. A Raw score of less than or equal to 16 suggests the patient may benefit from discharge to post-acute rehabilitation services. Please also refer to the recommendation of the Physical Therapist for safe discharge planning. From PT standpoint, will recommend Level II (moderate resource intensity) rehab services at D/C. No SOB & dizziness reported t/o session. Nsg staff most recent vital signs as follows: /92   Pulse 79   Temp 97.7 °F (36.5 °C)   Resp 17   Ht 5' 6\" (1.676 m)   Wt 74.8 kg (165 lb)   LMP  (LMP Unknown)   SpO2 97%   BMI 26.63 kg/m² . At end of session, pt back in bed in stable condition, call bell & phone in reach, bed alarm activated. Fall precautions reinforced w/ good understanding. CM to follow. Nsg staff to continue to mobilized pt (OOB in chair for all meals & ambulate in room) as tolerated to prevent further decline in function. Will also recommend Restorative for daily amb &/or daily OOB in chair as appropriate. Nsg notified. Co-eval was necessary to complete this PT eval for the pt's best interest given pt's medical acuity & complexity.   Barriers to Discharge Inaccessible home environment   Barriers to Discharge Comments stairs   Goals   Patient Goals to get back in bed   STG Expiration Date 04/30/25   Short Term Goal #1 Goals to be met in 14 days; pt will be able to: 1) " inc strength & balance by 1/2 grade to improve overall functional mobility & dec fall risk; 2) inc bed mobility to S for pt to be able to get in/OOB safely w/ proper techniques 100% of the time, to dec caregiver burden & safely function at home; 3) inc transfers to S for pt to transition safely from one surface to another w/o % of the time, to dec caregiver burden & safely function at home; 4) inc amb w/ RW approx. >150' w/ S for pt to ambulate household distances w/o any % of the time, to dec caregiver burden & safely function at home; 5) negotiate stairs w/ S for inc safety during stair mgt inside/outside of home & dec caregiver burden; 6) pt/caregiver ed   PT Treatment Day 0   Plan   Treatment/Interventions Functional transfer training;LE strengthening/ROM;Elevations;Therapeutic exercise;Endurance training;Patient/family training;Bed mobility;Gait training;Spoke to nursing;OT   PT Frequency 3-5x/wk   Discharge Recommendation   Rehab Resource Intensity Level, PT II (Moderate Resource Intensity)  (pending progress)   Equipment Recommended Walker   Walker Package Recommended Wheeled walker   Additional Comments restorative for daily amb   AM-PAC Basic Mobility Inpatient   Turning in Flat Bed Without Bedrails 3   Lying on Back to Sitting on Edge of Flat Bed Without Bedrails 2   Moving Bed to Chair 2   Standing Up From Chair Using Arms 2   Walk in Room 2   Climb 3-5 Stairs With Railing 1   Basic Mobility Inpatient Raw Score 12   Basic Mobility Standardized Score 32.23   [facility] Highest Level Of Mobility   -[facility] Goal 4: Move to chair/commode   -[facility] Achieved 5: Stand (1 or more minutes)   End of Consult   Patient Position at End of Consult Supine;Bed/Chair alarm activated;All needs within reach   End of Consult Comments Pt in stable condition. All needs in reach. Bed alarm activated.   Hx/personal factors: co-morbidities, inaccessible home, dec caregiver support, use of AD, pain, h/o of falls, fall  risk, assist w/ ADL's, and O2  Examination: dec mobility, dec balance, dec endurance, dec amb, risk for falls, pain  Clinical: unpredictable (ongoing medical status, abnormal lab values, risk for falls, and pain mgt)  Complexity: high    Marcio Gonzalez

## 2025-04-16 NOTE — PLAN OF CARE
Problem: Potential for Falls  Goal: Patient will remain free of falls  Description: INTERVENTIONS:- Educate patient/family on patient safety including physical limitations- Instruct patient to call for assistance with activity - Consult OT/PT to assist with strengthening/mobility - Keep Call bell within reach- Keep bed low and locked with side rails adjusted as appropriate- Keep care items and personal belongings within reach- Initiate and maintain comfort rounds- Make Fall Risk Sign visible to staff- Offer Toileting every 2 Hours, in advance of need- Initiate/Maintain bed alarm- Obtain necessary fall risk management equipment: bedside - Apply yellow socks and bracelet for high fall risk patients- Consider moving patient to room near nurses station  Outcome: Progressing     Problem: RESPIRATORY - ADULT  Goal: Achieves optimal ventilation and oxygenation  Description: INTERVENTIONS:- Assess for changes in respiratory status- Assess for changes in mentation and behavior- Position to facilitate oxygenation and minimize respiratory effort- Oxygen administered by appropriate delivery if ordered- Initiate smoking cessation education as indicated- Encourage broncho-pulmonary hygiene including cough, deep breathe, Incentive Spirometry- Assess the need for suctioning and aspirate as needed- Assess and instruct to report SOB or any respiratory difficulty- Respiratory Therapy support as indicated  Outcome: Progressing

## 2025-04-16 NOTE — PROGRESS NOTES
Progress Note - Hospitalist   Name: Aleshia Mauricio 59 y.o. female I MRN: [id number]  Unit/Bed#: [place] [address] -02 I Date of Admission: 4/14/2025   Date of Service: 4/16/2025 I Hospital Day: 1    Assessment & Plan  Sepsis (HCC)  SIRS: leukocytosis, tachycardia  Source: multifocal pneumonia  Flu, COVID and SARS are negative  Lactic acid normal.  Procalcitonin positive.  S/p 30cc/kg IVF  Continue IV ceftriaxone/doxy  Follow urine strep/legionella  Follow urine culture  Blood cultures negative at 24hrs  Pneumonia  Multifocal as seen on CTA chest PE protocol  Clinically presented with shortness of breath, cough and chest pain which is most likely pleuritic  Continue ceftriaxone/doxycycline  Await urine strep/Legionella  Follow sputum culture  Mucinex, incentive spirometer, Robitussin all ordered  Myocardial injury  Reported Right sided chest pain  Troponin 9 --> 94 --> 7 --> 5  EKG with normal sinus rhythm and rate of 82 bpm and QTc of 420 ms  Chest pain most likely from multifocal pneumonia rather than cardiac which resulted in normalization of troponin  Aspirin 325 mg given once.Continue 81mg for now.  Lisinopril (ACEI) resumed as well as nifedipine ([facility]B)  Echo EF 75%, normal diastolic function, no regional wall motion abnormality  Hypokalemia  Potassium of 3.0  Continue to replete  Telemetry  Hypertensive urgency  Blood pressure currently of 181/84  Will resume blood pressure medications at home including lisinopril and nifedipine  Monitoring for hypertensive urgency    Hyponatremia  Sodium 133 which is at his baseline  Secondary to SSRI (Celexa) that he takes  Resolved  Hypothyroidism  Continue Synthroid  Iron deficiency anemia  H&H of 11/35  On ferrous sulfate which is resumed  Brain aneurysm  S/p clipping  On Keppra  Continue to exercise seizure precautions  Chronic midline low back pain with sciatica  Continue home dose of temazepam and tizanidine  Exercise fall precaution  Neuropathy  Continue gabapentin  Anxiety  and depression  On bupropion and Celexa    VTE Pharmacologic Prophylaxis:   Moderate Risk (Score 3-4) - Pharmacological DVT Prophylaxis Ordered: heparin.    Mobility:   Basic Mobility Inpatient Raw Score: 13  JH-HLM Goal: 4: Move to chair/commode  JH-HLM Achieved: 5: Stand (1 or more minutes)  JH-HLM Goal achieved. Continue to encourage appropriate mobility.    Patient Centered Rounds: I performed bedside rounds with nursing staff today.   Discussions with Specialists or Other Care Team Provider: None    Education and Discussions with Family / Patient: Updated  (daughter) via phone.    Current Length of Stay: 1 day(s)  Current Patient Status: Inpatient   Certification Statement: The patient will continue to require additional inpatient hospital stay due to pneumonia  Discharge Plan: Anticipate discharge in >72 hrs to home.    Code Status: Level 1 - Full Code    Subjective   No acute events overnight.  Patient continues to report feeling unwell.  Continues to have productive cough which is worsening her back pain.  Today, reported back pain is so severe that she wishes to speak with hospice.  Not a hospice candidate.    Objective :  Temp:  [97.7 °F (36.5 °C)-98.7 °F (37.1 °C)] 97.7 °F (36.5 °C)  HR:  [60-89] 79  BP: (122-165)/(65-92) 165/92  Resp:  [17-18] 17  SpO2:  [91 %-100 %] 97 %  O2 Device: Nasal cannula  Nasal Cannula O2 Flow Rate (L/min):  [3 L/min] 3 L/min    Body mass index is 26.63 kg/m².     Input and Output Summary (last 24 hours):     Intake/Output Summary (Last 24 hours) at 4/16/2025 1205  Last data filed at 4/16/2025 0401  Gross per 24 hour   Intake 240 ml   Output 1200 ml   Net -960 ml       Physical Exam  Vitals and nursing note reviewed.   Constitutional:       Appearance: Normal appearance.   HENT:      Head: Normocephalic and atraumatic.      Mouth/Throat:      Mouth: Mucous membranes are moist.      Pharynx: Oropharynx is clear. No oropharyngeal exudate.   Eyes:      Extraocular  Movements: Extraocular movements intact.   Cardiovascular:      Rate and Rhythm: Normal rate and regular rhythm.      Pulses: Normal pulses.      Heart sounds: Normal heart sounds. No murmur heard.     No friction rub. No gallop.   Pulmonary:      Effort: Pulmonary effort is normal. No respiratory distress.      Breath sounds: No stridor. Rales present. No wheezing.   Abdominal:      General: Abdomen is flat. Bowel sounds are normal. There is no distension.      Palpations: Abdomen is soft.      Tenderness: There is no abdominal tenderness.   Musculoskeletal:      Right lower leg: No edema.      Left lower leg: No edema.   Skin:     General: Skin is warm and dry.   Neurological:      General: No focal deficit present.      Mental Status: She is alert and oriented to person, place, and time.         Lines/Drains:              Lab Results: I have reviewed the following results:   Results from last 7 days   Lab Units 04/16/25  0948 04/15/25  0615 04/14/25  1732   WBC Thousand/uL 13.43*   < > 14.99*   HEMOGLOBIN g/dL 9.4*   < > 10.9*   HEMATOCRIT % 30.0*   < > 34.8   PLATELETS Thousands/uL 420*   < > 357   SEGS PCT %  --   --  85*   LYMPHO PCT % 7*  --  6*   MONO PCT % 6  --  6   EOS PCT % 0  --  0    < > = values in this interval not displayed.     Results from last 7 days   Lab Units 04/16/25  0948 04/15/25  0926 04/15/25  0615   SODIUM mmol/L 136  --  136   POTASSIUM mmol/L 3.2*   < > 3.7   CHLORIDE mmol/L 101  --  101   CO2 mmol/L 27  --  25   BUN mg/dL 7  --  10   CREATININE mg/dL 0.55*  --  0.56*   ANION GAP mmol/L 8  --  10   CALCIUM mg/dL 8.4  --  8.0*   ALBUMIN g/dL  --   --  3.0*   TOTAL BILIRUBIN mg/dL  --   --  0.38   ALK PHOS U/L  --   --  103   ALT U/L  --   --  12   AST U/L  --   --  11*   GLUCOSE RANDOM mg/dL 134  --  158*    < > = values in this interval not displayed.     Results from last 7 days   Lab Units 04/14/25  2058   INR  1.41*     Results from last 7 days   Lab Units 04/16/25  1143  04/16/25  0705 04/15/25  2139 04/15/25  1830   POC GLUCOSE mg/dl 105 116 122 124     Results from last 7 days   Lab Units 04/15/25  0615   HEMOGLOBIN A1C % 6.8*     Results from last 7 days   Lab Units 04/16/25  0518 04/15/25  0217   LACTIC ACID mmol/L  --  0.7   PROCALCITONIN ng/ml 0.86* 1.33*       Recent Cultures (last 7 days):   Results from last 7 days   Lab Units 04/15/25  0217   BLOOD CULTURE  No Growth at 24 hrs.  No Growth at 24 hrs.       Imaging Results Review: No pertinent imaging studies reviewed.  Other Study Results Review: No additional pertinent studies reviewed.    Last 24 Hours Medication List:     Current Facility-Administered Medications:     acetaminophen (TYLENOL) tablet 650 mg, Q6H PRN    umeclidinium 62.5 mcg/actuation inhaler AEPB 1 puff, Daily **AND** albuterol (PROVENTIL HFA,VENTOLIN HFA) inhaler 2 puff, 4x Daily    aspirin chewable tablet 81 mg, Daily    buPROPion (WELLBUTRIN XL) 24 hr tablet 150 mg, Daily    ceftriaxone (ROCEPHIN) 1 g/50 mL in dextrose IVPB, Q24H, Last Rate: 1,000 mg (04/16/25 0154)    citalopram (CeleXA) tablet 40 mg, Daily    dextromethorphan-guaiFENesin (ROBITUSSIN DM) oral syrup 10 mL, Q6H PRN    doxycycline (VIBRAMYCIN) 100 mg in sodium chloride 0.9 % 100 mL IVPB, Q12H, Last Rate: 100 mg (04/16/25 0248)    ferrous sulfate tablet 325 mg, Q48H    gabapentin (NEURONTIN) capsule 300 mg, 4x Daily    guaiFENesin (MUCINEX) 12 hr tablet 600 mg, Q12H TASHA    heparin (porcine) subcutaneous injection 5,000 Units, Q8H TASHA **AND** [CANCELED] Platelet count, Once    insulin lispro (HumALOG/ADMELOG) 100 units/mL subcutaneous injection 1-5 Units, TID AC **AND** Fingerstick Glucose (POCT), TID AC    insulin lispro (HumALOG/ADMELOG) 100 units/mL subcutaneous injection 1-5 Units, HS    ketorolac (TORADOL) injection 15 mg, Q6H TASHA    labetalol (NORMODYNE) injection 10 mg, Q6H PRN    levETIRAcetam (KEPPRA) tablet 1,500 mg, BID    levothyroxine tablet 100 mcg, Early Morning     lisinopril (ZESTRIL) tablet 10 mg, After Breakfast    LORazepam (ATIVAN) tablet 0.5 mg, Q6H PRN    lubiprostone (AMITIZA) capsule 24 mcg, BID With Meals    magnesium sulfate 4 g/100 mL IVPB (premix) 4 g, Once    methocarbamol (ROBAXIN) tablet 500 mg, TID PRN    morphine injection 2 mg, Q6H PRN    nicotine (NICODERM CQ) 7 mg/24hr TD 24 hr patch 1 patch, Daily    NIFEdipine (PROCARDIA XL) 24 hr tablet 60 mg, Daily    nitroglycerin (NITROSTAT) SL tablet 0.4 mg, Q5 Min PRN    oxyCODONE (ROXICODONE) IR tablet 5 mg, Q6H PRN    pantoprazole (PROTONIX) EC tablet 40 mg, Daily Before Breakfast    polyethylene glycol (MIRALAX) packet 17 g, Daily PRN    potassium chloride (Klor-Con M20) CR tablet 40 mEq, Once    senna-docusate sodium (SENOKOT S) 8.6-50 mg per tablet 1 tablet, HS    SUMAtriptan (IMITREX) tablet 100 mg, Once PRN    tamsulosin (FLOMAX) capsule 0.4 mg, Daily With Dinner    temazepam (RESTORIL) capsule 22.5 mg, HS    tiZANidine (ZANAFLEX) tablet 2 mg, BID PRN    Administrative Statements   Today, Patient Was Seen By: Inderjit Baum PA-C      **Please Note: This note may have been constructed using a voice recognition system.**

## 2025-04-16 NOTE — PLAN OF CARE
Problem: PHYSICAL THERAPY ADULT  Goal: Performs mobility at highest level of function for planned discharge setting.  See evaluation for individualized goals.  Description: Treatment/Interventions: Functional transfer training, LE strengthening/ROM, Elevations, Therapeutic exercise, Endurance training, Patient/family training, Bed mobility, Gait training, Spoke to nursing, OT  Equipment Recommended: Walker       See flowsheet documentation for full assessment, interventions and recommendations.  Note: Prognosis: Good  Problem List: Decreased strength, Decreased endurance, Impaired balance, Decreased mobility, Impaired judgement, Pain  Assessment: Pt. 59 y.o.female presented after unwitnessed syncopal episode at home. Pt also c/o SOB & chest pain. Pt admitted for syncope w/ Multifocal pneumonia, sepsis, NSTEMI, hypokalemia, hyponatremia, hypertension urgency & iron deficiency anemia. Pt referred to PT for mobility assessment & D/C planning. Please see above for information re: home set-up & PLOF as well as objective findings during PT assessment. PTA, pt reports being fairly I w/o AD but admits to holding onto furniture for support when ambulating. Pt also reports that she crawls up the stairs at baseline. On eval, pt functioning below baseline hence will continue skilled PT to improve function & safety. Pt require modAx1 for bed mobility & minAx2 for transfers & amb trial w/ RW + cues for techniques & safety. Gait deviations as above but no gross LOB noted.  Dec overall activity tolerance 2* to generalized pain. Pt on 2L O2 NC w/ SpO2 at 96-99% during session. The patient's AM-PAC Basic Mobility Inpatient Short Form Raw Score is 12. A Raw score of less than or equal to 16 suggests the patient may benefit from discharge to post-acute rehabilitation services. Please also refer to the recommendation of the Physical Therapist for safe discharge planning. From PT standpoint, will recommend Level II (moderate resource  "intensity) rehab services at D/C. No SOB & dizziness reported t/o session. Nsg staff most recent vital signs as follows: /92   Pulse 79   Temp 97.7 °F (36.5 °C)   Resp 17   Ht 5' 6\" (1.676 m)   Wt 74.8 kg (165 lb)   LMP  (LMP Unknown)   SpO2 97%   BMI 26.63 kg/m² . At end of session, pt back in bed in stable condition, call bell & phone in reach, bed alarm activated. Fall precautions reinforced w/ good understanding. CM to follow. Nsg staff to continue to mobilized pt (OOB in chair for all meals & ambulate in room) as tolerated to prevent further decline in function. Will also recommend Restorative for daily amb &/or daily OOB in chair as appropriate. Nsg notified. Co-eval was necessary to complete this PT eval for the pt's best interest given pt's medical acuity & complexity.    Barriers to Discharge: Inaccessible home environment  Barriers to Discharge Comments: stairs    Rehab Resource Intensity Level, PT: II (Moderate Resource Intensity) (pending progress)    See flowsheet documentation for full assessment.        "

## 2025-04-16 NOTE — UTILIZATION REVIEW
Initial Clinical Review    Admission: Date/Time/Statement:   Admission Orders (From admission, onward)       Ordered        04/15/25 0313  INPATIENT ADMISSION  Once             SOC 4/14 @ 1722             Orders Placed This Encounter   Procedures    INPATIENT ADMISSION     Standing Status:   Standing     Number of Occurrences:   1     Level of Care:   Med Surg [16]     Estimated length of stay:   More than 2 Midnights     Certification:   I certify that inpatient services are medically necessary for this patient for a duration of greater than two midnights. See H&P and MD Progress Notes for additional information about the patient's course of treatment.     ED Arrival Information       Expected   -    Arrival   4/14/2025 16:54    Acuity   Emergent              Means of arrival   Wheelchair    Escorted by   Family Member    Service   Hospitalist    Admission type   Emergency              Arrival complaint   chest pain             Chief Complaint   Patient presents with    Chest Pain     Per pt's daughter, pt has been having chest pain and sob since yesterday.        Initial Presentation: 59 y.o. female presents to the ED 4/14 from home with c/o unwitnessed syncopal episode, headache, fogginess and LOC w/ head strike, R side CP, SOB, cough.  PMH: COPD, cerebral aneurysm s/p clipping, IVC filter, Dilaudid pain pump for chronic LBP, anemia, depression, neuropathy, constipation, seizure order and hypothyroidism. In the ED pt is HTN, sat 88%.  Treated with PO Tylenol, IV Reglan, IV Mag x2, PO KCL, sq Imitrex, IV SoluCortef, Heparin bolus and drip, PO Benadryl, IV & PO antibiotics, Mucinex.  Labs - elevated WBC, D dimer, alk phos, troponin, procal, glucose, abnormal UA, low NA, K.  ECG - SR w/ PVC.  Imaging - multifocal Pneumonia; no PE, extensive paranasal sinus disease.  On exam diffuse bilat rales.  Alert.  Admitted to INPATIENT status with Sepsis, Pneumonia, NSTEMI, Hypokalemia, hyponatremia - PLAN: IV antibiotics,  sputum & blood cultures, urine antigens, IV fluids, Heparin drip, ASA, ACEI, Nifedipine, antitussives, replete K and trend lytes, continue home Keppra and Gabapentin, pain pump in place.       Anticipated Length of Stay/Certification Statement:  More than 2 days secondary to sepsis from multifocal pneumonia/non-STEMI     Date: 4/16   Day 2:   Sepsis, Pneumonia, NSTEMI, Hypokalemia, hyponatremia -remains on IV antibiotics, scheduled IV Toradol, Heparin drip and IV fluids d/c 4/15.  Is on oxygen - has some hypoxia yesterday.  BP well controlled today.  Continues w/ downtrending leukocytosis, K up to 3.9, procal trending down.  Pt feels no improvement, productive cough, has rales on exam,  worsening back pain severe today.      Date: 4/17  Day 3: Has surpassed a 2nd midnight with active treatments and services.  Sepsis, Pneumonia, NSTEMI, Hypokalemia, hyponatremia - Urine streptococcus pneumonia positive, changing IV antibiotics to IV Ceftriaxone. Blood cultures negative.  K 3.3 -  continue repletion and Tele. Normal WBC, afebrile, VS stable, on room air.  On exam continues w/ cough and weakness but improving. More energy today. Rales on exam.        ED Treatment-Medication Administration from 04/14/2025 1654 to 04/15/2025 1345         Date/Time Order Dose Route Action     04/14/2025 1856 acetaminophen (TYLENOL) tablet 650 mg 650 mg Oral Given     04/14/2025 1900 metoclopramide (REGLAN) injection 10 mg 10 mg Intravenous Given     04/14/2025 1904 magnesium sulfate 2 g/50 mL IVPB (premix) 2 g 2 g Intravenous New Bag     04/14/2025 1856 potassium chloride (Klor-Con M20) CR tablet 40 mEq 40 mEq Oral Given     04/14/2025 1925 SUMAtriptan (IMITREX) subcutaneous injection 6 mg 6 mg Subcutaneous Given     04/14/2025 2000 hydrocortisone (Solu-CORTEF) injection 200 mg 200 mg Intravenous Given     04/14/2025 2124 heparin (porcine) injection 5,600 Units 5,600 Units Intravenous Given     04/14/2025 2127 heparin (porcine) 25,000  units in 0.45% NaCl 250 mL infusion (premix) 18 Units/kg/hr Intravenous New Bag     04/14/2025 2259 diphenhydrAMINE (BENADRYL) 50 mg in sodium chloride 0.9 % 50 mL IVPB 50 mg Intravenous New Bag     04/14/2025 2321 gabapentin (NEURONTIN) capsule 300 mg 300 mg Oral Given     04/14/2025 2321 methocarbamol (ROBAXIN) tablet 500 mg 500 mg Oral Given     04/15/2025 0004 iohexol (OMNIPAQUE) 350 MG/ML injection (MULTI-DOSE) 100 mL 85 mL Intravenous Given     04/15/2025 0028 LORazepam (ATIVAN) tablet 0.5 mg 0.5 mg Oral Given     04/15/2025 0231 ceftriaxone (ROCEPHIN) 1 g/50 mL in dextrose IVPB 1,000 mg Intravenous New Bag     04/15/2025 0231 doxycycline hyclate (VIBRAMYCIN) capsule 100 mg 100 mg Oral Given     04/15/2025 0912 buPROPion (WELLBUTRIN XL) 24 hr tablet 150 mg 150 mg Oral Given     04/15/2025 0912 citalopram (CeleXA) tablet 40 mg 40 mg Oral Given     04/15/2025 0919 umeclidinium 62.5 mcg/actuation inhaler AEPB 1 puff 1 puff Inhalation Given     04/15/2025 0918 albuterol (PROVENTIL HFA,VENTOLIN HFA) inhaler 2 puff 2 puff Inhalation Given     04/15/2025 1248 albuterol (PROVENTIL HFA,VENTOLIN HFA) inhaler 2 puff 2 puff Inhalation Given     04/15/2025 0912 gabapentin (NEURONTIN) capsule 300 mg 300 mg Oral Given     04/15/2025 1248 gabapentin (NEURONTIN) capsule 300 mg 300 mg Oral Given     04/15/2025 0912 levETIRAcetam (KEPPRA) tablet 1,500 mg 1,500 mg Oral Given     04/15/2025 0603 levothyroxine tablet 100 mcg 100 mcg Oral Given     04/15/2025 0428 lisinopril (ZESTRIL) tablet 10 mg 10 mg Oral Given     04/15/2025 0751 methocarbamol (ROBAXIN) tablet 500 mg 500 mg Oral Given     04/15/2025 0912 NIFEdipine (PROCARDIA XL) 24 hr tablet 60 mg 60 mg Oral Given     04/15/2025 0742 pantoprazole (PROTONIX) EC tablet 40 mg 40 mg Oral Given     04/15/2025 0432 sodium chloride 0.9 % bolus 1,000 mL 1,000 mL Intravenous New Bag     04/15/2025 0910 lactated ringers infusion 75 mL/hr Intravenous New Bag     04/15/2025 0605 heparin  (porcine) subcutaneous injection 5,000 Units 5,000 Units Subcutaneous Given     04/15/2025 0429 potassium chloride (Klor-Con M20) CR tablet 40 mEq 40 mEq Oral Given     04/15/2025 0742 potassium chloride (Klor-Con M20) CR tablet 40 mEq 40 mEq Oral Given     04/15/2025 0428 aspirin tablet 325 mg 325 mg Oral Given     04/15/2025 0912 guaiFENesin (MUCINEX) 12 hr tablet 600 mg 600 mg Oral Given     04/15/2025 0612 sodium chloride 0.9 % bolus 1,000 mL 1,000 mL Intravenous New Bag     04/15/2025 0802 magnesium sulfate 2 g/50 mL IVPB (premix) 2 g 2 g Intravenous New Bag            Scheduled Medications:  umeclidinium, 1 puff, Inhalation, Daily   And  albuterol, 2 puff, Inhalation, 4x Daily  aspirin, 81 mg, Oral, Daily  buPROPion, 150 mg, Oral, Daily  cefTRIAXone, 1,000 mg, Intravenous, Q24H  citalopram, 40 mg, Oral, Daily  ferrous sulfate, 325 mg, Oral, Q48H  gabapentin, 300 mg, Oral, 4x Daily  guaiFENesin, 600 mg, Oral, Q12H TASHA  heparin (porcine), 5,000 Units, Subcutaneous, Q8H TASHA  insulin lispro, 1-5 Units, Subcutaneous, TID AC  insulin lispro, 1-5 Units, Subcutaneous, HS  ketorolac, 15 mg, Intravenous, Q6H TASHA  lactobacillus acidophilus-bulgaricus, 1 packet, Oral, BID With Meals  levETIRAcetam, 1,500 mg, Oral, BID  levothyroxine, 100 mcg, Oral, Early Morning  lisinopril, 10 mg, Oral, After Breakfast  lubiprostone, 24 mcg, Oral, BID With Meals  nicotine, 1 patch, Transdermal, Daily  NIFEdipine ER, 60 mg, Oral, Daily  pantoprazole, 40 mg, Oral, Daily Before Breakfast  senna-docusate sodium, 1 tablet, Oral, HS  tamsulosin, 0.4 mg, Oral, Daily With Dinner  temazepam, 22.5 mg, Oral, HS      Continuous IV Infusions:     Heparin drip - d/c 4/15  IV LR @ 75 cc/hr - d/c 4/15    PRN Meds:  acetaminophen, 650 mg, Oral, Q6H PRN - x 1 4/15, 4/16  dextromethorphan-guaiFENesin, 10 mL, Oral, Q6H PRN - x 1 4/15, 4/16, 4/*17  labetalol, 10 mg, Intravenous, Q6H PRN  LORazepam, 0.5 mg, Oral, Q6H PRN - x 1 4/15, x2 4/16, x1  4/17  methocarbamol, 500 mg, Oral, TID PRN - x 2 4/15  morphine injection, 2 mg, Intravenous, Q6H PRN - x 2 4/15, 4/16  nitroglycerin, 0.4 mg, Sublingual, Q5 Min PRN  oxyCODONE, 5 mg, Oral, Q6H PRN - x 1 4/16  polyethylene glycol, 17 g, Oral, Daily PRN  SUMAtriptan, 100 mg, Oral, Once PRN  tiZANidine, 2 mg, Oral, BID PRN - x 2 4/16, x1 4/17      ED Triage Vitals   Temperature Pulse Respirations Blood Pressure SpO2 Pain Score   04/14/25 1708 04/14/25 1706 04/14/25 1706 04/14/25 1706 04/14/25 1706 04/14/25 1856   98.2 °F (36.8 °C) 105 20 (!) 182/100 (S) (!) 88 % 4     Weight (last 2 days)       Date/Time Weight    04/15/25 1334 74.8 (165)            Vital Signs (last 3 days)       Date/Time Temp Pulse Resp BP MAP (mmHg) SpO2 Calculated FIO2 (%) - Nasal Cannula Nasal Cannula O2 Flow Rate (L/min) O2 Device Patient Position - Orthostatic VS [place] Coma Scale Score Pain    04/17/25 1244 -- -- -- -- -- -- -- -- -- -- -- 8    04/17/25 10:49:34 97.5 °F (36.4 °C) 67 -- 124/59 81 94 % -- -- -- -- -- --    04/17/25 0855 -- -- -- -- -- 95 % -- -- None (Room air) -- 15 No Pain    04/17/25 07:09:37 97.8 °F (36.6 °C) 69 16 149/76 100 97 % -- -- -- -- -- --    04/17/25 0619 -- -- -- -- -- -- -- -- -- -- -- 4    04/17/25 03:17:24 98.1 °F (36.7 °C) 72 -- 133/69 90 94 % -- -- -- -- -- --    04/17/25 0044 -- -- -- -- -- -- -- -- -- -- -- No Pain    04/16/25 23:40:55 97.7 °F (36.5 °C) 81 18 120/64 83 93 % -- -- Nasal cannula Lying -- --    04/16/25 2000 -- -- -- -- -- 90 % 32 3 L/min Nasal cannula -- 15 No Pain    04/16/25 19:06:41 98.9 °F (37.2 °C) 88 18 125/62 83 96 % -- -- -- -- -- --    04/16/25 1723 -- -- -- -- -- -- -- -- -- -- -- 10 - Worst Possible Pain    04/16/25 14:27:11 98 °F (36.7 °C) 75 18 123/62 82 94 % -- -- -- -- -- --    04/16/25 11:46:33 97.7 °F (36.5 °C) 79 -- -- -- 97 % -- -- -- -- -- --    04/16/25 1125 -- -- -- -- -- -- -- -- -- -- -- 10 - Worst Possible Pain    04/16/25 1058 -- -- -- -- -- -- -- -- -- -- -- 10 -  Worst Possible Pain    04/16/25 10:41:18 -- -- -- 165/92 116 -- -- -- -- -- -- --    04/16/25 1022 -- -- -- -- -- -- -- -- -- -- -- 10 - Worst Possible Pain    04/16/25 0935 -- -- -- -- -- -- -- -- -- -- -- 6    04/16/25 0900 -- -- -- -- -- 93 % 32 3 L/min Nasal cannula -- 15 --    04/16/25 07:06:25 -- 75 17 140/65 90 94 % -- -- -- -- -- --    04/16/25 0621 -- -- -- -- -- -- -- -- -- -- -- 3    04/16/25 0158 -- -- -- -- -- -- -- -- -- -- -- 10 - Worst Possible Pain    04/15/25 23:08:36 -- 87 -- 122/75 91 91 % -- -- -- -- -- --    04/15/25 1933 -- -- -- -- -- -- 32 3 L/min Nasal cannula -- 15 4    04/15/25 19:21:09 -- 84 18 152/79 103 94 % -- -- -- -- -- --    04/15/25 1826 -- -- -- -- -- -- -- -- -- -- -- 9    04/15/25 18:22:35 -- 89 -- 138/72 94 93 % -- -- -- -- -- --    04/15/25 1419 98.7 °F (37.1 °C) 63 18 143/81 -- 95 % 32 3 L/min Nasal cannula Lying -- --    04/15/25 1405 -- -- -- -- -- -- -- -- -- -- -- 8    04/15/25 1334 -- 63 -- 143/81 -- -- -- -- -- -- -- --    04/15/25 1245 -- 60 18 152/74 106 100 % 32 3 L/min Nasal cannula -- -- --    04/15/25 1045 -- 78 14 144/67 96 100 % 32 3 L/min Nasal cannula -- -- --    04/15/25 0915 -- 76 14 143/83 107 98 % 32 3 L/min Nasal cannula -- -- --    04/15/25 0912 -- -- -- 135/97 -- -- -- -- -- -- -- --    04/15/25 0900 -- -- -- -- -- -- 32 3 L/min Nasal cannula -- 15 --    04/15/25 0730 97.8 °F (36.6 °C) 76 17 156/80 107 98 % 32 3 L/min Nasal cannula -- -- 5    04/15/25 0700 -- 86 15 167/87 118 97 % -- -- None (Room air) Lying -- --    04/15/25 0645 -- 80 14 136/65 94 88 % -- -- None (Room air) Lying -- --    04/15/25 0631 -- 80 13 148/70 100 88 % -- -- -- -- -- --    04/15/25 0600 -- 84 14 138/68 95 95 % -- -- -- Lying -- --    04/15/25 0545 -- 84 12 145/70 101 -- -- -- None (Room air) Lying -- --    04/15/25 0530 -- 88 19 158/81 111 -- -- -- -- -- -- --    04/15/25 0515 -- 88 13 161/74 107 -- -- -- None (Room air) Lying -- --    04/15/25 0508 -- -- -- -- -- -- -- -- --  -- 15 --    04/15/25 0500 -- 96 18 148/81 109 94 % -- -- None (Room air) Lying -- --    04/15/25 0445 -- 92 12 175/96 127 99 % -- -- None (Room air) Lying -- --    04/15/25 0428 -- -- -- 176/85 -- -- -- -- -- -- -- --    04/15/25 0400 -- 98 14 176/85 122 100 % -- -- None (Room air) Lying -- --    04/15/25 0330 -- 90 15 183/98 134 100 % -- -- None (Room air) Lying -- --    04/15/25 0300 -- 78 13 171/83 118 100 % -- -- None (Room air) Lying -- --    04/15/25 0200 -- 82 14 181/84 121 92 % -- -- None (Room air) Lying -- --    04/15/25 0100 -- 82 16 -- -- 90 % -- -- -- -- -- --    04/15/25 0030 -- 82 13 169/79 114 91 % -- -- None (Room air) Lying -- --    04/14/25 2315 -- 80 12 190/88 127 100 % -- -- -- -- -- --    04/14/25 2245 -- 78 16 174/79 113 98 % -- -- None (Room air) Lying -- --    04/14/25 2215 -- 84 16 177/85 122 100 % -- -- None (Room air) Lying -- --    04/14/25 2145 -- 80 16 180/86 124 97 % -- -- None (Room air) Lying -- --    04/14/25 2115 -- 86 18 160/77 111 95 % -- -- None (Room air) Lying -- --    04/14/25 2045 -- 82 16 176/76 109 100 % -- -- None (Room air) Lying -- --    04/14/25 2015 -- 82 16 162/72 103 97 % -- -- None (Room air) Lying -- --    04/14/25 1945 -- 84 16 178/86 123 92 % -- -- None (Room air) Lying -- --    04/14/25 1915 -- 92 15 174/87 124 94 % -- -- None (Room air) Lying -- --    04/14/25 1856 -- -- -- -- -- -- -- -- -- -- -- 4    04/14/25 1827 -- 98 16 185/93 -- 99 % -- -- None (Room air) Lying -- --    04/14/25 1724 -- -- -- -- -- -- -- -- -- -- 15 --    04/14/25 1708 98.2 °F (36.8 °C) -- -- -- -- -- -- -- -- -- -- --    04/14/25 1707 -- -- -- -- -- 92 % -- -- None (Room air) -- -- --    04/14/25 1706 -- 105 20 182/100 -- 88 %  -- -- None (Room air) -- -- --              Pertinent Labs/Diagnostic Test Results:   Radiology:  CTA chest pe study   Final Interpretation by Ish Quiroz MD (04/15 0126)         1. Findings consistent with multifocal pneumonia.   2. No evidence of  acute pulmonary embolus, thoracic.                  Workstation performed: [id number]         CT head without contrast   Final Interpretation by Ish Quiroz MD (04/15 0101)         1. No evidence of acute intracranial hemorrhage or mass effect.   2. Extensive paranasal sinus disease.                  Workstation performed: [id number]         XR chest 2 views   ED Interpretation by Deena Luke DO (04/14 2155)   No acute cardiopulmonary disease      Final Interpretation by Kamilah Ziegler MD (04/15 1047)      Patchy opacities in the right mid lung and left lower lobe, likely reflecting atelectasis versus pneumonia.            Workstation performed: [id number]           Cardiology:  Echo complete w/ contrast if indicated   Final Result by Brian Byers MD (04/15 1535)        Left Ventricle: Left ventricular cavity size is normal. Wall thickness    is normal. The left ventricular ejection fraction is 75%. Systolic    function is hyperdynamic. Wall motion is normal. Diastolic function is    normal.     Right Ventricle: Right ventricular cavity size is normal. Systolic    function is normal.     Left Atrium: The atrium is moderately dilated.     Right Atrium: The atrium is mildly dilated.     Tricuspid Valve: The right ventricular systolic pressure is mildly    elevated. The estimated right ventricular systolic pressure is 40.00 mmHg.     Aorta: The aortic root is normal in size. The ascending aorta is mildly    dilated. The aortic root is 3.30 cm. The ascending aorta is 3.9 cm.         ECG 12 lead   Final Result by Jarrett Kennedy DO (04/15 1326)   Age and gender specific ECG analysis    Normal sinus rhythm   Normal ECG   Confirmed by Jarrett Kennedy ([id number]) on 4/15/2025 1:26:04 PM      ECG 12 lead   Final Result by Jarrett Kennedy DO (04/15 0628)   Age and gender specific ECG analysis    Normal sinus rhythm   Nonspecific T wave abnormality   Abnormal ECG   Confirmed by Brent  Jarrett ([id number]) on 4/15/2025 6:28:22 AM      ECG 12 lead   Final Result by Jarrett Kennedy DO (04/15 0628)   Age and gender specific ECG analysis    Normal sinus rhythm   Nonspecific T wave abnormality   Abnormal ECG   Confirmed by Jarrett Kennedy ([id number]) on 4/15/2025 6:28:09 AM      ECG 12 lead   Final Result by Jarrett Kennedy DO (04/15 0628)   Age and gender specific ECG analysis    Normal sinus rhythm   Nonspecific T wave abnormality   Abnormal ECG   Confirmed by Jarrett Kennedy ([id number]) on 4/15/2025 6:28:05 AM      ECG 12 lead   Final Result by Lidya Carlson MD (04/14 1733)   Age and gender specific ECG analysis    Sinus rhythm with occasional Premature ventricular complexes   T wave abnormality, consider inferior ischemia   Abnormal ECG   When compared with ECG of 27-Aug-2023 21:06,   Premature ventricular complexes are now Present   T wave inversion now evident in Inferior leads   Nonspecific T wave abnormality now evident in Lateral leads   QT has shortened   Confirmed by Lidya Carlson ([id number]) on 4/14/2025 5:33:12 PM        GI:  No orders to display           Results from last 7 days   Lab Units 04/17/25  0401 04/16/25  0948 04/15/25  0615 04/14/25  1732   WBC Thousand/uL 10.67* 13.43* 13.85* 14.99*   HEMOGLOBIN g/dL 8.9* 9.4* 8.8* 10.9*   HEMATOCRIT % 28.9* 30.0* 28.2* 34.8   PLATELETS Thousands/uL 440* 420* 380 357   TOTAL NEUT ABS Thousands/µL  --   --   --  12.92*         Results from last 7 days   Lab Units 04/17/25  0401 04/16/25  0948 04/15/25  0926 04/15/25  0615 04/14/25  1732   SODIUM mmol/L 136 136  --  136 133*   POTASSIUM mmol/L 3.3* 3.2* 3.9 3.7 3.0*   CHLORIDE mmol/L 100 101  --  101 94*   CO2 mmol/L 28 27 -- 25 26   ANION GAP mmol/L 8 8  --  10 13   BUN mg/dL 7 7  --  10 10   CREATININE mg/dL 0.50* 0.55*  --  0.56* 0.71   EGFR ml/min/1.73sq m 105 102  --  101 92   CALCIUM mg/dL 8.2* 8.4  --  8.0* 8.8   MAGNESIUM mg/dL 1.8* 1.2*  --  1.3*  --    PHOSPHORUS mg/dL  --   --   --  3.7   --      Results from last 7 days   Lab Units 04/15/25  0615 04/14/25  1732   AST U/L 11* 19   ALT U/L 12 16   ALK PHOS U/L 103 128*   TOTAL PROTEIN g/dL 6.6 8.3   ALBUMIN g/dL 3.0* 3.8   TOTAL BILIRUBIN mg/dL 0.38 0.58     Results from last 7 days   Lab Units 04/17/25  1114 04/17/25  0613 04/16/25  2143 04/16/25  1658 04/16/25  1143 04/16/25  0705 04/15/25  2139 04/15/25  1830   POC GLUCOSE mg/dl 118 100 109 117 105 116 122 124     Results from last 7 days   Lab Units 04/17/25  0401 04/16/25  0948 04/15/25  0615 04/14/25  1732   GLUCOSE RANDOM mg/dL 90 134 158* 172*         Results from last 7 days   Lab Units 04/15/25  0615   HEMOGLOBIN A1C % 6.8*   EAG mg/dl 148     Results from last 7 days   Lab Units 04/14/25  2134 04/14/25  1930 04/14/25  1732   HS TNI 0HR ng/L  --   --  9   HS TNI 2HR ng/L  --  94*  --    HSTNI D2 ng/L  --  85*  --    HS TNI 4HR ng/L 7  --   --    HSTNI D4 ng/L -2  --   --      Results from last 7 days   Lab Units 04/14/25  1843   D-DIMER QUANTITATIVE ug/ml FEU 4.21*     Results from last 7 days   Lab Units 04/15/25  0615 04/14/25  2058   PROTIME seconds  --  17.4*   INR   --  1.41*   PTT seconds 37* 41*         Results from last 7 days   Lab Units 04/17/25  0401 04/16/25  0518 04/15/25  0217   PROCALCITONIN ng/ml 0.55* 0.86* 1.33*     Results from last 7 days   Lab Units 04/15/25  0217   LACTIC ACID mmol/L 0.7     Results from last 7 days   Lab Units 04/15/25  1227   CLARITY UA  Clear   COLOR UA  Yellow   SPEC GRAV UA  >=1.050*   PH UA  6.5   GLUCOSE UA mg/dl 100 (1/10%)*   KETONES UA mg/dl 40 (2+)*   BLOOD UA  Negative   PROTEIN UA mg/dl 50 (1+)*   NITRITE UA  Negative   BILIRUBIN UA  Negative   UROBILINOGEN UA (BE) mg/dl 2.0*   LEUKOCYTES UA  Negative   WBC UA /hpf 1-2   RBC UA /hpf 1-2   BACTERIA UA /hpf None Seen   EPITHELIAL CELLS WET PREP /hpf Occasional       Results from last 7 days   Lab Units 04/16/25  0944 04/15/25  0217   BLOOD CULTURE   --  No Growth at 48 hrs.  No Growth at 48  hrs.   SPUTUM CULTURE  Culture too young- will reincubate  --    GRAM STAIN RESULT  1+ Epithelial cells per low power field  Rare Polys  No organisms seen  --                    Past Medical History:   Diagnosis Date    Abnormal gait     Abscess of abdominal cavity (HCC)     Aneurysm (HCC)     Brain with clips x 3 per pt    Arm injury     Right arm- pt present with short FA/wrist  immoblizer present on ED arrival    Cataract     Cellulitis     B/L LE    Chronic back pain     Chronic pain     COPD (chronic obstructive pulmonary disease) (HCC)     Disease of thyroid gland     hypothyroid    Edema     B/L LE    Falls frequently     last fall 1 hour ago per pt    Foot drop, left foot     Fracture     Facial bones per pt- left orbit/cheek? pt unsure    Fracture closed, nasal bone     Gastric bypass status for obesity     Hypoglycemia     Memory loss, short term     Migraine     MRSA (methicillin resistant Staphylococcus aureus)     Stomach ulcer      Present on Admission:   Sepsis (HCC)   Hypokalemia   Hypertensive urgency   Iron deficiency anemia   Hypothyroidism   Brain aneurysm   Myocardial injury   Pneumonia   Chronic midline low back pain with sciatica   Neuropathy   Hyponatremia      Admitting Diagnosis: Chest pain [R07.9]  Elevated troponin [R79.89]  Multifocal pneumonia [J18.9]  Age/Sex: 59 y.o. female    Network Utilization Review Department  ATTENTION: Please call with any questions or concerns to [phone] and carefully listen to the prompts so that you are directed to the right person. All voicemails are confidential.   For Discharge needs, contact Care Management DC Support Team at [phone] opt. [phone]  Send all requests for admission clinical reviews, approved or denied determinations and any other requests to dedicated fax number below belonging to the campus where the patient is receiving treatment. List of dedicated fax numbers for the Facilities:  FACILITY NAME UR FAX NUMBER   ADMISSION DENIALS  (Administrative/Medical Necessity) [phone]   DISCHARGE SUPPORT TEAM (NETWORK) [phone]   PARENT CHILD HEALTH (Maternity/NICU/Pediatrics) [phone]   [facility] [phone]   [facility] [phone]   [facility] [phone]   [facility] [phone]   [facility] [phone]   [facility] [phone]   [facility] [phone]   [facility] [phone]   [facility] [phone]   [facility] [phone]   [facility] [phone]   [facility] [place] [phone]

## 2025-04-16 NOTE — PLAN OF CARE
"  Problem: OCCUPATIONAL THERAPY ADULT  Goal: Performs self-care activities at highest level of function for planned discharge setting.  See evaluation for individualized goals.  Description: Treatment Interventions: ADL retraining, Functional transfer training, UE strengthening/ROM, Endurance training, Cognitive reorientation, Patient/family training, Compensatory technique education, Continued evaluation          See flowsheet documentation for full assessment, interventions and recommendations.   Note: Limitation: Decreased ADL status, Decreased UE strength, Decreased Safe judgement during ADL, Decreased endurance, Decreased cognition, Decreased high-level ADLs  Prognosis: Fair  Assessment: Pt is a 58y/o female admitted to the hospital after having a syncopal episode; CT(brain)=neg acute findings. Pt noted with PNA, HTN, sepsis. Pt with PMH brain aneurysm with clipping, chronic pain, COPD, facial fx's, gastric bypass, and back sx. PTA pt states that she had assistance with her ADLs; states independence with her transfers/ambulation--\"furniture walks\" in house; mostly housebound; +falls=15+, neg home alone, +. During inital eval, pt demonstrated deficits with her functional balance, functional mobility, ADL status, transfer safety, b/l UE strength, and activity tolerance(currently fair=15-20mins). Pt would benefit from continued OT tx for the above deficits.2-5xwk/1-2wks. The patient's raw score on the AM-PAC Daily Activity Inpatient Short Form is 15. A raw score of less than 19 suggests the patient may benefit from discharge to post-acute rehabilitation services. Please refer to the recommendation of the Occupational Therapist for safe discharge planning.     Rehab Resource Intensity Level, OT: II (Moderate Resource Intensity)          "

## 2025-04-17 LAB
ANION GAP SERPL CALCULATED.3IONS-SCNC: 8 MMOL/L (ref 4–13)
BUN SERPL-MCNC: 7 MG/DL (ref 5–25)
CALCIUM SERPL-MCNC: 8.2 MG/DL (ref 8.4–10.2)
CHLORIDE SERPL-SCNC: 100 MMOL/L (ref 96–108)
CO2 SERPL-SCNC: 28 MMOL/L (ref 21–32)
CREAT SERPL-MCNC: 0.5 MG/DL (ref 0.6–1.3)
ERYTHROCYTE [DISTWIDTH] IN BLOOD BY AUTOMATED COUNT: 15.9 % (ref 11.6–15.1)
GFR SERPL CREATININE-BSD FRML MDRD: 105 ML/MIN/1.73SQ M
GLUCOSE SERPL-MCNC: 100 MG/DL (ref 65–140)
GLUCOSE SERPL-MCNC: 118 MG/DL (ref 65–140)
GLUCOSE SERPL-MCNC: 126 MG/DL (ref 65–140)
GLUCOSE SERPL-MCNC: 142 MG/DL (ref 65–140)
GLUCOSE SERPL-MCNC: 90 MG/DL (ref 65–140)
HCT VFR BLD AUTO: 28.9 % (ref 34.8–46.1)
HGB BLD-MCNC: 8.9 G/DL (ref 11.5–15.4)
MAGNESIUM SERPL-MCNC: 1.8 MG/DL (ref 1.9–2.7)
MCH RBC QN AUTO: 28.3 PG (ref 26.8–34.3)
MCHC RBC AUTO-ENTMCNC: 30.8 G/DL (ref 31.4–37.4)
MCV RBC AUTO: 92 FL (ref 82–98)
PLATELET # BLD AUTO: 440 THOUSANDS/UL (ref 149–390)
PMV BLD AUTO: 9.7 FL (ref 8.9–12.7)
POTASSIUM SERPL-SCNC: 3.3 MMOL/L (ref 3.5–5.3)
PROCALCITONIN SERPL-MCNC: 0.55 NG/ML
RBC # BLD AUTO: 3.15 MILLION/UL (ref 3.81–5.12)
SODIUM SERPL-SCNC: 136 MMOL/L (ref 135–147)
WBC # BLD AUTO: 10.67 THOUSAND/UL (ref 4.31–10.16)

## 2025-04-17 PROCEDURE — 97110 THERAPEUTIC EXERCISES: CPT

## 2025-04-17 PROCEDURE — 85027 COMPLETE CBC AUTOMATED: CPT | Performed by: PHYSICIAN ASSISTANT

## 2025-04-17 PROCEDURE — 99232 SBSQ HOSP IP/OBS MODERATE 35: CPT | Performed by: PHYSICIAN ASSISTANT

## 2025-04-17 PROCEDURE — 83735 ASSAY OF MAGNESIUM: CPT | Performed by: PHYSICIAN ASSISTANT

## 2025-04-17 PROCEDURE — 97116 GAIT TRAINING THERAPY: CPT

## 2025-04-17 PROCEDURE — 97530 THERAPEUTIC ACTIVITIES: CPT

## 2025-04-17 PROCEDURE — 82948 REAGENT STRIP/BLOOD GLUCOSE: CPT

## 2025-04-17 PROCEDURE — 84145 PROCALCITONIN (PCT): CPT | Performed by: PHYSICIAN ASSISTANT

## 2025-04-17 PROCEDURE — 80048 BASIC METABOLIC PNL TOTAL CA: CPT | Performed by: PHYSICIAN ASSISTANT

## 2025-04-17 RX ORDER — POTASSIUM CHLORIDE 1500 MG/1
40 TABLET, EXTENDED RELEASE ORAL ONCE
Status: COMPLETED | OUTPATIENT
Start: 2025-04-17 | End: 2025-04-17

## 2025-04-17 RX ORDER — LACTOBACILLUS ACIDOPHILUS / LACTOBACILLUS BULGARICUS 100 MILLION CFU STRENGTH
1 GRANULES ORAL 2 TIMES DAILY WITH MEALS
Status: DISCONTINUED | OUTPATIENT
Start: 2025-04-17 | End: 2025-04-19 | Stop reason: HOSPADM

## 2025-04-17 RX ORDER — LOPERAMIDE HYDROCHLORIDE 2 MG/1
2 CAPSULE ORAL 2 TIMES DAILY PRN
Status: DISCONTINUED | OUTPATIENT
Start: 2025-04-17 | End: 2025-04-19 | Stop reason: HOSPADM

## 2025-04-17 RX ORDER — MAGNESIUM SULFATE HEPTAHYDRATE 40 MG/ML
2 INJECTION, SOLUTION INTRAVENOUS ONCE
Status: COMPLETED | OUTPATIENT
Start: 2025-04-17 | End: 2025-04-17

## 2025-04-17 RX ADMIN — KETOROLAC TROMETHAMINE 15 MG: 30 INJECTION, SOLUTION INTRAMUSCULAR; INTRAVENOUS at 12:44

## 2025-04-17 RX ADMIN — LOPERAMIDE HYDROCHLORIDE 2 MG: 2 CAPSULE ORAL at 16:40

## 2025-04-17 RX ADMIN — HEPARIN SODIUM 5000 UNITS: 5000 INJECTION INTRAVENOUS; SUBCUTANEOUS at 23:33

## 2025-04-17 RX ADMIN — ALBUTEROL SULFATE 2 PUFF: 90 AEROSOL, METERED RESPIRATORY (INHALATION) at 10:52

## 2025-04-17 RX ADMIN — LEVETIRACETAM 1500 MG: 750 TABLET, FILM COATED ORAL at 17:19

## 2025-04-17 RX ADMIN — GUAIFENESIN 600 MG: 600 TABLET ORAL at 08:51

## 2025-04-17 RX ADMIN — HEPARIN SODIUM 5000 UNITS: 5000 INJECTION INTRAVENOUS; SUBCUTANEOUS at 13:40

## 2025-04-17 RX ADMIN — NIFEDIPINE 60 MG: 60 TABLET, FILM COATED, EXTENDED RELEASE ORAL at 08:51

## 2025-04-17 RX ADMIN — HEPARIN SODIUM 5000 UNITS: 5000 INJECTION INTRAVENOUS; SUBCUTANEOUS at 06:19

## 2025-04-17 RX ADMIN — Medication 1000 MG: at 02:01

## 2025-04-17 RX ADMIN — GUAIFENESIN 600 MG: 600 TABLET ORAL at 23:32

## 2025-04-17 RX ADMIN — GABAPENTIN 300 MG: 300 CAPSULE ORAL at 23:31

## 2025-04-17 RX ADMIN — TEMAZEPAM 22.5 MG: 7.5 CAPSULE ORAL at 23:32

## 2025-04-17 RX ADMIN — GUAIFENESIN AND DEXTROMETHORPHAN 10 ML: 100; 10 SYRUP ORAL at 06:19

## 2025-04-17 RX ADMIN — KETOROLAC TROMETHAMINE 15 MG: 30 INJECTION, SOLUTION INTRAMUSCULAR; INTRAVENOUS at 00:44

## 2025-04-17 RX ADMIN — TIZANIDINE 2 MG: 2 TABLET ORAL at 13:34

## 2025-04-17 RX ADMIN — KETOROLAC TROMETHAMINE 15 MG: 30 INJECTION, SOLUTION INTRAMUSCULAR; INTRAVENOUS at 06:19

## 2025-04-17 RX ADMIN — ALBUTEROL SULFATE 2 PUFF: 90 AEROSOL, METERED RESPIRATORY (INHALATION) at 13:34

## 2025-04-17 RX ADMIN — GUAIFENESIN AND DEXTROMETHORPHAN 10 ML: 100; 10 SYRUP ORAL at 23:32

## 2025-04-17 RX ADMIN — GABAPENTIN 300 MG: 300 CAPSULE ORAL at 12:44

## 2025-04-17 RX ADMIN — UMECLIDINIUM 1 PUFF: 62.5 AEROSOL, POWDER ORAL at 10:51

## 2025-04-17 RX ADMIN — LISINOPRIL 10 MG: 10 TABLET ORAL at 08:51

## 2025-04-17 RX ADMIN — MAGNESIUM SULFATE HEPTAHYDRATE 2 G: 40 INJECTION, SOLUTION INTRAVENOUS at 08:48

## 2025-04-17 RX ADMIN — ASPIRIN 81 MG: 81 TABLET, CHEWABLE ORAL at 08:50

## 2025-04-17 RX ADMIN — PANTOPRAZOLE SODIUM 40 MG: 40 TABLET, DELAYED RELEASE ORAL at 06:19

## 2025-04-17 RX ADMIN — ALBUTEROL SULFATE 2 PUFF: 90 AEROSOL, METERED RESPIRATORY (INHALATION) at 17:52

## 2025-04-17 RX ADMIN — BUPROPION HYDROCHLORIDE 150 MG: 150 TABLET, EXTENDED RELEASE ORAL at 08:51

## 2025-04-17 RX ADMIN — GABAPENTIN 300 MG: 300 CAPSULE ORAL at 17:20

## 2025-04-17 RX ADMIN — LEVOTHYROXINE SODIUM 100 MCG: 100 TABLET ORAL at 06:19

## 2025-04-17 RX ADMIN — KETOROLAC TROMETHAMINE 15 MG: 30 INJECTION, SOLUTION INTRAMUSCULAR; INTRAVENOUS at 17:20

## 2025-04-17 RX ADMIN — KETOROLAC TROMETHAMINE 15 MG: 30 INJECTION, SOLUTION INTRAMUSCULAR; INTRAVENOUS at 23:32

## 2025-04-17 RX ADMIN — FERROUS SULFATE TAB 325 MG (65 MG ELEMENTAL FE) 325 MG: 325 (65 FE) TAB at 06:19

## 2025-04-17 RX ADMIN — ALBUTEROL SULFATE 2 PUFF: 90 AEROSOL, METERED RESPIRATORY (INHALATION) at 23:34

## 2025-04-17 RX ADMIN — GABAPENTIN 300 MG: 300 CAPSULE ORAL at 08:50

## 2025-04-17 RX ADMIN — POTASSIUM CHLORIDE 40 MEQ: 1500 TABLET, EXTENDED RELEASE ORAL at 08:50

## 2025-04-17 RX ADMIN — DOXYCYCLINE 100 MG: 100 INJECTION, POWDER, LYOPHILIZED, FOR SOLUTION INTRAVENOUS at 02:58

## 2025-04-17 RX ADMIN — LORAZEPAM 0.5 MG: 0.5 TABLET ORAL at 06:19

## 2025-04-17 RX ADMIN — TAMSULOSIN HYDROCHLORIDE 0.4 MG: 0.4 CAPSULE ORAL at 16:39

## 2025-04-17 RX ADMIN — CITALOPRAM HYDROBROMIDE 40 MG: 20 TABLET ORAL at 08:50

## 2025-04-17 RX ADMIN — LACTOBACILLUS ACIDOPHILUS / LACTOBACILLUS BULGARICUS 1 PACKET: 100 MILLION CFU STRENGTH GRANULES at 16:39

## 2025-04-17 RX ADMIN — LEVETIRACETAM 1500 MG: 750 TABLET, FILM COATED ORAL at 08:51

## 2025-04-17 NOTE — PROGRESS NOTES
Progress Note - Hospitalist   Name: Aleshia Mauricio 59 y.o. female I MRN: [id number]  Unit/Bed#: [place] [address] -02 I Date of Admission: 4/14/2025   Date of Service: 4/17/2025 I Hospital Day: 2    Assessment & Plan  Sepsis (HCC)  SIRS: leukocytosis, tachycardia  Source: multifocal pneumonia  Flu, COVID and SARS are negative  Lactic acid normal.  Procalcitonin positive.  S/p 30cc/kg IVF  Continue IV ceftriaxone/doxy  Urine streptococcus pneumonia positive  D/c doxy, continue IV ceftriaxone  Blood cultures negative at 48hrs  Improving  Pneumonia  Multifocal as seen on CTA chest PE protocol  Clinically presented with shortness of breath, cough and chest pain which is most likely pleuritic  Positive urine strep penumo. Continue IV ceftriaxone. D/c doxy.  Mucinex, incentive spirometer, Robitussin all ordered  Myocardial injury  Reported Right sided chest pain. In setting of PNA.  Troponin 9 --> 94 --> 7 --> 5  EKG with normal sinus rhythm and rate of 82 bpm and QTc of 420 ms  Chest pain most likely from multifocal pneumonia rather than cardiac which resulted in normalization of troponin  Aspirin 325 mg given once.Continue 81mg for now.  Lisinopril ([facility]I) resumed as well as nifedipine ([facility])  Echo EF 75%, normal diastolic function, no regional wall motion abnormality  Hypokalemia  Potassium of 3.0  Continue to replete  Telemetry  Hypertensive urgency  Blood pressure currently of 181/84  Will resume blood pressure medications at home including lisinopril and nifedipine  Monitoring for hypertensive urgency    Hyponatremia  Sodium 133 which is at his baseline  Secondary to SSRI (Celexa) that he takes  Resolved  Hypothyroidism  Continue Synthroid  Iron deficiency anemia  H&H of 11/35  On ferrous sulfate which is resumed  Brain aneurysm  S/p clipping  On Keppra  Continue to exercise seizure precautions  Chronic midline low back pain with sciatica  Continue home dose of temazepam and tizanidine  Exercise fall  precaution  Neuropathy  Continue gabapentin  Anxiety and depression  On bupropion and Celexa    VTE Pharmacologic Prophylaxis:   Moderate Risk (Score 3-4) - Pharmacological DVT Prophylaxis Ordered: heparin.    Mobility:   Basic Mobility Inpatient Raw Score: 12  JH-HLM Goal: 4: Move to chair/commode  JH-HLM Achieved: 6: Walk 10 steps or more  JH-HLM Goal achieved. Continue to encourage appropriate mobility.    Patient Centered Rounds: I performed bedside rounds with nursing staff today.   Discussions with Specialists or Other Care Team Provider: None    Education and Discussions with Family / Patient: Attempted to update  (daughter) via phone. Left voicemail.     Current Length of Stay: 2 day(s)  Current Patient Status: Inpatient   Certification Statement: The patient will continue to require additional inpatient hospital stay due to pneumonia  Discharge Plan: Anticipate discharge in 24-48 hrs to home.    Code Status: Level 1 - Full Code    Subjective   Continues to have cough and weakness, however this is improving.  Appears to have more energy today.    Objective :  Temp:  [97.5 °F (36.4 °C)-98.9 °F (37.2 °C)] 97.5 °F (36.4 °C)  HR:  [67-88] 67  BP: (120-149)/(59-76) 124/59  Resp:  [16-18] 16  SpO2:  [90 %-97 %] 94 %  O2 Device: None (Room air)  Nasal Cannula O2 Flow Rate (L/min):  [3 L/min] 3 L/min    Body mass index is 26.63 kg/m².     Input and Output Summary (last 24 hours):     Intake/Output Summary (Last 24 hours) at 4/17/2025 1100  Last data filed at 4/17/2025 0901  Gross per 24 hour   Intake 740 ml   Output 1050 ml   Net -310 ml       Physical Exam  Vitals and nursing note reviewed.   Constitutional:       Appearance: Normal appearance.   HENT:      Head: Normocephalic and atraumatic.      Mouth/Throat:      Mouth: Mucous membranes are moist.      Pharynx: Oropharynx is clear. No oropharyngeal exudate.   Eyes:      Extraocular Movements: Extraocular movements intact.   Cardiovascular:       Rate and Rhythm: Normal rate and regular rhythm.      Pulses: Normal pulses.      Heart sounds: Normal heart sounds. No murmur heard.     No friction rub. No gallop.   Pulmonary:      Effort: Pulmonary effort is normal. No respiratory distress.      Breath sounds: No stridor. Rales present. No wheezing.   Abdominal:      General: Abdomen is flat. Bowel sounds are normal. There is no distension.      Palpations: Abdomen is soft.      Tenderness: There is no abdominal tenderness.   Musculoskeletal:      Right lower leg: No edema.      Left lower leg: No edema.   Skin:     General: Skin is warm and dry.   Neurological:      General: No focal deficit present.      Mental Status: She is alert and oriented to person, place, and time.           Lines/Drains:        Telemetry:  Telemetry Orders (From admission, onward)               24 Hour Telemetry Monitoring  Continuous x 24 Hours (Telem)        Expiring   Question:  Reason for 24 Hour Telemetry  Answer:  PCI/EP study (including pacer and ICD implementation), Cardiac surgery, MI, abnormal cardiac cath, and chest pain- rule out MI                     Telemetry Reviewed: Normal Sinus Rhythm  Indication for Continued Telemetry Use: No indication for continued use. Will discontinue.                Lab Results: I have reviewed the following results:   Results from last 7 days   Lab Units 04/17/25  0401 04/16/25  0948 04/15/25  0615 04/14/25  1732   WBC Thousand/uL 10.67* 13.43*   < > 14.99*   HEMOGLOBIN g/dL 8.9* 9.4*   < > 10.9*   HEMATOCRIT % 28.9* 30.0*   < > 34.8   PLATELETS Thousands/uL 440* 420*   < > 357   SEGS PCT %  --   --   --  85*   LYMPHO PCT %  --  7*  --  6*   MONO PCT %  --  6  --  6   EOS PCT %  --  0  --  0    < > = values in this interval not displayed.     Results from last 7 days   Lab Units 04/17/25  0401 04/15/25  0926 04/15/25  0615   SODIUM mmol/L 136   < > 136   POTASSIUM mmol/L 3.3*   < > 3.7   CHLORIDE mmol/L 100   < > 101   CO2 mmol/L 28   < > 25    BUN mg/dL 7   < > 10   CREATININE mg/dL 0.50*   < > 0.56*   ANION GAP mmol/L 8   < > 10   CALCIUM mg/dL 8.2*   < > 8.0*   ALBUMIN g/dL  --   --  3.0*   TOTAL BILIRUBIN mg/dL  --   --  0.38   ALK PHOS U/L  --   --  103   ALT U/L  --   --  12   AST U/L  --   --  11*   GLUCOSE RANDOM mg/dL 90   < > 158*    < > = values in this interval not displayed.     Results from last 7 days   Lab Units 04/14/25  2058   INR  1.41*     Results from last 7 days   Lab Units 04/17/25  0613 04/16/25  2143 04/16/25  1658 04/16/25  1143 04/16/25  0705 04/15/25  2139 04/15/25  1830   POC GLUCOSE mg/dl 100 109 117 105 116 122 124     Results from last 7 days   Lab Units 04/15/25  0615   HEMOGLOBIN A1C % 6.8*     Results from last 7 days   Lab Units 04/17/25  0401 04/16/25  0518 04/15/25  0217   LACTIC ACID mmol/L  --   --  0.7   PROCALCITONIN ng/ml 0.55* 0.86* 1.33*       Recent Cultures (last 7 days):   Results from last 7 days   Lab Units 04/16/25  1207 04/16/25  0944 04/15/25  0217   BLOOD CULTURE   --   --  No Growth at 48 hrs.  No Growth at 48 hrs.   GRAM STAIN RESULT   --  1+ Epithelial cells per low power field  Rare Polys  No organisms seen  --    LEGIONELLA URINARY ANTIGEN  Negative  --   --        Imaging Results Review: No pertinent imaging studies reviewed.  Other Study Results Review: No additional pertinent studies reviewed.    Last 24 Hours Medication List:     Current Facility-Administered Medications:     acetaminophen (TYLENOL) tablet 650 mg, Q6H PRN    umeclidinium 62.5 mcg/actuation inhaler AEPB 1 puff, Daily **AND** albuterol (PROVENTIL HFA,VENTOLIN HFA) inhaler 2 puff, 4x Daily    aspirin chewable tablet 81 mg, Daily    buPROPion (WELLBUTRIN XL) 24 hr tablet 150 mg, Daily    ceftriaxone (ROCEPHIN) 1 g/50 mL in dextrose IVPB, Q24H, Last Rate: 1,000 mg (04/17/25 0201)    citalopram (CeleXA) tablet 40 mg, Daily    dextromethorphan-guaiFENesin (ROBITUSSIN DM) oral syrup 10 mL, Q6H PRN    ferrous sulfate tablet 325 mg,  Q48H    gabapentin (NEURONTIN) capsule 300 mg, 4x Daily    guaiFENesin (MUCINEX) 12 hr tablet 600 mg, Q12H TASHA    heparin (porcine) subcutaneous injection 5,000 Units, Q8H TASHA **AND** [CANCELED] Platelet count, Once    insulin lispro (HumALOG/ADMELOG) 100 units/mL subcutaneous injection 1-5 Units, TID AC **AND** Fingerstick Glucose (POCT), TID AC    insulin lispro (HumALOG/ADMELOG) 100 units/mL subcutaneous injection 1-5 Units, HS    ketorolac (TORADOL) injection 15 mg, Q6H TASHA    labetalol (NORMODYNE) injection 10 mg, Q6H PRN    levETIRAcetam (KEPPRA) tablet 1,500 mg, BID    levothyroxine tablet 100 mcg, Early Morning    lisinopril (ZESTRIL) tablet 10 mg, After Breakfast    LORazepam (ATIVAN) tablet 0.5 mg, Q6H PRN    lubiprostone (AMITIZA) capsule 24 mcg, BID With Meals    methocarbamol (ROBAXIN) tablet 500 mg, TID PRN    morphine injection 2 mg, Q6H PRN    nicotine (NICODERM CQ) 7 mg/24hr TD 24 hr patch 1 patch, Daily    NIFEdipine (PROCARDIA XL) 24 hr tablet 60 mg, Daily    nitroglycerin (NITROSTAT) SL tablet 0.4 mg, Q5 Min PRN    oxyCODONE (ROXICODONE) IR tablet 5 mg, Q6H PRN    pantoprazole (PROTONIX) EC tablet 40 mg, Daily Before Breakfast    polyethylene glycol (MIRALAX) packet 17 g, Daily PRN    senna-docusate sodium (SENOKOT S) 8.6-50 mg per tablet 1 tablet, HS    SUMAtriptan (IMITREX) tablet 100 mg, Once PRN    tamsulosin (FLOMAX) capsule 0.4 mg, Daily With Dinner    temazepam (RESTORIL) capsule 22.5 mg, HS    tiZANidine (ZANAFLEX) tablet 2 mg, BID PRN    Administrative Statements   Today, Patient Was Seen By: Inderjit Baum PA-C      **Please Note: This note may have been constructed using a voice recognition system.**

## 2025-04-17 NOTE — WOUND OSTOMY CARE
Consult Note - Wound   Aleshia Mauricio 59 y.o. female MRN: [id number]  Unit/Bed#: [place] [address] -02 Encounter: [id number]      History and Present Illness:  59 year old female presented to the hospital with syncope.  Patient's history significant for COPD, brain aneurysm with clipping.    Assessment Findings:   Patient agreeable to assessment.  She is able to turn in bed independently.  Continent of bowel and bladder.  Bilateral heels intact and blanchable.    Left buttock--patient reports she believes she scratched herself in area of old scar tissue.  On exam, there is a small, scabbed area to the lateral aspect of old intact scar tissue.      See flowsheet for wound details.    Wound Care Plan:   1-Silicone Cream/Hydraguard lotion to bilateral heels twice daily and as needed.  2-Elevate heels off of bed/chair surface to offload pressure.  3-Offloading air cushion in chair when out of bed.  4-Apply moisturizing skin cream to body daily and as needed.  5-Turn/reposition every 2 hours for pressure re-distribution on skin.   6-Accumax pump to bed mattress.  7-Sacrum/buttocks--cleanse with soap and water, pat dry.  Apply silicone bordered foam dressing for treatment.  Change dressing every other day and as needed with soilage.    Wound care team to follow.  Plan of care reviewed with primary RN.    Wound 04/17/25 Traumatic Buttocks Left (Active)   Wound Image   04/17/25 1024   Wound Description Dry;Brown 04/17/25 1024   Drainage Amount None 04/17/25 1024   Agustina-wound Assessment Scar Tissue;Intact 04/17/25 1024   Treatments Cleansed 04/17/25 1024   Dressing Foam, Silicon (eg. Allevyn, etc) 04/17/25 1024       Sakina Shepherd RN, BSN, CWON

## 2025-04-17 NOTE — PHYSICAL THERAPY NOTE
PHYSICAL THERAPY NOTE          Patient Name: Aleshia Mauricio  Today's Date: 4/17/2025 04/17/25 1600   Note Type   Note Type Treatment   Pain Assessment   Pain Assessment Tool 0-10   Pain Score No Pain   Restrictions/Precautions   Other Precautions Chair Alarm;Bed Alarm;Fall Risk   General   Chart Reviewed Yes   Family/Caregiver Present No   Cognition   Overall Cognitive Status WFL   Arousal/Participation Alert;Cooperative   Attention Attends with cues to redirect   Orientation Level Oriented to time;Oriented to person;Oriented to place   Memory Within functional limits   Following Commands Follows one step commands with increased time or repetition   Subjective   Subjective okay,  I have the diarrhea.  I need immodium. pt agreeable to PT.   Bed Mobility   Rolling L 5  Supervision   Additional items Assist x 1;Bedrails;Increased time required;Verbal cues   Supine to Sit 5  Supervision   Additional items Assist x 1;Bedrails;Increased time required   Sit to Supine 5  Supervision   Additional items Assist x 1;Increased time required   Transfers   Sit to Stand 4  Minimal assistance   Additional items Assist x 1   Stand to Sit 4  Minimal assistance   Additional items Assist x 1   Additional Comments w rw and cuesf or technique   Ambulation/Elevation   Gait pattern Forward Flexion;Short stride;Excessively slow;Inconsistent armando   Gait Assistance 4  Minimal assist   Additional items Assist x 1;Verbal cues   Assistive Device Rolling walker   Distance 40' x2   Ambulation/Elevation Additional Comments slowgait speed with progressive increase in forward trunk flexion as pt  progressed with ambulation distances.  verbal cues to correct however pt  unable to maintain upright posture.   Balance   Static Sitting Good   Dynamic Sitting Fair   Static Standing Poor +  (w rw)   Dynamic Standing Poor +  (w rw)   Ambulatory Poor +  (w rw)   Endurance  Deficit   Endurance Deficit Description weakness, fatigue,   Activity Tolerance   Activity Tolerance Patient limited by fatigue;Patient tolerated treatment well   Exercises   Heelslides Supine;10 reps;AROM;Bilateral   Knee AROM Short Arc Quad Supine;10 reps;AROM;Bilateral   Knee AROM Long Arc Quad Sitting;10 reps;AROM;Bilateral   Balance training  pt  performs hip and knee flexion and extension arom in L sidleying position to b/l le's.  b/l ap's x 10 reps.   Assessment   Prognosis Good   Problem List Decreased strength;Decreased endurance;Impaired balance;Decreased mobility;Impaired judgement   Assessment Pt seen for PT treatment session this date with interventions consisting of bed mobility, transfer training, gait training, and HEP, and education provided as needed for safety and direction to improve functional mobility, safety awareness, and activity tolerance. Pt agreeable to PT treatment session upon arrival, pt found supine in bed . At end of session, pt left  In L sidelying position in bed with pillow between knees with all needs in reach. In comparison to previous session, pt with improvement in activity tolerance, endurance, standing balance, ambulation distances, ambulatory balance, b/l le strength, AM- pac score, and functional mobility. Pt  is showing progress toward PT goals with improvements as noted.  Pt  with improved activity tolerance, pt  progressed with ambulation distances to 40' x2 with one standing rest break between gait trials due to fatigue.  Pt  requires frequent verbal cues f or improved posture during gait training due to a progressive increase in forward trunk flexion and decreased stride length and foot clearance..  Pt.  Performs b/l le arom exercises in sitting and l sidelying positions x10 reps.  Verbal and tactile cues for correct performance required.   Continue to recommend  level II vs III pending continues progress and achievement of PT goals  if home recommend 24/7 support and  A and HHPT  at time of d/c in order to maximize pt's functional independence and safety w/ mobility. Pt continues to be functioning below baseline level. PT will continue to see pt while here in order to address the deficits listed above and provide interventions consistent w/ POC in effort to achieve STGs.    The patient's AM-PAC Basic Mobility Inpatient Short Form Raw Score is 16. A raw score less than 16 suggests the patient may benefit from discharge to post-acute rehabilitation services. Please also refer to the recommendation of the Physical Therapist for safe discharge planning.   Goals   Patient Goals to be able to walk, do the wash and take the dog out.   STG Expiration Date 04/30/25   PT Treatment Day 1   Plan   Treatment/Interventions Functional transfer training;LE strengthening/ROM;Therapeutic exercise;Endurance training;Equipment eval/education;Patient/family training;Bed mobility;Gait training;Spoke to nursing   Progress Progressing toward goals   PT Frequency 3-5x/wk   Discharge Recommendation   Rehab Resource Intensity Level, PT II (Moderate Resource Intensity)  (vs III pending continued progress and achievement of PT goals.  if home recommend 24/7 A and HHPT.)   AM-PAC Basic Mobility Inpatient   Turning in Flat Bed Without Bedrails 3   Lying on Back to Sitting on Edge of Flat Bed Without Bedrails 3   Moving Bed to Chair 3   Standing Up From Chair Using Arms 3   Walk in Room 3   Climb 3-5 Stairs With Railing 1   Basic Mobility Inpatient Raw Score 16   Basic Mobility Standardized Score 38.32   [facility] Highest Level Of Mobility   -HLM Goal 5: Stand one or more mins   JH-HLM Achieved 7: Walk 25 feet or more   Education   Education Provided Mobility training;Home exercise program;Assistive device   Patient Demonstrates acceptance/verbal understanding   End of Consult   Patient Position at End of Consult Supine;Bed/Chair alarm activated;All needs within reach   End of Consult Comments pt in stable  condition post PT session.      04/17/25 1600   Note Type   Note Type Treatment   Pain Assessment   Pain Assessment Tool 0-10   Pain Score No Pain   Restrictions/Precautions   Other Precautions Chair Alarm;Bed Alarm;Fall Risk   General   Chart Reviewed Yes   Family/Caregiver Present No   Cognition   Overall Cognitive Status WFL   Arousal/Participation Alert;Cooperative   Attention Attends with cues to redirect   Orientation Level Oriented to time;Oriented to person;Oriented to place   Memory Within functional limits   Following Commands Follows one step commands with increased time or repetition   Subjective   Subjective okay,  I have the diarrhea.  I need immodium. pt agreeable to PT.   Bed Mobility   Rolling L 5  Supervision   Additional items Assist x 1;Bedrails;Increased time required;Verbal cues   Supine to Sit 5  Supervision   Additional items Assist x 1;Bedrails;Increased time required   Sit to Supine 5  Supervision   Additional items Assist x 1;Increased time required   Transfers   Sit to Stand 4  Minimal assistance   Additional items Assist x 1   Stand to Sit 4  Minimal assistance   Additional items Assist x 1   Additional Comments w rw and cuesf or technique   Ambulation/Elevation   Gait pattern Forward Flexion;Short stride;Excessively slow;Inconsistent armando   Gait Assistance 4  Minimal assist   Additional items Assist x 1;Verbal cues   Assistive Device Rolling walker   Distance 40' x2   Ambulation/Elevation Additional Comments slowgait speed with progressive increase in forward trunk flexion as pt  progressed with ambulation distances.  verbal cues to correct however pt  unable to maintain upright posture.   Balance   Static Sitting Good   Dynamic Sitting Fair   Static Standing Poor +  (w rw)   Dynamic Standing Poor +  (w rw)   Ambulatory Poor +  (w rw)   Endurance Deficit   Endurance Deficit Description weakness, fatigue,   Activity Tolerance   Activity Tolerance Patient limited by fatigue;Patient  tolerated treatment well   Exercises   Heelslides Supine;10 reps;AROM;Bilateral   Knee AROM Short Arc Quad Supine;10 reps;AROM;Bilateral   Knee AROM Long Arc Quad Sitting;10 reps;AROM;Bilateral   Balance training  pt  performs hip and knee flexion and extension arom in L sidleying position to b/l le's.  b/l ap's x 10 reps.   Assessment   Prognosis Good   Problem List Decreased strength;Decreased endurance;Impaired balance;Decreased mobility;Impaired judgement   Assessment Pt seen for PT treatment session this date with interventions consisting of bed mobility, transfer training, gait training, and HEP, and education provided as needed for safety and direction to improve functional mobility, safety awareness, and activity tolerance. Pt agreeable to PT treatment session upon arrival, pt found supine in bed . At end of session, pt left  In L sidelying position in bed with pillow between knees with all needs in reach. In comparison to previous session, pt with improvement in activity tolerance, endurance, standing balance, ambulation distances, ambulatory balance, b/l le strength, AM- pac score, and functional mobility. Pt  is showing progress toward PT goals with improvements as noted.  Pt  with improved activity tolerance, pt  progressed with ambulation distances to 40' x2 with one standing rest break between gait trials due to fatigue.  Pt  requires frequent verbal cues f or improved posture during gait training due to a progressive increase in forward trunk flexion and decreased stride length and foot clearance..  Pt.  Performs b/l le arom exercises in sitting and l sidelying positions x10 reps.  Verbal and tactile cues for correct performance required.   Continue to recommend  level II vs III pending continues progress and achievement of PT goals  if home recommend 24/7 support and A and HHPT  at time of d/c in order to maximize pt's functional independence and safety w/ mobility. Pt continues to be functioning  below baseline level. PT will continue to see pt while here in order to address the deficits listed above and provide interventions consistent w/ POC in effort to achieve STGs.    The patient's AM-PAC Basic Mobility Inpatient Short Form Raw Score is 16. A raw score less than 16 suggests the patient may benefit from discharge to post-acute rehabilitation services. Please also refer to the recommendation of the Physical Therapist for safe discharge planning.   Goals   Patient Goals to be able to walk, do the wash and take the dog out.   STG Expiration Date 04/30/25   PT Treatment Day 1   Plan   Treatment/Interventions Functional transfer training;LE strengthening/ROM;Therapeutic exercise;Endurance training;Equipment eval/education;Patient/family training;Bed mobility;Gait training;Spoke to nursing   Progress Progressing toward goals   PT Frequency 3-5x/wk   Discharge Recommendation   Rehab Resource Intensity Level, PT II (Moderate Resource Intensity)  (vs III pending continued progress and achievement of PT goals.  if home recommend 24/7 A and HHPT.)   AM-PAC Basic Mobility Inpatient   Turning in Flat Bed Without Bedrails 3   Lying on Back to Sitting on Edge of Flat Bed Without Bedrails 3   Moving Bed to Chair 3   Standing Up From Chair Using Arms 3   Walk in Room 3   Climb 3-5 Stairs With Railing 1   Basic Mobility Inpatient Raw Score 16   Basic Mobility Standardized Score 38.32   [facility] Highest Level Of Mobility   -HLM Goal 5: Stand one or more mins   -HLM Achieved 7: Walk 25 feet or more   Education   Education Provided Mobility training;Home exercise program;Assistive device   Patient Demonstrates acceptance/verbal understanding   End of Consult   Patient Position at End of Consult Supine;Bed/Chair alarm activated;All needs within reach   End of Consult Comments pt in stable condition post PT session.   Sammie Crowder, PTA

## 2025-04-17 NOTE — PLAN OF CARE
Problem: PHYSICAL THERAPY ADULT  Goal: Performs mobility at highest level of function for planned discharge setting.  See evaluation for individualized goals.  Description: Treatment/Interventions: Functional transfer training, LE strengthening/ROM, Elevations, Therapeutic exercise, Endurance training, Patient/family training, Bed mobility, Gait training, Spoke to nursing, OT  Equipment Recommended: Walker       See flowsheet documentation for full assessment, interventions and recommendations.  Outcome: Progressing  Note: Prognosis: Good  Problem List: Decreased strength, Decreased endurance, Impaired balance, Decreased mobility, Impaired judgement  Assessment: Pt seen for PT treatment session this date with interventions consisting of bed mobility, transfer training, gait training, and HEP, and education provided as needed for safety and direction to improve functional mobility, safety awareness, and activity tolerance. Pt agreeable to PT treatment session upon arrival, pt found supine in bed . At end of session, pt left  In L sidelying position in bed with pillow between knees with all needs in reach. In comparison to previous session, pt with improvement in activity tolerance, endurance, standing balance, ambulation distances, ambulatory balance, b/l le strength, AM- pac score, and functional mobility. Pt  is showing progress toward PT goals with improvements as noted.  Pt  with improved activity tolerance, pt  progressed with ambulation distances to 40' x2 with one standing rest break between gait trials due to fatigue.  Pt  requires frequent verbal cues f or improved posture during gait training due to a progressive increase in forward trunk flexion and decreased stride length and foot clearance..  Pt.  Performs b/l le arom exercises in sitting and l sidelying positions x10 reps.  Verbal and tactile cues for correct performance required     Continue to recommend  level II vs III pending continues progress  and achievement of PT goals  if home recommend 24/7 support and A and HHPT  at time of d/c in order to maximize pt's functional independence and safety w/ mobility. Pt continues to be functioning below baseline level. PT will continue to see pt while here in order to address the deficits listed above and provide interventions consistent w/ POC in effort to achieve STGs.    The patient's AM-PAC Basic Mobility Inpatient Short Form Raw Score is 16. A raw score less than 16 suggests the patient may benefit from discharge to post-acute rehabilitation services. Please also refer to the recommendation of the Physical Therapist for safe discharge planning.  Barriers to Discharge: Inaccessible home environment  Barriers to Discharge Comments: stairs  Rehab Resource Intensity Level, PT: II (Moderate Resource Intensity) (vs III pending continued progress and achievement of PT goals.  if home recommend 24/7 A and HHPT.)    See flowsheet documentation for full assessment.

## 2025-04-17 NOTE — ASSESSMENT & PLAN NOTE
SIRS: leukocytosis, tachycardia  Source: multifocal pneumonia  Flu, COVID and SARS are negative  Lactic acid normal.  Procalcitonin positive.  S/p 30cc/kg IVF  Continue IV ceftriaxone/doxy  Urine streptococcus pneumonia positive  D/c doxy, continue IV ceftriaxone  Blood cultures negative at 48hrs  Improving

## 2025-04-17 NOTE — ASSESSMENT & PLAN NOTE
Multifocal as seen on [facility] chest PE protocol  Clinically presented with shortness of breath, cough and chest pain which is most likely pleuritic  Positive urine strep penumo. Continue IV ceftriaxone. D/c doxy.  Mucinex, incentive spirometer, Robitussin all ordered

## 2025-04-17 NOTE — PLAN OF CARE
Problem: Potential for Falls  Goal: Patient will remain free of falls  Description: INTERVENTIONS:- Educate patient/family on patient safety including physical limitations- Instruct patient to call for assistance with activity - Consult OT/PT to assist with strengthening/mobility - Keep Call bell within reach- Keep bed low and locked with side rails adjusted as appropriate- Keep care items and personal belongings within reach- Initiate and maintain comfort rounds- Make Fall Risk Sign visible to staff- Offer Toileting every 2 Hours, in advance of need- Initiate/Maintain bed alarm- Obtain necessary fall risk management equipment: bed rails- Apply yellow socks and bracelet for high fall risk patients- Consider moving patient to room near nurses station  Outcome: Progressing     Problem: Prexisting or High Potential for Compromised Skin Integrity  Goal: Skin integrity is maintained or improved  Description: INTERVENTIONS:- Identify patients at risk for skin breakdown- Assess and monitor skin integrity- Assess and monitor nutrition and hydration status- Monitor labs - Assess for incontinence - Turn and reposition patient- Assist with mobility/ambulation- Relieve pressure over bony prominences- Avoid friction and shearing- Provide appropriate hygiene as needed including keeping skin clean and dry- Evaluate need for skin moisturizer/barrier cream- Collaborate with interdisciplinary team - Patient/family teaching- Consider wound care consult   Outcome: Progressing     Problem: NEUROSENSORY - ADULT  Goal: Achieves stable or improved neurological status  Description: INTERVENTIONS- Monitor and report changes in neurological status- Monitor vital signs such as temperature, blood pressure, glucose, and any other labs ordered - Initiate measures to prevent increased intracranial pressure- Monitor for seizure activity and implement precautions if appropriate    Outcome: Progressing  Goal: Remains free of injury related to  seizures activity  Description: INTERVENTIONS- Maintain airway, patient safety  and administer oxygen as ordered- Monitor patient for seizure activity, document and report duration and description of seizure to physician/advanced practitioner- If seizure occurs,  ensure patient safety during seizure- Reorient patient post seizure- Seizure pads on all 4 side rails- Instruct patient/family to notify RN of any seizure activity including if an aura is experienced- Instruct patient/family to call for assistance with activity based on nursing assessment- Administer anti-seizure medications if ordered  Outcome: Progressing  Goal: Achieves maximal functionality and self care  Description: INTERVENTIONS- Monitor swallowing and airway patency with patient fatigue and changes in neurological status- Encourage and assist patient to increase activity and self care. - Encourage visually impaired, hearing impaired and aphasic patients to use assistive/communication devices  Outcome: Progressing     Problem: CARDIOVASCULAR - ADULT  Goal: Maintains optimal cardiac output and hemodynamic stability  Description: INTERVENTIONS:- Monitor I/O, vital signs and rhythm- Monitor for S/S and trends of decreased cardiac output- Administer and titrate ordered vasoactive medications to optimize hemodynamic stability- Assess quality of pulses, skin color and temperature- Assess for signs of decreased coronary artery perfusion- Instruct patient to report change in severity of symptoms  Outcome: Progressing  Goal: Absence of cardiac dysrhythmias or at baseline rhythm  Description: INTERVENTIONS:- Continuous cardiac monitoring, vital signs, obtain 12 lead EKG if ordered- Administer antiarrhythmic and heart rate control medications as ordered- Monitor electrolytes and administer replacement therapy as ordered  Outcome: Progressing     Problem: RESPIRATORY - ADULT  Goal: Achieves optimal ventilation and oxygenation  Description: INTERVENTIONS:- Assess  for changes in respiratory status- Assess for changes in mentation and behavior- Position to facilitate oxygenation and minimize respiratory effort- Oxygen administered by appropriate delivery if ordered- Initiate smoking cessation education as indicated- Encourage broncho-pulmonary hygiene including cough, deep breathe, Incentive Spirometry- Assess the need for suctioning and aspirate as needed- Assess and instruct to report SOB or any respiratory difficulty- Respiratory Therapy support as indicated  Outcome: Progressing     Problem: METABOLIC, FLUID AND ELECTROLYTES - ADULT  Goal: Electrolytes maintained within normal limits  Description: INTERVENTIONS:- Monitor labs and assess patient for signs and symptoms of electrolyte imbalances- Administer electrolyte replacement as ordered- Monitor response to electrolyte replacements, including repeat lab results as appropriate- Instruct patient on fluid and nutrition as appropriate  Outcome: Progressing  Goal: Fluid balance maintained  Description: INTERVENTIONS:- Monitor labs - Monitor I/O and WT- Instruct patient on fluid and nutrition as appropriate- Assess for signs & symptoms of volume excess or deficit  Outcome: Progressing  Goal: Glucose maintained within target range  Description: INTERVENTIONS:- Monitor Blood Glucose as ordered- Assess for signs and symptoms of hyperglycemia and hypoglycemia- Administer ordered medications to maintain glucose within target range- Assess nutritional intake and initiate nutrition service referral as needed  Outcome: Progressing

## 2025-04-17 NOTE — DISCHARGE INSTR - OTHER ORDERS
Wound Care Plan:   1-Remedy Silicone Cream/Hydraguard lotion to bilateral buttocks, sacrum, and heels twice daily and as needed.  2-Elevate heels off of bed/chair surface to offload pressure.  3-Offloading air cushion in chair when out of bed.  4-Apply lotion to body daily and as needed.  5-Turn/reposition every 2 hours for pressure re-distribution on skin.   6-Sacrum/buttocks--cleanse with soap and water, pat dry.  Apply silicone bordered foam dressing for treatment.  Change dressing every other day and as needed with soilage.

## 2025-04-17 NOTE — ASSESSMENT & PLAN NOTE
Reported Right sided chest pain. In setting of PNA.  Troponin 9 --> 94 --> 7 --> 5  EKG with normal sinus rhythm and rate of 82 bpm and QTc of 420 ms  Chest pain most likely from multifocal pneumonia rather than cardiac which resulted in normalization of troponin  Aspirin 325 mg given once.Continue 81mg for now.  Lisinopril ([facility]) resumed as well as nifedipine ([facility])  Echo EF 75%, normal diastolic function, no regional wall motion abnormality

## 2025-04-18 PROBLEM — R79.89 ELEVATED TROPONIN: Status: ACTIVE | Noted: 2025-04-15

## 2025-04-18 LAB
ANION GAP SERPL CALCULATED.3IONS-SCNC: 7 MMOL/L (ref 4–13)
BACTERIA SPT RESP CULT: NORMAL
BUN SERPL-MCNC: 6 MG/DL (ref 5–25)
CALCIUM SERPL-MCNC: 8.5 MG/DL (ref 8.4–10.2)
CHLORIDE SERPL-SCNC: 100 MMOL/L (ref 96–108)
CO2 SERPL-SCNC: 28 MMOL/L (ref 21–32)
CREAT SERPL-MCNC: 0.54 MG/DL (ref 0.6–1.3)
ERYTHROCYTE [DISTWIDTH] IN BLOOD BY AUTOMATED COUNT: 15.8 % (ref 11.6–15.1)
GFR SERPL CREATININE-BSD FRML MDRD: 102 ML/MIN/1.73SQ M
GLUCOSE SERPL-MCNC: 101 MG/DL (ref 65–140)
GLUCOSE SERPL-MCNC: 101 MG/DL (ref 65–140)
GLUCOSE SERPL-MCNC: 111 MG/DL (ref 65–140)
GLUCOSE SERPL-MCNC: 139 MG/DL (ref 65–140)
GLUCOSE SERPL-MCNC: 143 MG/DL (ref 65–140)
GRAM STN SPEC: NORMAL
HCT VFR BLD AUTO: 29.5 % (ref 34.8–46.1)
HGB BLD-MCNC: 9.3 G/DL (ref 11.5–15.4)
MCH RBC QN AUTO: 28.3 PG (ref 26.8–34.3)
MCHC RBC AUTO-ENTMCNC: 31.5 G/DL (ref 31.4–37.4)
MCV RBC AUTO: 90 FL (ref 82–98)
PLATELET # BLD AUTO: 470 THOUSANDS/UL (ref 149–390)
PMV BLD AUTO: 9.9 FL (ref 8.9–12.7)
POTASSIUM SERPL-SCNC: 4 MMOL/L (ref 3.5–5.3)
PROCALCITONIN SERPL-MCNC: 0.39 NG/ML
RBC # BLD AUTO: 3.29 MILLION/UL (ref 3.81–5.12)
SODIUM SERPL-SCNC: 135 MMOL/L (ref 135–147)
WBC # BLD AUTO: 10.65 THOUSAND/UL (ref 4.31–10.16)

## 2025-04-18 PROCEDURE — 82948 REAGENT STRIP/BLOOD GLUCOSE: CPT

## 2025-04-18 PROCEDURE — 99232 SBSQ HOSP IP/OBS MODERATE 35: CPT

## 2025-04-18 PROCEDURE — 85027 COMPLETE CBC AUTOMATED: CPT | Performed by: PHYSICIAN ASSISTANT

## 2025-04-18 PROCEDURE — 80048 BASIC METABOLIC PNL TOTAL CA: CPT | Performed by: PHYSICIAN ASSISTANT

## 2025-04-18 PROCEDURE — 84145 PROCALCITONIN (PCT): CPT | Performed by: PHYSICIAN ASSISTANT

## 2025-04-18 RX ADMIN — ALBUTEROL SULFATE 2 PUFF: 90 AEROSOL, METERED RESPIRATORY (INHALATION) at 08:24

## 2025-04-18 RX ADMIN — GABAPENTIN 300 MG: 300 CAPSULE ORAL at 21:19

## 2025-04-18 RX ADMIN — LEVETIRACETAM 1500 MG: 750 TABLET, FILM COATED ORAL at 17:15

## 2025-04-18 RX ADMIN — ALBUTEROL SULFATE 2 PUFF: 90 AEROSOL, METERED RESPIRATORY (INHALATION) at 12:53

## 2025-04-18 RX ADMIN — ASPIRIN 81 MG: 81 TABLET, CHEWABLE ORAL at 08:20

## 2025-04-18 RX ADMIN — LORAZEPAM 0.5 MG: 0.5 TABLET ORAL at 16:16

## 2025-04-18 RX ADMIN — HEPARIN SODIUM 5000 UNITS: 5000 INJECTION INTRAVENOUS; SUBCUTANEOUS at 16:17

## 2025-04-18 RX ADMIN — GUAIFENESIN 600 MG: 600 TABLET ORAL at 20:21

## 2025-04-18 RX ADMIN — GABAPENTIN 300 MG: 300 CAPSULE ORAL at 17:15

## 2025-04-18 RX ADMIN — METHOCARBAMOL 500 MG: 500 TABLET ORAL at 16:17

## 2025-04-18 RX ADMIN — PANTOPRAZOLE SODIUM 40 MG: 40 TABLET, DELAYED RELEASE ORAL at 06:23

## 2025-04-18 RX ADMIN — NIFEDIPINE 60 MG: 60 TABLET, FILM COATED, EXTENDED RELEASE ORAL at 08:19

## 2025-04-18 RX ADMIN — SUMATRIPTAN SUCCINATE 100 MG: 50 TABLET ORAL at 20:20

## 2025-04-18 RX ADMIN — HEPARIN SODIUM 5000 UNITS: 5000 INJECTION INTRAVENOUS; SUBCUTANEOUS at 21:19

## 2025-04-18 RX ADMIN — SENNOSIDES AND DOCUSATE SODIUM 1 TABLET: 50; 8.6 TABLET ORAL at 21:19

## 2025-04-18 RX ADMIN — UMECLIDINIUM 1 PUFF: 62.5 AEROSOL, POWDER ORAL at 08:22

## 2025-04-18 RX ADMIN — ALBUTEROL SULFATE 2 PUFF: 90 AEROSOL, METERED RESPIRATORY (INHALATION) at 21:24

## 2025-04-18 RX ADMIN — LORAZEPAM 0.5 MG: 0.5 TABLET ORAL at 06:23

## 2025-04-18 RX ADMIN — GUAIFENESIN AND DEXTROMETHORPHAN 10 ML: 100; 10 SYRUP ORAL at 06:23

## 2025-04-18 RX ADMIN — GABAPENTIN 300 MG: 300 CAPSULE ORAL at 08:20

## 2025-04-18 RX ADMIN — CITALOPRAM HYDROBROMIDE 40 MG: 20 TABLET ORAL at 08:20

## 2025-04-18 RX ADMIN — BUPROPION HYDROCHLORIDE 150 MG: 150 TABLET, EXTENDED RELEASE ORAL at 08:20

## 2025-04-18 RX ADMIN — GABAPENTIN 300 MG: 300 CAPSULE ORAL at 12:52

## 2025-04-18 RX ADMIN — LACTOBACILLUS ACIDOPHILUS / LACTOBACILLUS BULGARICUS 1 PACKET: 100 MILLION CFU STRENGTH GRANULES at 16:16

## 2025-04-18 RX ADMIN — LOPERAMIDE HYDROCHLORIDE 2 MG: 2 CAPSULE ORAL at 21:19

## 2025-04-18 RX ADMIN — ALBUTEROL SULFATE 2 PUFF: 90 AEROSOL, METERED RESPIRATORY (INHALATION) at 17:16

## 2025-04-18 RX ADMIN — TEMAZEPAM 22.5 MG: 7.5 CAPSULE ORAL at 21:19

## 2025-04-18 RX ADMIN — HEPARIN SODIUM 5000 UNITS: 5000 INJECTION INTRAVENOUS; SUBCUTANEOUS at 06:23

## 2025-04-18 RX ADMIN — ACETAMINOPHEN 650 MG: 325 TABLET, FILM COATED ORAL at 08:21

## 2025-04-18 RX ADMIN — LEVOTHYROXINE SODIUM 100 MCG: 100 TABLET ORAL at 06:23

## 2025-04-18 RX ADMIN — Medication 1000 MG: at 02:28

## 2025-04-18 RX ADMIN — LUBIPROSTONE 24 MCG: 24 CAPSULE, GELATIN COATED ORAL at 08:22

## 2025-04-18 RX ADMIN — LACTOBACILLUS ACIDOPHILUS / LACTOBACILLUS BULGARICUS 1 PACKET: 100 MILLION CFU STRENGTH GRANULES at 08:25

## 2025-04-18 RX ADMIN — GUAIFENESIN 600 MG: 600 TABLET ORAL at 08:19

## 2025-04-18 RX ADMIN — LISINOPRIL 10 MG: 10 TABLET ORAL at 08:20

## 2025-04-18 RX ADMIN — KETOROLAC TROMETHAMINE 15 MG: 30 INJECTION, SOLUTION INTRAMUSCULAR; INTRAVENOUS at 06:23

## 2025-04-18 RX ADMIN — LUBIPROSTONE 24 MCG: 24 CAPSULE, GELATIN COATED ORAL at 16:17

## 2025-04-18 RX ADMIN — LEVETIRACETAM 1500 MG: 750 TABLET, FILM COATED ORAL at 08:19

## 2025-04-18 RX ADMIN — TAMSULOSIN HYDROCHLORIDE 0.4 MG: 0.4 CAPSULE ORAL at 16:17

## 2025-04-18 NOTE — PLAN OF CARE
Problem: Potential for Falls  Goal: Patient will remain free of falls  Description: INTERVENTIONS:- Educate patient/family on patient safety including physical limitations- Instruct patient to call for assistance with activity - Consult OT/PT to assist with strengthening/mobility - Keep Call bell within reach- Keep bed low and locked with side rails adjusted as appropriate- Keep care items and personal belongings within reach- Initiate and maintain comfort rounds- Make Fall Risk Sign visible to staff- Offer Toileting every 2 Hours, in advance of need- Initiate/Maintain bed alarm- Obtain necessary fall risk management equipment: bed rails, alarms- Apply yellow socks and bracelet for high fall risk patients- Consider moving patient to room near nurses station  Outcome: Progressing     Problem: Prexisting or High Potential for Compromised Skin Integrity  Goal: Skin integrity is maintained or improved  Description: INTERVENTIONS:- Identify patients at risk for skin breakdown- Assess and monitor skin integrity- Assess and monitor nutrition and hydration status- Monitor labs - Assess for incontinence - Turn and reposition patient- Assist with mobility/ambulation- Relieve pressure over bony prominences- Avoid friction and shearing- Provide appropriate hygiene as needed including keeping skin clean and dry- Evaluate need for skin moisturizer/barrier cream- Collaborate with interdisciplinary team - Patient/family teaching- Consider wound care consult   Outcome: Progressing     Problem: NEUROSENSORY - ADULT  Goal: Achieves stable or improved neurological status  Description: INTERVENTIONS- Monitor and report changes in neurological status- Monitor vital signs such as temperature, blood pressure, glucose, and any other labs ordered - Initiate measures to prevent increased intracranial pressure- Monitor for seizure activity and implement precautions if appropriate    Outcome: Progressing  Goal: Remains free of injury related  to seizures activity  Description: INTERVENTIONS- Maintain airway, patient safety  and administer oxygen as ordered- Monitor patient for seizure activity, document and report duration and description of seizure to physician/advanced practitioner- If seizure occurs,  ensure patient safety during seizure- Reorient patient post seizure- Seizure pads on all 4 side rails- Instruct patient/family to notify RN of any seizure activity including if an aura is experienced- Instruct patient/family to call for assistance with activity based on nursing assessment- Administer anti-seizure medications if ordered  Outcome: Progressing  Goal: Achieves maximal functionality and self care  Description: INTERVENTIONS- Monitor swallowing and airway patency with patient fatigue and changes in neurological status- Encourage and assist patient to increase activity and self care. - Encourage visually impaired, hearing impaired and aphasic patients to use assistive/communication devices  Outcome: Progressing     Problem: CARDIOVASCULAR - ADULT  Goal: Maintains optimal cardiac output and hemodynamic stability  Description: INTERVENTIONS:- Monitor I/O, vital signs and rhythm- Monitor for S/S and trends of decreased cardiac output- Administer and titrate ordered vasoactive medications to optimize hemodynamic stability- Assess quality of pulses, skin color and temperature- Assess for signs of decreased coronary artery perfusion- Instruct patient to report change in severity of symptoms  Outcome: Progressing  Goal: Absence of cardiac dysrhythmias or at baseline rhythm  Description: INTERVENTIONS:- Continuous cardiac monitoring, vital signs, obtain 12 lead EKG if ordered- Administer antiarrhythmic and heart rate control medications as ordered- Monitor electrolytes and administer replacement therapy as ordered  Outcome: Progressing     Problem: RESPIRATORY - ADULT  Goal: Achieves optimal ventilation and oxygenation  Description: INTERVENTIONS:-  Assess for changes in respiratory status- Assess for changes in mentation and behavior- Position to facilitate oxygenation and minimize respiratory effort- Oxygen administered by appropriate delivery if ordered- Initiate smoking cessation education as indicated- Encourage broncho-pulmonary hygiene including cough, deep breathe, Incentive Spirometry- Assess the need for suctioning and aspirate as needed- Assess and instruct to report SOB or any respiratory difficulty- Respiratory Therapy support as indicated  Outcome: Progressing     Problem: METABOLIC, FLUID AND ELECTROLYTES - ADULT  Goal: Electrolytes maintained within normal limits  Description: INTERVENTIONS:- Monitor labs and assess patient for signs and symptoms of electrolyte imbalances- Administer electrolyte replacement as ordered- Monitor response to electrolyte replacements, including repeat lab results as appropriate- Instruct patient on fluid and nutrition as appropriate  Outcome: Progressing  Goal: Fluid balance maintained  Description: INTERVENTIONS:- Monitor labs - Monitor I/O and WT- Instruct patient on fluid and nutrition as appropriate- Assess for signs & symptoms of volume excess or deficit  Outcome: Progressing  Goal: Glucose maintained within target range  Description: INTERVENTIONS:- Monitor Blood Glucose as ordered- Assess for signs and symptoms of hyperglycemia and hypoglycemia- Administer ordered medications to maintain glucose within target range- Assess nutritional intake and initiate nutrition service referral as needed  Outcome: Progressing

## 2025-04-18 NOTE — ASSESSMENT & PLAN NOTE
Recent Labs     04/16/25  0948 04/17/25  0401 04/18/25  0436   HGB 9.4* 8.9* 9.3*   Monitor CBC.  On ferrous sulfate which is resumed.

## 2025-04-18 NOTE — PLAN OF CARE
Problem: Potential for Falls  Goal: Patient will remain free of falls  Description: INTERVENTIONS:- Educate patient/family on patient safety including physical limitations- Instruct patient to call for assistance with activity - Consult OT/PT to assist with strengthening/mobility - Keep Call bell within reach- Keep bed low and locked with side rails adjusted as appropriate- Keep care items and personal belongings within reach- Initiate and maintain comfort rounds- Make Fall Risk Sign visible to staff- Offer Toileting every 2 Hours, in advance of need- Initiate/Maintain bed alarm- Obtain necessary fall risk management equipment: bed rails, alarms- Apply yellow socks and bracelet for high fall risk patients- Consider moving patient to room near nurses station  Outcome: Progressing     Problem: Prexisting or High Potential for Compromised Skin Integrity  Goal: Skin integrity is maintained or improved  Description: INTERVENTIONS:- Identify patients at risk for skin breakdown- Assess and monitor skin integrity- Assess and monitor nutrition and hydration status- Monitor labs - Assess for incontinence - Turn and reposition patient- Assist with mobility/ambulation- Relieve pressure over bony prominences- Avoid friction and shearing- Provide appropriate hygiene as needed including keeping skin clean and dry- Evaluate need for skin moisturizer/barrier cream- Collaborate with interdisciplinary team - Patient/family teaching- Consider wound care consult   Outcome: Progressing     Problem: NEUROSENSORY - ADULT  Goal: Achieves stable or improved neurological status  Description: INTERVENTIONS- Monitor and report changes in neurological status- Monitor vital signs such as temperature, blood pressure, glucose, and any other labs ordered - Initiate measures to prevent increased intracranial pressure- Monitor for seizure activity and implement precautions if appropriate    Outcome: Progressing  Goal: Remains free of injury related  to seizures activity  Description: INTERVENTIONS- Maintain airway, patient safety  and administer oxygen as ordered- Monitor patient for seizure activity, document and report duration and description of seizure to physician/advanced practitioner- If seizure occurs,  ensure patient safety during seizure- Reorient patient post seizure- Seizure pads on all 4 side rails- Instruct patient/family to notify RN of any seizure activity including if an aura is experienced- Instruct patient/family to call for assistance with activity based on nursing assessment- Administer anti-seizure medications if ordered  Outcome: Progressing  Goal: Achieves maximal functionality and self care  Description: INTERVENTIONS- Monitor swallowing and airway patency with patient fatigue and changes in neurological status- Encourage and assist patient to increase activity and self care. - Encourage visually impaired, hearing impaired and aphasic patients to use assistive/communication devices  Outcome: Progressing     Problem: CARDIOVASCULAR - ADULT  Goal: Maintains optimal cardiac output and hemodynamic stability  Description: INTERVENTIONS:- Monitor I/O, vital signs and rhythm- Monitor for S/S and trends of decreased cardiac output- Administer and titrate ordered vasoactive medications to optimize hemodynamic stability- Assess quality of pulses, skin color and temperature- Assess for signs of decreased coronary artery perfusion- Instruct patient to report change in severity of symptoms  Outcome: Progressing  Goal: Absence of cardiac dysrhythmias or at baseline rhythm  Description: INTERVENTIONS:- Continuous cardiac monitoring, vital signs, obtain 12 lead EKG if ordered- Administer antiarrhythmic and heart rate control medications as ordered- Monitor electrolytes and administer replacement therapy as ordered  Outcome: Progressing     Problem: RESPIRATORY - ADULT  Goal: Achieves optimal ventilation and oxygenation  Description: INTERVENTIONS:-  Assess for changes in respiratory status- Assess for changes in mentation and behavior- Position to facilitate oxygenation and minimize respiratory effort- Oxygen administered by appropriate delivery if ordered- Initiate smoking cessation education as indicated- Encourage broncho-pulmonary hygiene including cough, deep breathe, Incentive Spirometry- Assess the need for suctioning and aspirate as needed- Assess and instruct to report SOB or any respiratory difficulty- Respiratory Therapy support as indicated  Outcome: Progressing

## 2025-04-18 NOTE — ASSESSMENT & PLAN NOTE
Reported Right sided chest pain. In setting of PNA.  Troponin 9 --> 94 --> 7 --> 5  EKG with normal sinus rhythm and rate of 82 bpm and QTc of 420 ms  Echo EF 75%, normal diastolic function, no regional wall motion abnormality  Chest pain most likely from multifocal pneumonia rather than cardiac which resulted in normalization of troponin  Aspirin 325 mg given once. Continue 81mg for now.    Lisinopril/nifedipine resumed

## 2025-04-18 NOTE — PROGRESS NOTES
Progress Note - Hospitalist   Name: Aleshia Mauricio 59 y.o. female I MRN: [id number]  Unit/Bed#: [place] [address] -02 I Date of Admission: 4/14/2025   Date of Service: 4/18/2025 I Hospital Day: 3    Assessment & Plan  Pneumonia  Multifocal as seen on CTA chest PE protocol  Clinically presented with shortness of breath, cough and chest pain which is most likely pleuritic.  Procalcitonin downtrending.  Positive urine strep penumo. Continue IV ceftriaxone. D/c doxy.  Mucinex, incentive spirometer, Robitussin.  Sepsis (HCC)  SIRS: leukocytosis, tachycardia.  Source: multifocal pneumonia.  Flu, COVID and SARS are negative.  Lactic acid normal.  Procalcitonin downtrending.  S/p 30cc/kg IVF.  Continue IV ceftriaxone D4/5.  Urine streptococcus pneumonia positive  Sputum culture NGTD.  Blood cultures NGTD.  Elevated troponin  Reported Right sided chest pain. In setting of PNA.  Troponin 9 --> 94 --> 7 --> 5  EKG with normal sinus rhythm and rate of 82 bpm and QTc of 420 ms  Echo EF 75%, normal diastolic function, no regional wall motion abnormality  Chest pain most likely from multifocal pneumonia rather than cardiac which resulted in normalization of troponin  Aspirin 325 mg given once. Continue 81mg for now.    Lisinopril/nifedipine resumed  Hypokalemia  Potassium of 3.0.  Continue to replete.  Normalized.  Hypertensive urgency  Blood pressure normalized at this point.  Will resume blood pressure medications at home including lisinopril and nifedipine.  Normalized with home meds.  Hyponatremia  Sodium 133 which is at his baseline.  Secondary to SSRI (Celexa) that he takes.  Resolved.  Hypothyroidism  Continue Synthroid.  Iron deficiency anemia  Recent Labs     04/16/25  0948 04/17/25  0401 04/18/25  0436   HGB 9.4* 8.9* 9.3*   Monitor CBC.  On ferrous sulfate which is resumed.  Brain aneurysm  S/p clipping.  On Keppra.  Continue to exercise seizure precautions.  Chronic midline low back pain with sciatica  Continue home dose of  temazepam and tizanidine.  Exercise fall precaution.  Neuropathy  Continue gabapentin.  Anxiety and depression  On bupropion and Celexa.    VTE Pharmacologic Prophylaxis:   Moderate Risk (Score 3-4) - Pharmacological DVT Prophylaxis Ordered: heparin.    Mobility:   Basic Mobility Inpatient Raw Score: 16  JH-HLM Goal: 5: Stand one or more mins  JH-HLM Achieved: 6: Walk 10 steps or more  JH-HLM Goal achieved. Continue to encourage appropriate mobility.    Patient Centered Rounds: I performed bedside rounds with nursing staff today.   Discussions with Specialists or Other Care Team Provider: RENETTA    Education and Discussions with Family / Patient: Patient declined call to .     Current Length of Stay: 3 day(s)  Current Patient Status: Inpatient   Certification Statement: The patient will continue to require additional inpatient hospital stay due to final day IV abx  Discharge Plan: Anticipate discharge tomorrow to home.    Code Status: Level 1 - Full Code    Subjective   Feeling much improved. GI sx improving.    Objective :  Temp:  [97.4 °F (36.3 °C)-98 °F (36.7 °C)] 98 °F (36.7 °C)  HR:  [63-73] 67  BP: (122-147)/(67-74) 147/73  Resp:  [16] 16  SpO2:  [92 %-95 %] 93 %  O2 Device: None (Room air)    Body mass index is 26.63 kg/m².     Input and Output Summary (last 24 hours):     Intake/Output Summary (Last 24 hours) at 4/18/2025 1421  Last data filed at 4/18/2025 0946  Gross per 24 hour   Intake 1040 ml   Output 1700 ml   Net -660 ml       Physical Exam  Vitals reviewed.   Constitutional:       General: She is not in acute distress.     Appearance: Normal appearance. She is not toxic-appearing.   HENT:      Head: Normocephalic and atraumatic.   Eyes:      General: No scleral icterus.  Cardiovascular:      Rate and Rhythm: Normal rate and regular rhythm.      Heart sounds: Normal heart sounds.   Pulmonary:      Effort: Pulmonary effort is normal. No respiratory distress.      Breath sounds: Normal breath  sounds. No stridor.   Abdominal:      General: Abdomen is flat. Bowel sounds are normal. There is no distension.      Palpations: Abdomen is soft.   Musculoskeletal:         General: Normal range of motion.      Cervical back: Normal range of motion.      Right lower leg: No edema.      Left lower leg: No edema.   Skin:     Coloration: Skin is not jaundiced or pale.   Neurological:      General: No focal deficit present.      Mental Status: She is alert and oriented to person, place, and time. Mental status is at baseline.             Lab Results: I have reviewed the following results:   Results from last 7 days   Lab Units 04/18/25  0436 04/17/25  0401 04/16/25  0948 04/15/25  0615 04/14/25  1732   WBC Thousand/uL 10.65*   < > 13.43*   < > 14.99*   HEMOGLOBIN g/dL 9.3*   < > 9.4*   < > 10.9*   HEMATOCRIT % 29.5*   < > 30.0*   < > 34.8   PLATELETS Thousands/uL 470*   < > 420*   < > 357   SEGS PCT %  --   --   --   --  85*   LYMPHO PCT %  --   --  7*  --  6*   MONO PCT %  --   --  6  --  6   EOS PCT %  --   --  0  --  0    < > = values in this interval not displayed.     Results from last 7 days   Lab Units 04/18/25  0436 04/15/25  0926 04/15/25  0615   SODIUM mmol/L 135   < > 136   POTASSIUM mmol/L 4.0   < > 3.7   CHLORIDE mmol/L 100   < > 101   CO2 mmol/L 28   < > 25   BUN mg/dL 6   < > 10   CREATININE mg/dL 0.54*   < > 0.56*   ANION GAP mmol/L 7   < > 10   CALCIUM mg/dL 8.5   < > 8.0*   ALBUMIN g/dL  --   --  3.0*   TOTAL BILIRUBIN mg/dL  --   --  0.38   ALK PHOS U/L  --   --  103   ALT U/L  --   --  12   AST U/L  --   --  11*   GLUCOSE RANDOM mg/dL 101   < > 158*    < > = values in this interval not displayed.     Results from last 7 days   Lab Units 04/14/25  2058   INR  1.41*     Results from last 7 days   Lab Units 04/18/25  1050 04/18/25  0736 04/17/25  2107 04/17/25  1604 04/17/25  1114 04/17/25  0613 04/16/25  2143 04/16/25  1658 04/16/25  1143 04/16/25  0705 04/15/25  2139 04/15/25  1830   POC GLUCOSE  mg/dl 139 101 142* 126 118 100 109 117 105 116 122 124     Results from last 7 days   Lab Units 04/15/25  0615   HEMOGLOBIN A1C % 6.8*     Results from last 7 days   Lab Units 04/18/25  0436 04/17/25  0401 04/16/25  0518 04/15/25  0217   LACTIC ACID mmol/L  --   --   --  0.7   PROCALCITONIN ng/ml 0.39* 0.55* 0.86* 1.33*       Recent Cultures (last 7 days):   Results from last 7 days   Lab Units 04/16/25  1207 04/16/25  0944 04/15/25  0217   BLOOD CULTURE   --   --  No Growth at 72 hrs.  No Growth at 72 hrs.   SPUTUM CULTURE   --  1+ Growth of  --    GRAM STAIN RESULT   --  1+ Epithelial cells per low power field  Rare Polys  No organisms seen  --    LEGIONELLA URINARY ANTIGEN  Negative  --   --        Imaging Results Review: No pertinent imaging studies reviewed.  Other Study Results Review: Pathology reports reviewed.    Last 24 Hours Medication List:     Current Facility-Administered Medications:     acetaminophen (TYLENOL) tablet 650 mg, Q6H PRN    umeclidinium 62.5 mcg/actuation inhaler AEPB 1 puff, Daily **AND** albuterol (PROVENTIL HFA,VENTOLIN HFA) inhaler 2 puff, 4x Daily    aspirin chewable tablet 81 mg, Daily    buPROPion (WELLBUTRIN XL) 24 hr tablet 150 mg, Daily    ceftriaxone (ROCEPHIN) 1 g/50 mL in dextrose IVPB, Q24H, Last Rate: 1,000 mg (04/18/25 0228)    citalopram (CeleXA) tablet 40 mg, Daily    dextromethorphan-guaiFENesin (ROBITUSSIN DM) oral syrup 10 mL, Q6H PRN    ferrous sulfate tablet 325 mg, Q48H    gabapentin (NEURONTIN) capsule 300 mg, 4x Daily    guaiFENesin (MUCINEX) 12 hr tablet 600 mg, Q12H TASHA    heparin (porcine) subcutaneous injection 5,000 Units, Q8H TASHA **AND** [CANCELED] Platelet count, Once    insulin lispro (HumALOG/ADMELOG) 100 units/mL subcutaneous injection 1-5 Units, TID AC **AND** Fingerstick Glucose (POCT), TID AC    insulin lispro (HumALOG/ADMELOG) 100 units/mL subcutaneous injection 1-5 Units, HS    labetalol (NORMODYNE) injection 10 mg, Q6H PRN    lactobacillus  acidophilus-bulgaricus (FLORANEX) packet 1 packet, BID With Meals    levETIRAcetam (KEPPRA) tablet 1,500 mg, BID    levothyroxine tablet 100 mcg, Early Morning    lisinopril (ZESTRIL) tablet 10 mg, After Breakfast    loperamide (IMODIUM) capsule 2 mg, BID PRN    LORazepam (ATIVAN) tablet 0.5 mg, Q6H PRN    lubiprostone (AMITIZA) capsule 24 mcg, BID With Meals    methocarbamol (ROBAXIN) tablet 500 mg, TID PRN    morphine injection 2 mg, Q6H PRN    nicotine (NICODERM CQ) 7 mg/24hr TD 24 hr patch 1 patch, Daily    NIFEdipine (PROCARDIA XL) 24 hr tablet 60 mg, Daily    nitroglycerin (NITROSTAT) SL tablet 0.4 mg, Q5 Min PRN    oxyCODONE (ROXICODONE) IR tablet 5 mg, Q6H PRN    pantoprazole (PROTONIX) EC tablet 40 mg, Daily Before Breakfast    polyethylene glycol (MIRALAX) packet 17 g, Daily PRN    senna-docusate sodium (SENOKOT S) 8.6-50 mg per tablet 1 tablet, HS    SUMAtriptan (IMITREX) tablet 100 mg, Once PRN    tamsulosin (FLOMAX) capsule 0.4 mg, Daily With Dinner    temazepam (RESTORIL) capsule 22.5 mg, HS    tiZANidine (ZANAFLEX) tablet 2 mg, BID PRN    Administrative Statements   Today, Patient Was Seen By: Marsha Costa PA-C    **Please Note: This note may have been constructed using a voice recognition system.**

## 2025-04-18 NOTE — ASSESSMENT & PLAN NOTE
SIRS: leukocytosis, tachycardia.  Source: multifocal pneumonia.  Flu, COVID and SARS are negative.  Lactic acid normal.  Procalcitonin downtrending.  S/p 30cc/kg IVF.  Continue IV ceftriaxone D4/5.  Urine streptococcus pneumonia positive  Sputum culture NGTD.  Blood cultures NGTD.

## 2025-04-18 NOTE — ASSESSMENT & PLAN NOTE
Multifocal as seen on CTA chest PE protocol  Clinically presented with shortness of breath, cough and chest pain which is most likely pleuritic.  Procalcitonin downtrending.  Positive urine strep penumo. Continue IV ceftriaxone. D/c doxy.  Mucinex, incentive spirometer, Robitussin.

## 2025-04-18 NOTE — CASE MANAGEMENT
Case Management Assessment & Discharge Planning Note    Patient name Aleshia Mauricio  Location [facility] M* MRN [id number]  : 1965 Date 2025       Current Admission Date: 2025  Current Admission Diagnosis:Pneumonia   Patient Active Problem List    Diagnosis Date Noted Date Diagnosed    Sepsis (HCC) 04/15/2025     Elevated troponin 04/15/2025     Pneumonia 04/15/2025     Neuropathy 04/15/2025     Anxiety and depression 04/15/2025     Yeast infection 2024     Brain aneurysm 2020     Hypothyroidism 2020     Iron deficiency anemia 2020     Delusion (HCC) 2020     Chronic midline low back pain with sciatica 09/15/2020     Intractable low back pain 09/15/2020     Hypokalemia 09/15/2020     Hyponatremia 09/15/2020     Ambulatory dysfunction 09/15/2020     Hypertensive urgency 09/15/2020     Pain in left hip 2019       LOS (days): 3  Geometric Mean LOS (GMLOS) (days): 4.9  Days to GMLOS:1.4     OBJECTIVE:    Risk of Unplanned Readmission Score: 13.65         Current admission status: Inpatient       Preferred Pharmacy:   [facility]  [address]  [facility] [address]  Phone: [phone] Fax: [phone]    Primary Care Provider: Douglas C Shoenberger, MD    Primary Insurance: [employer] RE[employer]  Secondary Insurance: [place] [employer] [facility] [employer] [facility]    ASSESSMENT:  Active Health Care Proxies    There are no active Health Care Proxies on file.       Advance Directives  Does patient have a Health Care POA?: No  Does patient have Advance Directives?: Yes  Advance Directives: Living will  Primary Contact: Milena Mauricio (Daughter)  [phone] (Mobile)      Readmission Root Cause  30 Day Readmission: No    Patient Information  Admitted from:: Home  Mental Status: Alert  During Assessment patient was accompanied by: Not accompanied during assessment  Assessment information provided by::  Patient  Primary Caregiver: Child (HHA waiver, 76 hours per week)  Caregiver's Name:: Milena Mauricio (Daughter)  Caregiver's Relationship to Patient:: Family Member  Caregiver's Telephone Number:: [phone] (Mobile)  Support Systems: Self, Daughter (Lives with 2 daughters)  County of Residence: [place]  What city do you live in?: [place]  Home entry access options. Select all that apply.: Stairs  Number of steps to enter home.: 5  Type of Current Residence: [facility]  Living Arrangements: Lives w/ Daughter (Lives with 3 daughters)  Is patient a ?: No    Activities of Daily Living Prior to Admission  Functional Status: Assistance (Utilizes arm crutches -- dtrs are patient's HHAs.)  Completes ADLs independently?: No  Level of ADL dependence: Assistance  Ambulates independently?: No  Level of ambulatory dependence: Assistance (Arm Crutches)  Does patient use assisted devices?: Yes  Assisted Devices (DME) used: Stair Chair/Glide, Other (Comment) (Arm crutches, pain pump (diludid))  Does patient currently own DME?: Yes  What DME does the patient currently own?: Other (Comment), Stair Chair/Glide (Arm crutches, Diludid Pain pump)  Does patient have a history of Outpatient Therapy (PT/OT)?: No  Does the patient have a history of Short-Term Rehab?: No  Does patient have a history of HHC?: Yes  Does patient currently have HHC?: No      Patient Information Continued  Income Source: SSI/SSD  Does patient have prescription coverage?: Yes  Can the patient afford their medications and any related supplies (such as glucometers or test strips)?: Yes  Does patient receive dialysis treatments?: No  Does patient have a history of substance abuse?: No  Does patient have a history of Mental Health Diagnosis?: No      Means of Transportation  Means of Transport to Appts:: Family transport (Patient's daughters)    DISCHARGE DETAILS:    Discharge planning discussed with:: Patient and patient's daughter- Milena, via phone  contact  Freedom of Choice: Yes  Comments - Freedom of Choice: CM reviewed with patient IP therapy recommendations for STR; patient declined and requested follow up with HHC. Patient reports her daughters are patient's HHAs.  CM contacted family/caregiver?: Yes (Patient's daugtherMilena via phone contact.)  Were Treatment Team discharge recommendations reviewed with patient/caregiver?: Yes  Did patient/caregiver verbalize understanding of patient care needs?: N/A- going to facility       Contacts  Patient Contacts: Milena Mauricio  Relationship to Patient:: Family  Contact Method: Phone  Phone Number: [phone]  Reason/Outcome: Emergency Contact, Discharge Planning      DME Referral Provided  Referral made for DME?: No    Other Referral/Resources/Interventions Provided:  Interventions: HHC  Referral Comments: CM made referral in [place] for HHC.        Treatment Team Recommendation: Home, Home with Home Health Care  Discharge Destination Plan:: Home with Home Health Care, Home             Additional Comments: CM spoke with patient at bedside to compelte assessment; patient alert. Per patient's request, CM contacted patient's daughterMilena via phone contact. Patient confirmed resides with daughters and are patient's HHA (waiver, 76 hours per week). Patient confirmed has pain pump and is managed by  pain clinic. CM reviewed IP therapy recommendations for STR. Patient declined STR; would like to return home with HHC and daughters providing HHA. CM made referral in Aidin for HHC. CM to follow for further discharge planning needs.

## 2025-04-18 NOTE — ASSESSMENT & PLAN NOTE
Blood pressure normalized at this point.  Will resume blood pressure medications at home including lisinopril and nifedipine.  Normalized with home meds.

## 2025-04-19 VITALS
BODY MASS INDEX: 26.52 KG/M2 | SYSTOLIC BLOOD PRESSURE: 152 MMHG | RESPIRATION RATE: 16 BRPM | DIASTOLIC BLOOD PRESSURE: 76 MMHG | WEIGHT: 165 LBS | OXYGEN SATURATION: 94 % | HEIGHT: 66 IN | HEART RATE: 73 BPM | TEMPERATURE: 97.4 F

## 2025-04-19 LAB
ANION GAP SERPL CALCULATED.3IONS-SCNC: 9 MMOL/L (ref 4–13)
BUN SERPL-MCNC: 5 MG/DL (ref 5–25)
CALCIUM SERPL-MCNC: 8.3 MG/DL (ref 8.4–10.2)
CHLORIDE SERPL-SCNC: 102 MMOL/L (ref 96–108)
CO2 SERPL-SCNC: 26 MMOL/L (ref 21–32)
CREAT SERPL-MCNC: 0.6 MG/DL (ref 0.6–1.3)
ERYTHROCYTE [DISTWIDTH] IN BLOOD BY AUTOMATED COUNT: 16.1 % (ref 11.6–15.1)
GFR SERPL CREATININE-BSD FRML MDRD: 99 ML/MIN/1.73SQ M
GLUCOSE SERPL-MCNC: 105 MG/DL (ref 65–140)
GLUCOSE SERPL-MCNC: 145 MG/DL (ref 65–140)
GLUCOSE SERPL-MCNC: 87 MG/DL (ref 65–140)
HCT VFR BLD AUTO: 30.3 % (ref 34.8–46.1)
HGB BLD-MCNC: 9.7 G/DL (ref 11.5–15.4)
MAGNESIUM SERPL-MCNC: 1.4 MG/DL (ref 1.9–2.7)
MCH RBC QN AUTO: 28.6 PG (ref 26.8–34.3)
MCHC RBC AUTO-ENTMCNC: 32 G/DL (ref 31.4–37.4)
MCV RBC AUTO: 89 FL (ref 82–98)
PLATELET # BLD AUTO: 550 THOUSANDS/UL (ref 149–390)
PMV BLD AUTO: 9.6 FL (ref 8.9–12.7)
POTASSIUM SERPL-SCNC: 4 MMOL/L (ref 3.5–5.3)
RBC # BLD AUTO: 3.39 MILLION/UL (ref 3.81–5.12)
SODIUM SERPL-SCNC: 137 MMOL/L (ref 135–147)
WBC # BLD AUTO: 10.45 THOUSAND/UL (ref 4.31–10.16)

## 2025-04-19 PROCEDURE — 80048 BASIC METABOLIC PNL TOTAL CA: CPT | Performed by: PHYSICIAN ASSISTANT

## 2025-04-19 PROCEDURE — 99239 HOSP IP/OBS DSCHRG MGMT >30: CPT

## 2025-04-19 PROCEDURE — 82948 REAGENT STRIP/BLOOD GLUCOSE: CPT

## 2025-04-19 PROCEDURE — 85027 COMPLETE CBC AUTOMATED: CPT | Performed by: PHYSICIAN ASSISTANT

## 2025-04-19 PROCEDURE — 83735 ASSAY OF MAGNESIUM: CPT

## 2025-04-19 RX ORDER — GUAIFENESIN/DEXTROMETHORPHAN 100-10MG/5
10 SYRUP ORAL EVERY 6 HOURS PRN
Qty: 473 ML | Refills: 0 | Status: SHIPPED | OUTPATIENT
Start: 2025-04-19

## 2025-04-19 RX ORDER — LACTOBACILLUS ACIDOPHILUS / LACTOBACILLUS BULGARICUS 100 MILLION CFU STRENGTH
1 GRANULES ORAL 2 TIMES DAILY WITH MEALS
Qty: 60 PACKET | Refills: 0 | Status: SHIPPED | OUTPATIENT
Start: 2025-04-19

## 2025-04-19 RX ORDER — BISMUTH TRIBROMOPH/PETROLATUM 4" X 4"
1 BANDAGE TOPICAL EVERY OTHER DAY
Qty: 10 EACH | Refills: 0 | Status: SHIPPED | OUTPATIENT
Start: 2025-04-19

## 2025-04-19 RX ADMIN — GABAPENTIN 300 MG: 300 CAPSULE ORAL at 08:14

## 2025-04-19 RX ADMIN — LORAZEPAM 0.5 MG: 0.5 TABLET ORAL at 02:41

## 2025-04-19 RX ADMIN — ASPIRIN 81 MG: 81 TABLET, CHEWABLE ORAL at 08:14

## 2025-04-19 RX ADMIN — LEVOTHYROXINE SODIUM 100 MCG: 100 TABLET ORAL at 05:19

## 2025-04-19 RX ADMIN — PANTOPRAZOLE SODIUM 40 MG: 40 TABLET, DELAYED RELEASE ORAL at 05:19

## 2025-04-19 RX ADMIN — LEVETIRACETAM 1500 MG: 750 TABLET, FILM COATED ORAL at 08:14

## 2025-04-19 RX ADMIN — LUBIPROSTONE 24 MCG: 24 CAPSULE, GELATIN COATED ORAL at 08:15

## 2025-04-19 RX ADMIN — GABAPENTIN 300 MG: 300 CAPSULE ORAL at 11:41

## 2025-04-19 RX ADMIN — CITALOPRAM HYDROBROMIDE 40 MG: 20 TABLET ORAL at 08:14

## 2025-04-19 RX ADMIN — HEPARIN SODIUM 5000 UNITS: 5000 INJECTION INTRAVENOUS; SUBCUTANEOUS at 05:19

## 2025-04-19 RX ADMIN — UMECLIDINIUM 1 PUFF: 62.5 AEROSOL, POWDER ORAL at 08:22

## 2025-04-19 RX ADMIN — Medication 1000 MG: at 02:32

## 2025-04-19 RX ADMIN — NIFEDIPINE 60 MG: 60 TABLET, FILM COATED, EXTENDED RELEASE ORAL at 08:14

## 2025-04-19 RX ADMIN — LACTOBACILLUS ACIDOPHILUS / LACTOBACILLUS BULGARICUS 1 PACKET: 100 MILLION CFU STRENGTH GRANULES at 08:14

## 2025-04-19 RX ADMIN — ALBUTEROL SULFATE 2 PUFF: 90 AEROSOL, METERED RESPIRATORY (INHALATION) at 11:42

## 2025-04-19 RX ADMIN — TIZANIDINE 2 MG: 2 TABLET ORAL at 02:44

## 2025-04-19 RX ADMIN — GUAIFENESIN 600 MG: 600 TABLET ORAL at 08:14

## 2025-04-19 RX ADMIN — FERROUS SULFATE TAB 325 MG (65 MG ELEMENTAL FE) 325 MG: 325 (65 FE) TAB at 02:45

## 2025-04-19 RX ADMIN — BUPROPION HYDROCHLORIDE 150 MG: 150 TABLET, EXTENDED RELEASE ORAL at 08:14

## 2025-04-19 RX ADMIN — OXYCODONE 5 MG: 5 TABLET ORAL at 12:38

## 2025-04-19 RX ADMIN — MORPHINE SULFATE 2 MG: 2 INJECTION, SOLUTION INTRAMUSCULAR; INTRAVENOUS at 08:14

## 2025-04-19 RX ADMIN — LISINOPRIL 10 MG: 10 TABLET ORAL at 08:14

## 2025-04-19 NOTE — DISCHARGE SUMMARY
Discharge Summary - Hospitalist   Name: Aleshia Mauricio 59 y.o. female I MRN: [id number]  Unit/Bed#: [place] [address] -02 I Date of Admission: 4/14/2025   Date of Service: 4/19/2025 I Hospital Day: 4     Assessment & Plan  Pneumonia  Multifocal as seen on CTA chest PE protocol  Clinically presented with shortness of breath, cough and chest pain which is most likely pleuritic.  Procalcitonin downtrending.  Positive urine strep penumo. Completed 5 days IV ceftriaxone. D/c doxy.  Mucinex, incentive spirometer, Robitussin.  Sepsis (HCC)  SIRS: leukocytosis, tachycardia.  Source: multifocal pneumonia.  Flu, COVID and SARS are negative.  Lactic acid normal.  Procalcitonin downtrending.  S/p 30cc/kg IVF.  Complete IV ceftriaxone D5/5.  Urine streptococcus pneumonia positive  Sputum culture NGTD.  Blood cultures NGTD.  Elevated troponin  Reported Right sided chest pain. In setting of PNA.  Troponin 9 --> 94 --> 7 --> 5  EKG with normal sinus rhythm and rate of 82 bpm and QTc of 420 ms  Echo EF 75%, normal diastolic function, no regional wall motion abnormality  Chest pain most likely from multifocal pneumonia rather than cardiac which resulted in normalization of troponin  Aspirin 325 mg given once. Continue 81mg for now.    Lisinopril/nifedipine resumed  Hypokalemia  Potassium of 3.0.  Continue to replete.  Normalized.  Hypertensive urgency  Blood pressure normalized at this point.  Will resume blood pressure medications at home including lisinopril and nifedipine.  Normalized with home meds.  Hyponatremia  Sodium 133 which is at his baseline.  Secondary to SSRI (Celexa) that he takes.  Resolved.  Hypothyroidism  Continue Synthroid.  Iron deficiency anemia  Recent Labs     04/17/25  0401 04/18/25  0436 04/19/25  0505   HGB 8.9* 9.3* 9.7*   Monitor CBC.  On ferrous sulfate which is resumed.  Brain aneurysm  S/p clipping.  On Keppra.  Continue to exercise seizure precautions.  Chronic midline low back pain with sciatica  Continue  home dose of temazepam and tizanidine.  Exercise fall precaution.  Neuropathy  Continue gabapentin.  Anxiety and depression  On bupropion and Celexa.     Medical Problems       Resolved Problems  Date Reviewed: 4/15/2025   None       Discharging Physician / Practitioner: Marsha Costa PA-C  PCP: Douglas C Shoenberger, MD  Admission Date:   Admission Orders (From admission, onward)       Ordered        04/15/25 0313  INPATIENT ADMISSION  Once                          Discharge Date: 04/19/25    Consultations During Hospital Stay:  None    Procedures Performed:   XR chest 2 views  Result Date: 4/15/2025  Impression: Patchy opacities in the right mid lung and left lower lobe, likely reflecting atelectasis versus pneumonia. Workstation performed: [id number]     CTA chest pe study  Result Date: 4/15/2025  Impression: 1. Findings consistent with multifocal pneumonia. 2. No evidence of acute pulmonary embolus, thoracic. Workstation performed: [id number]     CT head without contrast  Result Date: 4/15/2025  Impression: 1. No evidence of acute intracranial hemorrhage or mass effect. 2. Extensive paranasal sinus disease. Workstation performed: [id number]     Significant Findings / Test Results:   None    Incidental Findings:        Test Results Pending at Discharge (will require follow up):   None     Outpatient Tests Requested:  none    Complications:  none    Reason for Admission: PNA    Hospital Course:   Aleshia Mauricio is a 59 y.o. female patient who originally presented to the hospital on 4/14/2025 COPD, cerebral aneurysm s/p clipping, IVC filter, Dilaudid pain pump, anemia, depression, neuropathy, constipation, seizure order and hypothyroidism. Came with a complaint of syncope which occurred while she was at home. Says she has chronic headache but this time was associated with fogginess before she lost consciousness. Says she hit her head when she fell and the syncope was unwitnessed. Endorsed having some right-sided  "chest pain and shortness of breath. Positive cough since this morning. With the constellation of symptoms above, she came in to be evaluated. In the ED, she was found to be hypertensive with a blood pressure of 181/84 and heart rate of 105 bpm. Vitals otherwise stable. Labs show leukocytosis of 15,000 otherwise mild anemia with H&H of 11/35. CMP with a sodium of 133 (chronic and at baseline), potassium of 3.0, chloride 93, glucose of 172. Troponin initially was normal at 9 and increased to 94 with subsequent repeat and normal at 7 and 5. With initial elevation of D-dimer at 4.12 and second troponin of 94, heparin GTT was initiated and CTA chest was ordered. Shows no PE but rather multifocal pneumonia therefore heparin GTT was discontinued. Other labs include elevated alk phos of 128, procalcitonin of 1.13, INR of 1.41 otherwise flu, COVID, SARS and lactic acid are all within normal limits. EKG is normal sinus rhythm at a rate of 82 bpm and QTc of 420 ms. Blood cultures were drawn x 2. Rocephin and doxycycline were given as well as a dose of Tylenol and Benadryl.     She did continue with 5 days of IV Rocephin. Improved at time of discharge.    Please see above list of diagnoses and related plan for additional information.     Condition at Discharge: stable    Discharge Day Visit / Exam:   Subjective:  Feeling much improved today  Vitals: Blood Pressure: 152/76 (04/19/25 0747)  Pulse: 73 (04/19/25 07)  Temperature: (!) 97.4 °F (36.3 °C) (04/19/25 07)  Temp Source: Oral (04/18/25 2015)  Respirations: 16 (04/19/25 07)  Height: 5' 6\" (167.6 cm) (04/15/25 1334)  Weight - Scale: 74.8 kg (165 lb) (04/15/25 1334)  SpO2: 94 % (04/19/25 07)  Physical Exam  Vitals reviewed.   Constitutional:       General: She is not in acute distress.     Appearance: Normal appearance. She is not toxic-appearing.   HENT:      Head: Normocephalic and atraumatic.   Eyes:      General: No scleral icterus.  Cardiovascular:      Rate and " Rhythm: Normal rate and regular rhythm.      Heart sounds: Normal heart sounds.   Pulmonary:      Effort: Pulmonary effort is normal. No respiratory distress.      Breath sounds: Normal breath sounds. No stridor.   Abdominal:      General: Abdomen is flat. Bowel sounds are normal. There is no distension.      Palpations: Abdomen is soft.   Musculoskeletal:         General: Normal range of motion.      Cervical back: Normal range of motion.      Right lower leg: No edema.      Left lower leg: No edema.   Skin:     Coloration: Skin is not jaundiced or pale.   Neurological:      General: No focal deficit present.      Mental Status: She is alert and oriented to person, place, and time. Mental status is at baseline.          Discussion with Family: Updated  (daughter) via phone.    Discharge instructions/Information to patient and family:   See after visit summary for information provided to patient and family.      Provisions for Follow-Up Care:  See after visit summary for information related to follow-up care and any pertinent home health orders.      Mobility at time of Discharge:   Basic Mobility Inpatient Raw Score: 15  JH-HLM Goal: 4: Move to chair/commode  JH-HLM Achieved: 4: Move to chair/commode  HLM Goal achieved. Continue to encourage appropriate mobility.     Disposition:   Home with VNA Services (Reminder: Complete face to face encounter)    Planned Readmission: No    Discharge Medications:  See after visit summary for reconciled discharge medications provided to patient and/or family.      Administrative Statements   Discharge Statement:  I have spent a total time of 45 minutes in caring for this patient on the day of the visit/encounter. >30 minutes of time was spent on: Diagnostic results, Prognosis, Risks and benefits of tx options, Instructions for management, Counseling / Coordination of care, Documenting in the medical record, Reviewing / ordering tests, medicine, procedures  , and  Communicating with other healthcare professionals .    **Please Note: This note may have been constructed using a voice recognition system**

## 2025-04-19 NOTE — ASSESSMENT & PLAN NOTE
Multifocal as seen on CTA chest PE protocol  Clinically presented with shortness of breath, cough and chest pain which is most likely pleuritic.  Procalcitonin downtrending.  Positive urine strep penumo. Completed 5 days IV ceftriaxone. D/c doxy.  Mucinex, incentive spirometer, Robitussin.

## 2025-04-19 NOTE — ASSESSMENT & PLAN NOTE
Recent Labs     04/17/25  0401 04/18/25  0436 04/19/25  0505   HGB 8.9* 9.3* 9.7*   Monitor CBC.  On ferrous sulfate which is resumed.

## 2025-04-19 NOTE — PROGRESS NOTES
Patient:    MRN:  [id number]    Lily Request ID:  [id number]    Level of care reserved:  Home Health Agency    Partner Reserved:  [facility], [facility] - [place] [address] [phone]    Clinical needs requested:    Geography searched:  [id number]    Start of Service:    Request sent:  4:26pm EDT on 4/18/2025 by Rebecca Denise    Partner reserved:  12:54pm EDT on 4/19/2025 by Emily Kuo    Choice list shared:  12:54pm EDT on 4/19/2025 by Emily Kuo

## 2025-04-19 NOTE — DISCHARGE INSTR - AVS FIRST PAGE
Dear Aleshia Mauricio,     It was our pleasure to care for you here at [facility].  It is our hope that we were always able to exceed the expected standards for your care during your stay.  You were hospitalized due to Pneumonia.  You were cared for on the 2S floor by Marsha Costa PA-C under the service of Sagar Blackwell Pe* with the [facility] who covers for your primary care physician (PCP), Douglas C Shoenberger, MD, while you were hospitalized.  If you have any questions or concerns related to this hospitalization, you may contact us at .  For follow up as well as any medication refills, we recommend that you follow up with your primary care physician.  A registered nurse will reach out to you by phone within a few days after your discharge to answer any additional questions that you may have after going home.  However, at this time we provide for you here, the most important instructions / recommendations at discharge:     Notable Medication Adjustments -   Continue with robitussin and supportive measures for symptom relief  Continue with probiotics due to antibiotic use in hospital  Testing Required after Discharge -   Follow up with PCP and pain management  ** Please contact your PCP to request testing orders for any of the testing recommended here **  Please review this entire after visit summary as additional general instructions including medication list, appointments, activity, diet, any pertinent wound care, and other additional recommendations from your care team that may be provided for you.      Sincerely,     Marsha Costa PA-C

## 2025-04-19 NOTE — CASE MANAGEMENT
Case Management Discharge Planning Note    Patient name Aleshia Mauricio  Location [place] [address] /South 2 M* MRN [id number]  : 1965 Date 2025       Current Admission Date: 2025  Current Admission Diagnosis:Pneumonia   Patient Active Problem List    Diagnosis Date Noted Date Diagnosed    Sepsis (HCC) 04/15/2025     Elevated troponin 04/15/2025     Pneumonia 04/15/2025     Neuropathy 04/15/2025     Anxiety and depression 04/15/2025     Yeast infection 2024     Brain aneurysm 2020     Hypothyroidism 2020     Iron deficiency anemia 2020     Delusion (HCC) 2020     Chronic midline low back pain with sciatica 09/15/2020     Intractable low back pain 09/15/2020     Hypokalemia 09/15/2020     Hyponatremia 09/15/2020     Ambulatory dysfunction 09/15/2020     Hypertensive urgency 09/15/2020     Pain in left hip 2019       LOS (days): 4  Geometric Mean LOS (GMLOS) (days): 4.9  Days to GMLOS:0.5     OBJECTIVE:  Risk of Unplanned Readmission Score: 21.02      Current admission status: Inpatient   Preferred Pharmacy:   [facility] [place], [place] - [address]  [address] [facility] [address]  Phone: [phone] Fax: [phone]    [id number] [place], [place] - [address] [address]  Phone: [phone] Fax: [phone]    Primary Care Provider: Douglas C Shoenberger, MD    Primary Insurance: [employer] RE[employer]  Secondary Insurance: [place] [employer] [facility]    DISCHARGE DETAILS:    Discharge planning discussed with:: pt  Freedom of Choice: Yes     Were Treatment Team discharge recommendations reviewed with patient/caregiver?: Yes  Did patient/caregiver verbalize understanding of patient care needs?: No  Were patient/caregiver advised of the risks associated with not following Treatment Team discharge recommendations?: Yes    Requested Home Health Care         Is the patient interested  in [facility] at discharge?: Yes  Home Health Discipline requested:: Nursing, Occupational Therapy, Physical Therapy  Home Health Agency Name:: [facility]  HHA External Referral Reason (only applicable if external HHA name selected): [facility] does not accept patient's insurance  Home Health Follow-Up Provider:: PCP  Home Health Services Needed:: Evaluate Functional Status and Safety, Strengthening/Theraputic Exercises to Improve Function, Gait/ADL Training  Homebound Criteria Met:: Requires the Assistance of Another Person for Safe Ambulation or to Leave the Home, Uses an Assist Device (i.e. cane, walker, etc)  Supporting Clincal Findings:: Limited Endurance, Fatigues Easliy in Short Distances    DME Referral Provided  Referral made for DME?: No    Other Referral/Resources/Interventions Provided:  Interventions: [facility]    Treatment Team Recommendation: Home, Home with Home Health Care  Discharge Destination Plan:: Home with Home Health Care  Transport at Discharge : Family     Additional Comments: RENETTA spoke with pt that [facility] cannot accept her due to her insurance plan not being in network. SHe was agreeable to [facility]. Cm reserved on Aidin.

## 2025-04-19 NOTE — ASSESSMENT & PLAN NOTE
SIRS: leukocytosis, tachycardia.  Source: multifocal pneumonia.  Flu, COVID and SARS are negative.  Lactic acid normal.  Procalcitonin downtrending.  S/p 30cc/kg IVF.  Complete IV ceftriaxone D5/5.  Urine streptococcus pneumonia positive  Sputum culture NGTD.  Blood cultures NGTD.

## 2025-04-19 NOTE — PROGRESS NOTES
Patient:    MRN:  [id number]    Lily Request ID:  [id number]    Level of care reserved:  Home Health Agency    Partner Reserved:  [facility], [facility] [place] [address] [phone]    Clinical needs requested:    Geography searched:  [id number]    Start of Service:    Request sent:  4:26pm EDT on 4/18/2025 by Rebecca Denise    Partner reserved:  12:54pm EDT on 4/19/2025 by Emily Kuo    Choice list shared:  12:54pm EDT on 4/19/2025 by Emily Kuo

## 2025-04-20 LAB
BACTERIA BLD CULT: NORMAL
BACTERIA BLD CULT: NORMAL

## 2025-04-22 NOTE — UTILIZATION REVIEW
NOTIFICATION OF ADMISSION DISCHARGE   This is a Notification of Discharge from [facility]. Please be advised that this patient has been discharge from our facility. Below you will find the admission and discharge date and time including the patient’s disposition.   UTILIZATION REVIEW CONTACT:  Utilization Review Assistants  Network Utilization Review Department  Phone: [phone] x carefully listen to the prompts. All voicemails are confidential.  Email: [email or web address]     ADMISSION INFORMATION  PRESENTATION DATE: 4/14/2025  4:59 PM  OBERVATION ADMISSION DATE: N/A  INPATIENT ADMISSION DATE: 4/15/25  3:13 AM   DISCHARGE DATE: 4/19/2025  2:40 PM   DISPOSITION:Home with Home Health Care    [facility] Utilization Review Department  ATTENTION: Please call with any questions or concerns to [phone] and carefully listen to the prompts so that you are directed to the right person. All voicemails are confidential.   For Discharge needs, contact Care Management DC Support Team at [phone] opt. 2  Send all requests for admission clinical reviews, approved or denied determinations and any other requests to dedicated fax number below belonging to the campus where the patient is receiving treatment. List of dedicated fax numbers for the Facilities:  FACILITY NAME UR FAX NUMBER   ADMISSION DENIALS (Administrative/Medical Necessity) [phone]   DISCHARGE SUPPORT TEAM ([facility]) [phone]   PARENT CHILD HEALTH (Maternity/NICU/Pediatrics) [phone]   [facility] [phone]   [facility] [phone]   [facility] [phone]   [facility] [phone]   [facility] [phone]   [facility] [phone]   General acute hospital [phone]   [facility] [phone]  "  [facility] [phone]   [facility] [phone]   [facility] [phone]   [facility] [phone]           Medication List      START taking these medications     dextromethorphan-guaiFENesin  mg/5 mL syrup; Commonly known as:   ROBITUSSIN DM; Take 10 mL by mouth every 6 (six) hours as needed for cough   lactobacillus acidophilus-bulgaricus packet; Take 1 packet by mouth 2   (two) times a day with meals   SiliGentle Foam Dressing 6\"X6\" Pads; Use 1 Pad every other day Or when   soilage, replace pad. Apply to sacrum after cleansing with soap and water   and patting dry.     CONTINUE taking these medications     acetaminophen 500 mg tablet; Commonly known as: TYLENOL; Take 2 tablets   (1,000 mg total) by mouth every 6 (six) hours as needed for mild pain or   moderate pain   ALLERGY MED PO   buPROPion 150 mg 12 hr tablet; Commonly known as: WELLBUTRIN SR   citalopram 40 mg tablet; Commonly known as: CeleXA   Combivent Respimat inhaler; Generic drug: ipratropium-albuterol   CRANBERRY PO   cyanocobalamin 1,000 mcg/mL   EPINEPHrine 0.3 mg/0.3 mL Soaj; Commonly known as: EPIPEN; Inject 0.3 mL   (0.3 mg total) into a muscle once for 1 dose   gabapentin 300 mg capsule; Commonly known as: NEURONTIN   levETIRAcetam 500 mg tablet; Commonly known as: KEPPRA   levothyroxine 100 mcg tablet   LORazepam 1 mg tablet; Commonly known as: ATIVAN; Take 1 tablet (1 mg   total) by mouth 4 (four) times a day for 10 days   lubiprostone 24 mcg capsule; Commonly known as: AMITIZA   methocarbamol 500 mg tablet; Commonly known as: ROBAXIN; Take 1 tablet   (500 mg total) by mouth 3 (three) times a day as needed for muscle spasms   pantoprazole 40 mg tablet; Commonly known as: PROTONIX; Take 1 tablet   (40 mg total) by mouth daily in the early morning   senna-docusate sodium 8.6-50 mg per tablet; Commonly known " as: SENOKOT   S; Take 1 tablet by mouth daily at bedtime   tamsulosin 0.4 mg; Commonly known as: FLOMAX; Take 1 capsule (0.4 mg   total) by mouth daily with dinner   temazepam 22.5 MG capsule; Commonly known as: RESTORIL   tiZANidine 2 mg tablet; Commonly known as: ZANAFLEX; Take 1 tablet (2 mg   total) by mouth 2 (two) times a day as needed for muscle spasms for up to   3 days     ASK your doctor about these medications     ferrous sulfate 325 (65 Fe) mg tablet; Take 1 tablet (325 mg total) by   mouth every other day   lisinopril 10 mg tablet; Commonly known as: ZESTRIL; Take 1 tablet (10   mg total) by mouth daily after breakfast   NIFEdipine ER 60 MG 24 hr tablet; Commonly known as: ADALAT CC; Take 1   tablet (60 mg total) by mouth daily

## 2025-05-06 ENCOUNTER — TELEPHONE (OUTPATIENT)
Dept: CASE MANAGEMENT | Facility: HOSPITAL | Age: 60
End: 2025-05-06

## 2025-05-06 NOTE — TELEPHONE ENCOUNTER
CM spoke with patient via phone (PH: [phone]) patient confirmed received letter discontinuing SNAP benefits.   CM explained did not complete any documentation in regards to SNAP benefits. CM provided email address for patient to provide letter to attempt to further assist. Patient agreeable and understood. Patient confirmed has [employer] benefits. CM inquired on patient independent Primary Care Physician (Dr. Douglas Shoenberger); CM was to attempt to provide patient with OP CM. Patient confirmed will provide CM with letter; CM will assist as able. Patient confirmed will attempt to complete appeal process for benefits. CM to follow for further assistance.

## 2025-05-15 PROBLEM — A41.9 SEPSIS (HCC): Status: RESOLVED | Noted: 2025-04-15 | Resolved: 2025-05-15

## 2025-05-15 PROBLEM — J18.9 PNEUMONIA: Status: RESOLVED | Noted: 2025-04-15 | Resolved: 2025-05-15
